# Patient Record
Sex: MALE | Race: WHITE | NOT HISPANIC OR LATINO | Employment: OTHER | URBAN - METROPOLITAN AREA
[De-identification: names, ages, dates, MRNs, and addresses within clinical notes are randomized per-mention and may not be internally consistent; named-entity substitution may affect disease eponyms.]

---

## 2017-02-21 ENCOUNTER — HOSPITAL ENCOUNTER (OUTPATIENT)
Dept: FAMILY MEDICINE CLINIC | Facility: CLINIC | Age: 82
Setting detail: SPECIMEN
Discharge: HOME | End: 2017-02-21

## 2017-02-21 LAB
INR PPP: 0.7 (ref 2–3)
PROTHROMBIN TIME: 10.3 SEC (ref 19.4–28.5)

## 2017-02-24 ENCOUNTER — HOSPITAL ENCOUNTER (OUTPATIENT)
Dept: FAMILY MEDICINE CLINIC | Facility: CLINIC | Age: 82
Setting detail: SPECIMEN
Discharge: HOME | End: 2017-02-24

## 2017-02-24 LAB
INR PPP: 1 (ref 2–3)
PROTHROMBIN TIME: 13.8 SEC (ref 19.4–28.5)

## 2017-03-20 ENCOUNTER — HOSPITAL ENCOUNTER (OUTPATIENT)
Dept: FAMILY MEDICINE CLINIC | Facility: CLINIC | Age: 82
Setting detail: SPECIMEN
Discharge: HOME | End: 2017-03-20

## 2017-03-20 LAB
INR PPP: 1.4 (ref 2–3)
PROTHROMBIN TIME: 15.5 SEC (ref 19.4–28.5)

## 2017-06-26 ENCOUNTER — HOSPITAL ENCOUNTER (OUTPATIENT)
Dept: FAMILY MEDICINE CLINIC | Facility: CLINIC | Age: 82
Setting detail: SPECIMEN
Discharge: HOME | End: 2017-06-26
Attending: FAMILY MEDICINE | Admitting: FAMILY MEDICINE

## 2017-06-26 LAB
INR PPP: 4.4 (ref 2–3)
PROTHROMBIN TIME: 50.1 SEC (ref 19.4–28.5)

## 2017-07-05 ENCOUNTER — HOSPITAL ENCOUNTER (EMERGENCY)
Dept: HOSPITAL 23 - CFTX | Age: 82
Discharge: HOME | End: 2017-07-05
Payer: MEDICARE

## 2017-07-05 DIAGNOSIS — S61.211A: ICD-10-CM

## 2017-07-05 DIAGNOSIS — Z23: ICD-10-CM
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
## 2017-07-05 DIAGNOSIS — S61.213A: ICD-10-CM

## 2017-07-05 DIAGNOSIS — S61.012A: Primary | ICD-10-CM

## 2017-07-05 LAB
HIV1+2 AB SPEC QL IA.RAPID: 34.1 SECONDS (ref 23.5–31.3)
INR: 2.2
PROTHROMBIN TIME (PATIENT): 23.8 SECONDS (ref 10–11.7)

## 2018-01-09 ENCOUNTER — HOSPITAL ENCOUNTER (OUTPATIENT)
Dept: FAMILY MEDICINE CLINIC | Facility: CLINIC | Age: 83
Setting detail: SPECIMEN
Discharge: HOME | End: 2018-01-09
Attending: FAMILY MEDICINE | Admitting: FAMILY MEDICINE

## 2018-01-09 LAB
INR PPP: 2.7 (ref 2–3)
PROTHROMBIN TIME: 28 SEC (ref 19.4–28.5)

## 2018-12-20 ENCOUNTER — HOSPITAL ENCOUNTER (OUTPATIENT)
Dept: FAMILY MEDICINE CLINIC | Facility: CLINIC | Age: 83
Setting detail: SPECIMEN
Discharge: HOME | End: 2018-12-20
Attending: FAMILY MEDICINE | Admitting: FAMILY MEDICINE

## 2018-12-20 LAB
ALBUMIN SERPL-MCNC: 2.8 G/DL (ref 3.5–4.8)
ALBUMIN/GLOB SERPL: 0.8 {RATIO} (ref 1–1.7)
ALP SERPL-CCNC: 60 IU/L (ref 32–91)
ALT SERPL-CCNC: 9 IU/L (ref 17–63)
ANION GAP SERPL CALC-SCNC: 13.7 MMOL/L (ref 10–20)
AST SERPL-CCNC: 14 IU/L (ref 15–41)
BASOPHILS # BLD AUTO: 0 10*3/UL (ref 0–0.2)
BASOPHILS NFR BLD AUTO: 1 % (ref 0–2)
BILIRUB SERPL-MCNC: 0.4 MG/DL (ref 0.3–1.2)
BUN SERPL-MCNC: 17 MG/DL (ref 8–20)
BUN/CREAT SERPL: 15.5 (ref 6.2–20.3)
CALCIUM SERPL-MCNC: 8.4 MG/DL (ref 8.9–10.3)
CHLORIDE SERPL-SCNC: 106 MMOL/L (ref 101–111)
CONV CO2: 25 MMOL/L (ref 22–32)
CONV TOTAL PROTEIN: 6.2 G/DL (ref 6.1–7.9)
CREAT UR-MCNC: 1.1 MG/DL (ref 0.7–1.2)
DIFFERENTIAL METHOD BLD: (no result)
EOSINOPHIL # BLD AUTO: 0.1 10*3/UL (ref 0–0.3)
EOSINOPHIL # BLD AUTO: 2 % (ref 0–3)
ERYTHROCYTE [DISTWIDTH] IN BLOOD BY AUTOMATED COUNT: 17.5 % (ref 11.5–14.5)
GLOBULIN UR ELPH-MCNC: 3.4 G/DL (ref 2.5–3.8)
GLUCOSE SERPL-MCNC: 96 MG/DL (ref 65–99)
HCT VFR BLD AUTO: 34.1 % (ref 40–54)
HGB BLD-MCNC: 10.9 G/DL (ref 14–18)
LYMPHOCYTES # BLD AUTO: 0.8 10*3/UL (ref 0.8–4.8)
LYMPHOCYTES NFR BLD AUTO: 13 % (ref 18–42)
MCH RBC QN AUTO: 28.1 PG (ref 26–32)
MCHC RBC AUTO-ENTMCNC: 32.1 G/DL (ref 32–36)
MCV RBC AUTO: 87.5 FL (ref 80–94)
MONOCYTES # BLD AUTO: 0.4 10*3/UL (ref 0.1–1.3)
MONOCYTES NFR BLD AUTO: 6 % (ref 2–11)
NEUTROPHILS # BLD AUTO: 4.8 10*3/UL (ref 2.3–8.6)
NEUTROPHILS NFR BLD AUTO: 78 % (ref 50–75)
NRBC BLD AUTO-RTO: 0 /100{WBCS}
NRBC/RBC NFR BLD MANUAL: 0 10*3/UL
PLATELET # BLD AUTO: 249 10*3/UL (ref 150–450)
PMV BLD AUTO: 9.1 FL (ref 7.4–10.4)
POTASSIUM SERPL-SCNC: 4.7 MMOL/L (ref 3.6–5.1)
RBC # BLD AUTO: 3.89 10*6/UL (ref 4.6–6)
SODIUM SERPL-SCNC: 140 MMOL/L (ref 136–144)
T3FREE SERPL-MCNC: 4.85 PG/ML (ref 2.39–6.79)
T4 FREE SERPL-MCNC: 0.93 NG/DL (ref 0.58–1.64)
TSH SERPL-ACNC: 1.98 UIU/ML (ref 0.34–5.6)
WBC # BLD AUTO: 6.1 10*3/UL (ref 4.5–11.5)

## 2019-07-11 RX ORDER — SERTRALINE HYDROCHLORIDE 100 MG/1
TABLET, FILM COATED ORAL
Qty: 30 TABLET | Refills: 1 | Status: SHIPPED | OUTPATIENT
Start: 2019-07-11 | End: 2019-09-09 | Stop reason: SDUPTHER

## 2019-07-15 ENCOUNTER — ANTICOAGULATION VISIT (OUTPATIENT)
Dept: FAMILY MEDICINE CLINIC | Facility: CLINIC | Age: 84
End: 2019-07-15

## 2019-07-15 DIAGNOSIS — Z79.01 LONG TERM CURRENT USE OF ANTICOAGULANT: Primary | ICD-10-CM

## 2019-07-15 LAB — INR PPP: 2.1 (ref 2–3)

## 2019-07-15 PROCEDURE — 85610 PROTHROMBIN TIME: CPT | Performed by: FAMILY MEDICINE

## 2019-07-15 PROCEDURE — 36416 COLLJ CAPILLARY BLOOD SPEC: CPT | Performed by: FAMILY MEDICINE

## 2019-07-15 PROCEDURE — 93793 ANTICOAG MGMT PT WARFARIN: CPT | Performed by: FAMILY MEDICINE

## 2019-07-30 RX ORDER — WARFARIN SODIUM 1 MG/1
TABLET ORAL
Qty: 30 TABLET | Refills: 2 | Status: SHIPPED | OUTPATIENT
Start: 2019-07-30 | End: 2019-10-15

## 2019-08-05 ENCOUNTER — ANTICOAGULATION VISIT (OUTPATIENT)
Dept: FAMILY MEDICINE CLINIC | Facility: CLINIC | Age: 84
End: 2019-08-05

## 2019-08-05 DIAGNOSIS — Z79.01 LONG TERM CURRENT USE OF ANTICOAGULANT: Primary | ICD-10-CM

## 2019-08-05 DIAGNOSIS — I25.83 CORONARY ARTERY DISEASE DUE TO LIPID RICH PLAQUE: ICD-10-CM

## 2019-08-05 DIAGNOSIS — I25.10 CORONARY ARTERY DISEASE DUE TO LIPID RICH PLAQUE: ICD-10-CM

## 2019-08-05 LAB — INR PPP: 3.8 (ref 2–3)

## 2019-08-05 PROCEDURE — 36416 COLLJ CAPILLARY BLOOD SPEC: CPT | Performed by: FAMILY MEDICINE

## 2019-08-05 PROCEDURE — 85610 PROTHROMBIN TIME: CPT | Performed by: FAMILY MEDICINE

## 2019-09-03 ENCOUNTER — ANTICOAGULATION VISIT (OUTPATIENT)
Dept: FAMILY MEDICINE CLINIC | Facility: CLINIC | Age: 84
End: 2019-09-03

## 2019-09-03 DIAGNOSIS — Z79.01 LONG TERM CURRENT USE OF ANTICOAGULANT: Primary | ICD-10-CM

## 2019-09-03 DIAGNOSIS — I25.83 CORONARY ARTERY DISEASE DUE TO LIPID RICH PLAQUE: ICD-10-CM

## 2019-09-03 DIAGNOSIS — I25.10 CORONARY ARTERY DISEASE DUE TO LIPID RICH PLAQUE: ICD-10-CM

## 2019-09-03 LAB — INR PPP: 2.7 (ref 2–3)

## 2019-09-03 PROCEDURE — 36416 COLLJ CAPILLARY BLOOD SPEC: CPT | Performed by: FAMILY MEDICINE

## 2019-09-03 PROCEDURE — 85610 PROTHROMBIN TIME: CPT | Performed by: FAMILY MEDICINE

## 2019-09-09 RX ORDER — SERTRALINE HYDROCHLORIDE 100 MG/1
TABLET, FILM COATED ORAL
Qty: 30 TABLET | Refills: 2 | Status: SHIPPED | OUTPATIENT
Start: 2019-09-09 | End: 2019-12-11 | Stop reason: SDUPTHER

## 2019-10-15 ENCOUNTER — OFFICE VISIT (OUTPATIENT)
Dept: FAMILY MEDICINE CLINIC | Facility: CLINIC | Age: 84
End: 2019-10-15

## 2019-10-15 VITALS
WEIGHT: 188 LBS | DIASTOLIC BLOOD PRESSURE: 83 MMHG | HEIGHT: 71 IN | OXYGEN SATURATION: 97 % | HEART RATE: 53 BPM | BODY MASS INDEX: 26.32 KG/M2 | SYSTOLIC BLOOD PRESSURE: 189 MMHG

## 2019-10-15 DIAGNOSIS — Z23 NEED FOR VACCINATION: Primary | ICD-10-CM

## 2019-10-15 DIAGNOSIS — I10 ESSENTIAL HYPERTENSION: ICD-10-CM

## 2019-10-15 DIAGNOSIS — C85.10 B-CELL LYMPHOMA, UNSPECIFIED B-CELL LYMPHOMA TYPE, UNSPECIFIED BODY REGION (HCC): ICD-10-CM

## 2019-10-15 PROBLEM — I21.3 ST ELEVATION (STEMI) MYOCARDIAL INFARCTION (HCC): Status: ACTIVE | Noted: 2019-10-15

## 2019-10-15 PROBLEM — E46 PROTEIN CALORIE MALNUTRITION (HCC): Status: ACTIVE | Noted: 2018-12-20

## 2019-10-15 LAB
ALBUMIN SERPL-MCNC: 3.9 G/DL (ref 3.5–5.2)
ALBUMIN/GLOB SERPL: 1.3 G/DL
ALP SERPL-CCNC: 72 U/L (ref 39–117)
ALT SERPL W P-5'-P-CCNC: 8 U/L (ref 1–41)
ANION GAP SERPL CALCULATED.3IONS-SCNC: 12.7 MMOL/L (ref 5–15)
AST SERPL-CCNC: 12 U/L (ref 1–40)
BASOPHILS # BLD AUTO: 0.04 10*3/MM3 (ref 0–0.2)
BASOPHILS NFR BLD AUTO: 0.8 % (ref 0–1.5)
BILIRUB SERPL-MCNC: 0.2 MG/DL (ref 0.2–1.2)
BUN BLD-MCNC: 24 MG/DL (ref 8–23)
BUN/CREAT SERPL: 19.4 (ref 7–25)
CALCIUM SPEC-SCNC: 8.4 MG/DL (ref 8.2–9.6)
CHLORIDE SERPL-SCNC: 106 MMOL/L (ref 98–107)
CHOLEST SERPL-MCNC: 127 MG/DL (ref 0–200)
CO2 SERPL-SCNC: 25.3 MMOL/L (ref 22–29)
CREAT BLD-MCNC: 1.24 MG/DL (ref 0.76–1.27)
DEPRECATED RDW RBC AUTO: 48.6 FL (ref 37–54)
EOSINOPHIL # BLD AUTO: 0.16 10*3/MM3 (ref 0–0.4)
EOSINOPHIL NFR BLD AUTO: 3.1 % (ref 0.3–6.2)
ERYTHROCYTE [DISTWIDTH] IN BLOOD BY AUTOMATED COUNT: 15.1 % (ref 12.3–15.4)
GFR SERPL CREATININE-BSD FRML MDRD: 55 ML/MIN/1.73
GLOBULIN UR ELPH-MCNC: 3.1 GM/DL
GLUCOSE BLD-MCNC: 91 MG/DL (ref 65–99)
HCT VFR BLD AUTO: 34.4 % (ref 37.5–51)
HDLC SERPL-MCNC: 49 MG/DL (ref 40–60)
HGB BLD-MCNC: 11.1 G/DL (ref 13–17.7)
IMM GRANULOCYTES # BLD AUTO: 0.01 10*3/MM3 (ref 0–0.05)
IMM GRANULOCYTES NFR BLD AUTO: 0.2 % (ref 0–0.5)
LDLC SERPL CALC-MCNC: 50 MG/DL (ref 0–100)
LDLC/HDLC SERPL: 1.03 {RATIO}
LYMPHOCYTES # BLD AUTO: 0.97 10*3/MM3 (ref 0.7–3.1)
LYMPHOCYTES NFR BLD AUTO: 18.6 % (ref 19.6–45.3)
MCH RBC QN AUTO: 28.1 PG (ref 26.6–33)
MCHC RBC AUTO-ENTMCNC: 32.3 G/DL (ref 31.5–35.7)
MCV RBC AUTO: 87.1 FL (ref 79–97)
MONOCYTES # BLD AUTO: 0.37 10*3/MM3 (ref 0.1–0.9)
MONOCYTES NFR BLD AUTO: 7.1 % (ref 5–12)
NEUTROPHILS # BLD AUTO: 3.66 10*3/MM3 (ref 1.7–7)
NEUTROPHILS NFR BLD AUTO: 70.2 % (ref 42.7–76)
NRBC BLD AUTO-RTO: 0 /100 WBC (ref 0–0.2)
PLATELET # BLD AUTO: 178 10*3/MM3 (ref 140–450)
PMV BLD AUTO: 11.1 FL (ref 6–12)
POTASSIUM BLD-SCNC: 4.8 MMOL/L (ref 3.5–5.2)
PROT SERPL-MCNC: 7 G/DL (ref 6–8.5)
RBC # BLD AUTO: 3.95 10*6/MM3 (ref 4.14–5.8)
SODIUM BLD-SCNC: 144 MMOL/L (ref 136–145)
TRIGL SERPL-MCNC: 138 MG/DL (ref 0–150)
VLDLC SERPL-MCNC: 27.6 MG/DL (ref 5–40)
WBC NRBC COR # BLD: 5.21 10*3/MM3 (ref 3.4–10.8)

## 2019-10-15 PROCEDURE — 90674 CCIIV4 VAC NO PRSV 0.5 ML IM: CPT | Performed by: FAMILY MEDICINE

## 2019-10-15 PROCEDURE — 80053 COMPREHEN METABOLIC PANEL: CPT | Performed by: FAMILY MEDICINE

## 2019-10-15 PROCEDURE — 36415 COLL VENOUS BLD VENIPUNCTURE: CPT | Performed by: FAMILY MEDICINE

## 2019-10-15 PROCEDURE — G0008 ADMIN INFLUENZA VIRUS VAC: HCPCS | Performed by: FAMILY MEDICINE

## 2019-10-15 PROCEDURE — 85025 COMPLETE CBC W/AUTO DIFF WBC: CPT | Performed by: FAMILY MEDICINE

## 2019-10-15 PROCEDURE — 80061 LIPID PANEL: CPT | Performed by: FAMILY MEDICINE

## 2019-10-15 PROCEDURE — 99213 OFFICE O/P EST LOW 20 MIN: CPT | Performed by: FAMILY MEDICINE

## 2019-10-15 RX ORDER — WARFARIN SODIUM 5 MG/1
TABLET ORAL
Start: 2019-10-15 | End: 2019-11-04 | Stop reason: SDUPTHER

## 2019-10-15 RX ORDER — ROSUVASTATIN CALCIUM 10 MG/1
TABLET, COATED ORAL
COMMUNITY
Start: 2018-01-21 | End: 2020-03-14

## 2019-10-15 RX ORDER — ASPIRIN 325 MG
81 TABLET ORAL DAILY
COMMUNITY
End: 2021-01-01

## 2019-10-15 RX ORDER — LISINOPRIL AND HYDROCHLOROTHIAZIDE 20; 12.5 MG/1; MG/1
1 TABLET ORAL DAILY
Qty: 30 TABLET | Refills: 3 | Status: SHIPPED | OUTPATIENT
Start: 2019-10-15 | End: 2021-01-01

## 2019-10-15 NOTE — PROGRESS NOTES
"Here for 6 mo f/u-cholesterol  INR-3.4  Takes 6 mg daily    Subjective   Niko Servin is a 89 y.o. male.     He is in for follow-up on his high cholesterol.  He is also had a history in the past of a B-cell lymphoma but that was treated and is in remission.  He is overdue for lab work.  He recently traveled to [place] with his wife for a family wedding and had no issues or events.  He denies any headaches or chest pains and states he feels well.  He has no explanation for today's elevated blood pressure.  He was on blood pressure medication at one time but it was discontinued after he became hypotensive, though that was during his treatment for lymphoma.           BP (!) 189/83 (BP Location: Right arm, Cuff Size: Adult)   Pulse 53   Ht 180.3 cm (71\")   Wt 85.3 kg (188 lb)   SpO2 97%   BMI 26.22 kg/m²       Chief Complaint   Patient presents with   • Hyperlipidemia           Current Outpatient Medications:   •  aspirin 325 MG tablet, Take 81 mg by mouth Daily., Disp: , Rfl:   •  rosuvastatin (CRESTOR) 10 MG tablet, ROSUVASTATIN CALCIUM 10 MG TABS, Disp: , Rfl:   •  sertraline (ZOLOFT) 100 MG tablet, TAKE ONE TABLET BY MOUTH DAILY, Disp: 30 tablet, Rfl: 2  •  lisinopril-hydrochlorothiazide (PRINZIDE,ZESTORETIC) 20-12.5 MG per tablet, Take 1 tablet by mouth Daily., Disp: 30 tablet, Rfl: 3  •  warfarin (COUMADIN) 5 MG tablet, Take 1 po daily in the afternoon, Disp: , Rfl:         The following portions of the patient's history were reviewed and updated as appropriate: allergies, current medications, past family history, past medical history, past social history, past surgical history and problem list.    Review of Systems   Constitutional: Negative for activity change, fatigue and fever.   HENT: Negative for congestion, sinus pressure, sinus pain, sore throat and trouble swallowing.    Eyes: Negative for visual disturbance.   Respiratory: Negative for chest tightness, shortness of breath and wheezing.  "   Cardiovascular: Negative for chest pain.   Gastrointestinal: Negative for abdominal distention, abdominal pain, constipation, diarrhea, nausea and vomiting.   Genitourinary: Negative for difficulty urinating and dysuria.   Musculoskeletal: Negative for back pain and neck pain.   Neurological: Negative for dizziness, weakness, light-headedness and numbness.   Hematological: Bruises/bleeds easily.   Psychiatric/Behavioral: Negative for agitation, hallucinations and suicidal ideas.       Objective   Physical Exam   Constitutional: He is oriented to person, place, and time. No distress.   HENT:   Nose: Nose normal.   Mouth/Throat: Oropharynx is clear and moist.   Neck: Normal range of motion. Neck supple. No JVD present. No thyromegaly present.   Cardiovascular: Normal rate.   Murmur heard.  Pulmonary/Chest: Effort normal and breath sounds normal.   Abdominal: Soft. Bowel sounds are normal. He exhibits no mass. There is no guarding.   Musculoskeletal: He exhibits no edema.   Lymphadenopathy:     He has no cervical adenopathy.   Neurological: He is alert and oriented to person, place, and time.   Skin: Skin is warm and dry.   Psychiatric: He has a normal mood and affect.   Nursing note and vitals reviewed.        Assessment/Plan   Problems Addressed this Visit        Cardiovascular and Mediastinum    HTN (hypertension)    Relevant Medications    lisinopril-hydrochlorothiazide (PRINZIDE,ZESTORETIC) 20-12.5 MG per tablet    Other Relevant Orders    Comprehensive metabolic panel    Lipid panel       Hematopoietic and Hemostatic    B-cell lymphoma (CMS/HCC)    Relevant Orders    CBC w AUTO Differential    CBC Auto Differential      Other Visit Diagnoses     Need for vaccination    -  Primary    Relevant Orders    Flucelvax Quad=>4Years (PFS) (Completed)        I will restart some treatment for his high blood pressure  I will see him back here in the office in 3 months to monitor that but he is to come back next month for a  recheck of his INR.  His INR was a little out of line at 3.4  I will reduce his warfarin dose to 5 mg daily  He will get some labs done for me today and I will see him back sooner if labs indicate a should

## 2019-11-04 RX ORDER — WARFARIN SODIUM 5 MG/1
TABLET ORAL
Qty: 60 TABLET | Refills: 3 | Status: SHIPPED | OUTPATIENT
Start: 2019-11-04 | End: 2021-01-01

## 2019-11-12 ENCOUNTER — ANTICOAGULATION VISIT (OUTPATIENT)
Dept: FAMILY MEDICINE CLINIC | Facility: CLINIC | Age: 84
End: 2019-11-12

## 2019-11-12 DIAGNOSIS — Z79.01 LONG TERM CURRENT USE OF ANTICOAGULANT: ICD-10-CM

## 2019-11-12 DIAGNOSIS — I25.10 CORONARY ARTERY DISEASE DUE TO LIPID RICH PLAQUE: Primary | ICD-10-CM

## 2019-11-12 DIAGNOSIS — I25.83 CORONARY ARTERY DISEASE DUE TO LIPID RICH PLAQUE: Primary | ICD-10-CM

## 2019-11-12 LAB — INR PPP: 2.8 (ref 2–3)

## 2019-11-12 PROCEDURE — 93793 ANTICOAG MGMT PT WARFARIN: CPT | Performed by: FAMILY MEDICINE

## 2019-11-12 PROCEDURE — 36416 COLLJ CAPILLARY BLOOD SPEC: CPT | Performed by: FAMILY MEDICINE

## 2019-11-12 PROCEDURE — 85610 PROTHROMBIN TIME: CPT | Performed by: FAMILY MEDICINE

## 2019-12-09 ENCOUNTER — ANTICOAGULATION VISIT (OUTPATIENT)
Dept: FAMILY MEDICINE CLINIC | Facility: CLINIC | Age: 84
End: 2019-12-09

## 2019-12-09 DIAGNOSIS — Z79.01 LONG TERM CURRENT USE OF ANTICOAGULANT: Primary | ICD-10-CM

## 2019-12-09 DIAGNOSIS — I25.10 CHRONIC CORONARY ARTERY DISEASE: ICD-10-CM

## 2019-12-09 LAB — INR PPP: 2.1 (ref 2–3)

## 2019-12-09 PROCEDURE — 93793 ANTICOAG MGMT PT WARFARIN: CPT | Performed by: FAMILY MEDICINE

## 2019-12-09 PROCEDURE — 85610 PROTHROMBIN TIME: CPT | Performed by: FAMILY MEDICINE

## 2019-12-09 PROCEDURE — 36416 COLLJ CAPILLARY BLOOD SPEC: CPT | Performed by: FAMILY MEDICINE

## 2019-12-12 RX ORDER — SERTRALINE HYDROCHLORIDE 100 MG/1
TABLET, FILM COATED ORAL
Qty: 30 TABLET | Refills: 1 | Status: SHIPPED | OUTPATIENT
Start: 2019-12-12 | End: 2020-02-15

## 2020-01-01 ENCOUNTER — ANTICOAGULATION VISIT (OUTPATIENT)
Dept: FAMILY MEDICINE CLINIC | Facility: CLINIC | Age: 85
End: 2020-01-01

## 2020-01-01 ENCOUNTER — FLU SHOT (OUTPATIENT)
Dept: FAMILY MEDICINE CLINIC | Facility: CLINIC | Age: 85
End: 2020-01-01

## 2020-01-01 DIAGNOSIS — Z95.2 HISTORY OF MITRAL VALVE REPLACEMENT: ICD-10-CM

## 2020-01-01 DIAGNOSIS — Z79.01 LONG TERM CURRENT USE OF ANTICOAGULANT: Primary | ICD-10-CM

## 2020-01-01 DIAGNOSIS — Z23 IMMUNIZATION DUE: Primary | ICD-10-CM

## 2020-01-01 LAB
INR PPP: 2.7 (ref 2.5–3.5)
INR PPP: 3.4 (ref 2.5–3.5)
INR PPP: 3.6 (ref 2.5–3.5)
INR PPP: 3.9 (ref 2.5–3.5)
INR PPP: 4.2 (ref 2.5–3.5)

## 2020-01-01 PROCEDURE — 36416 COLLJ CAPILLARY BLOOD SPEC: CPT | Performed by: FAMILY MEDICINE

## 2020-01-01 PROCEDURE — 90686 IIV4 VACC NO PRSV 0.5 ML IM: CPT | Performed by: FAMILY MEDICINE

## 2020-01-01 PROCEDURE — 85610 PROTHROMBIN TIME: CPT | Performed by: FAMILY MEDICINE

## 2020-01-01 PROCEDURE — 93793 ANTICOAG MGMT PT WARFARIN: CPT | Performed by: FAMILY MEDICINE

## 2020-01-01 PROCEDURE — G0008 ADMIN INFLUENZA VIRUS VAC: HCPCS | Performed by: FAMILY MEDICINE

## 2020-01-01 RX ORDER — ROSUVASTATIN CALCIUM 10 MG/1
TABLET, COATED ORAL
Qty: 30 TABLET | Refills: 2 | Status: SHIPPED | OUTPATIENT
Start: 2020-01-01 | End: 2021-01-01

## 2020-01-01 RX ORDER — WARFARIN SODIUM 7.5 MG/1
TABLET ORAL
Qty: 30 TABLET | Refills: 4 | Status: SHIPPED | OUTPATIENT
Start: 2020-01-01 | End: 2021-01-01

## 2020-01-01 RX ORDER — SERTRALINE HYDROCHLORIDE 100 MG/1
TABLET, FILM COATED ORAL
Qty: 30 TABLET | Refills: 2 | Status: SHIPPED | OUTPATIENT
Start: 2020-01-01 | End: 2021-01-01

## 2020-01-14 ENCOUNTER — OFFICE VISIT (OUTPATIENT)
Dept: FAMILY MEDICINE CLINIC | Facility: CLINIC | Age: 85
End: 2020-01-14

## 2020-01-14 VITALS
WEIGHT: 189.8 LBS | OXYGEN SATURATION: 98 % | SYSTOLIC BLOOD PRESSURE: 148 MMHG | HEART RATE: 63 BPM | HEIGHT: 69 IN | BODY MASS INDEX: 28.11 KG/M2 | TEMPERATURE: 97.6 F | DIASTOLIC BLOOD PRESSURE: 64 MMHG

## 2020-01-14 DIAGNOSIS — Z79.01 LONG TERM CURRENT USE OF ANTICOAGULANT: Primary | ICD-10-CM

## 2020-01-14 DIAGNOSIS — Z95.2 HISTORY OF MITRAL VALVE REPLACEMENT: ICD-10-CM

## 2020-01-14 LAB — INR PPP: 1.5 (ref 2–3)

## 2020-01-14 PROCEDURE — 99213 OFFICE O/P EST LOW 20 MIN: CPT | Performed by: FAMILY MEDICINE

## 2020-01-14 PROCEDURE — 36416 COLLJ CAPILLARY BLOOD SPEC: CPT | Performed by: FAMILY MEDICINE

## 2020-01-14 PROCEDURE — 85610 PROTHROMBIN TIME: CPT | Performed by: FAMILY MEDICINE

## 2020-01-14 NOTE — PROGRESS NOTES
"Subjective   Niko Servin is a 89 y.o. male.     He is in today for follow-up on his high blood pressure and to get his INR checked.  He takes warfarin following of valve replacement years ago.  He still has difficulty hearing and wears a hearing aid but his wife does not believe is working well.  He still has some difficulty walking at home and has a walker but will not use it.  His family is aware and they are comfortable with him just continuing to be his usual stubborn self.  He denies issues with bowel or bladder function.  He denies issues with sleep or mood.  He and family have been advised to not have him driving but he continues to ignore that, and so far has managed not to have any incidents or accidents.         /64 (BP Location: Right arm, Patient Position: Sitting, Cuff Size: Small Adult)   Pulse 63   Temp 97.6 °F (36.4 °C) (Oral)   Ht 175.3 cm (69\")   Wt 86.1 kg (189 lb 12.8 oz)   SpO2 98%   BMI 28.03 kg/m²       Chief Complaint   Patient presents with   • Hypertension     bp f/u and INR check            Current Outpatient Medications:   •  aspirin 325 MG tablet, Take 81 mg by mouth Daily., Disp: , Rfl:   •  lisinopril-hydrochlorothiazide (PRINZIDE,ZESTORETIC) 20-12.5 MG per tablet, Take 1 tablet by mouth Daily., Disp: 30 tablet, Rfl: 3  •  rosuvastatin (CRESTOR) 10 MG tablet, ROSUVASTATIN CALCIUM 10 MG TABS, Disp: , Rfl:   •  sertraline (ZOLOFT) 100 MG tablet, TAKE ONE TABLET BY MOUTH DAILY, Disp: 30 tablet, Rfl: 1  •  warfarin (COUMADIN) 5 MG tablet, TAKE TWO TABLETS BY MOUTH DAILY AT 5PM, Disp: 60 tablet, Rfl: 3        The following portions of the patient's history were reviewed and updated as appropriate: allergies, current medications, past family history, past medical history, past social history, past surgical history and problem list.    Review of Systems   Constitutional: Negative for activity change, fatigue and fever.   HENT: Negative for congestion, sinus pressure, sinus " pain, sore throat and trouble swallowing.    Eyes: Negative for visual disturbance.   Respiratory: Negative for chest tightness, shortness of breath and wheezing.    Cardiovascular: Negative for chest pain.   Gastrointestinal: Negative for abdominal distention, abdominal pain, constipation, diarrhea, nausea and vomiting.   Genitourinary: Negative for difficulty urinating and dysuria.   Musculoskeletal: Negative for back pain and neck pain.   Neurological: Negative for dizziness, weakness, light-headedness and numbness.   Hematological: Bruises/bleeds easily.   Psychiatric/Behavioral: Negative for agitation, hallucinations and suicidal ideas.       Objective   Physical Exam   Constitutional: He is oriented to person, place, and time. No distress.   HENT:   Mouth/Throat: No oropharyngeal exudate.   Wearing his hearing aid in left ear but it does not feel like it is working to me   Neck: Normal range of motion. Neck supple. No thyromegaly present.   Cardiovascular: Normal rate.   Murmur heard.  Pulmonary/Chest: Effort normal and breath sounds normal. He has no wheezes.   Abdominal: Soft. Bowel sounds are normal. There is no guarding.   Lymphadenopathy:     He has no cervical adenopathy.   Neurological: He is alert and oriented to person, place, and time. He displays normal reflexes.   Nursing note and vitals reviewed.        Assessment/Plan   Problems Addressed this Visit        Other    History of mitral valve replacement    Relevant Orders    POC INR (Completed)      Other Visit Diagnoses     Long term current use of anticoagulant    -  Primary    Relevant Orders    POC INR (Completed)        His blood pressure is improved so I will not change his medication  I have advised that he use the walker and stop driving again  I have advised that we upped his Coumadin to 6 mg and will recheck his INR in 2 weeks  I will see him back in 6 months to check his blood pressure again

## 2020-01-22 RX ORDER — WARFARIN SODIUM 1 MG/1
TABLET ORAL
Qty: 30 TABLET | Refills: 1 | Status: SHIPPED | OUTPATIENT
Start: 2020-01-22 | End: 2020-01-29 | Stop reason: DRUGHIGH

## 2020-01-28 ENCOUNTER — ANTICOAGULATION VISIT (OUTPATIENT)
Dept: FAMILY MEDICINE CLINIC | Facility: CLINIC | Age: 85
End: 2020-01-28

## 2020-01-28 DIAGNOSIS — I25.10 CHRONIC CORONARY ARTERY DISEASE: ICD-10-CM

## 2020-01-28 DIAGNOSIS — Z79.01 LONG TERM CURRENT USE OF ANTICOAGULANT: Primary | ICD-10-CM

## 2020-01-28 LAB — INR PPP: 1.7 (ref 2–3)

## 2020-01-28 PROCEDURE — 93793 ANTICOAG MGMT PT WARFARIN: CPT | Performed by: FAMILY MEDICINE

## 2020-01-28 PROCEDURE — 85610 PROTHROMBIN TIME: CPT | Performed by: FAMILY MEDICINE

## 2020-01-28 PROCEDURE — 36416 COLLJ CAPILLARY BLOOD SPEC: CPT | Performed by: FAMILY MEDICINE

## 2020-01-28 NOTE — PROGRESS NOTES
Patient is to increase his dose to 7.5mg daily. Prescription for new dose will be sent to pharmacy. Patient is to recheck INR in 2 weeks.

## 2020-01-29 ENCOUNTER — TELEPHONE (OUTPATIENT)
Dept: FAMILY MEDICINE CLINIC | Facility: CLINIC | Age: 85
End: 2020-01-29

## 2020-01-29 RX ORDER — WARFARIN SODIUM 7.5 MG/1
7.5 TABLET ORAL
Qty: 30 TABLET | Refills: 5 | Status: SHIPPED | OUTPATIENT
Start: 2020-01-29 | End: 2020-01-01

## 2020-02-11 ENCOUNTER — ANTICOAGULATION VISIT (OUTPATIENT)
Dept: FAMILY MEDICINE CLINIC | Facility: CLINIC | Age: 85
End: 2020-02-11

## 2020-02-11 DIAGNOSIS — Z79.01 LONG TERM CURRENT USE OF ANTICOAGULANT: Primary | ICD-10-CM

## 2020-02-11 DIAGNOSIS — I25.10 CHRONIC CORONARY ARTERY DISEASE: ICD-10-CM

## 2020-02-11 LAB — INR PPP: 2.8 (ref 2–3)

## 2020-02-11 PROCEDURE — 85610 PROTHROMBIN TIME: CPT | Performed by: FAMILY MEDICINE

## 2020-02-11 PROCEDURE — 36416 COLLJ CAPILLARY BLOOD SPEC: CPT | Performed by: FAMILY MEDICINE

## 2020-02-15 RX ORDER — SERTRALINE HYDROCHLORIDE 100 MG/1
TABLET, FILM COATED ORAL
Qty: 30 TABLET | Refills: 3 | Status: SHIPPED | OUTPATIENT
Start: 2020-02-15 | End: 2020-06-14

## 2020-03-10 ENCOUNTER — ANTICOAGULATION VISIT (OUTPATIENT)
Dept: FAMILY MEDICINE CLINIC | Facility: CLINIC | Age: 85
End: 2020-03-10

## 2020-03-10 DIAGNOSIS — Z95.2 HISTORY OF MITRAL VALVE REPLACEMENT: ICD-10-CM

## 2020-03-10 DIAGNOSIS — Z79.01 LONG TERM CURRENT USE OF ANTICOAGULANT: Primary | ICD-10-CM

## 2020-03-10 LAB — INR PPP: 3.5 (ref 2–3)

## 2020-03-10 PROCEDURE — 93793 ANTICOAG MGMT PT WARFARIN: CPT | Performed by: FAMILY MEDICINE

## 2020-03-10 PROCEDURE — 36416 COLLJ CAPILLARY BLOOD SPEC: CPT | Performed by: FAMILY MEDICINE

## 2020-03-10 PROCEDURE — 85610 PROTHROMBIN TIME: CPT | Performed by: FAMILY MEDICINE

## 2020-03-14 RX ORDER — ROSUVASTATIN CALCIUM 10 MG/1
TABLET, COATED ORAL
Qty: 30 TABLET | Refills: 2 | Status: SHIPPED | OUTPATIENT
Start: 2020-03-14 | End: 2020-06-22

## 2020-04-07 ENCOUNTER — ANTICOAGULATION VISIT (OUTPATIENT)
Dept: FAMILY MEDICINE CLINIC | Facility: CLINIC | Age: 85
End: 2020-04-07

## 2020-04-07 DIAGNOSIS — Z79.01 LONG TERM CURRENT USE OF ANTICOAGULANT: Primary | ICD-10-CM

## 2020-04-07 DIAGNOSIS — Z95.2 HISTORY OF MITRAL VALVE REPLACEMENT: ICD-10-CM

## 2020-04-07 LAB — INR PPP: 5.1 (ref 2.5–3.5)

## 2020-04-07 PROCEDURE — 85610 PROTHROMBIN TIME: CPT | Performed by: FAMILY MEDICINE

## 2020-04-07 PROCEDURE — 36416 COLLJ CAPILLARY BLOOD SPEC: CPT | Performed by: FAMILY MEDICINE

## 2020-04-21 ENCOUNTER — ANTICOAGULATION VISIT (OUTPATIENT)
Dept: FAMILY MEDICINE CLINIC | Facility: CLINIC | Age: 85
End: 2020-04-21

## 2020-04-21 DIAGNOSIS — Z79.01 LONG TERM CURRENT USE OF ANTICOAGULANT: Primary | ICD-10-CM

## 2020-04-21 DIAGNOSIS — I25.10 CHRONIC CORONARY ARTERY DISEASE: ICD-10-CM

## 2020-04-21 LAB — INR PPP: 1.6 (ref 2–3)

## 2020-04-21 PROCEDURE — 85610 PROTHROMBIN TIME: CPT | Performed by: FAMILY MEDICINE

## 2020-04-21 PROCEDURE — 36416 COLLJ CAPILLARY BLOOD SPEC: CPT | Performed by: FAMILY MEDICINE

## 2020-04-21 PROCEDURE — 93793 ANTICOAG MGMT PT WARFARIN: CPT | Performed by: FAMILY MEDICINE

## 2020-05-19 ENCOUNTER — ANTICOAGULATION VISIT (OUTPATIENT)
Dept: FAMILY MEDICINE CLINIC | Facility: CLINIC | Age: 85
End: 2020-05-19

## 2020-05-19 DIAGNOSIS — Z79.01 LONG TERM CURRENT USE OF ANTICOAGULANT: Primary | ICD-10-CM

## 2020-05-19 DIAGNOSIS — Z95.2 HISTORY OF MITRAL VALVE REPLACEMENT: ICD-10-CM

## 2020-05-19 LAB — INR PPP: 1.4 (ref 2.5–3.5)

## 2020-05-19 PROCEDURE — 93793 ANTICOAG MGMT PT WARFARIN: CPT | Performed by: NURSE PRACTITIONER

## 2020-05-19 PROCEDURE — 36416 COLLJ CAPILLARY BLOOD SPEC: CPT | Performed by: NURSE PRACTITIONER

## 2020-05-19 PROCEDURE — 85610 PROTHROMBIN TIME: CPT | Performed by: NURSE PRACTITIONER

## 2020-05-22 ENCOUNTER — ANTICOAGULATION VISIT (OUTPATIENT)
Dept: FAMILY MEDICINE CLINIC | Facility: CLINIC | Age: 85
End: 2020-05-22

## 2020-05-22 DIAGNOSIS — Z79.01 LONG TERM CURRENT USE OF ANTICOAGULANT: Primary | ICD-10-CM

## 2020-05-22 LAB — INR PPP: 1.7 (ref 2.5–3.5)

## 2020-05-22 PROCEDURE — 36416 COLLJ CAPILLARY BLOOD SPEC: CPT | Performed by: FAMILY MEDICINE

## 2020-05-22 PROCEDURE — 93793 ANTICOAG MGMT PT WARFARIN: CPT | Performed by: FAMILY MEDICINE

## 2020-05-22 PROCEDURE — 85610 PROTHROMBIN TIME: CPT | Performed by: FAMILY MEDICINE

## 2020-05-27 ENCOUNTER — ANTICOAGULATION VISIT (OUTPATIENT)
Dept: FAMILY MEDICINE CLINIC | Facility: CLINIC | Age: 85
End: 2020-05-27

## 2020-05-27 DIAGNOSIS — Z79.01 LONG TERM CURRENT USE OF ANTICOAGULANT: Primary | ICD-10-CM

## 2020-05-27 DIAGNOSIS — Z95.2 HISTORY OF MITRAL VALVE REPLACEMENT: ICD-10-CM

## 2020-05-27 LAB — INR PPP: 1.6 (ref 2.5–3.5)

## 2020-05-27 PROCEDURE — 85610 PROTHROMBIN TIME: CPT | Performed by: FAMILY MEDICINE

## 2020-05-27 PROCEDURE — 36416 COLLJ CAPILLARY BLOOD SPEC: CPT | Performed by: FAMILY MEDICINE

## 2020-05-27 PROCEDURE — 93793 ANTICOAG MGMT PT WARFARIN: CPT | Performed by: FAMILY MEDICINE

## 2020-05-27 NOTE — PROGRESS NOTES
Pt is to take 1.5 tablets on Monday,Wednesday,Friday and 1 tablet all other days. Recheck INR in 2 weeks.

## 2020-06-09 ENCOUNTER — ANTICOAGULATION VISIT (OUTPATIENT)
Dept: FAMILY MEDICINE CLINIC | Facility: CLINIC | Age: 85
End: 2020-06-09

## 2020-06-09 DIAGNOSIS — I25.10 CHRONIC CORONARY ARTERY DISEASE: ICD-10-CM

## 2020-06-09 DIAGNOSIS — Z79.01 LONG TERM CURRENT USE OF ANTICOAGULANT: Primary | ICD-10-CM

## 2020-06-09 LAB — INR PPP: 2.8 (ref 2.5–3.5)

## 2020-06-09 PROCEDURE — 85610 PROTHROMBIN TIME: CPT | Performed by: FAMILY MEDICINE

## 2020-06-09 NOTE — PROGRESS NOTES
Pt is to take 1.5 tablets on Monday,Wednesday,Friday and 1 tablet all other days. Recheck INR in 1 month.

## 2020-06-14 RX ORDER — SERTRALINE HYDROCHLORIDE 100 MG/1
TABLET, FILM COATED ORAL
Qty: 30 TABLET | Refills: 0 | Status: SHIPPED | OUTPATIENT
Start: 2020-06-14 | End: 2020-07-16

## 2020-06-22 RX ORDER — ROSUVASTATIN CALCIUM 10 MG/1
TABLET, COATED ORAL
Qty: 30 TABLET | Refills: 3 | Status: SHIPPED | OUTPATIENT
Start: 2020-06-22 | End: 2020-01-01

## 2020-07-09 ENCOUNTER — ANTICOAGULATION VISIT (OUTPATIENT)
Dept: FAMILY MEDICINE CLINIC | Facility: CLINIC | Age: 85
End: 2020-07-09

## 2020-07-09 DIAGNOSIS — Z95.2 HISTORY OF MITRAL VALVE REPLACEMENT: ICD-10-CM

## 2020-07-09 DIAGNOSIS — Z79.01 LONG TERM CURRENT USE OF ANTICOAGULANT: Primary | ICD-10-CM

## 2020-07-09 LAB — INR PPP: 5.1 (ref 1–3)

## 2020-07-09 PROCEDURE — 85610 PROTHROMBIN TIME: CPT | Performed by: FAMILY MEDICINE

## 2020-07-09 PROCEDURE — 36416 COLLJ CAPILLARY BLOOD SPEC: CPT | Performed by: FAMILY MEDICINE

## 2020-07-09 PROCEDURE — 93793 ANTICOAG MGMT PT WARFARIN: CPT | Performed by: FAMILY MEDICINE

## 2020-07-16 RX ORDER — SERTRALINE HYDROCHLORIDE 100 MG/1
TABLET, FILM COATED ORAL
Qty: 30 TABLET | Refills: 3 | Status: SHIPPED | OUTPATIENT
Start: 2020-07-16 | End: 2020-01-01

## 2020-07-20 ENCOUNTER — ANTICOAGULATION VISIT (OUTPATIENT)
Dept: FAMILY MEDICINE CLINIC | Facility: CLINIC | Age: 85
End: 2020-07-20

## 2020-07-20 DIAGNOSIS — Z95.2 HISTORY OF MITRAL VALVE REPLACEMENT: Primary | ICD-10-CM

## 2020-07-20 DIAGNOSIS — Z79.01 LONG TERM CURRENT USE OF ANTICOAGULANT: ICD-10-CM

## 2020-07-20 LAB — INR PPP: 2.7 (ref 2–3)

## 2020-07-20 PROCEDURE — 36416 COLLJ CAPILLARY BLOOD SPEC: CPT | Performed by: FAMILY MEDICINE

## 2020-07-20 PROCEDURE — 85610 PROTHROMBIN TIME: CPT | Performed by: FAMILY MEDICINE

## 2020-08-03 ENCOUNTER — ANTICOAGULATION VISIT (OUTPATIENT)
Dept: FAMILY MEDICINE CLINIC | Facility: CLINIC | Age: 85
End: 2020-08-03

## 2020-08-03 DIAGNOSIS — Z79.01 LONG TERM CURRENT USE OF ANTICOAGULANT: Primary | ICD-10-CM

## 2020-08-03 DIAGNOSIS — Z95.2 HISTORY OF MITRAL VALVE REPLACEMENT: ICD-10-CM

## 2020-08-03 LAB — INR PPP: 3.7 (ref 2.5–3.5)

## 2020-08-03 PROCEDURE — 93793 ANTICOAG MGMT PT WARFARIN: CPT | Performed by: FAMILY MEDICINE

## 2020-08-03 PROCEDURE — 36416 COLLJ CAPILLARY BLOOD SPEC: CPT | Performed by: FAMILY MEDICINE

## 2020-08-03 PROCEDURE — 85610 PROTHROMBIN TIME: CPT | Performed by: FAMILY MEDICINE

## 2021-01-01 ENCOUNTER — PATIENT OUTREACH (OUTPATIENT)
Dept: CASE MANAGEMENT | Facility: OTHER | Age: 86
End: 2021-01-01

## 2021-01-01 ENCOUNTER — HOSPITAL ENCOUNTER (INPATIENT)
Facility: HOSPITAL | Age: 86
LOS: 4 days | Discharge: HOSPICE | End: 2021-08-21
Attending: STUDENT IN AN ORGANIZED HEALTH CARE EDUCATION/TRAINING PROGRAM | Admitting: STUDENT IN AN ORGANIZED HEALTH CARE EDUCATION/TRAINING PROGRAM

## 2021-01-01 ENCOUNTER — APPOINTMENT (OUTPATIENT)
Dept: CT IMAGING | Facility: HOSPITAL | Age: 86
End: 2021-01-01

## 2021-01-01 ENCOUNTER — LAB (OUTPATIENT)
Dept: FAMILY MEDICINE CLINIC | Facility: CLINIC | Age: 86
End: 2021-01-01

## 2021-01-01 ENCOUNTER — OFFICE VISIT (OUTPATIENT)
Dept: FAMILY MEDICINE CLINIC | Facility: CLINIC | Age: 86
End: 2021-01-01

## 2021-01-01 ENCOUNTER — ANTICOAGULATION VISIT (OUTPATIENT)
Dept: FAMILY MEDICINE CLINIC | Facility: CLINIC | Age: 86
End: 2021-01-01

## 2021-01-01 ENCOUNTER — APPOINTMENT (OUTPATIENT)
Dept: CARDIOLOGY | Facility: HOSPITAL | Age: 86
End: 2021-01-01

## 2021-01-01 ENCOUNTER — APPOINTMENT (OUTPATIENT)
Dept: GENERAL RADIOLOGY | Facility: HOSPITAL | Age: 86
End: 2021-01-01

## 2021-01-01 ENCOUNTER — ANESTHESIA (OUTPATIENT)
Dept: PERIOP | Facility: HOSPITAL | Age: 86
End: 2021-01-01

## 2021-01-01 ENCOUNTER — TELEPHONE (OUTPATIENT)
Dept: FAMILY MEDICINE CLINIC | Facility: CLINIC | Age: 86
End: 2021-01-01

## 2021-01-01 ENCOUNTER — HOSPITAL ENCOUNTER (EMERGENCY)
Facility: HOSPITAL | Age: 86
Discharge: HOME | End: 2021-04-12
Admitting: EMERGENCY MEDICINE

## 2021-01-01 ENCOUNTER — TRANSCRIBE ORDERS (OUTPATIENT)
Dept: HOME HEALTH SERVICES | Facility: HOME HEALTHCARE | Age: 86
End: 2021-01-01

## 2021-01-01 ENCOUNTER — ANESTHESIA EVENT (OUTPATIENT)
Dept: PERIOP | Facility: HOSPITAL | Age: 86
End: 2021-01-01

## 2021-01-01 ENCOUNTER — EPISODE CHANGES (OUTPATIENT)
Dept: CASE MANAGEMENT | Facility: OTHER | Age: 86
End: 2021-01-01

## 2021-01-01 ENCOUNTER — HOSPITAL ENCOUNTER (INPATIENT)
Facility: HOSPITAL | Age: 86
LOS: 6 days | End: 2021-08-17
Attending: EMERGENCY MEDICINE | Admitting: STUDENT IN AN ORGANIZED HEALTH CARE EDUCATION/TRAINING PROGRAM

## 2021-01-01 VITALS
HEART RATE: 80 BPM | RESPIRATION RATE: 22 BRPM | WEIGHT: 186.51 LBS | DIASTOLIC BLOOD PRESSURE: 61 MMHG | HEIGHT: 71 IN | SYSTOLIC BLOOD PRESSURE: 131 MMHG | TEMPERATURE: 98.3 F | BODY MASS INDEX: 26.11 KG/M2 | OXYGEN SATURATION: 100 %

## 2021-01-01 VITALS
HEART RATE: 67 BPM | RESPIRATION RATE: 17 BRPM | SYSTOLIC BLOOD PRESSURE: 147 MMHG | TEMPERATURE: 97.8 F | BODY MASS INDEX: 26.26 KG/M2 | WEIGHT: 188.27 LBS | DIASTOLIC BLOOD PRESSURE: 67 MMHG | OXYGEN SATURATION: 100 %

## 2021-01-01 VITALS
SYSTOLIC BLOOD PRESSURE: 142 MMHG | RESPIRATION RATE: 19 BRPM | HEART RATE: 111 BPM | TEMPERATURE: 97.5 F | HEIGHT: 68 IN | DIASTOLIC BLOOD PRESSURE: 92 MMHG | BODY MASS INDEX: 25.76 KG/M2 | WEIGHT: 170 LBS | OXYGEN SATURATION: 96 %

## 2021-01-01 VITALS
SYSTOLIC BLOOD PRESSURE: 175 MMHG | HEART RATE: 56 BPM | BODY MASS INDEX: 28.13 KG/M2 | DIASTOLIC BLOOD PRESSURE: 69 MMHG | TEMPERATURE: 98.4 F | OXYGEN SATURATION: 95 % | WEIGHT: 185 LBS

## 2021-01-01 DIAGNOSIS — Z95.2 HISTORY OF MITRAL VALVE REPLACEMENT: Primary | ICD-10-CM

## 2021-01-01 DIAGNOSIS — I96 TOE GANGRENE (HCC): ICD-10-CM

## 2021-01-01 DIAGNOSIS — Z95.2 HISTORY OF MITRAL VALVE REPLACEMENT: ICD-10-CM

## 2021-01-01 DIAGNOSIS — Z95.2 MECHANICAL HEART VALVE PRESENT: ICD-10-CM

## 2021-01-01 DIAGNOSIS — Z79.01 LONG TERM CURRENT USE OF ANTICOAGULANT THERAPY: ICD-10-CM

## 2021-01-01 DIAGNOSIS — I73.9 PERIPHERAL VASCULAR DISEASE OF LOWER EXTREMITY WITH ULCERATION (HCC): ICD-10-CM

## 2021-01-01 DIAGNOSIS — I10 ESSENTIAL HYPERTENSION: Primary | ICD-10-CM

## 2021-01-01 DIAGNOSIS — R41.0 DISORIENTATION: ICD-10-CM

## 2021-01-01 DIAGNOSIS — Z79.01 LONG TERM CURRENT USE OF ANTICOAGULANT THERAPY: Primary | ICD-10-CM

## 2021-01-01 DIAGNOSIS — L97.909 PERIPHERAL VASCULAR DISEASE OF LOWER EXTREMITY WITH ULCERATION (HCC): ICD-10-CM

## 2021-01-01 DIAGNOSIS — H26.9 CATARACT OF BOTH EYES, UNSPECIFIED CATARACT TYPE: ICD-10-CM

## 2021-01-01 DIAGNOSIS — L03.116 CELLULITIS OF LEFT LOWER EXTREMITY: Primary | ICD-10-CM

## 2021-01-01 DIAGNOSIS — R41.0 DISORIENTATION: Primary | ICD-10-CM

## 2021-01-01 DIAGNOSIS — I25.10 CHRONIC CORONARY ARTERY DISEASE: Primary | ICD-10-CM

## 2021-01-01 DIAGNOSIS — Z79.01 LONG TERM CURRENT USE OF ANTICOAGULANT: ICD-10-CM

## 2021-01-01 DIAGNOSIS — M25.572 ACUTE LEFT ANKLE PAIN: Primary | ICD-10-CM

## 2021-01-01 DIAGNOSIS — R79.1 SUPRATHERAPEUTIC INR: ICD-10-CM

## 2021-01-01 LAB
ABO GROUP BLD: NORMAL
ABO GROUP BLD: NORMAL
ALBUMIN SERPL-MCNC: 2.2 G/DL (ref 3.5–5.2)
ALBUMIN SERPL-MCNC: 2.6 G/DL (ref 3.5–5.2)
ALBUMIN SERPL-MCNC: 2.7 G/DL (ref 3.5–5.2)
ALBUMIN SERPL-MCNC: 2.7 G/DL (ref 3.5–5.2)
ALBUMIN SERPL-MCNC: 3.3 G/DL (ref 3.5–5.2)
ALBUMIN SERPL-MCNC: 3.8 G/DL (ref 3.5–5.2)
ALBUMIN/GLOB SERPL: 0.6 G/DL
ALBUMIN/GLOB SERPL: 0.7 G/DL
ALBUMIN/GLOB SERPL: 0.7 G/DL
ALBUMIN/GLOB SERPL: 0.8 G/DL
ALBUMIN/GLOB SERPL: 1.1 G/DL
ALBUMIN/GLOB SERPL: 1.3 G/DL
ALP SERPL-CCNC: 57 U/L (ref 39–117)
ALP SERPL-CCNC: 64 U/L (ref 39–117)
ALP SERPL-CCNC: 72 U/L (ref 39–117)
ALP SERPL-CCNC: 74 U/L (ref 39–117)
ALP SERPL-CCNC: 77 U/L (ref 39–117)
ALP SERPL-CCNC: 80 U/L (ref 39–117)
ALT SERPL W P-5'-P-CCNC: 10 U/L (ref 1–41)
ALT SERPL W P-5'-P-CCNC: 7 U/L (ref 1–41)
ALT SERPL W P-5'-P-CCNC: 8 U/L (ref 1–41)
ALT SERPL W P-5'-P-CCNC: 9 U/L (ref 1–41)
AMMONIA BLD-SCNC: 10 UMOL/L (ref 16–60)
ANION GAP SERPL CALCULATED.3IONS-SCNC: 10 MMOL/L (ref 5–15)
ANION GAP SERPL CALCULATED.3IONS-SCNC: 10 MMOL/L (ref 5–15)
ANION GAP SERPL CALCULATED.3IONS-SCNC: 11 MMOL/L (ref 5–15)
ANION GAP SERPL CALCULATED.3IONS-SCNC: 8.4 MMOL/L (ref 5–15)
ANION GAP SERPL CALCULATED.3IONS-SCNC: 9 MMOL/L (ref 5–15)
APTT PPP: 39.9 SECONDS (ref 61–76.5)
APTT PPP: 49.5 SECONDS (ref 61–76.5)
APTT PPP: 51 SECONDS (ref 61–76.5)
APTT PPP: 53.8 SECONDS (ref 61–76.5)
APTT PPP: 54.1 SECONDS (ref 61–76.5)
APTT PPP: 55.1 SECONDS (ref 61–76.5)
APTT PPP: 56.2 SECONDS (ref 61–76.5)
APTT PPP: 68.3 SECONDS (ref 61–76.5)
APTT PPP: 73.2 SECONDS (ref 61–76.5)
APTT PPP: 86.3 SECONDS (ref 61–76.5)
ARTERIAL PATENCY WRIST A: POSITIVE
ARTERIAL PATENCY WRIST A: POSITIVE
AST SERPL-CCNC: 10 U/L (ref 1–40)
AST SERPL-CCNC: 14 U/L (ref 1–40)
AST SERPL-CCNC: 15 U/L (ref 1–40)
AST SERPL-CCNC: 16 U/L (ref 1–40)
AST SERPL-CCNC: 17 U/L (ref 1–40)
AST SERPL-CCNC: 22 U/L (ref 1–40)
ATMOSPHERIC PRESS: ABNORMAL MM[HG]
ATMOSPHERIC PRESS: ABNORMAL MM[HG]
B PARAPERT DNA SPEC QL NAA+PROBE: NOT DETECTED
B PERT DNA SPEC QL NAA+PROBE: NOT DETECTED
BACTERIA BLD CULT: ABNORMAL
BACTERIA SPEC AEROBE CULT: ABNORMAL
BACTERIA SPEC AEROBE CULT: NORMAL
BACTERIA SPEC ANAEROBE CULT: NORMAL
BACTERIA UR QL AUTO: ABNORMAL /HPF
BACTERIA UR QL AUTO: NORMAL /HPF
BASE EXCESS BLDA CALC-SCNC: 0.2 MMOL/L (ref 0–3)
BASE EXCESS BLDA CALC-SCNC: 3.5 MMOL/L (ref 0–3)
BASOPHILS # BLD AUTO: 0 10*3/MM3 (ref 0–0.2)
BASOPHILS # BLD AUTO: 0.04 10*3/MM3 (ref 0–0.2)
BASOPHILS # BLD AUTO: 0.04 10*3/MM3 (ref 0–0.2)
BASOPHILS # BLD AUTO: 0.1 10*3/MM3 (ref 0–0.2)
BASOPHILS NFR BLD AUTO: 0.2 % (ref 0–1.5)
BASOPHILS NFR BLD AUTO: 0.2 % (ref 0–1.5)
BASOPHILS NFR BLD AUTO: 0.3 % (ref 0–1.5)
BASOPHILS NFR BLD AUTO: 0.3 % (ref 0–1.5)
BASOPHILS NFR BLD AUTO: 0.4 % (ref 0–1.5)
BASOPHILS NFR BLD AUTO: 0.4 % (ref 0–1.5)
BASOPHILS NFR BLD AUTO: 0.5 % (ref 0–1.5)
BASOPHILS NFR BLD AUTO: 0.6 % (ref 0–1.5)
BASOPHILS NFR BLD AUTO: 0.7 % (ref 0–1.5)
BASOPHILS NFR BLD AUTO: 0.9 % (ref 0–1.5)
BDY SITE: ABNORMAL
BDY SITE: ABNORMAL
BH BB BLOOD EXPIRATION DATE: NORMAL
BH BB BLOOD TYPE BARCODE: 600
BH BB BLOOD TYPE BARCODE: 6200
BH BB BLOOD TYPE BARCODE: 6200
BH BB DISPENSE STATUS: NORMAL
BH BB PRODUCT CODE: NORMAL
BH BB UNIT NUMBER: NORMAL
BH CV ECHO MEAS - AO MAX PG (FULL): 37.5 MMHG
BH CV ECHO MEAS - AO MAX PG: 40.3 MMHG
BH CV ECHO MEAS - AO MEAN PG (FULL): 18.2 MMHG
BH CV ECHO MEAS - AO MEAN PG: 20 MMHG
BH CV ECHO MEAS - AO ROOT AREA (BSA CORRECTED): 1.5
BH CV ECHO MEAS - AO ROOT AREA: 7.4 CM^2
BH CV ECHO MEAS - AO ROOT DIAM: 3.1 CM
BH CV ECHO MEAS - AO V2 MAX: 317 CM/SEC
BH CV ECHO MEAS - AO V2 MEAN: 212.6 CM/SEC
BH CV ECHO MEAS - AO V2 VTI: 63.1 CM
BH CV ECHO MEAS - AORTIC HR: 76.2 BPM
BH CV ECHO MEAS - AORTIC R-R: 0.79 SEC
BH CV ECHO MEAS - AVA(I,A): 1.4 CM^2
BH CV ECHO MEAS - AVA(I,D): 1.4 CM^2
BH CV ECHO MEAS - AVA(V,A): 1.1 CM^2
BH CV ECHO MEAS - AVA(V,D): 1.1 CM^2
BH CV ECHO MEAS - BSA(HAYCOCK): 2 M^2
BH CV ECHO MEAS - BSA: 2 M^2
BH CV ECHO MEAS - BZI_BMI: 24.5 KILOGRAMS/M^2
BH CV ECHO MEAS - BZI_METRIC_HEIGHT: 182.9 CM
BH CV ECHO MEAS - BZI_METRIC_WEIGHT: 82.1 KG
BH CV ECHO MEAS - CI(AO): 17.3 L/MIN/M^2
BH CV ECHO MEAS - CI(LVOT): 3.4 L/MIN/M^2
BH CV ECHO MEAS - CO(AO): 35.4 L/MIN
BH CV ECHO MEAS - CO(LVOT): 6.9 L/MIN
BH CV ECHO MEAS - EDV(CUBED): 112.3 ML
BH CV ECHO MEAS - EDV(MOD-SP2): 100.8 ML
BH CV ECHO MEAS - EDV(MOD-SP4): 86.8 ML
BH CV ECHO MEAS - EDV(TEICH): 108.8 ML
BH CV ECHO MEAS - EF(CUBED): 59.7 %
BH CV ECHO MEAS - EF(MOD-BP): 57 %
BH CV ECHO MEAS - EF(MOD-SP2): 51.6 %
BH CV ECHO MEAS - EF(MOD-SP4): 61.8 %
BH CV ECHO MEAS - EF(TEICH): 51.2 %
BH CV ECHO MEAS - ESV(CUBED): 45.3 ML
BH CV ECHO MEAS - ESV(MOD-SP2): 48.8 ML
BH CV ECHO MEAS - ESV(MOD-SP4): 33.2 ML
BH CV ECHO MEAS - ESV(TEICH): 53.1 ML
BH CV ECHO MEAS - FS: 26.1 %
BH CV ECHO MEAS - IVS/LVPW: 0.94
BH CV ECHO MEAS - IVSD: 1.1 CM
BH CV ECHO MEAS - LA DIMENSION(2D): 4.5 CM
BH CV ECHO MEAS - LV DIASTOLIC VOL/BSA (35-75): 42.5 ML/M^2
BH CV ECHO MEAS - LV MASS(C)D: 196.7 GRAMS
BH CV ECHO MEAS - LV MASS(C)DI: 96.4 GRAMS/M^2
BH CV ECHO MEAS - LV MAX PG: 2.9 MMHG
BH CV ECHO MEAS - LV MEAN PG: 1.8 MMHG
BH CV ECHO MEAS - LV SYSTOLIC VOL/BSA (12-30): 16.3 ML/M^2
BH CV ECHO MEAS - LV V1 MAX: 84.5 CM/SEC
BH CV ECHO MEAS - LV V1 MEAN: 63.6 CM/SEC
BH CV ECHO MEAS - LV V1 VTI: 21.1 CM
BH CV ECHO MEAS - LVIDD: 4.8 CM
BH CV ECHO MEAS - LVIDS: 3.6 CM
BH CV ECHO MEAS - LVOT AREA: 4.3 CM^2
BH CV ECHO MEAS - LVOT DIAM: 2.3 CM
BH CV ECHO MEAS - LVPWD: 1.1 CM
BH CV ECHO MEAS - MR MAX PG: 61.7 MMHG
BH CV ECHO MEAS - MR MAX VEL: 392.5 CM/SEC
BH CV ECHO MEAS - MV A MAX VEL: 121.6 CM/SEC
BH CV ECHO MEAS - MV DEC SLOPE: 363.9 CM/SEC^2
BH CV ECHO MEAS - MV DEC TIME: 0.24 SEC
BH CV ECHO MEAS - MV E MAX VEL: 87.9 CM/SEC
BH CV ECHO MEAS - MV E/A: 0.72
BH CV ECHO MEAS - MV MAX PG: 7.4 MMHG
BH CV ECHO MEAS - MV MEAN PG: 3.3 MMHG
BH CV ECHO MEAS - MV V2 MAX: 135.6 CM/SEC
BH CV ECHO MEAS - MV V2 MEAN: 85.8 CM/SEC
BH CV ECHO MEAS - MV V2 VTI: 50.4 CM
BH CV ECHO MEAS - MVA(VTI): 1.8 CM^2
BH CV ECHO MEAS - PA MAX PG (FULL): 5.2 MMHG
BH CV ECHO MEAS - PA MAX PG: 7.6 MMHG
BH CV ECHO MEAS - PA MEAN PG (FULL): 2.4 MMHG
BH CV ECHO MEAS - PA MEAN PG: 3.6 MMHG
BH CV ECHO MEAS - PA V2 MAX: 137.8 CM/SEC
BH CV ECHO MEAS - PA V2 MEAN: 89.7 CM/SEC
BH CV ECHO MEAS - PA V2 VTI: 26.6 CM
BH CV ECHO MEAS - PULM A REVS DUR: 0.13 SEC
BH CV ECHO MEAS - PULM A REVS VEL: 25.4 CM/SEC
BH CV ECHO MEAS - PULM DIAS VEL: 49.6 CM/SEC
BH CV ECHO MEAS - PULM S/D: 1.1
BH CV ECHO MEAS - PULM SYS VEL: 56.3 CM/SEC
BH CV ECHO MEAS - RV MAX PG: 2.4 MMHG
BH CV ECHO MEAS - RV MEAN PG: 1.2 MMHG
BH CV ECHO MEAS - RV V1 MAX: 77.2 CM/SEC
BH CV ECHO MEAS - RV V1 MEAN: 51.5 CM/SEC
BH CV ECHO MEAS - RV V1 VTI: 14.1 CM
BH CV ECHO MEAS - SI(AO): 227.8 ML/M^2
BH CV ECHO MEAS - SI(CUBED): 32.8 ML/M^2
BH CV ECHO MEAS - SI(LVOT): 44.2 ML/M^2
BH CV ECHO MEAS - SI(MOD-SP2): 25.5 ML/M^2
BH CV ECHO MEAS - SI(MOD-SP4): 26.3 ML/M^2
BH CV ECHO MEAS - SI(TEICH): 27.3 ML/M^2
BH CV ECHO MEAS - SV(AO): 465.1 ML
BH CV ECHO MEAS - SV(CUBED): 67.1 ML
BH CV ECHO MEAS - SV(LVOT): 90.3 ML
BH CV ECHO MEAS - SV(MOD-SP2): 52 ML
BH CV ECHO MEAS - SV(MOD-SP4): 53.6 ML
BH CV ECHO MEAS - SV(TEICH): 55.7 ML
BH CV ECHO MEAS - TR MAX VEL: 279.5 CM/SEC
BH CV LOWER ARTERIAL LEFT ABI RATIO: 1.4
BH CV LOWER ARTERIAL LEFT DORSALIS PEDIS SYS MAX: 172 MMHG
BH CV LOWER ARTERIAL LEFT GREAT TOE SYS MAX: 0 MMHG
BH CV LOWER ARTERIAL LEFT POST TIBIAL SYS MAX: 212 MMHG
BH CV LOWER ARTERIAL LEFT TBI RATIO: 0
BH CV LOWER ARTERIAL RIGHT DORSALIS PEDIS SYS MAX: 254 MMHG
BH CV LOWER ARTERIAL RIGHT GREAT TOE SYS MAX: 173 MMHG
BH CV LOWER ARTERIAL RIGHT POST TIBIAL SYS MAX: 254 MMHG
BH CV LOWER ARTERIAL RIGHT TBI RATIO: 1.15
BH CV LOWER VASCULAR LEFT COMMON FEMORAL AUGMENT: NORMAL
BH CV LOWER VASCULAR LEFT COMMON FEMORAL COMPETENT: NORMAL
BH CV LOWER VASCULAR LEFT COMMON FEMORAL COMPRESS: NORMAL
BH CV LOWER VASCULAR LEFT COMMON FEMORAL PHASIC: NORMAL
BH CV LOWER VASCULAR LEFT COMMON FEMORAL SPONT: NORMAL
BH CV LOWER VASCULAR LEFT DISTAL FEMORAL COMPRESS: NORMAL
BH CV LOWER VASCULAR LEFT GASTRONEMIUS COMPRESS: NORMAL
BH CV LOWER VASCULAR LEFT GREATER SAPH AK COMPRESS: NORMAL
BH CV LOWER VASCULAR LEFT GREATER SAPH BK COMPRESS: NORMAL
BH CV LOWER VASCULAR LEFT LESSER SAPH COMPRESS: NORMAL
BH CV LOWER VASCULAR LEFT MID FEMORAL AUGMENT: NORMAL
BH CV LOWER VASCULAR LEFT MID FEMORAL COMPETENT: NORMAL
BH CV LOWER VASCULAR LEFT MID FEMORAL COMPRESS: NORMAL
BH CV LOWER VASCULAR LEFT MID FEMORAL PHASIC: NORMAL
BH CV LOWER VASCULAR LEFT MID FEMORAL SPONT: NORMAL
BH CV LOWER VASCULAR LEFT PERONEAL COMPRESS: NORMAL
BH CV LOWER VASCULAR LEFT POPLITEAL AUGMENT: NORMAL
BH CV LOWER VASCULAR LEFT POPLITEAL COMPETENT: NORMAL
BH CV LOWER VASCULAR LEFT POPLITEAL COMPRESS: NORMAL
BH CV LOWER VASCULAR LEFT POPLITEAL PHASIC: NORMAL
BH CV LOWER VASCULAR LEFT POPLITEAL SPONT: NORMAL
BH CV LOWER VASCULAR LEFT POSTERIOR TIBIAL COMPRESS: NORMAL
BH CV LOWER VASCULAR LEFT PROXIMAL FEMORAL COMPRESS: NORMAL
BH CV LOWER VASCULAR LEFT SAPHENOFEMORAL JUNCTION COMPRESS: NORMAL
BH CV LOWER VASCULAR RIGHT COMMON FEMORAL AUGMENT: NORMAL
BH CV LOWER VASCULAR RIGHT COMMON FEMORAL COMPETENT: NORMAL
BH CV LOWER VASCULAR RIGHT COMMON FEMORAL COMPRESS: NORMAL
BH CV LOWER VASCULAR RIGHT COMMON FEMORAL PHASIC: NORMAL
BH CV LOWER VASCULAR RIGHT COMMON FEMORAL SPONT: NORMAL
BH CV XLRA MEAS - DIST GSV THIGH DIST LEFT: 0.56 CM
BH CV XLRA MEAS - GSV ANKLE DIST LEFT: 0.36 CM
BH CV XLRA MEAS - GSV ANKLE DIST RIGHT: 0.23 CM
BH CV XLRA MEAS - MID GSV CALF LEFT: 0.46 CM
BH CV XLRA MEAS - MID GSV CALF RIGHT: 0.23 CM
BH CV XLRA MEAS - MID GSV THIGH  LEFT: 0.4 CM
BH CV XLRA MEAS - PROX GSV CALF DIST LEFT: 0.28 CM
BH CV XLRA MEAS - PROX GSV CALF DIST RIGHT: 0.35 CM
BH CV XLRA MEAS - PROX GSV THIGH  LEFT: 0.63 CM
BILIRUB SERPL-MCNC: 0.2 MG/DL (ref 0–1.2)
BILIRUB SERPL-MCNC: 0.3 MG/DL (ref 0–1.2)
BILIRUB SERPL-MCNC: 0.4 MG/DL (ref 0–1.2)
BILIRUB UR QL STRIP: NEGATIVE
BILIRUB UR QL STRIP: NEGATIVE
BLD GP AB SCN SERPL QL: NEGATIVE
BLD GP AB SCN SERPL QL: NEGATIVE
BUN SERPL-MCNC: 18 MG/DL (ref 8–23)
BUN SERPL-MCNC: 19 MG/DL (ref 8–23)
BUN SERPL-MCNC: 20 MG/DL (ref 8–23)
BUN SERPL-MCNC: 20 MG/DL (ref 8–23)
BUN SERPL-MCNC: 21 MG/DL (ref 8–23)
BUN SERPL-MCNC: 25 MG/DL (ref 8–23)
BUN SERPL-MCNC: 26 MG/DL (ref 8–23)
BUN SERPL-MCNC: 28 MG/DL (ref 8–23)
BUN SERPL-MCNC: 30 MG/DL (ref 8–23)
BUN SERPL-MCNC: 30 MG/DL (ref 8–23)
BUN SERPL-MCNC: 32 MG/DL (ref 8–23)
BUN SERPL-MCNC: 35 MG/DL (ref 8–23)
BUN SERPL-MCNC: 36 MG/DL (ref 8–23)
BUN/CREAT SERPL: 18.5 (ref 7–25)
BUN/CREAT SERPL: 19 (ref 7–25)
BUN/CREAT SERPL: 19.1 (ref 7–25)
BUN/CREAT SERPL: 19.1 (ref 7–25)
BUN/CREAT SERPL: 19.6 (ref 7–25)
BUN/CREAT SERPL: 22 (ref 7–25)
BUN/CREAT SERPL: 22.3 (ref 7–25)
BUN/CREAT SERPL: 23.4 (ref 7–25)
BUN/CREAT SERPL: 23.5 (ref 7–25)
BUN/CREAT SERPL: 23.8 (ref 7–25)
BUN/CREAT SERPL: 25.2 (ref 7–25)
BUN/CREAT SERPL: 28 (ref 7–25)
BUN/CREAT SERPL: 30 (ref 7–25)
C PNEUM DNA NPH QL NAA+NON-PROBE: NOT DETECTED
CALCIUM SPEC-SCNC: 7.7 MG/DL (ref 8.2–9.6)
CALCIUM SPEC-SCNC: 7.9 MG/DL (ref 8.2–9.6)
CALCIUM SPEC-SCNC: 7.9 MG/DL (ref 8.2–9.6)
CALCIUM SPEC-SCNC: 8.1 MG/DL (ref 8.2–9.6)
CALCIUM SPEC-SCNC: 8.2 MG/DL (ref 8.2–9.6)
CALCIUM SPEC-SCNC: 8.2 MG/DL (ref 8.2–9.6)
CALCIUM SPEC-SCNC: 8.3 MG/DL (ref 8.2–9.6)
CALCIUM SPEC-SCNC: 8.4 MG/DL (ref 8.2–9.6)
CALCIUM SPEC-SCNC: 8.5 MG/DL (ref 8.2–9.6)
CALCIUM SPEC-SCNC: 8.5 MG/DL (ref 8.2–9.6)
CALCIUM SPEC-SCNC: 8.7 MG/DL (ref 8.2–9.6)
CHLORIDE SERPL-SCNC: 103 MMOL/L (ref 98–107)
CHLORIDE SERPL-SCNC: 103 MMOL/L (ref 98–107)
CHLORIDE SERPL-SCNC: 104 MMOL/L (ref 98–107)
CHLORIDE SERPL-SCNC: 105 MMOL/L (ref 98–107)
CHLORIDE SERPL-SCNC: 105 MMOL/L (ref 98–107)
CHLORIDE SERPL-SCNC: 106 MMOL/L (ref 98–107)
CHLORIDE SERPL-SCNC: 107 MMOL/L (ref 98–107)
CHLORIDE SERPL-SCNC: 108 MMOL/L (ref 98–107)
CHLORIDE SERPL-SCNC: 108 MMOL/L (ref 98–107)
CHOLEST SERPL-MCNC: 132 MG/DL (ref 0–200)
CLARITY UR: ABNORMAL
CLARITY UR: CLEAR
CO2 BLDA-SCNC: 26.6 MMOL/L (ref 22–29)
CO2 BLDA-SCNC: 32.5 MMOL/L (ref 22–29)
CO2 SERPL-SCNC: 26 MMOL/L (ref 22–29)
CO2 SERPL-SCNC: 27.6 MMOL/L (ref 22–29)
CO2 SERPL-SCNC: 28 MMOL/L (ref 22–29)
CO2 SERPL-SCNC: 28 MMOL/L (ref 22–29)
CO2 SERPL-SCNC: 29 MMOL/L (ref 22–29)
CO2 SERPL-SCNC: 31 MMOL/L (ref 22–29)
COLOR UR: ABNORMAL
COLOR UR: ABNORMAL
CREAT SERPL-MCNC: 0.94 MG/DL (ref 0.76–1.27)
CREAT SERPL-MCNC: 0.97 MG/DL (ref 0.76–1.27)
CREAT SERPL-MCNC: 1.05 MG/DL (ref 0.76–1.27)
CREAT SERPL-MCNC: 1.08 MG/DL (ref 0.76–1.27)
CREAT SERPL-MCNC: 1.1 MG/DL (ref 0.76–1.27)
CREAT SERPL-MCNC: 1.12 MG/DL (ref 0.76–1.27)
CREAT SERPL-MCNC: 1.18 MG/DL (ref 0.76–1.27)
CREAT SERPL-MCNC: 1.19 MG/DL (ref 0.76–1.27)
CREAT SERPL-MCNC: 1.2 MG/DL (ref 0.76–1.27)
CREAT SERPL-MCNC: 1.25 MG/DL (ref 0.76–1.27)
CREAT SERPL-MCNC: 1.26 MG/DL (ref 0.76–1.27)
CREAT SERPL-MCNC: 1.27 MG/DL (ref 0.76–1.27)
CREAT SERPL-MCNC: 1.28 MG/DL (ref 0.76–1.27)
CROSSMATCH INTERPRETATION: NORMAL
CRP SERPL-MCNC: 28.87 MG/DL (ref 0–0.5)
D-LACTATE SERPL-SCNC: 0.8 MMOL/L (ref 0.5–2)
D-LACTATE SERPL-SCNC: 1 MMOL/L (ref 0.5–2)
D-LACTATE SERPL-SCNC: 1.1 MMOL/L (ref 0.5–2)
D-LACTATE SERPL-SCNC: 1.4 MMOL/L (ref 0.5–2)
DEPRECATED RDW RBC AUTO: 48.1 FL (ref 37–54)
DEPRECATED RDW RBC AUTO: 48.7 FL (ref 37–54)
DEPRECATED RDW RBC AUTO: 50.3 FL (ref 37–54)
DEPRECATED RDW RBC AUTO: 50.8 FL (ref 37–54)
DEPRECATED RDW RBC AUTO: 51.2 FL (ref 37–54)
DEPRECATED RDW RBC AUTO: 51.6 FL (ref 37–54)
DEPRECATED RDW RBC AUTO: 52.1 FL (ref 37–54)
DEPRECATED RDW RBC AUTO: 52.5 FL (ref 37–54)
DEPRECATED RDW RBC AUTO: 52.9 FL (ref 37–54)
DEPRECATED RDW RBC AUTO: 53.8 FL (ref 37–54)
EOSINOPHIL # BLD AUTO: 0.1 10*3/MM3 (ref 0–0.4)
EOSINOPHIL # BLD AUTO: 0.16 10*3/MM3 (ref 0–0.4)
EOSINOPHIL # BLD AUTO: 0.16 10*3/MM3 (ref 0–0.4)
EOSINOPHIL # BLD AUTO: 0.2 10*3/MM3 (ref 0–0.4)
EOSINOPHIL NFR BLD AUTO: 0.6 % (ref 0.3–6.2)
EOSINOPHIL NFR BLD AUTO: 0.8 % (ref 0.3–6.2)
EOSINOPHIL NFR BLD AUTO: 0.9 % (ref 0.3–6.2)
EOSINOPHIL NFR BLD AUTO: 1 % (ref 0.3–6.2)
EOSINOPHIL NFR BLD AUTO: 1.2 % (ref 0.3–6.2)
EOSINOPHIL NFR BLD AUTO: 1.5 % (ref 0.3–6.2)
EOSINOPHIL NFR BLD AUTO: 1.9 % (ref 0.3–6.2)
EOSINOPHIL NFR BLD AUTO: 2 % (ref 0.3–6.2)
EOSINOPHIL NFR BLD AUTO: 2.3 % (ref 0.3–6.2)
EOSINOPHIL NFR BLD AUTO: 2.5 % (ref 0.3–6.2)
EOSINOPHIL NFR BLD AUTO: 2.8 % (ref 0.3–6.2)
EOSINOPHIL NFR BLD AUTO: 2.8 % (ref 0.3–6.2)
ERYTHROCYTE [DISTWIDTH] IN BLOOD BY AUTOMATED COUNT: 15.2 % (ref 12.3–15.4)
ERYTHROCYTE [DISTWIDTH] IN BLOOD BY AUTOMATED COUNT: 15.4 % (ref 12.3–15.4)
ERYTHROCYTE [DISTWIDTH] IN BLOOD BY AUTOMATED COUNT: 17.1 % (ref 12.3–15.4)
ERYTHROCYTE [DISTWIDTH] IN BLOOD BY AUTOMATED COUNT: 17.4 % (ref 12.3–15.4)
ERYTHROCYTE [DISTWIDTH] IN BLOOD BY AUTOMATED COUNT: 17.5 % (ref 12.3–15.4)
ERYTHROCYTE [DISTWIDTH] IN BLOOD BY AUTOMATED COUNT: 17.6 % (ref 12.3–15.4)
ERYTHROCYTE [DISTWIDTH] IN BLOOD BY AUTOMATED COUNT: 17.6 % (ref 12.3–15.4)
ERYTHROCYTE [DISTWIDTH] IN BLOOD BY AUTOMATED COUNT: 17.8 % (ref 12.3–15.4)
ERYTHROCYTE [DISTWIDTH] IN BLOOD BY AUTOMATED COUNT: 17.9 % (ref 12.3–15.4)
ERYTHROCYTE [DISTWIDTH] IN BLOOD BY AUTOMATED COUNT: 18.1 % (ref 12.3–15.4)
ERYTHROCYTE [DISTWIDTH] IN BLOOD BY AUTOMATED COUNT: 18.1 % (ref 12.3–15.4)
ERYTHROCYTE [SEDIMENTATION RATE] IN BLOOD: 42 MM/HR (ref 0–20)
FERRITIN SERPL-MCNC: 554.4 NG/ML (ref 30–400)
FLUAV SUBTYP SPEC NAA+PROBE: NOT DETECTED
FLUBV RNA ISLT QL NAA+PROBE: NOT DETECTED
GFR SERPL CREATININE-BSD FRML MDRD: 53 ML/MIN/1.73
GFR SERPL CREATININE-BSD FRML MDRD: 53 ML/MIN/1.73
GFR SERPL CREATININE-BSD FRML MDRD: 54 ML/MIN/1.73
GFR SERPL CREATININE-BSD FRML MDRD: 54 ML/MIN/1.73
GFR SERPL CREATININE-BSD FRML MDRD: 57 ML/MIN/1.73
GFR SERPL CREATININE-BSD FRML MDRD: 57 ML/MIN/1.73
GFR SERPL CREATININE-BSD FRML MDRD: 58 ML/MIN/1.73
GFR SERPL CREATININE-BSD FRML MDRD: 61 ML/MIN/1.73
GFR SERPL CREATININE-BSD FRML MDRD: 63 ML/MIN/1.73
GFR SERPL CREATININE-BSD FRML MDRD: 64 ML/MIN/1.73
GFR SERPL CREATININE-BSD FRML MDRD: 66 ML/MIN/1.73
GFR SERPL CREATININE-BSD FRML MDRD: 72 ML/MIN/1.73
GFR SERPL CREATININE-BSD FRML MDRD: 75 ML/MIN/1.73
GLOBULIN UR ELPH-MCNC: 2.9 GM/DL
GLOBULIN UR ELPH-MCNC: 3 GM/DL
GLOBULIN UR ELPH-MCNC: 3.2 GM/DL
GLOBULIN UR ELPH-MCNC: 3.4 GM/DL
GLOBULIN UR ELPH-MCNC: 3.6 GM/DL
GLOBULIN UR ELPH-MCNC: 3.7 GM/DL
GLUCOSE BLDC GLUCOMTR-MCNC: 117 MG/DL (ref 70–105)
GLUCOSE BLDC GLUCOMTR-MCNC: 120 MG/DL (ref 70–105)
GLUCOSE BLDC GLUCOMTR-MCNC: 125 MG/DL (ref 70–105)
GLUCOSE SERPL-MCNC: 103 MG/DL (ref 65–99)
GLUCOSE SERPL-MCNC: 106 MG/DL (ref 65–99)
GLUCOSE SERPL-MCNC: 113 MG/DL (ref 65–99)
GLUCOSE SERPL-MCNC: 115 MG/DL (ref 65–99)
GLUCOSE SERPL-MCNC: 123 MG/DL (ref 65–99)
GLUCOSE SERPL-MCNC: 76 MG/DL (ref 65–99)
GLUCOSE SERPL-MCNC: 79 MG/DL (ref 65–99)
GLUCOSE SERPL-MCNC: 82 MG/DL (ref 65–99)
GLUCOSE SERPL-MCNC: 88 MG/DL (ref 65–99)
GLUCOSE SERPL-MCNC: 89 MG/DL (ref 65–99)
GLUCOSE SERPL-MCNC: 95 MG/DL (ref 65–99)
GLUCOSE SERPL-MCNC: 98 MG/DL (ref 65–99)
GLUCOSE SERPL-MCNC: 98 MG/DL (ref 65–99)
GLUCOSE UR STRIP-MCNC: NEGATIVE MG/DL
GLUCOSE UR STRIP-MCNC: NEGATIVE MG/DL
GRAM STN SPEC: ABNORMAL
HADV DNA SPEC NAA+PROBE: NOT DETECTED
HBA1C MFR BLD: 6 % (ref 3.5–5.6)
HCO3 BLDA-SCNC: 25.3 MMOL/L (ref 21–28)
HCO3 BLDA-SCNC: 30.6 MMOL/L (ref 21–28)
HCOV 229E RNA SPEC QL NAA+PROBE: NOT DETECTED
HCOV HKU1 RNA SPEC QL NAA+PROBE: NOT DETECTED
HCOV NL63 RNA SPEC QL NAA+PROBE: NOT DETECTED
HCOV OC43 RNA SPEC QL NAA+PROBE: NOT DETECTED
HCT VFR BLD AUTO: 22.1 % (ref 37.5–51)
HCT VFR BLD AUTO: 24.4 % (ref 37.5–51)
HCT VFR BLD AUTO: 24.5 % (ref 37.5–51)
HCT VFR BLD AUTO: 25 % (ref 37.5–51)
HCT VFR BLD AUTO: 25.1 % (ref 37.5–51)
HCT VFR BLD AUTO: 25.2 % (ref 37.5–51)
HCT VFR BLD AUTO: 25.5 % (ref 37.5–51)
HCT VFR BLD AUTO: 26.3 % (ref 37.5–51)
HCT VFR BLD AUTO: 26.3 % (ref 37.5–51)
HCT VFR BLD AUTO: 27 % (ref 37.5–51)
HCT VFR BLD AUTO: 27.4 % (ref 37.5–51)
HCT VFR BLD AUTO: 27.6 % (ref 37.5–51)
HCT VFR BLD AUTO: 29.5 % (ref 37.5–51)
HCT VFR BLD AUTO: 32.2 % (ref 37.5–51)
HCT VFR BLD AUTO: 33.2 % (ref 37.5–51)
HDLC SERPL-MCNC: 54 MG/DL (ref 40–60)
HEMODILUTION: NO
HEMODILUTION: NO
HGB BLD-MCNC: 10.4 G/DL (ref 13–17.7)
HGB BLD-MCNC: 10.4 G/DL (ref 13–17.7)
HGB BLD-MCNC: 7.2 G/DL (ref 13–17.7)
HGB BLD-MCNC: 7.9 G/DL (ref 13–17.7)
HGB BLD-MCNC: 7.9 G/DL (ref 13–17.7)
HGB BLD-MCNC: 8 G/DL (ref 13–17.7)
HGB BLD-MCNC: 8 G/DL (ref 13–17.7)
HGB BLD-MCNC: 8.2 G/DL (ref 13–17.7)
HGB BLD-MCNC: 8.3 G/DL (ref 13–17.7)
HGB BLD-MCNC: 8.6 G/DL (ref 13–17.7)
HGB BLD-MCNC: 8.7 G/DL (ref 13–17.7)
HGB BLD-MCNC: 9.4 G/DL (ref 13–17.7)
HGB UR QL STRIP.AUTO: ABNORMAL
HGB UR QL STRIP.AUTO: NEGATIVE
HMPV RNA NPH QL NAA+NON-PROBE: NOT DETECTED
HOLD SPECIMEN: NORMAL
HPIV1 RNA SPEC QL NAA+PROBE: NOT DETECTED
HPIV2 RNA SPEC QL NAA+PROBE: NOT DETECTED
HPIV3 RNA NPH QL NAA+PROBE: NOT DETECTED
HPIV4 P GENE NPH QL NAA+PROBE: NOT DETECTED
HYALINE CASTS UR QL AUTO: ABNORMAL /LPF
HYALINE CASTS UR QL AUTO: NORMAL /LPF
IMM GRANULOCYTES # BLD AUTO: 0.01 10*3/MM3 (ref 0–0.05)
IMM GRANULOCYTES # BLD AUTO: 0.01 10*3/MM3 (ref 0–0.05)
IMM GRANULOCYTES NFR BLD AUTO: 0.2 % (ref 0–0.5)
IMM GRANULOCYTES NFR BLD AUTO: 0.2 % (ref 0–0.5)
INHALED O2 CONCENTRATION: 32 %
INHALED O2 CONCENTRATION: 33 %
INR PPP: 1.43 (ref 2–3)
INR PPP: 1.66 (ref 0.93–1.1)
INR PPP: 1.66 (ref 2–3)
INR PPP: 1.66 (ref 2–3)
INR PPP: 1.87 (ref 2–3)
INR PPP: 1.9 (ref 2–3)
INR PPP: 2.4 (ref 2–3)
INR PPP: 2.6 (ref 2.5–3.5)
INR PPP: 2.79 (ref 2–3)
INR PPP: 2.87 (ref 2–3)
INR PPP: 2.91 (ref 2–3)
INR PPP: 3.1 (ref 1–3)
INR PPP: 3.17 (ref 2–3)
INR PPP: 3.2 (ref 2.5–3.5)
INR PPP: 3.4 (ref 2.5–3.5)
INR PPP: 3.4 (ref 2.5–3.5)
INR PPP: 3.6 (ref 2.5–3.5)
INR PPP: 3.9 (ref 2.5–3.5)
INR PPP: 3.92 (ref 2–3)
INR PPP: 4.24 (ref 2–3)
INR PPP: 4.3 (ref 2.5–3.5)
INR PPP: >=8 (ref 2–3)
IRON 24H UR-MRATE: 15 MCG/DL (ref 59–158)
IRON 24H UR-MRATE: 25 MCG/DL (ref 59–158)
IRON SATN MFR SERPL: 19 % (ref 20–50)
KETONES UR QL STRIP: NEGATIVE
KETONES UR QL STRIP: NEGATIVE
LAB AP CASE REPORT: NORMAL
LDLC SERPL CALC-MCNC: 58 MG/DL (ref 0–100)
LDLC/HDLC SERPL: 1.04 {RATIO}
LEUKOCYTE ESTERASE UR QL STRIP.AUTO: ABNORMAL
LEUKOCYTE ESTERASE UR QL STRIP.AUTO: NEGATIVE
LYMPHOCYTES # BLD AUTO: 0.3 10*3/MM3 (ref 0.7–3.1)
LYMPHOCYTES # BLD AUTO: 0.4 10*3/MM3 (ref 0.7–3.1)
LYMPHOCYTES # BLD AUTO: 0.72 10*3/MM3 (ref 0.7–3.1)
LYMPHOCYTES # BLD AUTO: 0.8 10*3/MM3 (ref 0.7–3.1)
LYMPHOCYTES # BLD AUTO: 0.93 10*3/MM3 (ref 0.7–3.1)
LYMPHOCYTES NFR BLD AUTO: 12.6 % (ref 19.6–45.3)
LYMPHOCYTES NFR BLD AUTO: 16.3 % (ref 19.6–45.3)
LYMPHOCYTES NFR BLD AUTO: 2.7 % (ref 19.6–45.3)
LYMPHOCYTES NFR BLD AUTO: 2.9 % (ref 19.6–45.3)
LYMPHOCYTES NFR BLD AUTO: 3 % (ref 19.6–45.3)
LYMPHOCYTES NFR BLD AUTO: 3.3 % (ref 19.6–45.3)
LYMPHOCYTES NFR BLD AUTO: 3.8 % (ref 19.6–45.3)
LYMPHOCYTES NFR BLD AUTO: 4.2 % (ref 19.6–45.3)
LYMPHOCYTES NFR BLD AUTO: 4.2 % (ref 19.6–45.3)
LYMPHOCYTES NFR BLD AUTO: 4.4 % (ref 19.6–45.3)
LYMPHOCYTES NFR BLD AUTO: 4.5 % (ref 19.6–45.3)
LYMPHOCYTES NFR BLD AUTO: 6.5 % (ref 19.6–45.3)
M PNEUMO IGG SER IA-ACNC: NOT DETECTED
MAGNESIUM SERPL-MCNC: 1.5 MG/DL (ref 1.7–2.3)
MAGNESIUM SERPL-MCNC: 1.6 MG/DL (ref 1.7–2.3)
MAGNESIUM SERPL-MCNC: 1.6 MG/DL (ref 1.7–2.3)
MAGNESIUM SERPL-MCNC: 1.8 MG/DL (ref 1.7–2.3)
MAGNESIUM SERPL-MCNC: 1.9 MG/DL (ref 1.7–2.3)
MAGNESIUM SERPL-MCNC: 2.1 MG/DL (ref 1.7–2.3)
MAGNESIUM SERPL-MCNC: 2.4 MG/DL (ref 1.7–2.3)
MAXIMAL PREDICTED HEART RATE: 128 BPM
MCH RBC QN AUTO: 26.1 PG (ref 26.6–33)
MCH RBC QN AUTO: 26.1 PG (ref 26.6–33)
MCH RBC QN AUTO: 26.2 PG (ref 26.6–33)
MCH RBC QN AUTO: 26.4 PG (ref 26.6–33)
MCH RBC QN AUTO: 26.6 PG (ref 26.6–33)
MCH RBC QN AUTO: 26.8 PG (ref 26.6–33)
MCH RBC QN AUTO: 26.9 PG (ref 26.6–33)
MCH RBC QN AUTO: 27 PG (ref 26.6–33)
MCH RBC QN AUTO: 27 PG (ref 26.6–33)
MCH RBC QN AUTO: 27.1 PG (ref 26.6–33)
MCH RBC QN AUTO: 27.2 PG (ref 26.6–33)
MCH RBC QN AUTO: 27.5 PG (ref 26.6–33)
MCH RBC QN AUTO: 27.5 PG (ref 26.6–33)
MCH RBC QN AUTO: 27.7 PG (ref 26.6–33)
MCH RBC QN AUTO: 28.3 PG (ref 26.6–33)
MCHC RBC AUTO-ENTMCNC: 31.3 G/DL (ref 31.5–35.7)
MCHC RBC AUTO-ENTMCNC: 31.5 G/DL (ref 31.5–35.7)
MCHC RBC AUTO-ENTMCNC: 31.7 G/DL (ref 31.5–35.7)
MCHC RBC AUTO-ENTMCNC: 31.7 G/DL (ref 31.5–35.7)
MCHC RBC AUTO-ENTMCNC: 31.9 G/DL (ref 31.5–35.7)
MCHC RBC AUTO-ENTMCNC: 32 G/DL (ref 31.5–35.7)
MCHC RBC AUTO-ENTMCNC: 32.3 G/DL (ref 31.5–35.7)
MCHC RBC AUTO-ENTMCNC: 32.4 G/DL (ref 31.5–35.7)
MCHC RBC AUTO-ENTMCNC: 32.5 G/DL (ref 31.5–35.7)
MCHC RBC AUTO-ENTMCNC: 32.7 G/DL (ref 31.5–35.7)
MCHC RBC AUTO-ENTMCNC: 32.7 G/DL (ref 31.5–35.7)
MCHC RBC AUTO-ENTMCNC: 32.9 G/DL (ref 31.5–35.7)
MCHC RBC AUTO-ENTMCNC: 33 G/DL (ref 31.5–35.7)
MCV RBC AUTO: 82.1 FL (ref 79–97)
MCV RBC AUTO: 82.2 FL (ref 79–97)
MCV RBC AUTO: 82.2 FL (ref 79–97)
MCV RBC AUTO: 82.7 FL (ref 79–97)
MCV RBC AUTO: 83 FL (ref 79–97)
MCV RBC AUTO: 83.1 FL (ref 79–97)
MCV RBC AUTO: 83.2 FL (ref 79–97)
MCV RBC AUTO: 83.2 FL (ref 79–97)
MCV RBC AUTO: 83.3 FL (ref 79–97)
MCV RBC AUTO: 83.4 FL (ref 79–97)
MCV RBC AUTO: 83.6 FL (ref 79–97)
MCV RBC AUTO: 83.9 FL (ref 79–97)
MCV RBC AUTO: 84.2 FL (ref 79–97)
MCV RBC AUTO: 86.7 FL (ref 79–97)
MCV RBC AUTO: 87.5 FL (ref 79–97)
MODALITY: ABNORMAL
MODALITY: ABNORMAL
MONOCYTES # BLD AUTO: 0.4 10*3/MM3 (ref 0.1–0.9)
MONOCYTES # BLD AUTO: 0.45 10*3/MM3 (ref 0.1–0.9)
MONOCYTES # BLD AUTO: 0.5 10*3/MM3 (ref 0.1–0.9)
MONOCYTES # BLD AUTO: 0.6 10*3/MM3 (ref 0.1–0.9)
MONOCYTES NFR BLD AUTO: 4.7 % (ref 5–12)
MONOCYTES NFR BLD AUTO: 4.8 % (ref 5–12)
MONOCYTES NFR BLD AUTO: 5.1 % (ref 5–12)
MONOCYTES NFR BLD AUTO: 5.3 % (ref 5–12)
MONOCYTES NFR BLD AUTO: 5.4 % (ref 5–12)
MONOCYTES NFR BLD AUTO: 5.5 % (ref 5–12)
MONOCYTES NFR BLD AUTO: 5.8 % (ref 5–12)
MONOCYTES NFR BLD AUTO: 5.9 % (ref 5–12)
MONOCYTES NFR BLD AUTO: 7 % (ref 5–12)
MONOCYTES NFR BLD AUTO: 7.9 % (ref 5–12)
NEUTROPHILS NFR BLD AUTO: 10.5 10*3/MM3 (ref 1.7–7)
NEUTROPHILS NFR BLD AUTO: 4.13 10*3/MM3 (ref 1.7–7)
NEUTROPHILS NFR BLD AUTO: 4.37 10*3/MM3 (ref 1.7–7)
NEUTROPHILS NFR BLD AUTO: 7.3 10*3/MM3 (ref 1.7–7)
NEUTROPHILS NFR BLD AUTO: 7.5 10*3/MM3 (ref 1.7–7)
NEUTROPHILS NFR BLD AUTO: 7.6 10*3/MM3 (ref 1.7–7)
NEUTROPHILS NFR BLD AUTO: 72.1 % (ref 42.7–76)
NEUTROPHILS NFR BLD AUTO: 76.7 % (ref 42.7–76)
NEUTROPHILS NFR BLD AUTO: 8 10*3/MM3 (ref 1.7–7)
NEUTROPHILS NFR BLD AUTO: 8 10*3/MM3 (ref 1.7–7)
NEUTROPHILS NFR BLD AUTO: 8.2 10*3/MM3 (ref 1.7–7)
NEUTROPHILS NFR BLD AUTO: 86.8 % (ref 42.7–76)
NEUTROPHILS NFR BLD AUTO: 87.3 % (ref 42.7–76)
NEUTROPHILS NFR BLD AUTO: 87.8 % (ref 42.7–76)
NEUTROPHILS NFR BLD AUTO: 89.1 % (ref 42.7–76)
NEUTROPHILS NFR BLD AUTO: 89.1 % (ref 42.7–76)
NEUTROPHILS NFR BLD AUTO: 89.2 % (ref 42.7–76)
NEUTROPHILS NFR BLD AUTO: 89.6 % (ref 42.7–76)
NEUTROPHILS NFR BLD AUTO: 89.8 % (ref 42.7–76)
NEUTROPHILS NFR BLD AUTO: 89.8 % (ref 42.7–76)
NEUTROPHILS NFR BLD AUTO: 9 10*3/MM3 (ref 1.7–7)
NEUTROPHILS NFR BLD AUTO: 9.2 10*3/MM3 (ref 1.7–7)
NEUTROPHILS NFR BLD AUTO: 9.8 10*3/MM3 (ref 1.7–7)
NEUTROPHILS NFR BLD AUTO: 90.1 % (ref 42.7–76)
NITRITE UR QL STRIP: NEGATIVE
NITRITE UR QL STRIP: NEGATIVE
NRBC BLD AUTO-RTO: 0 /100 WBC (ref 0–0.2)
NT-PROBNP SERPL-MCNC: 5874 PG/ML (ref 0–1800)
PATH REPORT.FINAL DX SPEC: NORMAL
PATH REPORT.GROSS SPEC: NORMAL
PCO2 BLDA: 42.5 MM HG (ref 35–48)
PCO2 BLDA: 60.8 MM HG (ref 35–48)
PH BLDA: 7.31 PH UNITS (ref 7.35–7.45)
PH BLDA: 7.38 PH UNITS (ref 7.35–7.45)
PH UR STRIP.AUTO: 5.5 [PH] (ref 5–8)
PH UR STRIP.AUTO: 6 [PH] (ref 5–8)
PHOSPHATE SERPL-MCNC: 2.3 MG/DL (ref 2.5–4.5)
PHOSPHATE SERPL-MCNC: 2.3 MG/DL (ref 2.5–4.5)
PHOSPHATE SERPL-MCNC: 3.5 MG/DL (ref 2.5–4.5)
PLATELET # BLD AUTO: 166 10*3/MM3 (ref 140–450)
PLATELET # BLD AUTO: 217 10*3/MM3 (ref 140–450)
PLATELET # BLD AUTO: 243 10*3/MM3 (ref 140–450)
PLATELET # BLD AUTO: 245 10*3/MM3 (ref 140–450)
PLATELET # BLD AUTO: 254 10*3/MM3 (ref 140–450)
PLATELET # BLD AUTO: 261 10*3/MM3 (ref 140–450)
PLATELET # BLD AUTO: 265 10*3/MM3 (ref 140–450)
PLATELET # BLD AUTO: 267 10*3/MM3 (ref 140–450)
PLATELET # BLD AUTO: 277 10*3/MM3 (ref 140–450)
PLATELET # BLD AUTO: 278 10*3/MM3 (ref 140–450)
PLATELET # BLD AUTO: 290 10*3/MM3 (ref 140–450)
PLATELET # BLD AUTO: 292 10*3/MM3 (ref 140–450)
PLATELET # BLD AUTO: 302 10*3/MM3 (ref 140–450)
PLATELET # BLD AUTO: 304 10*3/MM3 (ref 140–450)
PLATELET # BLD AUTO: 308 10*3/MM3 (ref 140–450)
PMV BLD AUTO: 10.8 FL (ref 6–12)
PMV BLD AUTO: 11.9 FL (ref 6–12)
PMV BLD AUTO: 7.7 FL (ref 6–12)
PMV BLD AUTO: 7.8 FL (ref 6–12)
PMV BLD AUTO: 7.9 FL (ref 6–12)
PMV BLD AUTO: 8 FL (ref 6–12)
PMV BLD AUTO: 8 FL (ref 6–12)
PMV BLD AUTO: 8.1 FL (ref 6–12)
PMV BLD AUTO: 8.3 FL (ref 6–12)
PMV BLD AUTO: 8.5 FL (ref 6–12)
PO2 BLDA: 80.4 MM HG (ref 83–108)
PO2 BLDA: 81.1 MM HG (ref 83–108)
POTASSIUM SERPL-SCNC: 3.1 MMOL/L (ref 3.5–5.2)
POTASSIUM SERPL-SCNC: 3.4 MMOL/L (ref 3.5–5.2)
POTASSIUM SERPL-SCNC: 3.6 MMOL/L (ref 3.5–5.2)
POTASSIUM SERPL-SCNC: 3.8 MMOL/L (ref 3.5–5.2)
POTASSIUM SERPL-SCNC: 3.9 MMOL/L (ref 3.5–5.2)
POTASSIUM SERPL-SCNC: 3.9 MMOL/L (ref 3.5–5.2)
POTASSIUM SERPL-SCNC: 4 MMOL/L (ref 3.5–5.2)
POTASSIUM SERPL-SCNC: 4 MMOL/L (ref 3.5–5.2)
POTASSIUM SERPL-SCNC: 4.1 MMOL/L (ref 3.5–5.2)
POTASSIUM SERPL-SCNC: 4.8 MMOL/L (ref 3.5–5.2)
POTASSIUM SERPL-SCNC: 5.1 MMOL/L (ref 3.5–5.2)
PROT SERPL-MCNC: 5.6 G/DL (ref 6–8.5)
PROT SERPL-MCNC: 5.9 G/DL (ref 6–8.5)
PROT SERPL-MCNC: 6.2 G/DL (ref 6–8.5)
PROT SERPL-MCNC: 6.2 G/DL (ref 6–8.5)
PROT SERPL-MCNC: 6.4 G/DL (ref 6–8.5)
PROT SERPL-MCNC: 6.8 G/DL (ref 6–8.5)
PROT UR QL STRIP: ABNORMAL
PROT UR QL STRIP: ABNORMAL
PROTHROMBIN TIME: 15.5 SECONDS (ref 19.4–28.5)
PROTHROMBIN TIME: 17.7 SECONDS (ref 19.4–28.5)
PROTHROMBIN TIME: 17.7 SECONDS (ref 19.4–28.5)
PROTHROMBIN TIME: 17.7 SECONDS (ref 9.6–11.7)
PROTHROMBIN TIME: 19.8 SECONDS (ref 19.4–28.5)
PROTHROMBIN TIME: 20.1 SECONDS (ref 19.4–28.5)
PROTHROMBIN TIME: 25 SECONDS (ref 19.4–28.5)
PROTHROMBIN TIME: 28.7 SECONDS (ref 19.4–28.5)
PROTHROMBIN TIME: 29.8 SECONDS (ref 19.4–28.5)
PROTHROMBIN TIME: 30 SECONDS (ref 19.4–28.5)
PROTHROMBIN TIME: 32.3 SECONDS (ref 19.4–28.5)
PROTHROMBIN TIME: 39.3 SECONDS (ref 19.4–28.5)
PROTHROMBIN TIME: 42.3 SECONDS (ref 19.4–28.5)
PROTHROMBIN TIME: 90 SECONDS (ref 19.4–28.5)
PROTHROMBIN TIME: >90 SECONDS (ref 19.4–28.5)
PROTHROMBIN TIME: >90 SECONDS (ref 19.4–28.5)
QT INTERVAL: 383 MS
RBC # BLD AUTO: 2.65 10*6/MM3 (ref 4.14–5.8)
RBC # BLD AUTO: 2.9 10*6/MM3 (ref 4.14–5.8)
RBC # BLD AUTO: 2.93 10*6/MM3 (ref 4.14–5.8)
RBC # BLD AUTO: 3.01 10*6/MM3 (ref 4.14–5.8)
RBC # BLD AUTO: 3.01 10*6/MM3 (ref 4.14–5.8)
RBC # BLD AUTO: 3.04 10*6/MM3 (ref 4.14–5.8)
RBC # BLD AUTO: 3.06 10*6/MM3 (ref 4.14–5.8)
RBC # BLD AUTO: 3.14 10*6/MM3 (ref 4.14–5.8)
RBC # BLD AUTO: 3.2 10*6/MM3 (ref 4.14–5.8)
RBC # BLD AUTO: 3.26 10*6/MM3 (ref 4.14–5.8)
RBC # BLD AUTO: 3.33 10*6/MM3 (ref 4.14–5.8)
RBC # BLD AUTO: 3.34 10*6/MM3 (ref 4.14–5.8)
RBC # BLD AUTO: 3.56 10*6/MM3 (ref 4.14–5.8)
RBC # BLD AUTO: 3.68 10*6/MM3 (ref 4.14–5.8)
RBC # BLD AUTO: 3.83 10*6/MM3 (ref 4.14–5.8)
RBC # UR: ABNORMAL /HPF
RBC # UR: NORMAL /HPF
REF LAB TEST METHOD: ABNORMAL
REF LAB TEST METHOD: NORMAL
RH BLD: POSITIVE
RH BLD: POSITIVE
RHINOVIRUS RNA SPEC NAA+PROBE: NOT DETECTED
RSV RNA NPH QL NAA+NON-PROBE: NOT DETECTED
SAO2 % BLDCOA: 94.2 % (ref 94–98)
SAO2 % BLDCOA: 95.7 % (ref 94–98)
SARS-COV-2 ORF1AB RESP QL NAA+PROBE: NOT DETECTED
SARS-COV-2 RNA PNL SPEC NAA+PROBE: NOT DETECTED
SODIUM SERPL-SCNC: 141 MMOL/L (ref 136–145)
SODIUM SERPL-SCNC: 141 MMOL/L (ref 136–145)
SODIUM SERPL-SCNC: 142 MMOL/L (ref 136–145)
SODIUM SERPL-SCNC: 143 MMOL/L (ref 136–145)
SODIUM SERPL-SCNC: 144 MMOL/L (ref 136–145)
SODIUM SERPL-SCNC: 144 MMOL/L (ref 136–145)
SODIUM SERPL-SCNC: 146 MMOL/L (ref 136–145)
SODIUM SERPL-SCNC: 146 MMOL/L (ref 136–145)
SODIUM SERPL-SCNC: 148 MMOL/L (ref 136–145)
SP GR UR STRIP: 1.02 (ref 1–1.03)
SP GR UR STRIP: 1.02 (ref 1–1.03)
SQUAMOUS #/AREA URNS HPF: ABNORMAL /HPF
SQUAMOUS #/AREA URNS HPF: NORMAL /HPF
STRESS TARGET HR: 109 BPM
T&S EXPIRATION DATE: NORMAL
T&S EXPIRATION DATE: NORMAL
TIBC SERPL-MCNC: 133 MCG/DL (ref 298–536)
TRANSFERRIN SERPL-MCNC: 89 MG/DL (ref 200–360)
TRIGL SERPL-MCNC: 110 MG/DL (ref 0–150)
TROPONIN T SERPL-MCNC: 0.01 NG/ML (ref 0–0.03)
TSH SERPL DL<=0.05 MIU/L-ACNC: 1.79 UIU/ML (ref 0.27–4.2)
TSH SERPL DL<=0.05 MIU/L-ACNC: 2.83 UIU/ML (ref 0.27–4.2)
UNIT  ABO: NORMAL
UNIT  RH: NORMAL
UPPER ARTERIAL LEFT ARM BRACHIAL SYS MAX: 142 MMHG
UPPER ARTERIAL RIGHT ARM BRACHIAL SYS MAX: 151 MMHG
UROBILINOGEN UR QL STRIP: ABNORMAL
UROBILINOGEN UR QL STRIP: ABNORMAL
VANCOMYCIN SERPL-MCNC: 12.1 MCG/ML (ref 5–40)
VANCOMYCIN TROUGH SERPL-MCNC: 16.7 MCG/ML (ref 5–20)
VLDLC SERPL-MCNC: 20 MG/DL (ref 5–40)
WBC # BLD AUTO: 10 10*3/MM3 (ref 3.4–10.8)
WBC # BLD AUTO: 10.3 10*3/MM3 (ref 3.4–10.8)
WBC # BLD AUTO: 10.9 10*3/MM3 (ref 3.4–10.8)
WBC # BLD AUTO: 12 10*3/MM3 (ref 3.4–10.8)
WBC # BLD AUTO: 5.7 10*3/MM3 (ref 3.4–10.8)
WBC # BLD AUTO: 5.72 10*3/MM3 (ref 3.4–10.8)
WBC # BLD AUTO: 7.8 10*3/MM3 (ref 3.4–10.8)
WBC # BLD AUTO: 8.1 10*3/MM3 (ref 3.4–10.8)
WBC # BLD AUTO: 8.4 10*3/MM3 (ref 3.4–10.8)
WBC # BLD AUTO: 8.7 10*3/MM3 (ref 3.4–10.8)
WBC # BLD AUTO: 8.8 10*3/MM3 (ref 3.4–10.8)
WBC # BLD AUTO: 9 10*3/MM3 (ref 3.4–10.8)
WBC # BLD AUTO: 9.1 10*3/MM3 (ref 3.4–10.8)
WBC UR QL AUTO: ABNORMAL /HPF
WBC UR QL AUTO: NORMAL /HPF
WHOLE BLOOD HOLD SPECIMEN: NORMAL

## 2021-01-01 PROCEDURE — 25010000002 HALOPERIDOL LACTATE PER 5 MG: Performed by: PODIATRIST

## 2021-01-01 PROCEDURE — 85610 PROTHROMBIN TIME: CPT | Performed by: FAMILY MEDICINE

## 2021-01-01 PROCEDURE — 25010000002 LORAZEPAM PER 2 MG: Performed by: STUDENT IN AN ORGANIZED HEALTH CARE EDUCATION/TRAINING PROGRAM

## 2021-01-01 PROCEDURE — 75635 CT ANGIO ABDOMINAL ARTERIES: CPT

## 2021-01-01 PROCEDURE — 25010000002 HEPARIN (PORCINE) 25000-0.45 UT/250ML-% SOLUTION: Performed by: INTERNAL MEDICINE

## 2021-01-01 PROCEDURE — 80053 COMPREHEN METABOLIC PANEL: CPT | Performed by: INTERNAL MEDICINE

## 2021-01-01 PROCEDURE — 85610 PROTHROMBIN TIME: CPT | Performed by: INTERNAL MEDICINE

## 2021-01-01 PROCEDURE — 99497 ADVNCD CARE PLAN 30 MIN: CPT | Performed by: NURSE PRACTITIONER

## 2021-01-01 PROCEDURE — 86850 RBC ANTIBODY SCREEN: CPT | Performed by: EMERGENCY MEDICINE

## 2021-01-01 PROCEDURE — 94799 UNLISTED PULMONARY SVC/PX: CPT

## 2021-01-01 PROCEDURE — 83540 ASSAY OF IRON: CPT | Performed by: INTERNAL MEDICINE

## 2021-01-01 PROCEDURE — 99231 SBSQ HOSP IP/OBS SF/LOW 25: CPT | Performed by: PODIATRIST

## 2021-01-01 PROCEDURE — 25010000002 VANCOMYCIN 1 G RECONSTITUTED SOLUTION 1 EACH VIAL: Performed by: PODIATRIST

## 2021-01-01 PROCEDURE — 73630 X-RAY EXAM OF FOOT: CPT

## 2021-01-01 PROCEDURE — 25010000003 AMPICILLIN-SULBACTAM PER 1.5 G: Performed by: PODIATRIST

## 2021-01-01 PROCEDURE — 25010000003 AMPICILLIN-SULBACTAM PER 1.5 G: Performed by: NURSE PRACTITIONER

## 2021-01-01 PROCEDURE — 36430 TRANSFUSION BLD/BLD COMPNT: CPT

## 2021-01-01 PROCEDURE — 93922 UPR/L XTREMITY ART 2 LEVELS: CPT

## 2021-01-01 PROCEDURE — 25010000002 PROPOFOL 10 MG/ML EMULSION: Performed by: ANESTHESIOLOGY

## 2021-01-01 PROCEDURE — 99220 PR INITIAL OBSERVATION CARE/DAY 70 MINUTES: CPT | Performed by: INTERNAL MEDICINE

## 2021-01-01 PROCEDURE — P9017 PLASMA 1 DONOR FRZ W/IN 8 HR: HCPCS

## 2021-01-01 PROCEDURE — 90471 IMMUNIZATION ADMIN: CPT | Performed by: EMERGENCY MEDICINE

## 2021-01-01 PROCEDURE — 80053 COMPREHEN METABOLIC PANEL: CPT | Performed by: EMERGENCY MEDICINE

## 2021-01-01 PROCEDURE — 85610 PROTHROMBIN TIME: CPT | Performed by: EMERGENCY MEDICINE

## 2021-01-01 PROCEDURE — 25010000002 MORPHINE PER 10 MG: Performed by: INTERNAL MEDICINE

## 2021-01-01 PROCEDURE — 25010000002 FUROSEMIDE PER 20 MG: Performed by: INTERNAL MEDICINE

## 2021-01-01 PROCEDURE — 85025 COMPLETE CBC W/AUTO DIFF WBC: CPT | Performed by: INTERNAL MEDICINE

## 2021-01-01 PROCEDURE — 87077 CULTURE AEROBIC IDENTIFY: CPT | Performed by: EMERGENCY MEDICINE

## 2021-01-01 PROCEDURE — 86927 PLASMA FRESH FROZEN: CPT

## 2021-01-01 PROCEDURE — 93306 TTE W/DOPPLER COMPLETE: CPT

## 2021-01-01 PROCEDURE — 85610 PROTHROMBIN TIME: CPT | Performed by: NURSE PRACTITIONER

## 2021-01-01 PROCEDURE — 84100 ASSAY OF PHOSPHORUS: CPT | Performed by: INTERNAL MEDICINE

## 2021-01-01 PROCEDURE — 85730 THROMBOPLASTIN TIME PARTIAL: CPT | Performed by: INTERNAL MEDICINE

## 2021-01-01 PROCEDURE — 82803 BLOOD GASES ANY COMBINATION: CPT

## 2021-01-01 PROCEDURE — 99233 SBSQ HOSP IP/OBS HIGH 50: CPT | Performed by: INTERNAL MEDICINE

## 2021-01-01 PROCEDURE — 36415 COLL VENOUS BLD VENIPUNCTURE: CPT | Performed by: INTERNAL MEDICINE

## 2021-01-01 PROCEDURE — 86140 C-REACTIVE PROTEIN: CPT | Performed by: EMERGENCY MEDICINE

## 2021-01-01 PROCEDURE — 25010000002 MORPHINE PER 10 MG: Performed by: PODIATRIST

## 2021-01-01 PROCEDURE — 87077 CULTURE AEROBIC IDENTIFY: CPT | Performed by: PODIATRIST

## 2021-01-01 PROCEDURE — 83605 ASSAY OF LACTIC ACID: CPT

## 2021-01-01 PROCEDURE — 88311 DECALCIFY TISSUE: CPT | Performed by: PODIATRIST

## 2021-01-01 PROCEDURE — 80061 LIPID PANEL: CPT | Performed by: FAMILY MEDICINE

## 2021-01-01 PROCEDURE — 87075 CULTR BACTERIA EXCEPT BLOOD: CPT | Performed by: PODIATRIST

## 2021-01-01 PROCEDURE — 97162 PT EVAL MOD COMPLEX 30 MIN: CPT

## 2021-01-01 PROCEDURE — 85025 COMPLETE CBC W/AUTO DIFF WBC: CPT | Performed by: EMERGENCY MEDICINE

## 2021-01-01 PROCEDURE — 36416 COLLJ CAPILLARY BLOOD SPEC: CPT | Performed by: FAMILY MEDICINE

## 2021-01-01 PROCEDURE — 82140 ASSAY OF AMMONIA: CPT | Performed by: STUDENT IN AN ORGANIZED HEALTH CARE EDUCATION/TRAINING PROGRAM

## 2021-01-01 PROCEDURE — 85610 PROTHROMBIN TIME: CPT | Performed by: PODIATRIST

## 2021-01-01 PROCEDURE — 25010000002 CALCIUM GLUCONATE-NACL 1-0.675 GM/50ML-% SOLUTION: Performed by: INTERNAL MEDICINE

## 2021-01-01 PROCEDURE — 80202 ASSAY OF VANCOMYCIN: CPT | Performed by: INTERNAL MEDICINE

## 2021-01-01 PROCEDURE — 99232 SBSQ HOSP IP/OBS MODERATE 35: CPT | Performed by: INTERNAL MEDICINE

## 2021-01-01 PROCEDURE — 80048 BASIC METABOLIC PNL TOTAL CA: CPT | Performed by: PODIATRIST

## 2021-01-01 PROCEDURE — 25010000002 MORPHINE PER 10 MG: Performed by: STUDENT IN AN ORGANIZED HEALTH CARE EDUCATION/TRAINING PROGRAM

## 2021-01-01 PROCEDURE — 36415 COLL VENOUS BLD VENIPUNCTURE: CPT | Performed by: FAMILY MEDICINE

## 2021-01-01 PROCEDURE — 25010000002 MEROPENEM PER 100 MG: Performed by: INTERNAL MEDICINE

## 2021-01-01 PROCEDURE — 25010000002 VANCOMYCIN 1 G RECONSTITUTED SOLUTION 1 EACH VIAL: Performed by: NURSE PRACTITIONER

## 2021-01-01 PROCEDURE — 85025 COMPLETE CBC W/AUTO DIFF WBC: CPT | Performed by: FAMILY MEDICINE

## 2021-01-01 PROCEDURE — 80202 ASSAY OF VANCOMYCIN: CPT | Performed by: NURSE PRACTITIONER

## 2021-01-01 PROCEDURE — 87186 SC STD MICRODIL/AGAR DIL: CPT | Performed by: PODIATRIST

## 2021-01-01 PROCEDURE — 85610 PROTHROMBIN TIME: CPT | Performed by: STUDENT IN AN ORGANIZED HEALTH CARE EDUCATION/TRAINING PROGRAM

## 2021-01-01 PROCEDURE — 81001 URINALYSIS AUTO W/SCOPE: CPT | Performed by: EMERGENCY MEDICINE

## 2021-01-01 PROCEDURE — 85025 COMPLETE CBC W/AUTO DIFF WBC: CPT | Performed by: NURSE PRACTITIONER

## 2021-01-01 PROCEDURE — 90715 TDAP VACCINE 7 YRS/> IM: CPT | Performed by: EMERGENCY MEDICINE

## 2021-01-01 PROCEDURE — 71045 X-RAY EXAM CHEST 1 VIEW: CPT

## 2021-01-01 PROCEDURE — 70450 CT HEAD/BRAIN W/O DYE: CPT

## 2021-01-01 PROCEDURE — 25010000002 ALBUMIN HUMAN 25% PER 50 ML: Performed by: INTERNAL MEDICINE

## 2021-01-01 PROCEDURE — P9047 ALBUMIN (HUMAN), 25%, 50ML: HCPCS | Performed by: INTERNAL MEDICINE

## 2021-01-01 PROCEDURE — 93005 ELECTROCARDIOGRAM TRACING: CPT | Performed by: EMERGENCY MEDICINE

## 2021-01-01 PROCEDURE — 83735 ASSAY OF MAGNESIUM: CPT | Performed by: NURSE PRACTITIONER

## 2021-01-01 PROCEDURE — 83605 ASSAY OF LACTIC ACID: CPT | Performed by: INTERNAL MEDICINE

## 2021-01-01 PROCEDURE — 87150 DNA/RNA AMPLIFIED PROBE: CPT | Performed by: EMERGENCY MEDICINE

## 2021-01-01 PROCEDURE — 94640 AIRWAY INHALATION TREATMENT: CPT

## 2021-01-01 PROCEDURE — 99232 SBSQ HOSP IP/OBS MODERATE 35: CPT | Performed by: STUDENT IN AN ORGANIZED HEALTH CARE EDUCATION/TRAINING PROGRAM

## 2021-01-01 PROCEDURE — 36600 WITHDRAWAL OF ARTERIAL BLOOD: CPT

## 2021-01-01 PROCEDURE — 80048 BASIC METABOLIC PNL TOTAL CA: CPT | Performed by: STUDENT IN AN ORGANIZED HEALTH CARE EDUCATION/TRAINING PROGRAM

## 2021-01-01 PROCEDURE — 86901 BLOOD TYPING SEROLOGIC RH(D): CPT | Performed by: EMERGENCY MEDICINE

## 2021-01-01 PROCEDURE — 99284 EMERGENCY DEPT VISIT MOD MDM: CPT

## 2021-01-01 PROCEDURE — 97530 THERAPEUTIC ACTIVITIES: CPT

## 2021-01-01 PROCEDURE — 83735 ASSAY OF MAGNESIUM: CPT | Performed by: PODIATRIST

## 2021-01-01 PROCEDURE — 99232 SBSQ HOSP IP/OBS MODERATE 35: CPT | Performed by: PODIATRIST

## 2021-01-01 PROCEDURE — 85730 THROMBOPLASTIN TIME PARTIAL: CPT | Performed by: STUDENT IN AN ORGANIZED HEALTH CARE EDUCATION/TRAINING PROGRAM

## 2021-01-01 PROCEDURE — 99233 SBSQ HOSP IP/OBS HIGH 50: CPT | Performed by: STUDENT IN AN ORGANIZED HEALTH CARE EDUCATION/TRAINING PROGRAM

## 2021-01-01 PROCEDURE — 87635 SARS-COV-2 COVID-19 AMP PRB: CPT | Performed by: INTERNAL MEDICINE

## 2021-01-01 PROCEDURE — 86850 RBC ANTIBODY SCREEN: CPT | Performed by: INTERNAL MEDICINE

## 2021-01-01 PROCEDURE — 25010000002 VANCOMYCIN PER 500 MG: Performed by: EMERGENCY MEDICINE

## 2021-01-01 PROCEDURE — 80048 BASIC METABOLIC PNL TOTAL CA: CPT | Performed by: INTERNAL MEDICINE

## 2021-01-01 PROCEDURE — 85027 COMPLETE CBC AUTOMATED: CPT | Performed by: STUDENT IN AN ORGANIZED HEALTH CARE EDUCATION/TRAINING PROGRAM

## 2021-01-01 PROCEDURE — 80048 BASIC METABOLIC PNL TOTAL CA: CPT | Performed by: NURSE PRACTITIONER

## 2021-01-01 PROCEDURE — 25010000002 NA FERRIC GLUC CPLX PER 12.5 MG: Performed by: INTERNAL MEDICINE

## 2021-01-01 PROCEDURE — U0004 COV-19 TEST NON-CDC HGH THRU: HCPCS | Performed by: EMERGENCY MEDICINE

## 2021-01-01 PROCEDURE — 25010000002 MAGNESIUM SULFATE IN D5W 1G/100ML (PREMIX) 1-5 GM/100ML-% SOLUTION: Performed by: INTERNAL MEDICINE

## 2021-01-01 PROCEDURE — 80053 COMPREHEN METABOLIC PANEL: CPT | Performed by: FAMILY MEDICINE

## 2021-01-01 PROCEDURE — 85652 RBC SED RATE AUTOMATED: CPT | Performed by: EMERGENCY MEDICINE

## 2021-01-01 PROCEDURE — 28820 AMPUTATION OF TOE: CPT | Performed by: PODIATRIST

## 2021-01-01 PROCEDURE — 86901 BLOOD TYPING SEROLOGIC RH(D): CPT | Performed by: INTERNAL MEDICINE

## 2021-01-01 PROCEDURE — 25010000002 HALOPERIDOL LACTATE PER 5 MG: Performed by: INTERNAL MEDICINE

## 2021-01-01 PROCEDURE — 86900 BLOOD TYPING SEROLOGIC ABO: CPT | Performed by: INTERNAL MEDICINE

## 2021-01-01 PROCEDURE — 99222 1ST HOSP IP/OBS MODERATE 55: CPT | Performed by: NURSE PRACTITIONER

## 2021-01-01 PROCEDURE — 99283 EMERGENCY DEPT VISIT LOW MDM: CPT

## 2021-01-01 PROCEDURE — 88305 TISSUE EXAM BY PATHOLOGIST: CPT | Performed by: PODIATRIST

## 2021-01-01 PROCEDURE — 83036 HEMOGLOBIN GLYCOSYLATED A1C: CPT | Performed by: INTERNAL MEDICINE

## 2021-01-01 PROCEDURE — 25010000002 CEFTRIAXONE PER 250 MG: Performed by: NURSE PRACTITIONER

## 2021-01-01 PROCEDURE — 99214 OFFICE O/P EST MOD 30 MIN: CPT | Performed by: FAMILY MEDICINE

## 2021-01-01 PROCEDURE — 87040 BLOOD CULTURE FOR BACTERIA: CPT | Performed by: EMERGENCY MEDICINE

## 2021-01-01 PROCEDURE — 84466 ASSAY OF TRANSFERRIN: CPT | Performed by: STUDENT IN AN ORGANIZED HEALTH CARE EDUCATION/TRAINING PROGRAM

## 2021-01-01 PROCEDURE — 97166 OT EVAL MOD COMPLEX 45 MIN: CPT

## 2021-01-01 PROCEDURE — 84443 ASSAY THYROID STIM HORMONE: CPT | Performed by: INTERNAL MEDICINE

## 2021-01-01 PROCEDURE — 25010000002 TDAP 5-2.5-18.5 LF-MCG/0.5 SUSPENSION: Performed by: EMERGENCY MEDICINE

## 2021-01-01 PROCEDURE — 25010000002 PIPERACILLIN SOD-TAZOBACTAM PER 1 G: Performed by: EMERGENCY MEDICINE

## 2021-01-01 PROCEDURE — 81001 URINALYSIS AUTO W/SCOPE: CPT

## 2021-01-01 PROCEDURE — G0378 HOSPITAL OBSERVATION PER HR: HCPCS

## 2021-01-01 PROCEDURE — 86900 BLOOD TYPING SEROLOGIC ABO: CPT | Performed by: EMERGENCY MEDICINE

## 2021-01-01 PROCEDURE — 85025 COMPLETE CBC W/AUTO DIFF WBC: CPT | Performed by: PODIATRIST

## 2021-01-01 PROCEDURE — 82962 GLUCOSE BLOOD TEST: CPT

## 2021-01-01 PROCEDURE — 25010000002 VANCOMYCIN 10 G RECONSTITUTED SOLUTION: Performed by: EMERGENCY MEDICINE

## 2021-01-01 PROCEDURE — 94660 CPAP INITIATION&MGMT: CPT

## 2021-01-01 PROCEDURE — 99238 HOSP IP/OBS DSCHRG MGMT 30/<: CPT | Performed by: STUDENT IN AN ORGANIZED HEALTH CARE EDUCATION/TRAINING PROGRAM

## 2021-01-01 PROCEDURE — 0Y6Q0Z0 DETACHMENT AT LEFT 1ST TOE, COMPLETE, OPEN APPROACH: ICD-10-PCS | Performed by: PODIATRIST

## 2021-01-01 PROCEDURE — 99221 1ST HOSP IP/OBS SF/LOW 40: CPT | Performed by: PODIATRIST

## 2021-01-01 PROCEDURE — 93306 TTE W/DOPPLER COMPLETE: CPT | Performed by: INTERNAL MEDICINE

## 2021-01-01 PROCEDURE — 87633 RESP VIRUS 12-25 TARGETS: CPT | Performed by: INTERNAL MEDICINE

## 2021-01-01 PROCEDURE — 5A09357 ASSISTANCE WITH RESPIRATORY VENTILATION, LESS THAN 24 CONSECUTIVE HOURS, CONTINUOUS POSITIVE AIRWAY PRESSURE: ICD-10-PCS | Performed by: INTERNAL MEDICINE

## 2021-01-01 PROCEDURE — 97164 PT RE-EVAL EST PLAN CARE: CPT

## 2021-01-01 PROCEDURE — 99204 OFFICE O/P NEW MOD 45 MIN: CPT | Performed by: INTERNAL MEDICINE

## 2021-01-01 PROCEDURE — 84484 ASSAY OF TROPONIN QUANT: CPT | Performed by: EMERGENCY MEDICINE

## 2021-01-01 PROCEDURE — 83880 ASSAY OF NATRIURETIC PEPTIDE: CPT | Performed by: INTERNAL MEDICINE

## 2021-01-01 PROCEDURE — 93971 EXTREMITY STUDY: CPT

## 2021-01-01 PROCEDURE — 85027 COMPLETE CBC AUTOMATED: CPT | Performed by: INTERNAL MEDICINE

## 2021-01-01 PROCEDURE — 71046 X-RAY EXAM CHEST 2 VIEWS: CPT

## 2021-01-01 PROCEDURE — 63710000001 DIPHENHYDRAMINE PER 50 MG: Performed by: INTERNAL MEDICINE

## 2021-01-01 PROCEDURE — 87205 SMEAR GRAM STAIN: CPT | Performed by: PODIATRIST

## 2021-01-01 PROCEDURE — 73610 X-RAY EXAM OF ANKLE: CPT

## 2021-01-01 PROCEDURE — 36415 COLL VENOUS BLD VENIPUNCTURE: CPT | Performed by: EMERGENCY MEDICINE

## 2021-01-01 PROCEDURE — 84443 ASSAY THYROID STIM HORMONE: CPT | Performed by: STUDENT IN AN ORGANIZED HEALTH CARE EDUCATION/TRAINING PROGRAM

## 2021-01-01 PROCEDURE — P9016 RBC LEUKOCYTES REDUCED: HCPCS

## 2021-01-01 PROCEDURE — 83540 ASSAY OF IRON: CPT | Performed by: STUDENT IN AN ORGANIZED HEALTH CARE EDUCATION/TRAINING PROGRAM

## 2021-01-01 PROCEDURE — 86900 BLOOD TYPING SEROLOGIC ABO: CPT

## 2021-01-01 PROCEDURE — 71250 CT THORAX DX C-: CPT

## 2021-01-01 PROCEDURE — 87070 CULTURE OTHR SPECIMN AEROBIC: CPT | Performed by: PODIATRIST

## 2021-01-01 PROCEDURE — 25010000003 PHYTONADIONE 10 MG/ML SOLUTION 1 ML AMPULE: Performed by: NURSE PRACTITIONER

## 2021-01-01 PROCEDURE — 83605 ASSAY OF LACTIC ACID: CPT | Performed by: NURSE PRACTITIONER

## 2021-01-01 PROCEDURE — 36415 COLL VENOUS BLD VENIPUNCTURE: CPT

## 2021-01-01 PROCEDURE — 25010000003 LIDOCAINE 1 % SOLUTION: Performed by: PODIATRIST

## 2021-01-01 PROCEDURE — 25010000003 MAGNESIUM SULFATE 4 GM/100ML SOLUTION: Performed by: PODIATRIST

## 2021-01-01 PROCEDURE — 25010000002 DIPHENHYDRAMINE PER 50 MG: Performed by: NURSE PRACTITIONER

## 2021-01-01 PROCEDURE — 86923 COMPATIBILITY TEST ELECTRIC: CPT

## 2021-01-01 PROCEDURE — 87147 CULTURE TYPE IMMUNOLOGIC: CPT | Performed by: PODIATRIST

## 2021-01-01 PROCEDURE — 82728 ASSAY OF FERRITIN: CPT | Performed by: STUDENT IN AN ORGANIZED HEALTH CARE EDUCATION/TRAINING PROGRAM

## 2021-01-01 PROCEDURE — 87186 SC STD MICRODIL/AGAR DIL: CPT | Performed by: EMERGENCY MEDICINE

## 2021-01-01 PROCEDURE — 36415 COLL VENOUS BLD VENIPUNCTURE: CPT | Performed by: PODIATRIST

## 2021-01-01 PROCEDURE — 93970 EXTREMITY STUDY: CPT

## 2021-01-01 PROCEDURE — 87176 TISSUE HOMOGENIZATION CULTR: CPT | Performed by: PODIATRIST

## 2021-01-01 PROCEDURE — 0 IOPAMIDOL PER 1 ML: Performed by: INTERNAL MEDICINE

## 2021-01-01 PROCEDURE — 86901 BLOOD TYPING SEROLOGIC RH(D): CPT

## 2021-01-01 RX ORDER — IPRATROPIUM BROMIDE AND ALBUTEROL SULFATE 2.5; .5 MG/3ML; MG/3ML
3 SOLUTION RESPIRATORY (INHALATION) EVERY 4 HOURS PRN
Status: DISCONTINUED | OUTPATIENT
Start: 2021-01-01 | End: 2021-01-01 | Stop reason: HOSPADM

## 2021-01-01 RX ORDER — VANCOMYCIN 1.75 GRAM/500 ML IN 0.9 % SODIUM CHLORIDE INTRAVENOUS
20 ONCE
Status: COMPLETED | OUTPATIENT
Start: 2021-01-01 | End: 2021-01-01

## 2021-01-01 RX ORDER — IPRATROPIUM BROMIDE AND ALBUTEROL SULFATE 2.5; .5 MG/3ML; MG/3ML
3 SOLUTION RESPIRATORY (INHALATION) ONCE AS NEEDED
Status: DISCONTINUED | OUTPATIENT
Start: 2021-01-01 | End: 2021-01-01 | Stop reason: HOSPADM

## 2021-01-01 RX ORDER — HALOPERIDOL 5 MG/ML
1 INJECTION INTRAMUSCULAR EVERY 6 HOURS PRN
Status: DISCONTINUED | OUTPATIENT
Start: 2021-01-01 | End: 2021-01-01 | Stop reason: HOSPADM

## 2021-01-01 RX ORDER — MORPHINE SULFATE 4 MG/ML
2 INJECTION, SOLUTION INTRAMUSCULAR; INTRAVENOUS EVERY 4 HOURS PRN
Status: COMPLETED | OUTPATIENT
Start: 2021-01-01 | End: 2021-01-01

## 2021-01-01 RX ORDER — ROSUVASTATIN CALCIUM 10 MG/1
TABLET, COATED ORAL
Qty: 30 TABLET | Refills: 1 | Status: SHIPPED | OUTPATIENT
Start: 2021-01-01 | End: 2021-01-01

## 2021-01-01 RX ORDER — ACETAMINOPHEN 160 MG/5ML
650 SOLUTION ORAL EVERY 4 HOURS PRN
Status: DISCONTINUED | OUTPATIENT
Start: 2021-01-01 | End: 2021-01-01 | Stop reason: HOSPADM

## 2021-01-01 RX ORDER — WARFARIN SODIUM 7.5 MG/1
7.5 TABLET ORAL DAILY
Status: CANCELLED | OUTPATIENT
Start: 2021-01-01

## 2021-01-01 RX ORDER — ACETAMINOPHEN 650 MG/1
650 SUPPOSITORY RECTAL ONCE AS NEEDED
Status: DISCONTINUED | OUTPATIENT
Start: 2021-01-01 | End: 2021-01-01 | Stop reason: HOSPADM

## 2021-01-01 RX ORDER — CALCIUM GLUCONATE 20 MG/ML
1 INJECTION, SOLUTION INTRAVENOUS ONCE
Status: COMPLETED | OUTPATIENT
Start: 2021-01-01 | End: 2021-01-01

## 2021-01-01 RX ORDER — POTASSIUM CHLORIDE 20 MEQ/1
40 TABLET, EXTENDED RELEASE ORAL ONCE
Status: DISCONTINUED | OUTPATIENT
Start: 2021-01-01 | End: 2021-01-01

## 2021-01-01 RX ORDER — SERTRALINE HYDROCHLORIDE 100 MG/1
100 TABLET, FILM COATED ORAL DAILY
Status: DISCONTINUED | OUTPATIENT
Start: 2021-01-01 | End: 2021-01-01 | Stop reason: HOSPADM

## 2021-01-01 RX ORDER — MAGNESIUM SULFATE 1 G/100ML
1 INJECTION INTRAVENOUS ONCE
Status: COMPLETED | OUTPATIENT
Start: 2021-01-01 | End: 2021-01-01

## 2021-01-01 RX ORDER — FUROSEMIDE 10 MG/ML
20 INJECTION INTRAMUSCULAR; INTRAVENOUS ONCE
Status: COMPLETED | OUTPATIENT
Start: 2021-01-01 | End: 2021-01-01

## 2021-01-01 RX ORDER — ROSUVASTATIN CALCIUM 10 MG/1
10 TABLET, COATED ORAL NIGHTLY
Status: DISCONTINUED | OUTPATIENT
Start: 2021-01-01 | End: 2021-01-01 | Stop reason: HOSPADM

## 2021-01-01 RX ORDER — SODIUM CHLORIDE 450 MG/100ML
75 INJECTION, SOLUTION INTRAVENOUS CONTINUOUS
Status: DISCONTINUED | OUTPATIENT
Start: 2021-01-01 | End: 2021-01-01

## 2021-01-01 RX ORDER — DIPHENHYDRAMINE HYDROCHLORIDE 50 MG/ML
12.5 INJECTION INTRAMUSCULAR; INTRAVENOUS ONCE
Status: COMPLETED | OUTPATIENT
Start: 2021-01-01 | End: 2021-01-01

## 2021-01-01 RX ORDER — ROSUVASTATIN CALCIUM 10 MG/1
TABLET, COATED ORAL
Qty: 30 TABLET | Refills: 0 | Status: SHIPPED | OUTPATIENT
Start: 2021-01-01 | End: 2021-01-01

## 2021-01-01 RX ORDER — CLINDAMYCIN PHOSPHATE 900 MG/50ML
900 INJECTION, SOLUTION INTRAVENOUS EVERY 8 HOURS
Status: DISCONTINUED | OUTPATIENT
Start: 2021-01-01 | End: 2021-01-01

## 2021-01-01 RX ORDER — ACETAMINOPHEN 650 MG/1
650 SUPPOSITORY RECTAL EVERY 4 HOURS PRN
Status: CANCELLED | OUTPATIENT
Start: 2021-01-01

## 2021-01-01 RX ORDER — WARFARIN SODIUM 7.5 MG/1
7.5 TABLET ORAL DAILY
Status: DISCONTINUED | OUTPATIENT
Start: 2021-01-01 | End: 2021-01-01

## 2021-01-01 RX ORDER — MAGNESIUM SULFATE 1 G/100ML
1 INJECTION INTRAVENOUS AS NEEDED
Status: DISCONTINUED | OUTPATIENT
Start: 2021-01-01 | End: 2021-01-01 | Stop reason: SDUPTHER

## 2021-01-01 RX ORDER — ASPIRIN 81 MG/1
81 TABLET, CHEWABLE ORAL DAILY
Status: ON HOLD | COMMUNITY
End: 2021-01-01

## 2021-01-01 RX ORDER — POTASSIUM CHLORIDE 20 MEQ/1
40 TABLET, EXTENDED RELEASE ORAL AS NEEDED
Status: DISCONTINUED | OUTPATIENT
Start: 2021-01-01 | End: 2021-01-01 | Stop reason: HOSPADM

## 2021-01-01 RX ORDER — ALBUMIN (HUMAN) 12.5 G/50ML
25 SOLUTION INTRAVENOUS EVERY 8 HOURS
Status: COMPLETED | OUTPATIENT
Start: 2021-01-01 | End: 2021-01-01

## 2021-01-01 RX ORDER — HEPARIN SODIUM 10000 [USP'U]/100ML
12 INJECTION, SOLUTION INTRAVENOUS
Status: DISCONTINUED | OUTPATIENT
Start: 2021-01-01 | End: 2021-01-01 | Stop reason: HOSPADM

## 2021-01-01 RX ORDER — ONDANSETRON 2 MG/ML
4 INJECTION INTRAMUSCULAR; INTRAVENOUS EVERY 6 HOURS PRN
Status: DISCONTINUED | OUTPATIENT
Start: 2021-01-01 | End: 2021-01-01 | Stop reason: HOSPADM

## 2021-01-01 RX ORDER — ACETAMINOPHEN 160 MG/5ML
650 SOLUTION ORAL ONCE
Status: COMPLETED | OUTPATIENT
Start: 2021-01-01 | End: 2021-01-01

## 2021-01-01 RX ORDER — ALUMINA, MAGNESIA, AND SIMETHICONE 2400; 2400; 240 MG/30ML; MG/30ML; MG/30ML
15 SUSPENSION ORAL EVERY 6 HOURS PRN
Status: CANCELLED | OUTPATIENT
Start: 2021-01-01

## 2021-01-01 RX ORDER — CHOLECALCIFEROL (VITAMIN D3) 125 MCG
5 CAPSULE ORAL NIGHTLY PRN
Status: DISCONTINUED | OUTPATIENT
Start: 2021-01-01 | End: 2021-01-01 | Stop reason: HOSPADM

## 2021-01-01 RX ORDER — ROSUVASTATIN CALCIUM 10 MG/1
TABLET, COATED ORAL
Qty: 30 TABLET | Refills: 5 | Status: ON HOLD | OUTPATIENT
Start: 2021-01-01 | End: 2021-01-01

## 2021-01-01 RX ORDER — QUETIAPINE FUMARATE 25 MG/1
25 TABLET, FILM COATED ORAL EVERY 12 HOURS SCHEDULED
Status: CANCELLED | OUTPATIENT
Start: 2021-01-01

## 2021-01-01 RX ORDER — ACETAMINOPHEN 650 MG/1
650 SUPPOSITORY RECTAL EVERY 4 HOURS PRN
Status: DISCONTINUED | OUTPATIENT
Start: 2021-01-01 | End: 2021-01-01 | Stop reason: HOSPADM

## 2021-01-01 RX ORDER — ACETAMINOPHEN 650 MG/1
650 SUPPOSITORY RECTAL ONCE
Status: COMPLETED | OUTPATIENT
Start: 2021-01-01 | End: 2021-01-01

## 2021-01-01 RX ORDER — TAMSULOSIN HYDROCHLORIDE 0.4 MG/1
0.4 CAPSULE ORAL DAILY
Status: DISCONTINUED | OUTPATIENT
Start: 2021-01-01 | End: 2021-01-01 | Stop reason: HOSPADM

## 2021-01-01 RX ORDER — DIPHENHYDRAMINE HCL 25 MG
25 CAPSULE ORAL ONCE
Status: COMPLETED | OUTPATIENT
Start: 2021-01-01 | End: 2021-01-01

## 2021-01-01 RX ORDER — ACETAMINOPHEN 325 MG/1
650 TABLET ORAL ONCE
Status: COMPLETED | OUTPATIENT
Start: 2021-01-01 | End: 2021-01-01

## 2021-01-01 RX ORDER — DIPHENHYDRAMINE HYDROCHLORIDE 50 MG/ML
12.5 INJECTION INTRAMUSCULAR; INTRAVENOUS EVERY 6 HOURS PRN
Status: DISPENSED | OUTPATIENT
Start: 2021-01-01 | End: 2021-01-01

## 2021-01-01 RX ORDER — SODIUM CHLORIDE, SODIUM LACTATE, POTASSIUM CHLORIDE, CALCIUM CHLORIDE 600; 310; 30; 20 MG/100ML; MG/100ML; MG/100ML; MG/100ML
INJECTION, SOLUTION INTRAVENOUS CONTINUOUS PRN
Status: DISCONTINUED | OUTPATIENT
Start: 2021-01-01 | End: 2021-01-01 | Stop reason: SURG

## 2021-01-01 RX ORDER — QUETIAPINE FUMARATE 25 MG/1
25 TABLET, FILM COATED ORAL DAILY PRN
Status: DISCONTINUED | OUTPATIENT
Start: 2021-01-01 | End: 2021-01-01 | Stop reason: HOSPADM

## 2021-01-01 RX ORDER — MAGNESIUM SULFATE HEPTAHYDRATE 40 MG/ML
2 INJECTION, SOLUTION INTRAVENOUS AS NEEDED
Status: DISCONTINUED | OUTPATIENT
Start: 2021-01-01 | End: 2021-01-01 | Stop reason: HOSPADM

## 2021-01-01 RX ORDER — ACETAMINOPHEN 325 MG/1
650 TABLET ORAL ONCE AS NEEDED
Status: DISCONTINUED | OUTPATIENT
Start: 2021-01-01 | End: 2021-01-01 | Stop reason: HOSPADM

## 2021-01-01 RX ORDER — ALBUTEROL SULFATE 2.5 MG/3ML
2.5 SOLUTION RESPIRATORY (INHALATION) ONCE
Status: COMPLETED | OUTPATIENT
Start: 2021-01-01 | End: 2021-01-01

## 2021-01-01 RX ORDER — ACETAMINOPHEN 325 MG/1
650 TABLET ORAL EVERY 4 HOURS PRN
Status: DISCONTINUED | OUTPATIENT
Start: 2021-01-01 | End: 2021-01-01 | Stop reason: HOSPADM

## 2021-01-01 RX ORDER — QUETIAPINE FUMARATE 25 MG/1
25 TABLET, FILM COATED ORAL EVERY 12 HOURS SCHEDULED
Status: DISCONTINUED | OUTPATIENT
Start: 2021-01-01 | End: 2021-01-01 | Stop reason: HOSPADM

## 2021-01-01 RX ORDER — SODIUM CHLORIDE 0.9 % (FLUSH) 0.9 %
10 SYRINGE (ML) INJECTION AS NEEDED
Status: DISCONTINUED | OUTPATIENT
Start: 2021-01-01 | End: 2021-01-01 | Stop reason: HOSPADM

## 2021-01-01 RX ORDER — PHYTONADIONE 10 MG/ML
5 INJECTION, EMULSION INTRAMUSCULAR; INTRAVENOUS; SUBCUTANEOUS ONCE
Status: DISCONTINUED | OUTPATIENT
Start: 2021-01-01 | End: 2021-01-01

## 2021-01-01 RX ORDER — IPRATROPIUM BROMIDE AND ALBUTEROL SULFATE 2.5; .5 MG/3ML; MG/3ML
3 SOLUTION RESPIRATORY (INHALATION)
Status: DISCONTINUED | OUTPATIENT
Start: 2021-01-01 | End: 2021-01-01 | Stop reason: HOSPADM

## 2021-01-01 RX ORDER — SODIUM HYPOCHLORITE 1.25 MG/ML
SOLUTION TOPICAL 2 TIMES DAILY
Status: DISCONTINUED | OUTPATIENT
Start: 2021-01-01 | End: 2021-01-01 | Stop reason: HOSPADM

## 2021-01-01 RX ORDER — ONDANSETRON 2 MG/ML
4 INJECTION INTRAMUSCULAR; INTRAVENOUS ONCE AS NEEDED
Status: DISCONTINUED | OUTPATIENT
Start: 2021-01-01 | End: 2021-01-01 | Stop reason: HOSPADM

## 2021-01-01 RX ORDER — SERTRALINE HYDROCHLORIDE 100 MG/1
TABLET, FILM COATED ORAL
Qty: 30 TABLET | Refills: 3 | Status: SHIPPED | OUTPATIENT
Start: 2021-01-01 | End: 2021-01-01

## 2021-01-01 RX ORDER — NITROGLYCERIN 0.4 MG/1
0.4 TABLET SUBLINGUAL
Status: DISCONTINUED | OUTPATIENT
Start: 2021-01-01 | End: 2021-01-01 | Stop reason: HOSPADM

## 2021-01-01 RX ORDER — WARFARIN SODIUM 7.5 MG/1
TABLET ORAL
Qty: 30 TABLET | Refills: 2 | Status: SHIPPED | OUTPATIENT
Start: 2021-01-01 | End: 2021-01-01 | Stop reason: HOSPADM

## 2021-01-01 RX ORDER — WARFARIN SODIUM 1 MG/1
1 TABLET ORAL
Status: DISCONTINUED | OUTPATIENT
Start: 2021-01-01 | End: 2021-01-01

## 2021-01-01 RX ORDER — SERTRALINE HYDROCHLORIDE 100 MG/1
100 TABLET, FILM COATED ORAL DAILY PRN
Status: DISCONTINUED | OUTPATIENT
Start: 2021-01-01 | End: 2021-01-01 | Stop reason: HOSPADM

## 2021-01-01 RX ORDER — PROPOFOL 10 MG/ML
VIAL (ML) INTRAVENOUS AS NEEDED
Status: DISCONTINUED | OUTPATIENT
Start: 2021-01-01 | End: 2021-01-01 | Stop reason: SURG

## 2021-01-01 RX ORDER — ACETAMINOPHEN 325 MG/1
650 TABLET ORAL EVERY 4 HOURS PRN
Status: CANCELLED | OUTPATIENT
Start: 2021-01-01

## 2021-01-01 RX ORDER — QUETIAPINE FUMARATE 25 MG/1
25 TABLET, FILM COATED ORAL EVERY 12 HOURS SCHEDULED
Status: DISCONTINUED | OUTPATIENT
Start: 2021-01-01 | End: 2021-01-01

## 2021-01-01 RX ORDER — HALOPERIDOL 5 MG/ML
1 INJECTION INTRAMUSCULAR ONCE
Status: COMPLETED | OUTPATIENT
Start: 2021-01-01 | End: 2021-01-01

## 2021-01-01 RX ORDER — LORAZEPAM 2 MG/ML
1 INJECTION INTRAMUSCULAR EVERY 4 HOURS PRN
Status: DISCONTINUED | OUTPATIENT
Start: 2021-01-01 | End: 2021-01-01 | Stop reason: HOSPADM

## 2021-01-01 RX ORDER — FUROSEMIDE 10 MG/ML
20 INJECTION INTRAMUSCULAR; INTRAVENOUS EVERY 12 HOURS
Status: DISCONTINUED | OUTPATIENT
Start: 2021-01-01 | End: 2021-01-01

## 2021-01-01 RX ORDER — WARFARIN SODIUM 7.5 MG/1
TABLET ORAL
Qty: 30 TABLET | Refills: 3 | Status: SHIPPED | OUTPATIENT
Start: 2021-01-01 | End: 2021-01-01

## 2021-01-01 RX ORDER — PHYTONADIONE 2 MG/ML
5 INJECTION, EMULSION INTRAMUSCULAR; INTRAVENOUS; SUBCUTANEOUS ONCE
Status: DISCONTINUED | OUTPATIENT
Start: 2021-01-01 | End: 2021-01-01

## 2021-01-01 RX ORDER — BUDESONIDE 0.5 MG/2ML
0.5 INHALANT ORAL
Status: DISCONTINUED | OUTPATIENT
Start: 2021-01-01 | End: 2021-01-01 | Stop reason: HOSPADM

## 2021-01-01 RX ORDER — ALUMINA, MAGNESIA, AND SIMETHICONE 2400; 2400; 240 MG/30ML; MG/30ML; MG/30ML
15 SUSPENSION ORAL EVERY 6 HOURS PRN
Status: DISCONTINUED | OUTPATIENT
Start: 2021-01-01 | End: 2021-01-01 | Stop reason: HOSPADM

## 2021-01-01 RX ORDER — MORPHINE SULFATE 4 MG/ML
2 INJECTION, SOLUTION INTRAMUSCULAR; INTRAVENOUS EVERY 4 HOURS PRN
Status: CANCELLED | OUTPATIENT
Start: 2021-01-01

## 2021-01-01 RX ORDER — RISPERIDONE 1 MG/1
1 TABLET ORAL
Qty: 30 TABLET | Refills: 1 | Status: SHIPPED | OUTPATIENT
Start: 2021-01-01 | End: 2021-01-01

## 2021-01-01 RX ORDER — BUDESONIDE 0.5 MG/2ML
0.5 INHALANT ORAL
Status: DISCONTINUED | OUTPATIENT
Start: 2021-01-01 | End: 2021-01-01

## 2021-01-01 RX ORDER — FENTANYL/ROPIVACAINE/NS/PF 2-625MCG/1
15 PLASTIC BAG, INJECTION (ML) EPIDURAL AS NEEDED
Status: DISCONTINUED | OUTPATIENT
Start: 2021-01-01 | End: 2021-01-01 | Stop reason: HOSPADM

## 2021-01-01 RX ORDER — SERTRALINE HYDROCHLORIDE 100 MG/1
TABLET, FILM COATED ORAL
Qty: 30 TABLET | Refills: 2 | Status: SHIPPED | OUTPATIENT
Start: 2021-01-01 | End: 2021-01-01 | Stop reason: HOSPADM

## 2021-01-01 RX ORDER — ACETAMINOPHEN 160 MG/5ML
650 SOLUTION ORAL EVERY 4 HOURS PRN
Status: CANCELLED | OUTPATIENT
Start: 2021-01-01

## 2021-01-01 RX ORDER — GUAIFENESIN AND CODEINE PHOSPHATE 100; 10 MG/5ML; MG/5ML
5 SOLUTION ORAL EVERY 8 HOURS PRN
Status: DISCONTINUED | OUTPATIENT
Start: 2021-01-01 | End: 2021-01-01 | Stop reason: HOSPADM

## 2021-01-01 RX ORDER — HALOPERIDOL 5 MG/ML
1 INJECTION INTRAMUSCULAR EVERY 6 HOURS PRN
Status: CANCELLED | OUTPATIENT
Start: 2021-01-01

## 2021-01-01 RX ORDER — MAGNESIUM SULFATE HEPTAHYDRATE 40 MG/ML
4 INJECTION, SOLUTION INTRAVENOUS AS NEEDED
Status: DISCONTINUED | OUTPATIENT
Start: 2021-01-01 | End: 2021-01-01 | Stop reason: HOSPADM

## 2021-01-01 RX ORDER — ONDANSETRON 4 MG/1
4 TABLET, FILM COATED ORAL EVERY 6 HOURS PRN
Status: DISCONTINUED | OUTPATIENT
Start: 2021-01-01 | End: 2021-01-01 | Stop reason: HOSPADM

## 2021-01-01 RX ORDER — ACETAMINOPHEN 650 MG
TABLET, EXTENDED RELEASE ORAL AS NEEDED
Status: DISCONTINUED | OUTPATIENT
Start: 2021-01-01 | End: 2021-01-01 | Stop reason: HOSPADM

## 2021-01-01 RX ORDER — SERTRALINE HYDROCHLORIDE 100 MG/1
100 TABLET, FILM COATED ORAL DAILY
Status: DISCONTINUED | OUTPATIENT
Start: 2021-01-01 | End: 2021-01-01

## 2021-01-01 RX ORDER — LIDOCAINE HYDROCHLORIDE 10 MG/ML
INJECTION, SOLUTION INFILTRATION; PERINEURAL AS NEEDED
Status: DISCONTINUED | OUTPATIENT
Start: 2021-01-01 | End: 2021-01-01 | Stop reason: HOSPADM

## 2021-01-01 RX ORDER — SODIUM CHLORIDE 9 MG/ML
60 INJECTION, SOLUTION INTRAVENOUS CONTINUOUS
Status: DISCONTINUED | OUTPATIENT
Start: 2021-01-01 | End: 2021-01-01 | Stop reason: HOSPADM

## 2021-01-01 RX ORDER — MAGNESIUM SULFATE HEPTAHYDRATE 40 MG/ML
2 INJECTION, SOLUTION INTRAVENOUS AS NEEDED
Status: DISCONTINUED | OUTPATIENT
Start: 2021-01-01 | End: 2021-01-01 | Stop reason: SDUPTHER

## 2021-01-01 RX ORDER — POTASSIUM CHLORIDE 7.45 MG/ML
10 INJECTION INTRAVENOUS
Status: DISCONTINUED | OUTPATIENT
Start: 2021-01-01 | End: 2021-01-01 | Stop reason: HOSPADM

## 2021-01-01 RX ORDER — MORPHINE SULFATE 4 MG/ML
2 INJECTION, SOLUTION INTRAMUSCULAR; INTRAVENOUS EVERY 4 HOURS PRN
Status: DISCONTINUED | OUTPATIENT
Start: 2021-01-01 | End: 2021-01-01 | Stop reason: HOSPADM

## 2021-01-01 RX ORDER — SERTRALINE HYDROCHLORIDE 100 MG/1
100 TABLET, FILM COATED ORAL DAILY
Status: CANCELLED | OUTPATIENT
Start: 2021-01-01

## 2021-01-01 RX ORDER — SODIUM CHLORIDE 0.9 % (FLUSH) 0.9 %
10 SYRINGE (ML) INJECTION EVERY 12 HOURS SCHEDULED
Status: DISCONTINUED | OUTPATIENT
Start: 2021-01-01 | End: 2021-01-01 | Stop reason: HOSPADM

## 2021-01-01 RX ADMIN — SODIUM CHLORIDE 3 G: 900 INJECTION INTRAVENOUS at 21:53

## 2021-01-01 RX ADMIN — MORPHINE SULFATE 2 MG: 4 INJECTION INTRAVENOUS at 00:37

## 2021-01-01 RX ADMIN — IPRATROPIUM BROMIDE AND ALBUTEROL SULFATE 3 ML: 2.5; .5 SOLUTION RESPIRATORY (INHALATION) at 07:05

## 2021-01-01 RX ADMIN — DIPHENHYDRAMINE HYDROCHLORIDE 25 MG: 25 CAPSULE ORAL at 09:35

## 2021-01-01 RX ADMIN — HALOPERIDOL LACTATE 1 MG: 5 INJECTION, SOLUTION INTRAMUSCULAR at 12:12

## 2021-01-01 RX ADMIN — ROSUVASTATIN CALCIUM 10 MG: 10 TABLET, FILM COATED ORAL at 21:12

## 2021-01-01 RX ADMIN — SERTRALINE HYDROCHLORIDE 100 MG: 100 TABLET ORAL at 10:24

## 2021-01-01 RX ADMIN — Medication 10 ML: at 20:20

## 2021-01-01 RX ADMIN — TAMSULOSIN HYDROCHLORIDE 0.4 MG: 0.4 CAPSULE ORAL at 10:53

## 2021-01-01 RX ADMIN — Medication 10 ML: at 22:18

## 2021-01-01 RX ADMIN — QUETIAPINE FUMARATE 25 MG: 25 TABLET ORAL at 21:37

## 2021-01-01 RX ADMIN — FUROSEMIDE 20 MG: 10 INJECTION, SOLUTION INTRAMUSCULAR; INTRAVENOUS at 17:48

## 2021-01-01 RX ADMIN — IPRATROPIUM BROMIDE AND ALBUTEROL SULFATE 3 ML: 2.5; .5 SOLUTION RESPIRATORY (INHALATION) at 12:01

## 2021-01-01 RX ADMIN — GUAIFENESIN AND CODEINE PHOSPHATE 5 ML: 10; 100 LIQUID ORAL at 02:22

## 2021-01-01 RX ADMIN — HALOPERIDOL LACTATE 1 MG: 5 INJECTION, SOLUTION INTRAMUSCULAR at 00:37

## 2021-01-01 RX ADMIN — SODIUM HYPOCHLORITE 1 ML: 1.25 SOLUTION TOPICAL at 09:22

## 2021-01-01 RX ADMIN — SODIUM CHLORIDE 60 ML/HR: 9 INJECTION, SOLUTION INTRAVENOUS at 23:57

## 2021-01-01 RX ADMIN — CLINDAMYCIN PHOSPHATE 900 MG: 900 INJECTION, SOLUTION INTRAVENOUS at 10:00

## 2021-01-01 RX ADMIN — IPRATROPIUM BROMIDE AND ALBUTEROL SULFATE 3 ML: 2.5; .5 SOLUTION RESPIRATORY (INHALATION) at 20:59

## 2021-01-01 RX ADMIN — AMPICILLIN SODIUM AND SULBACTAM SODIUM 3 G: 2; 1 INJECTION, POWDER, FOR SOLUTION INTRAMUSCULAR; INTRAVENOUS at 22:16

## 2021-01-01 RX ADMIN — TAMSULOSIN HYDROCHLORIDE 0.4 MG: 0.4 CAPSULE ORAL at 09:35

## 2021-01-01 RX ADMIN — PHYTONADIONE 5 MG: 10 INJECTION, EMULSION INTRAMUSCULAR; INTRAVENOUS; SUBCUTANEOUS at 13:52

## 2021-01-01 RX ADMIN — SERTRALINE HYDROCHLORIDE 100 MG: 100 TABLET ORAL at 09:19

## 2021-01-01 RX ADMIN — AMPICILLIN SODIUM AND SULBACTAM SODIUM 3 G: 2; 1 INJECTION, POWDER, FOR SOLUTION INTRAMUSCULAR; INTRAVENOUS at 05:57

## 2021-01-01 RX ADMIN — IPRATROPIUM BROMIDE AND ALBUTEROL SULFATE 3 ML: 2.5; .5 SOLUTION RESPIRATORY (INHALATION) at 20:05

## 2021-01-01 RX ADMIN — IPRATROPIUM BROMIDE AND ALBUTEROL SULFATE 3 ML: 2.5; .5 SOLUTION RESPIRATORY (INHALATION) at 23:51

## 2021-01-01 RX ADMIN — VANCOMYCIN HYDROCHLORIDE 1000 MG: 1 INJECTION, POWDER, LYOPHILIZED, FOR SOLUTION INTRAVENOUS at 03:39

## 2021-01-01 RX ADMIN — Medication 10 ML: at 09:14

## 2021-01-01 RX ADMIN — VANCOMYCIN HYDROCHLORIDE 1750 MG: 10 INJECTION, POWDER, LYOPHILIZED, FOR SOLUTION INTRAVENOUS at 03:23

## 2021-01-01 RX ADMIN — DIPHENHYDRAMINE HYDROCHLORIDE 12.5 MG: 50 INJECTION INTRAMUSCULAR; INTRAVENOUS at 22:57

## 2021-01-01 RX ADMIN — IPRATROPIUM BROMIDE AND ALBUTEROL SULFATE 3 ML: 2.5; .5 SOLUTION RESPIRATORY (INHALATION) at 20:17

## 2021-01-01 RX ADMIN — HEPARIN SODIUM 12 UNITS/KG/HR: 10000 INJECTION, SOLUTION INTRAVENOUS at 03:07

## 2021-01-01 RX ADMIN — Medication 1200 MG: at 09:14

## 2021-01-01 RX ADMIN — VANCOMYCIN HYDROCHLORIDE 1000 MG: 1 INJECTION, POWDER, LYOPHILIZED, FOR SOLUTION INTRAVENOUS at 04:04

## 2021-01-01 RX ADMIN — Medication 10 ML: at 08:51

## 2021-01-01 RX ADMIN — MORPHINE SULFATE 2 MG: 4 INJECTION INTRAVENOUS at 19:03

## 2021-01-01 RX ADMIN — HEPARIN SODIUM 14 UNITS/KG/HR: 10000 INJECTION, SOLUTION INTRAVENOUS at 07:54

## 2021-01-01 RX ADMIN — BUDESONIDE 0.5 MG: 0.5 SUSPENSION RESPIRATORY (INHALATION) at 19:06

## 2021-01-01 RX ADMIN — IPRATROPIUM BROMIDE AND ALBUTEROL SULFATE 3 ML: 2.5; .5 SOLUTION RESPIRATORY (INHALATION) at 07:39

## 2021-01-01 RX ADMIN — MEROPENEM 1 G: 1 INJECTION INTRAVENOUS at 08:51

## 2021-01-01 RX ADMIN — SODIUM HYPOCHLORITE: 1.25 SOLUTION TOPICAL at 10:16

## 2021-01-01 RX ADMIN — GUAIFENESIN AND CODEINE PHOSPHATE 5 ML: 10; 100 LIQUID ORAL at 15:00

## 2021-01-01 RX ADMIN — AMPICILLIN SODIUM AND SULBACTAM SODIUM 3 G: 2; 1 INJECTION, POWDER, FOR SOLUTION INTRAMUSCULAR; INTRAVENOUS at 15:23

## 2021-01-01 RX ADMIN — CLINDAMYCIN PHOSPHATE 900 MG: 900 INJECTION, SOLUTION INTRAVENOUS at 11:50

## 2021-01-01 RX ADMIN — AMPICILLIN SODIUM AND SULBACTAM SODIUM 3 G: 2; 1 INJECTION, POWDER, FOR SOLUTION INTRAMUSCULAR; INTRAVENOUS at 09:14

## 2021-01-01 RX ADMIN — LORAZEPAM 1 MG: 2 INJECTION INTRAMUSCULAR; INTRAVENOUS at 17:37

## 2021-01-01 RX ADMIN — ACETAMINOPHEN 650 MG: 325 TABLET, FILM COATED ORAL at 20:20

## 2021-01-01 RX ADMIN — MORPHINE SULFATE 2 MG: 4 INJECTION INTRAVENOUS at 10:34

## 2021-01-01 RX ADMIN — PIPERACILLIN AND TAZOBACTAM 3.38 G: 3; .375 INJECTION, POWDER, LYOPHILIZED, FOR SOLUTION INTRAVENOUS at 00:38

## 2021-01-01 RX ADMIN — ROSUVASTATIN CALCIUM 10 MG: 10 TABLET, FILM COATED ORAL at 20:20

## 2021-01-01 RX ADMIN — MORPHINE SULFATE 2 MG: 4 INJECTION INTRAVENOUS at 08:18

## 2021-01-01 RX ADMIN — FUROSEMIDE 20 MG: 10 INJECTION, SOLUTION INTRAMUSCULAR; INTRAVENOUS at 15:11

## 2021-01-01 RX ADMIN — SODIUM HYPOCHLORITE 1 ML: 1.25 SOLUTION TOPICAL at 22:19

## 2021-01-01 RX ADMIN — TETANUS TOXOID, REDUCED DIPHTHERIA TOXOID AND ACELLULAR PERTUSSIS VACCINE, ADSORBED 0.5 ML: 5; 2.5; 8; 8; 2.5 SUSPENSION INTRAMUSCULAR at 00:33

## 2021-01-01 RX ADMIN — ROSUVASTATIN CALCIUM 10 MG: 10 TABLET, FILM COATED ORAL at 21:08

## 2021-01-01 RX ADMIN — PROPOFOL 50 MG: 10 INJECTION, EMULSION INTRAVENOUS at 16:53

## 2021-01-01 RX ADMIN — BUDESONIDE 0.5 MG: 0.5 SUSPENSION RESPIRATORY (INHALATION) at 07:39

## 2021-01-01 RX ADMIN — GUAIFENESIN AND CODEINE PHOSPHATE 5 ML: 10; 100 LIQUID ORAL at 17:43

## 2021-01-01 RX ADMIN — SERTRALINE HYDROCHLORIDE 100 MG: 100 TABLET ORAL at 09:53

## 2021-01-01 RX ADMIN — SERTRALINE HYDROCHLORIDE 100 MG: 100 TABLET ORAL at 08:19

## 2021-01-01 RX ADMIN — PROPOFOL 50 MG: 10 INJECTION, EMULSION INTRAVENOUS at 16:56

## 2021-01-01 RX ADMIN — SODIUM HYPOCHLORITE: 1.25 SOLUTION TOPICAL at 20:30

## 2021-01-01 RX ADMIN — QUETIAPINE FUMARATE 25 MG: 25 TABLET ORAL at 23:22

## 2021-01-01 RX ADMIN — BUDESONIDE 0.5 MG: 0.5 SUSPENSION RESPIRATORY (INHALATION) at 20:01

## 2021-01-01 RX ADMIN — Medication 10 ML: at 09:53

## 2021-01-01 RX ADMIN — TAMSULOSIN HYDROCHLORIDE 0.4 MG: 0.4 CAPSULE ORAL at 09:19

## 2021-01-01 RX ADMIN — FUROSEMIDE 20 MG: 10 INJECTION, SOLUTION INTRAMUSCULAR; INTRAVENOUS at 03:39

## 2021-01-01 RX ADMIN — Medication 10 ML: at 22:03

## 2021-01-01 RX ADMIN — CLINDAMYCIN PHOSPHATE 900 MG: 900 INJECTION, SOLUTION INTRAVENOUS at 19:00

## 2021-01-01 RX ADMIN — SODIUM HYPOCHLORITE: 1.25 SOLUTION TOPICAL at 19:53

## 2021-01-01 RX ADMIN — AMPICILLIN SODIUM AND SULBACTAM SODIUM 3 G: 2; 1 INJECTION, POWDER, FOR SOLUTION INTRAMUSCULAR; INTRAVENOUS at 22:05

## 2021-01-01 RX ADMIN — MORPHINE SULFATE 2 MG: 4 INJECTION INTRAVENOUS at 23:10

## 2021-01-01 RX ADMIN — TAMSULOSIN HYDROCHLORIDE 0.4 MG: 0.4 CAPSULE ORAL at 09:14

## 2021-01-01 RX ADMIN — TAMSULOSIN HYDROCHLORIDE 0.4 MG: 0.4 CAPSULE ORAL at 10:02

## 2021-01-01 RX ADMIN — AMPICILLIN SODIUM AND SULBACTAM SODIUM 3 G: 2; 1 INJECTION, POWDER, FOR SOLUTION INTRAMUSCULAR; INTRAVENOUS at 17:40

## 2021-01-01 RX ADMIN — AMPICILLIN SODIUM AND SULBACTAM SODIUM 3 G: 2; 1 INJECTION, POWDER, FOR SOLUTION INTRAMUSCULAR; INTRAVENOUS at 08:19

## 2021-01-01 RX ADMIN — AMPICILLIN SODIUM AND SULBACTAM SODIUM 3 G: 2; 1 INJECTION, POWDER, FOR SOLUTION INTRAMUSCULAR; INTRAVENOUS at 17:02

## 2021-01-01 RX ADMIN — IPRATROPIUM BROMIDE AND ALBUTEROL SULFATE 3 ML: 2.5; .5 SOLUTION RESPIRATORY (INHALATION) at 02:15

## 2021-01-01 RX ADMIN — Medication 10 ML: at 09:38

## 2021-01-01 RX ADMIN — IPRATROPIUM BROMIDE AND ALBUTEROL SULFATE 3 ML: 2.5; .5 SOLUTION RESPIRATORY (INHALATION) at 00:57

## 2021-01-01 RX ADMIN — ALBUMIN HUMAN 25 G: 0.25 SOLUTION INTRAVENOUS at 17:00

## 2021-01-01 RX ADMIN — ACETAMINOPHEN 650 MG: 325 TABLET, FILM COATED ORAL at 09:35

## 2021-01-01 RX ADMIN — IPRATROPIUM BROMIDE AND ALBUTEROL SULFATE 3 ML: 2.5; .5 SOLUTION RESPIRATORY (INHALATION) at 06:51

## 2021-01-01 RX ADMIN — SODIUM HYPOCHLORITE: 1.25 SOLUTION TOPICAL at 13:25

## 2021-01-01 RX ADMIN — VANCOMYCIN HYDROCHLORIDE 1000 MG: 1 INJECTION, POWDER, LYOPHILIZED, FOR SOLUTION INTRAVENOUS at 03:29

## 2021-01-01 RX ADMIN — GLYCOPYRROLATE 0.4 MG: 0.2 INJECTION, SOLUTION INTRAMUSCULAR; INTRAVITREAL at 21:37

## 2021-01-01 RX ADMIN — ROSUVASTATIN CALCIUM 10 MG: 10 TABLET, FILM COATED ORAL at 20:29

## 2021-01-01 RX ADMIN — Medication 10 ML: at 03:59

## 2021-01-01 RX ADMIN — Medication 10 ML: at 21:08

## 2021-01-01 RX ADMIN — SODIUM HYPOCHLORITE: 1.25 SOLUTION TOPICAL at 10:20

## 2021-01-01 RX ADMIN — AMPICILLIN SODIUM AND SULBACTAM SODIUM 3 G: 2; 1 INJECTION, POWDER, FOR SOLUTION INTRAMUSCULAR; INTRAVENOUS at 09:19

## 2021-01-01 RX ADMIN — ACETAMINOPHEN 650 MG: 325 TABLET, FILM COATED ORAL at 11:26

## 2021-01-01 RX ADMIN — IPRATROPIUM BROMIDE AND ALBUTEROL SULFATE 3 ML: 2.5; .5 SOLUTION RESPIRATORY (INHALATION) at 20:01

## 2021-01-01 RX ADMIN — PROPOFOL 50 MG: 10 INJECTION, EMULSION INTRAVENOUS at 16:58

## 2021-01-01 RX ADMIN — Medication 10 ML: at 20:29

## 2021-01-01 RX ADMIN — HALOPERIDOL LACTATE 1 MG: 5 INJECTION, SOLUTION INTRAMUSCULAR at 11:28

## 2021-01-01 RX ADMIN — IPRATROPIUM BROMIDE AND ALBUTEROL SULFATE 3 ML: 2.5; .5 SOLUTION RESPIRATORY (INHALATION) at 00:11

## 2021-01-01 RX ADMIN — SODIUM CHLORIDE 75 ML/HR: 4.5 INJECTION, SOLUTION INTRAVENOUS at 10:30

## 2021-01-01 RX ADMIN — LORAZEPAM 1 MG: 2 INJECTION INTRAMUSCULAR; INTRAVENOUS at 16:29

## 2021-01-01 RX ADMIN — CALCIUM GLUCONATE 1 G: 20 INJECTION, SOLUTION INTRAVENOUS at 14:24

## 2021-01-01 RX ADMIN — Medication 1200 MG: at 09:21

## 2021-01-01 RX ADMIN — MORPHINE SULFATE 1 MG: 4 INJECTION INTRAVENOUS at 13:38

## 2021-01-01 RX ADMIN — MORPHINE SULFATE 2 MG: 4 INJECTION INTRAVENOUS at 03:12

## 2021-01-01 RX ADMIN — SERTRALINE HYDROCHLORIDE 100 MG: 100 TABLET ORAL at 09:35

## 2021-01-01 RX ADMIN — GLYCOPYRROLATE 0.4 MG: 0.2 INJECTION, SOLUTION INTRAMUSCULAR; INTRAVITREAL at 10:04

## 2021-01-01 RX ADMIN — AMPICILLIN SODIUM AND SULBACTAM SODIUM 3 G: 2; 1 INJECTION, POWDER, FOR SOLUTION INTRAMUSCULAR; INTRAVENOUS at 21:05

## 2021-01-01 RX ADMIN — IPRATROPIUM BROMIDE AND ALBUTEROL SULFATE 3 ML: 2.5; .5 SOLUTION RESPIRATORY (INHALATION) at 19:52

## 2021-01-01 RX ADMIN — SERTRALINE HYDROCHLORIDE 100 MG: 100 TABLET ORAL at 09:21

## 2021-01-01 RX ADMIN — IPRATROPIUM BROMIDE AND ALBUTEROL SULFATE 3 ML: 2.5; .5 SOLUTION RESPIRATORY (INHALATION) at 00:00

## 2021-01-01 RX ADMIN — CLINDAMYCIN PHOSPHATE 900 MG: 900 INJECTION, SOLUTION INTRAVENOUS at 02:24

## 2021-01-01 RX ADMIN — FUROSEMIDE 20 MG: 10 INJECTION, SOLUTION INTRAMUSCULAR; INTRAVENOUS at 03:29

## 2021-01-01 RX ADMIN — IPRATROPIUM BROMIDE AND ALBUTEROL SULFATE 3 ML: 2.5; .5 SOLUTION RESPIRATORY (INHALATION) at 18:30

## 2021-01-01 RX ADMIN — IPRATROPIUM BROMIDE AND ALBUTEROL SULFATE 3 ML: 2.5; .5 SOLUTION RESPIRATORY (INHALATION) at 13:40

## 2021-01-01 RX ADMIN — SERTRALINE HYDROCHLORIDE 100 MG: 100 TABLET ORAL at 09:14

## 2021-01-01 RX ADMIN — CLINDAMYCIN PHOSPHATE 900 MG: 900 INJECTION, SOLUTION INTRAVENOUS at 17:02

## 2021-01-01 RX ADMIN — IPRATROPIUM BROMIDE AND ALBUTEROL SULFATE 3 ML: 2.5; .5 SOLUTION RESPIRATORY (INHALATION) at 07:30

## 2021-01-01 RX ADMIN — AMPICILLIN SODIUM AND SULBACTAM SODIUM 3 G: 2; 1 INJECTION, POWDER, FOR SOLUTION INTRAMUSCULAR; INTRAVENOUS at 02:00

## 2021-01-01 RX ADMIN — AMPICILLIN SODIUM AND SULBACTAM SODIUM 3 G: 2; 1 INJECTION, POWDER, FOR SOLUTION INTRAMUSCULAR; INTRAVENOUS at 16:27

## 2021-01-01 RX ADMIN — FUROSEMIDE 20 MG: 20 INJECTION, SOLUTION INTRAMUSCULAR; INTRAVENOUS at 14:24

## 2021-01-01 RX ADMIN — VANCOMYCIN HYDROCHLORIDE 1000 MG: 1 INJECTION, POWDER, LYOPHILIZED, FOR SOLUTION INTRAVENOUS at 04:50

## 2021-01-01 RX ADMIN — AMPICILLIN SODIUM AND SULBACTAM SODIUM 3 G: 2; 1 INJECTION, POWDER, FOR SOLUTION INTRAMUSCULAR; INTRAVENOUS at 16:58

## 2021-01-01 RX ADMIN — Medication 1200 MG: at 20:29

## 2021-01-01 RX ADMIN — AMPICILLIN SODIUM AND SULBACTAM SODIUM 3 G: 2; 1 INJECTION, POWDER, FOR SOLUTION INTRAMUSCULAR; INTRAVENOUS at 03:22

## 2021-01-01 RX ADMIN — FUROSEMIDE 20 MG: 10 INJECTION, SOLUTION INTRAMUSCULAR; INTRAVENOUS at 17:53

## 2021-01-01 RX ADMIN — SERTRALINE HYDROCHLORIDE 100 MG: 100 TABLET ORAL at 08:50

## 2021-01-01 RX ADMIN — SODIUM CHLORIDE 125 MG: 9 INJECTION, SOLUTION INTRAVENOUS at 12:05

## 2021-01-01 RX ADMIN — Medication 10 ML: at 09:21

## 2021-01-01 RX ADMIN — VANCOMYCIN HYDROCHLORIDE 1000 MG: 1 INJECTION, POWDER, LYOPHILIZED, FOR SOLUTION INTRAVENOUS at 04:08

## 2021-01-01 RX ADMIN — CLINDAMYCIN PHOSPHATE 900 MG: 900 INJECTION, SOLUTION INTRAVENOUS at 02:49

## 2021-01-01 RX ADMIN — IPRATROPIUM BROMIDE AND ALBUTEROL SULFATE 3 ML: 2.5; .5 SOLUTION RESPIRATORY (INHALATION) at 07:04

## 2021-01-01 RX ADMIN — MORPHINE SULFATE 2 MG: 4 INJECTION INTRAVENOUS at 17:46

## 2021-01-01 RX ADMIN — IPRATROPIUM BROMIDE AND ALBUTEROL SULFATE 3 ML: 2.5; .5 SOLUTION RESPIRATORY (INHALATION) at 11:41

## 2021-01-01 RX ADMIN — AMPICILLIN SODIUM AND SULBACTAM SODIUM 3 G: 2; 1 INJECTION, POWDER, FOR SOLUTION INTRAMUSCULAR; INTRAVENOUS at 21:15

## 2021-01-01 RX ADMIN — IPRATROPIUM BROMIDE AND ALBUTEROL SULFATE 3 ML: 2.5; .5 SOLUTION RESPIRATORY (INHALATION) at 11:51

## 2021-01-01 RX ADMIN — ROSUVASTATIN CALCIUM 10 MG: 10 TABLET, FILM COATED ORAL at 22:02

## 2021-01-01 RX ADMIN — AMPICILLIN SODIUM AND SULBACTAM SODIUM 3 G: 2; 1 INJECTION, POWDER, FOR SOLUTION INTRAMUSCULAR; INTRAVENOUS at 15:54

## 2021-01-01 RX ADMIN — MAGNESIUM SULFATE IN DEXTROSE 1 G: 10 INJECTION, SOLUTION INTRAVENOUS at 09:14

## 2021-01-01 RX ADMIN — MORPHINE SULFATE 2 MG: 4 INJECTION INTRAVENOUS at 17:37

## 2021-01-01 RX ADMIN — MAGNESIUM SULFATE HEPTAHYDRATE 4 G: 4 INJECTION, SOLUTION INTRAVENOUS at 08:18

## 2021-01-01 RX ADMIN — ALBUMIN HUMAN 25 G: 0.25 SOLUTION INTRAVENOUS at 19:51

## 2021-01-01 RX ADMIN — MORPHINE SULFATE 2 MG: 4 INJECTION INTRAVENOUS at 12:23

## 2021-01-01 RX ADMIN — IPRATROPIUM BROMIDE AND ALBUTEROL SULFATE 3 ML: 2.5; .5 SOLUTION RESPIRATORY (INHALATION) at 12:04

## 2021-01-01 RX ADMIN — POTASSIUM PHOSPHATE, MONOBASIC 1000 MG: 500 TABLET, SOLUBLE ORAL at 09:21

## 2021-01-01 RX ADMIN — AMPICILLIN SODIUM AND SULBACTAM SODIUM 3 G: 2; 1 INJECTION, POWDER, FOR SOLUTION INTRAMUSCULAR; INTRAVENOUS at 09:53

## 2021-01-01 RX ADMIN — ACETAMINOPHEN 650 MG: 325 TABLET, FILM COATED ORAL at 17:11

## 2021-01-01 RX ADMIN — MORPHINE SULFATE 2 MG: 4 INJECTION INTRAVENOUS at 16:57

## 2021-01-01 RX ADMIN — CLINDAMYCIN PHOSPHATE 900 MG: 900 INJECTION, SOLUTION INTRAVENOUS at 08:50

## 2021-01-01 RX ADMIN — ACETAMINOPHEN ORAL SOLUTION 650 MG: 650 SOLUTION ORAL at 22:57

## 2021-01-01 RX ADMIN — Medication 10 ML: at 03:23

## 2021-01-01 RX ADMIN — HALOPERIDOL LACTATE 1 MG: 5 INJECTION, SOLUTION INTRAMUSCULAR at 00:27

## 2021-01-01 RX ADMIN — ALBUTEROL SULFATE 2.5 MG: 2.5 SOLUTION RESPIRATORY (INHALATION) at 00:02

## 2021-01-01 RX ADMIN — SODIUM CHLORIDE 75 ML/HR: 4.5 INJECTION, SOLUTION INTRAVENOUS at 17:54

## 2021-01-01 RX ADMIN — IOPAMIDOL 100 ML: 755 INJECTION, SOLUTION INTRAVENOUS at 10:17

## 2021-01-01 RX ADMIN — Medication 10 ML: at 09:52

## 2021-01-01 RX ADMIN — HEPARIN SODIUM 11 UNITS/KG/HR: 10000 INJECTION, SOLUTION INTRAVENOUS at 03:53

## 2021-01-01 RX ADMIN — Medication 10 ML: at 04:09

## 2021-01-01 RX ADMIN — AMPICILLIN SODIUM AND SULBACTAM SODIUM 3 G: 2; 1 INJECTION, POWDER, FOR SOLUTION INTRAMUSCULAR; INTRAVENOUS at 10:23

## 2021-01-01 RX ADMIN — SODIUM CHLORIDE 3 G: 900 INJECTION INTRAVENOUS at 04:31

## 2021-01-01 RX ADMIN — AMPICILLIN SODIUM AND SULBACTAM SODIUM 3 G: 2; 1 INJECTION, POWDER, FOR SOLUTION INTRAMUSCULAR; INTRAVENOUS at 09:22

## 2021-01-01 RX ADMIN — SODIUM CHLORIDE 125 MG: 9 INJECTION, SOLUTION INTRAVENOUS at 09:21

## 2021-01-01 RX ADMIN — CEFTRIAXONE SODIUM 2 G: 2 INJECTION, POWDER, FOR SOLUTION INTRAMUSCULAR; INTRAVENOUS at 05:46

## 2021-01-01 RX ADMIN — IPRATROPIUM BROMIDE AND ALBUTEROL SULFATE 3 ML: 2.5; .5 SOLUTION RESPIRATORY (INHALATION) at 11:13

## 2021-01-01 RX ADMIN — Medication 10 ML: at 19:51

## 2021-01-01 RX ADMIN — GLYCOPYRROLATE 0.4 MG: 0.2 INJECTION, SOLUTION INTRAMUSCULAR; INTRAVITREAL at 09:36

## 2021-01-01 RX ADMIN — IPRATROPIUM BROMIDE AND ALBUTEROL SULFATE 3 ML: 2.5; .5 SOLUTION RESPIRATORY (INHALATION) at 23:45

## 2021-01-01 RX ADMIN — MORPHINE SULFATE 2 MG: 4 INJECTION INTRAVENOUS at 19:58

## 2021-01-01 RX ADMIN — IPRATROPIUM BROMIDE AND ALBUTEROL SULFATE 3 ML: 2.5; .5 SOLUTION RESPIRATORY (INHALATION) at 11:44

## 2021-01-01 RX ADMIN — HEPARIN SODIUM 12 UNITS/KG/HR: 10000 INJECTION, SOLUTION INTRAVENOUS at 11:40

## 2021-01-01 RX ADMIN — AMPICILLIN SODIUM AND SULBACTAM SODIUM 3 G: 2; 1 INJECTION, POWDER, FOR SOLUTION INTRAMUSCULAR; INTRAVENOUS at 03:52

## 2021-01-01 RX ADMIN — IPRATROPIUM BROMIDE AND ALBUTEROL SULFATE 3 ML: 2.5; .5 SOLUTION RESPIRATORY (INHALATION) at 06:38

## 2021-01-01 RX ADMIN — AMPICILLIN SODIUM AND SULBACTAM SODIUM 3 G: 2; 1 INJECTION, POWDER, FOR SOLUTION INTRAMUSCULAR; INTRAVENOUS at 09:37

## 2021-01-01 RX ADMIN — Medication 10 ML: at 08:18

## 2021-01-01 RX ADMIN — ALBUMIN HUMAN 25 G: 0.25 SOLUTION INTRAVENOUS at 05:12

## 2021-01-01 RX ADMIN — AMPICILLIN SODIUM AND SULBACTAM SODIUM 3 G: 2; 1 INJECTION, POWDER, FOR SOLUTION INTRAMUSCULAR; INTRAVENOUS at 15:53

## 2021-01-01 RX ADMIN — Medication 10 ML: at 09:23

## 2021-01-01 RX ADMIN — Medication 600 MG: at 08:19

## 2021-01-01 RX ADMIN — ROSUVASTATIN CALCIUM 10 MG: 10 TABLET, FILM COATED ORAL at 19:51

## 2021-01-01 RX ADMIN — MORPHINE SULFATE 2 MG: 4 INJECTION INTRAVENOUS at 01:54

## 2021-01-01 RX ADMIN — ROSUVASTATIN CALCIUM 10 MG: 10 TABLET, FILM COATED ORAL at 22:18

## 2021-01-01 RX ADMIN — SODIUM HYPOCHLORITE: 1.25 SOLUTION TOPICAL at 16:58

## 2021-01-01 RX ADMIN — AMPICILLIN SODIUM AND SULBACTAM SODIUM 3 G: 2; 1 INJECTION, POWDER, FOR SOLUTION INTRAMUSCULAR; INTRAVENOUS at 23:18

## 2021-01-01 RX ADMIN — Medication 10 ML: at 21:14

## 2021-01-01 RX ADMIN — SODIUM CHLORIDE, SODIUM LACTATE, POTASSIUM CHLORIDE, AND CALCIUM CHLORIDE: .6; .31; .03; .02 INJECTION, SOLUTION INTRAVENOUS at 16:50

## 2021-01-01 RX ADMIN — AMPICILLIN SODIUM AND SULBACTAM SODIUM 3 G: 2; 1 INJECTION, POWDER, FOR SOLUTION INTRAMUSCULAR; INTRAVENOUS at 01:36

## 2021-01-01 RX ADMIN — AMPICILLIN SODIUM AND SULBACTAM SODIUM 3 G: 2; 1 INJECTION, POWDER, FOR SOLUTION INTRAMUSCULAR; INTRAVENOUS at 03:12

## 2021-01-01 RX ADMIN — QUETIAPINE FUMARATE 25 MG: 25 TABLET ORAL at 19:10

## 2021-01-01 RX ADMIN — HEPARIN SODIUM 15 UNITS/KG/HR: 10000 INJECTION, SOLUTION INTRAVENOUS at 04:58

## 2021-01-01 RX ADMIN — AMPICILLIN SODIUM AND SULBACTAM SODIUM 3 G: 2; 1 INJECTION, POWDER, FOR SOLUTION INTRAMUSCULAR; INTRAVENOUS at 03:19

## 2021-01-01 RX ADMIN — IPRATROPIUM BROMIDE AND ALBUTEROL SULFATE 3 ML: 2.5; .5 SOLUTION RESPIRATORY (INHALATION) at 06:45

## 2021-01-01 RX ADMIN — TAMSULOSIN HYDROCHLORIDE 0.4 MG: 0.4 CAPSULE ORAL at 09:21

## 2021-01-01 RX ADMIN — QUETIAPINE FUMARATE 25 MG: 25 TABLET ORAL at 10:19

## 2021-03-02 NOTE — TELEPHONE ENCOUNTER
Called patient to schedule an appointment for med check/ refill, so I spoke with patient wife schedule for April 27,2021 at 1:00pm

## 2021-03-22 NOTE — TELEPHONE ENCOUNTER
Caller: LENIN DEV    Relationship: Emergency Contact    Best call back number: [phone]     Caller requesting test results: YES    What test was performed: BLOOD WORK    When was the test performed: 03/19/21    Where was the test performed: IN OFFICE

## 2021-04-12 NOTE — DISCHARGE INSTRUCTIONS
Take tylenol as needed for pain  Wear Ace bandage as needed for comfort  Rest, ice, elevate left leg for pain and/or swelling.  Apply ice in 20-minute increments every few hours while awake    Follow-up with primary care for further management evaluation  Follow-up with orthopedic specialist for further evaluation    Return to the ER for new or worsening symptoms

## 2021-04-12 NOTE — ED PROVIDER NOTES
Subjective   Chief Complaint: Foot pain    Patient is a 89-year-old  male history of CAD, depression, MI presents the ER chief complaint of left ankle pain for few days.  Patient denies falling, however wife at bedside states that patient fell in the yard the other day and she had to help him up.  Patient reports pain medial malleolus left ankle is worse with weightbearing or walking.  Patient states his pain feels like a dull ache that he rates a 5/10.  Denies any numbness or tingling.  Patient reports no pain at this time.  Patient denies any lower leg swelling or weakness.  Patient denies any chest pain shortness of breath headache or fever or chills.    Location: Left ankle    Quality: Dull ache    Duration: Few days    Timing: Constant    Severity: Mild to moderate    Associated Symptoms: None    PCP: Casper Saldaña      History provided by:  Patient      Review of Systems   Constitutional: Negative for fever.   HENT: Negative for sore throat and trouble swallowing.    Respiratory: Negative for shortness of breath and wheezing.    Cardiovascular: Negative for chest pain.   Gastrointestinal: Negative for abdominal pain, diarrhea, nausea and vomiting.   Genitourinary: Negative for dysuria.   Musculoskeletal: Positive for arthralgias.        Left ankle pain   Skin: Negative for pallor, rash and wound.   Neurological: Negative for weakness, numbness and headaches.   Psychiatric/Behavioral: Negative for behavioral problems.   All other systems reviewed and are negative.      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    Social History     Socioeconomic History   • Marital status:      Spouse name: Not on file   • Number of children: Not on file   • Years of education: Not on file   • Highest education level: Not on file   Tobacco Use   • Smoking status: Former Smoker   • Smokeless tobacco: Never Used   Substance and Sexual Activity   • Alcohol use: No   • Drug use: No   • Sexual activity: Defer           Objective   Physical Exam  Vitals and nursing note reviewed.   Constitutional:       General: He is not in acute distress.     Appearance: Normal appearance. He is normal weight. He is not diaphoretic.      Comments: Very hard of hearing   HENT:      Head: Normocephalic and atraumatic.      Right Ear: Tympanic membrane normal.      Left Ear: Tympanic membrane normal.   Cardiovascular:      Rate and Rhythm: Normal rate and regular rhythm.      Pulses: Normal pulses.      Heart sounds: Normal heart sounds.   Musculoskeletal:         General: Tenderness present. No signs of injury. Normal range of motion.      Cervical back: Normal range of motion.      Right lower leg: No edema.      Left lower leg: No edema.      Comments: Very mild tenderness to palpation medial left malleolus without obvious deformity or swelling  Pedal pulses present 2+ bilaterally    Sensation soft and intact, motor strength 5/5 bilaterally.   Skin:     General: Skin is warm.      Capillary Refill: Capillary refill takes less than 2 seconds.      Findings: No erythema, lesion or rash.   Neurological:      General: No focal deficit present.      Mental Status: He is alert and oriented to person, place, and time.   Psychiatric:         Mood and Affect: Mood normal.         Behavior: Behavior normal.         Procedures           ED Course    /92   Pulse 111   Temp  "97.5 °F (36.4 °C) (Oral)   Resp 19   Ht 172.7 cm (68\")   Wt 77.1 kg (170 lb)   SpO2 96%   BMI 25.85 kg/m²   Labs Reviewed - No data to display  Medications - No data to display  XR Ankle 3+ View Left    Result Date: 4/12/2021  No acute findings.  Electronically Signed By-Hammad Ragland MD On:4/12/2021 10:49 AM This report was finalized on [id number] by  Hammad Ragland MD.                                           MDM  Number of Diagnoses or Management Options  Acute left ankle pain  Diagnosis management comments: MEDICAL DECISION  Epic Chart Review:  Comorbidities: CAD, depression, MI  Differentials: Fracture, contusion, sprain, strain, DVT; this list is not all inclusive and does not constitute the entirety of considered causes  Radiology interpretation:  Images reviewed by me and interpreted by radiologist,   X-ray left ankle shows no acute findings, no fractures or dislocations  Left lower extremity Doppler negative for acute DVT or SVT.  Lab interpretation: Not warranted    While in the ED  appropriate PPE was worn during exam and throughout all encounters with the patient.  Patient had the above evaluation.  Patient reported pain left ankle for few days, however there was minimal tenderness on exam.  No obvious deformity, however there appears to be mild swelling compared to right ankle.  X-ray left ankle negative for acute osseous abnormality.  Doppler left extremity negative for acute DVT or SVT.  Suspect possible musculoskeletal strain based on HPI and physical exam.  Patient has no other complaints.  Patient placed in Ace bandage for comfort, he was advised to take Tylenol as needed for pain, rest, ice and elevate his left ankle for additional pain relief.  Patient will follow up with Dr. Saldaña this week as well as orthopedic specialist.    Discharge plan and instructions were discussed with the patient who verbalized understanding and is in agreement with the plan, all questions were answered at " this time.  Patient is aware of signs symptoms that would require immediate return to the emergency room.  Patient understands importance of following up with primary care provider for further evaluation and worsening concerns as well as blood pressure recheck in the next 4 weeks.    Patient remained afebrile, nontoxic-appearing, no acute respiratory distress throughout entire emergency room stay.  Patient was discharged in improved stable condition.         Amount and/or Complexity of Data Reviewed  Tests in the radiology section of CPT®: reviewed and ordered    Patient Progress  Patient progress: stable      Final diagnoses:   Acute left ankle pain       ED Disposition  ED Disposition     ED Disposition Condition Comment    Discharge Stable           Casper Saldaña MD  [address] [facility] [facility] [address]  [address]  [place] [address] [phone]    Schedule an appointment as soon as possible for a visit in 2 days  As needed, If symptoms worsen    Neel Acevedo MD  [address] [facility] [facility]  [place] [address] [phone]    Schedule an appointment as soon as possible for a visit in 2 days  As needed, If symptoms worsen         Medication List      No changes were made to your prescriptions during this visit.          Dena Flores PA  04/12/21 1739

## 2021-04-12 NOTE — ED NOTES
C/o left foot and ankle pain x 1 week no known injury states it's worse when he gets up in the morning.     Dena Murphy RN  04/12/21 [id number]

## 2021-04-13 NOTE — OUTREACH NOTE
Patient Outreach Note    Pt discharged from [facility] ED on 4/12/21, seen for left ankle pain. RN-ACM outreach call made to pt, spoke very briefly with pt's wife Nini. Explained role of RN-ACM. Nini states pt's ankle is sore but he's doing fine. Attempted to review ED AVS with Nini, Nini thanks RN-ACM for calling and ended call. Follow up outreach as needed.     Alex Zuniga RN  Ambulatory     4/13/2021, 12:56 EDT

## 2021-05-19 NOTE — TELEPHONE ENCOUNTER
Left Message at [facility] to see if they take the patients insurance plan.     Anila from [facility] called back and they do not take that insurance either.

## 2021-05-19 NOTE — TELEPHONE ENCOUNTER
Caller: STEPHEN ([facility])    Relationship to patient: Home Health    Best call back number: [phone]    Patient is needing: STEPHEN WITH [facility] IS CALLING IN TO STATE THEY ARE UNABLE TO ACCEPT INSURANCE FOR PATIENT AND CANNOT FULFILL REFERRAL REQUEST.

## 2021-06-08 NOTE — PROGRESS NOTES
Subjective   Niko Servin is a 89 y.o. male.     Niko Servin is in for follow up on his high blood pressure. He has been deteriorating of late behaviorally, showing signs of dementia. There is no history of chest pain or dyspnea. There is no history of issue with bowel or bladder dysfunction. There is no history of dizziness or lightheadedness. There is no history of issue with sleep or mood. There is no history of issue with present medication.            /69 (BP Location: Left arm, Patient Position: Sitting, Cuff Size: Large Adult)   Pulse 56   Temp 98.4 °F (36.9 °C) (Temporal)   Wt 83.9 kg (185 lb)   SpO2 95%   BMI 28.13 kg/m²       Chief Complaint   Patient presents with   • Med Refill           Current Outpatient Medications:   •  aspirin 325 MG tablet, Take 81 mg by mouth Daily., Disp: , Rfl:   •  lisinopril-hydrochlorothiazide (PRINZIDE,ZESTORETIC) 20-12.5 MG per tablet, Take 1 tablet by mouth Daily., Disp: 30 tablet, Rfl: 3  •  risperiDONE (RisperDAL) 1 MG tablet, Take 1 tablet by mouth Daily With Dinner., Disp: 30 tablet, Rfl: 1  •  rosuvastatin (CRESTOR) 10 MG tablet, TAKE ONE TABLET BY MOUTH EVERY NIGHT AT BEDTIME, Disp: 30 tablet, Rfl: 0  •  sertraline (ZOLOFT) 100 MG tablet, TAKE ONE TABLET BY MOUTH DAILY, Disp: 30 tablet, Rfl: 3  •  warfarin (COUMADIN) 5 MG tablet, TAKE TWO TABLETS BY MOUTH DAILY AT 5PM, Disp: 60 tablet, Rfl: 3  •  warfarin (COUMADIN) 7.5 MG tablet, TAKE ONE TABLET BY MOUTH DAILY, Disp: 30 tablet, Rfl: 3        The following portions of the patient's history were reviewed and updated as appropriate: allergies, current medications, past family history, past medical history, past social history, past surgical history, and problem list.    Review of Systems   Constitutional: Negative for activity change, fatigue and fever.   HENT: Positive for hearing loss. Negative for congestion, sinus pressure, sinus pain, sore throat and trouble swallowing.    Eyes: Positive for  visual disturbance.   Respiratory: Negative for chest tightness, shortness of breath and wheezing.    Cardiovascular: Negative for chest pain.   Gastrointestinal: Negative for abdominal distention, abdominal pain, constipation, diarrhea, nausea and vomiting.   Genitourinary: Negative for difficulty urinating and dysuria.   Musculoskeletal: Positive for gait problem. Negative for back pain and neck pain.   Psychiatric/Behavioral: Positive for confusion and hallucinations (Improving on medication). Negative for agitation, sleep disturbance (Better on medication) and suicidal ideas.       Objective   Physical Exam  Vitals and nursing note reviewed.   Constitutional:       Appearance: He is obese.   HENT:      Right Ear: Tympanic membrane and external ear normal.      Left Ear: Tympanic membrane and external ear normal.      Ears:      Comments: Not wearing hearing aids, extremely deaf  Eyes:      Comments: Appears to have cataracts bilaterally   Cardiovascular:      Rate and Rhythm: Normal rate and regular rhythm.      Heart sounds: Murmur heard.     Pulmonary:      Effort: Pulmonary effort is normal.      Breath sounds: No wheezing or rales.   Abdominal:      General: Bowel sounds are normal.      Palpations: Abdomen is soft.      Tenderness: There is no abdominal tenderness. There is no guarding.   Musculoskeletal:      Cervical back: Neck supple.      Right lower leg: No edema.      Left lower leg: No edema.      Comments: Right ankle a little stiff after recent sprain and subsequent fall   Lymphadenopathy:      Cervical: No cervical adenopathy.   Neurological:      Gait: Gait abnormal (Using his walker though).      Comments: Hard to know his true mental status due to the poor hearing and inability to truly respond           Assessment/Plan   Problems Addressed this Visit        Cardiac and Vasculature    HTN (hypertension) - Primary    Relevant Orders    Comprehensive metabolic panel    Lipid panel    Mechanical  heart valve present    Relevant Orders    CBC w AUTO Differential      Other Visit Diagnoses     Cataract of both eyes, unspecified cataract type        Relevant Orders    Ambulatory Referral to Ophthalmology (Completed)      Diagnoses       Codes Comments    Essential hypertension    -  Primary ICD-10-CM: I10  ICD-9-CM: 401.9     Mechanical heart valve present     ICD-10-CM: Z95.2  ICD-9-CM: V43.3     Cataract of both eyes, unspecified cataract type     ICD-10-CM: H26.9  ICD-9-CM: 366.9         I have asked him to continue to use his walker  I have asked him to have his eyes examined and to fix his hearing aids  I have told him not to drive  I will get some labs and recheck later in the year  The medication given previously have stopped the hallucinations and behavior issues  His wife is to call for new concerns or issues

## 2021-08-10 PROBLEM — R79.1 ELEVATED INR (INTERNATIONAL NORMALIZED RATIO) DUE TO PRIOR ANTICOAGULANT MEDICATION INGESTION: Status: ACTIVE | Noted: 2021-01-01

## 2021-08-10 PROBLEM — E78.2 MIXED HYPERLIPIDEMIA: Chronic | Status: ACTIVE | Noted: 2021-01-01

## 2021-08-10 PROBLEM — L03.116 CELLULITIS OF LEFT LOWER EXTREMITY: Status: ACTIVE | Noted: 2021-01-01

## 2021-08-10 PROBLEM — Z85.72 HISTORY OF B-CELL LYMPHOMA: Chronic | Status: ACTIVE | Noted: 2021-01-01

## 2021-08-10 NOTE — H&P
Forced surgery.      [facility]      Patient Name: Niko Servin  : 1929  MRN: [id number]  Primary Care Physician:  Casper Saldaña MD  Date of admission: 2021      Subjective      Chief Complaint: left foot edema    History of Present Illness:  Niko Servin is a 89 y.o. male w/PMH of B-cell lymphoma, CAD, MVR W/mechanical valve long-term anticoagulation, depression, [place], HTN presents to [facility] ED due to left lower extremity cellulitis.  Patient is extremely hard of hearing and is a poor historian, unable to complete review of systems at this time.  Family reports patient injured his left great toe approximately 2 weeks prior but did not tell anyone.  Patient's wife reports he has fallen multiple times over the last week due to gait abnormality.  Patient's wife also reports deterioration of cognition with intermittent hallucinations.  Wife reports patient is vaccinated for Covid.          Upon arrival to the ED vitals temp 98, HR 77, RR 17, /65, O2 sat 100% on room air.  Abs notable for troponin less than 0.015, glucose 113, , K4.1, creatinine 1.25, BUN 35, GFR 54, ALT 8, AST 15, total bili 0.3, CRP 28.7, lactic 1.0, INR greater than 8, WBC 12, Hgb 9.4, platelets 302, neutrophils 87.3.  Sed rate 42.  Blood cultures drawn.  CT of the head shows no acute intracranial abnormality, no hemorrhage.  Moderate chronic age-related intracranial findings.  EKG shows sinus rhythm rate 73 multiple PVCs, ventricular and supraventricular, borderline ST depression anterolateral leads.  Patient treated in the ED with Tdap, IV Zosyn, albuterol nebulizer, IV vancomycin.  UA, chest x-ray, x-ray left foot ordered.      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    Social History:  reports that he has quit smoking. He has never used smokeless tobacco. He reports current alcohol use of about 5.0 standard drinks of alcohol per week. He reports that he does not use drugs.    Home Medications:  Prior to Admission Medications     Prescriptions Last Dose Informant Patient Reported? Taking?    aspirin 325 MG tablet   Yes No    Take 81 mg by mouth Daily.    lisinopril-hydrochlorothiazide (PRINZIDE,ZESTORETIC) 20-12.5 MG per tablet   No No    Take 1 tablet by mouth Daily.    risperiDONE (RisperDAL) 1 MG tablet   No No    Take 1 tablet by mouth Daily With Dinner.    rosuvastatin (CRESTOR) 10 MG tablet   No No    TAKE ONE TABLET BY MOUTH EVERY NIGHT AT BEDTIME    sertraline (ZOLOFT) 100 MG tablet   No No    TAKE ONE TABLET BY MOUTH DAILY    warfarin (COUMADIN) 5 MG tablet   No No    TAKE TWO TABLETS BY MOUTH DAILY AT 5PM    warfarin (COUMADIN) 7.5 MG tablet   No No    TAKE ONE TABLET BY MOUTH DAILY            Allergies:  No Known Allergies    Objective      Vitals:   Temp:  [98 °F (36.7 °C)-99.4 °F (37.4 °C)] 98.9 °F (37.2 °C)  Heart Rate:  [76-89] 89  Resp:  [17-30] 26  BP: (113-169)/(45-75) 169/75    Physical Exam  Vitals and nursing note reviewed.   Constitutional:       Appearance: He is ill-appearing.   HENT:      Head: Atraumatic.      Nose: Congestion present.      Mouth/Throat:      Mouth: Mucous  membranes are dry.   Eyes:      General: No scleral icterus.  Cardiovascular:      Rate and Rhythm: Normal rate and regular rhythm.      Comments: Mechanical valve click  Pulmonary:      Effort: Pulmonary effort is normal.      Breath sounds: Normal air entry.   Abdominal:      General: Bowel sounds are normal.      Palpations: Abdomen is soft.      Tenderness: There is no abdominal tenderness. There is no guarding.   Musculoskeletal:      Right lower le+ Edema present.      Left lower leg: 3+ Edema present.   Skin:     Coloration: Skin is sallow.      Findings: Bruising, ecchymosis and signs of injury present.             Comments: Bilateral PVD   Neurological:      Mental Status: He is disoriented.      Motor: Weakness present.      Coordination: Coordination abnormal.      Gait: Gait abnormal.   Psychiatric:         Cognition and Memory: Cognition is impaired. Memory is impaired.      Comments: Family reports recent  hallucinations          Result Review    Result Review:  I have personally reviewed the results from the time of this admission to 8/10/2021 06:57 EDT and agree with these findings:  [x]  Laboratory  [x]  Microbiology  [x]  Radiology  [x]  EKG/Telemetry   [x]  Cardiology/Vascular   []  Pathology  []  Old records  []  Other:        Assessment/Plan        Active Hospital Problems:  Active Hospital Problems    Diagnosis    • **Cellulitis of left lower extremity    • Elevated INR (international normalized ratio) due to prior anticoagulant medication ingestion    • Mixed hyperlipidemia    • Essential hypertension    • Chronic coronary artery disease    • History of mitral valve replacement    • History of B-cell lymphoma    • Depression    • Mechanical heart valve present    • Hearing problem    • Long term current use of anticoagulant therapy        Cellulitis of left lower extremity:  -Wife reports multiple falls over the last week due to gait abnormality  -Left great toe with foul sweet smelling  odor, potential gangrene  -Stat x-ray left foot 3 view ordered  -Lactic now and every 6 hours x 2 to monitor for potential sepsis  -In the ED patient given IV Zosyn, IV vancomycin, Tdap  -2 g IV Rocephin, pharmacy consulted to dose IV vancomycin  -Consult podiatry for management  - consult for discharge planning  -Wound care consult  -Fall precautions  -Electrolyte protocol  -O2 as needed to keep sats greater than 90%    Elevated INR:  -INR greater than 8 upon arrival to the ED  -1 unit FFP given in the ED  -Monitor INR    Mixed hyperlipidemia:  -Encourage lifestyle modifications  -Encourage dietary modifications  -Continue home medication rosuvastatin 10 mg p.o. nightly    Essential hypertension:  -No available home medications at this time      Chronic coronary artery disease:  -Home medication ASA 81 mg p.o. daily, hold due to elevated INR  -EKG shows sinus rhythm rate 73 multiple PVCs, ventricular and supraventricular, borderline ST depression anterolateral leads    History of MVR/mechanical valve present/long-term use of anticoagulant therapy:  -Pharmacy to dose Coumadin during admission.  Target are 2.5-3.5.  Hold anticoagulants for surgery    Depression:  -Home medication sertraline 100 mg p.o. daily    Hard of hearing:  -Continue to monitor          DVT prophylaxis:  Medical DVT prophylaxis orders are present.    CODE STATUS:    Code Status: CPR  Medical Interventions (Level of Support Prior to Arrest): Full    Admission Status:  I believe this patient meets observation status.      I discussed the patient's findings and my recommendations with the patient.        This patient has been interviewed wearing appropriate personal protective equipment and findings discussed with the ED physician.    Signature: Electronically signed by MARIZA Peter, 08/10/21, 7:04 AM EDT.      Hospitalist Physician Assessment/Plan    History:*  Co of pain lt foot  drenies cp  soa    Exam:**  Gangrenous foul  smelling lt fooot  tachpnic  bilat basal rales      Medical Decision Making:*  Add clinda  See orders  cxr  Iv lasix  Surgery per dr Woo  Id conslt      Attending Physician Attestation    For this patient encounter, I have reviewed the mid-level provider documentation, medical decision making and treatment plan, and I have personally spent time with the patient.  All procedures were done by me, and/or all procedures were performe*d by the mid-level under my supervision.

## 2021-08-10 NOTE — PLAN OF CARE
Goal Outcome Evaluation:  Plan of Care Reviewed With: patient, spouse           Outcome Summary: Pt is a 89 YO M admitted with cellulits of L great toe. Pt [place] but spouse in room to assist with PLOF. Pt lives at home with spouse, and she reports he typically is independent with all ADLs, ambulation without AD, driving approx 2 weeks ago and no recent falls. Pt appears to be functioning well below this level so unsure of accuracy of reported PLOF. Pt this date severly SOA, requiring VC for PLB. Pt came to standing with MOD A and RWx, but unable to remain standing. Pt requires rehab following d/c, due to severe loss of functional mobility, but likely to refuse.

## 2021-08-10 NOTE — THERAPY EVALUATION
Patient Name: Niko Servin  : 1929    MRN: [id number]                              Today's Date: 8/10/2021       Admit Date: 2021    Visit Dx:     ICD-10-CM ICD-9-CM   1. Cellulitis of left lower extremity  L03.116 682.6   2. Supratherapeutic INR  R79.1 790.92     Patient Active Problem List   Diagnosis   • Ataxia   • B-cell lymphoma (CMS/HCC)   • Chronic coronary artery disease   • Depression   • Diffuse large B cell lymphoma (CMS/HCC)   • Hearing problem   • History of mitral valve replacement   • Essential hypertension   • Long term current use of anticoagulant therapy   • Mechanical heart valve present   • Postural hypotension   • Protein calorie malnutrition (CMS/HCC)   • Soft tissue mass   • ST elevation (STEMI) myocardial infarction (CMS/HCC)   • Cellulitis of left lower extremity   • History of B-cell lymphoma   • Elevated INR (international normalized ratio) due to prior anticoagulant medication ingestion   • Mixed hyperlipidemia     Past Medical History:   Diagnosis Date   • Ataxia    • CAD (coronary artery disease)    • CHF (congestive heart failure) (CMS/HCC)    • Depression    • Hx of mitral valve replacement    • Long term (current) use of anticoagulants    • 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)    • Mass of soft tissue     Of left arm   • Myocardial infarction (CMS/HCC)    • Postural hypotension    • Problems with hearing    • Protein calorie malnutrition (CMS/HCC)    • Unspecified injury of head, initial encounter      Past Surgical History:   Procedure Laterality Date   • VEIN BYPASS SURGERY       General Information     Row Name 08/10/21 1626          Physical Therapy Time and Intention    Document Type  evaluation  -EL     Mode of Treatment  individual therapy;physical therapy  -EL     Row Name 08/10/21 1626          General Information    Patient Profile Reviewed  yes  -EL     Prior Level of Function  independent:;all household mobility;ADL's;driving per spouse,  pt appears to be functioning at much lower level  -EL     Existing Precautions/Restrictions  fall  -EL     Barriers to Rehab  medically complex;hearing deficit  -[facility] Name 08/10/21 1626          Living Environment    Lives With  spouse  -[facility] Name 08/10/21 1626          Home Main Entrance    Number of Stairs, Main Entrance  none  -[facility] Name 08/10/21 1626          Stairs Within Home, Primary    Number of Stairs, Within Home, Primary  none  -[facility] Name 08/10/21 1626          Cognition    Orientation Status (Cognition)  oriented to;person;place;verbal cues/prompts needed for orientation  -[facility] Name 08/10/21 1626          Safety Issues, Functional Mobility    Impairments Affecting Function (Mobility)  balance;cognition;endurance/activity tolerance;pain;range of motion (ROM);shortness of breath  -       User Key  (r) = Recorded By, (t) = Taken By, (c) = Cosigned By    Initials Name Provider Type    Shola Enciso, PT Physical Therapist        Mobility     Row Name 08/10/21 1629          Bed Mobility    Bed Mobility  supine-sit  -EL     Supine-Sit [place] (Bed Mobility)  moderate assist (50% patient effort)  -EL     Assistive Device (Bed Mobility)  bed rails  -[facility] Name 08/10/21 1629          Sit-Stand Transfer    Sit-Stand [place] (Transfers)  moderate assist (50% patient effort)  -EL     Assistive Device (Sit-Stand Transfers)  walker, front-wheeled  -[facility] Name 08/10/21 1629          Gait/Stairs (Locomotion)    Comment (Gait/Stairs)  did not assess gait due to SOA  -       User Key  (r) = Recorded By, (t) = Taken By, (c) = Cosigned By    Initials Name Provider Type    Shola Enciso PT Physical Therapist        Obj/Interventions     [facility] Name 08/10/21 1630          Range of Motion Comprehensive    General Range of Motion  bilateral lower extremity ROM WFL  -[facility] Name 08/10/21 1630          Strength Comprehensive (MMT)    General Manual Muscle Testing (MMT)  Assessment  lower extremity strength deficits identified  -EL     Comment, General Manual Muscle Testing (MMT) Assessment  BLE 3+/5 gross, did not assess L knee flex/ext or ankle dorsi/plantar flexion  -EL     Row Name 08/10/21 1630          Balance    Balance Assessment  sitting static balance;standing static balance;standing dynamic balance  -EL     Static Sitting Balance  WFL  -EL     Static Standing Balance  mild impairment  -EL     Dynamic Standing Balance  mild impairment  -EL     Row Name 08/10/21 1630          Sensory Assessment (Somatosensory)    Sensory Assessment (Somatosensory)  sensation intact;other (see comments) per pt  -EL       User Key  (r) = Recorded By, (t) = Taken By, (c) = Cosigned By    Initials Name Provider Type    Shola Enciso, PT Physical Therapist        Goals/Plan     Row Name 08/10/21 1635          Bed Mobility Goal 1 (PT)    Activity/Assistive Device (Bed Mobility Goal 1, PT)  bed mobility activities, all  -EL     [place] Level/Cues Needed (Bed Mobility Goal 1, PT)  supervision required  -EL     Time Frame (Bed Mobility Goal 1, PT)  long term goal (LTG);2 weeks  -     Row Name 08/10/21 1635          Transfer Goal 1 (PT)    Activity/Assistive Device (Transfer Goal 1, PT)  transfers, all;walker, rolling  -EL     [place] Level/Cues Needed (Transfer Goal 1, PT)  minimum assist (75% or more patient effort)  -EL     Time Frame (Transfer Goal 1, PT)  long term goal (LTG);2 weeks  -     Row Name 08/10/21 1635          Gait Training Goal 1 (PT)    Activity/Assistive Device (Gait Training Goal 1, PT)  gait (walking locomotion);walker, rolling  -EL     [place] Level (Gait Training Goal 1, PT)  minimum assist (75% or more patient effort)  -EL     Distance (Gait Training Goal 1, PT)  25  -EL     Time Frame (Gait Training Goal 1, PT)  long term goal (LTG);2 weeks  -EL       User Key  (r) = Recorded By, (t) = Taken By, (c) = Cosigned By    Initials Name Provider Type    PORTIA Nunez  Shola, PT Physical Therapist        Clinical Impression     Row Name 08/10/21 1631          Pain    Additional Documentation  Pain Scale: FACES Pre/Post-Treatment (Group)  -     Row Name 08/10/21 1631          Pain Scale: FACES Pre/Post-Treatment    Pain: FACES Scale, Pretreatment  0-->no hurt  -EL     Posttreatment Pain Rating  0-->no hurt  -EL     Pre/Posttreatment Pain Comment  pt reports no pain  -EL     Row Name 08/10/21 1631          Plan of Care Review    Plan of Care Reviewed With  patient;spouse  -EL     Outcome Summary  Pt is a 89 YO M admitted with cellulits of L great toe. Pt [place] but spouse in room to assist with PLOF. Pt lives at home with spouse, and she reports he typically is independent with all ADLs, ambulation without AD, driving approx 2 weeks ago and no recent falls. Pt appears to be functioning well below this level so unsure of accuracy of reported PLOF. Pt this date severly SOA, requiring VC for PLB. Pt came to standing with MOD A and RWx, but unable to remain standing. Pt requires rehab following d/c, due to severe loss of functional mobility, but likely to refuse.  -     Row Name 08/10/21 1631          Therapy Assessment/Plan (PT)    Rehab Potential (PT)  good, to achieve stated therapy goals  -EL     Criteria for Skilled Interventions Met (PT)  yes  -EL     Predicted Duration of Therapy Intervention (PT)  Until d/c  -     Row Name 08/10/21 1631          Vital Signs    Pre SpO2 (%)  95  -EL     O2 Delivery Pre Treatment  supplemental O2  -EL     Intra SpO2 (%)  94  -EL     O2 Delivery Intra Treatment  supplemental O2  -EL     Post SpO2 (%)  94  -EL     O2 Delivery Post Treatment  supplemental O2  -EL     Pre Patient Position  Supine  -EL     Intra Patient Position  Standing  -EL     Post Patient Position  Supine  -EL     Row Name 08/10/21 1631          Positioning and Restraints    Pre-Treatment Position  in bed  -EL     Post Treatment Position  bed  -EL     In Bed  notified  nsg;supine;call light within reach;encouraged to call for assist;exit alarm on  -EL       User Key  (r) = Recorded By, (t) = Taken By, (c) = Cosigned By    Initials Name Provider Type    Shola Enciso PT Physical Therapist        Outcome Measures     Row Name 08/10/21 1635          How much help from another person do you currently need...    Turning from your back to your side while in flat bed without using bedrails?  3  -EL     Moving from lying on back to sitting on the side of a flat bed without bedrails?  2  -EL     Moving to and from a bed to a chair (including a wheelchair)?  2  -EL     Standing up from a chair using your arms (e.g., wheelchair, bedside chair)?  2  -EL     Climbing 3-5 steps with a railing?  1  -EL     To walk in hospital room?  1  -EL     AM-PAC 6 Clicks Score (PT)  11  -EL     Row Name 08/10/21 1635          Functional Assessment    Outcome Measure Options  AM-PAC 6 Clicks Basic Mobility (PT)  -EL       User Key  (r) = Recorded By, (t) = Taken By, (c) = Cosigned By    Initials Name Provider Type    Shola Enciso PT Physical Therapist                       Physical Therapy Education                 Title: PT OT SLP Therapies (Done)     Topic: Physical Therapy (Done)     Point: Mobility training (Done)     Learning Progress Summary           Patient Acceptance, E,TB, VU by  at 8/10/2021 1636                   Point: Precautions (Done)     Learning Progress Summary           Patient Acceptance, E,TB, VU by  at 8/10/2021 1636                               User Key     Initials Effective Dates Name Provider Type [facility] 06/23/20 -  Shola Nunez PT Physical Therapist PT              PT Recommendation and Plan  Planned Therapy Interventions (PT): balance training, neuromuscular re-education, bed mobility training, gait training, transfer training, patient/family education, strengthening  Plan of Care Reviewed With: patient, spouse  Outcome Summary: Pt is a 89 YO M admitted with  cellulits of L great toe. Pt [place] but spouse in room to assist with PLOF. Pt lives at home with spouse, and she reports he typically is independent with all ADLs, ambulation without AD, driving approx 2 weeks ago and no recent falls. Pt appears to be functioning well below this level so unsure of accuracy of reported PLOF. Pt this date severly SOA, requiring VC for PLB. Pt came to standing with MOD A and RWx, but unable to remain standing. Pt requires rehab following d/c, due to severe loss of functional mobility, but likely to refuse.     Time Calculation:   PT Charges     Row Name 08/10/21 1637             Time Calculation    Start Time  1459  -EL      Stop Time  1517  -EL      Time Calculation (min)  18 min  -EL      PT Received On  08/10/21  -EL      PT - Next Appointment  08/11/21  -EL      PT Goal Re-Cert Due Date  08/24/21  -EL        User Key  (r) = Recorded By, (t) = Taken By, (c) = Cosigned By    Initials Name Provider Type    Shola Enciso PT Physical Therapist        Therapy Charges for Today     Code Description Service Date Service Provider Modifiers Qty    [id number] HC PT EVAL MOD COMPLEXITY 3 8/10/2021 Shola Nunez PT GP 1          PT G-Codes  Outcome Measure Options: AM-PAC 6 Clicks Basic Mobility (PT)  AM-PAC 6 Clicks Score (PT): 11    Shola Nunez PT  8/10/2021

## 2021-08-10 NOTE — CONSULTS
08/10/21   Foot and Ankle Surgery - Consult  Provider: Dr. Willy Woo DPM  Location: [facility]    Subjective:  Niko Servin is a 89 y.o. male.     Chief Complaint   Patient presents with   • Toe Injury       Consulting Physician: Primary service    Reason for Consult: Gangrenous left toe    HPI: Patient is a 89-year-old male with multiple comorbidities including B-cell lymphoma, CAD, and MVR with mechanical valve that has been admitted for concerns of a gangrenous changes involving the left great toe.  Patient has his wife at bedside who provides majority of the history.  Apparently patient injured the toe several weeks ago and has tried to manage the issue himself.  His wife states that he has had gradual progression in redness, swelling, and deterioration of the toe prompting him to report to the ED.  After further questioning, patient has noticed gradual and progressive rest pain and claudication type symptoms involving the left lower extremity.  Patient denies any constitutional signs of infection.    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 No pertinent family history.    Social History     Socioeconomic History   • Marital status:      Spouse name: Not on file   • Number of children: Not on file   • Years of education: Not on file   • Highest education level: Not on file   Tobacco Use   •  Smoking status: Former Smoker   • Smokeless tobacco: Never Used   Vaping Use   • Vaping Use: Never used   Substance and Sexual Activity   • Alcohol use: Yes     Alcohol/week: 5.0 standard drinks     Types: 5 Cans of beer per week   • Drug use: No   • Sexual activity: Defer          Current Facility-Administered Medications:   •  acetaminophen (TYLENOL) tablet 650 mg, 650 mg, Oral, Q4H PRN **OR** acetaminophen (TYLENOL) 160 MG/5ML solution 650 mg, 650 mg, Oral, Q4H PRN **OR** acetaminophen (TYLENOL) suppository 650 mg, 650 mg, Rectal, Q4H PRN, Amisha Taveras, APRN  •  aluminum-magnesium hydroxide-simethicone (MAALOX MAX) 400-400-40 MG/5ML suspension 15 mL, 15 mL, Oral, Q6H PRN, Amisha Taveras, APRN  •  cefTRIAXone (ROCEPHIN) 2 g in sodium chloride 0.9 % 100 mL IVPB, 2 g, Intravenous, Q24H, Amisha Taveras APRN, Last Rate: 200 mL/hr at 08/10/21 0546, 2 g at 08/10/21 0546  •  Magnesium Sulfate 2 gram infusion - Mg less than or equal to 1.5 mg/dL, 2 g, Intravenous, PRN **OR** Magnesium Sulfate 1 gram infusion - Mg 1.6-1.9 mg/dL, 1 g, Intravenous, PRN, Amisha Taveras, APRN  •  melatonin tablet 5 mg, 5 mg, Oral, Nightly PRN, Amisha Taveras, APRN  •  nitroglycerin (NITROSTAT) SL tablet 0.4 mg, 0.4 mg, Sublingual, Q5 Min PRN, Amisha Taveras, APRN  •  ondansetron (ZOFRAN) tablet 4 mg, 4 mg, Oral, Q6H PRN **OR** ondansetron (ZOFRAN) injection 4 mg, 4 mg, Intravenous, Q6H PRN, Amisha Taveras, APRN  •  Pharmacy to dose vancomycin, , Does not apply, Continuous PRN, Amisha Taveras, APRN  •  Pharmacy to dose warfarin, , Does not apply, Continuous PRN, Nitin, Amisha, APRN  •  potassium chloride (K-DUR,KLOR-CON) CR tablet 40 mEq, 40 mEq, Oral, PRN, Amisha Taveras APRN  •  rosuvastatin (CRESTOR) tablet 10 mg, 10 mg, Oral, Nightly, Amisha Taveras APRN  •  sertraline (ZOLOFT) tablet 100 mg, 100 mg, Oral, Daily, Amisha Taveras APRN  •  sodium chloride 0.9 % flush 10 mL, 10 mL, Intravenous, PRN, Tushar Poon MD  •  sodium chloride  "0.9 % flush 10 mL, 10 mL, Intravenous, Q12H, Amisha Taveras, MARIZA  •  sodium chloride 0.9 % flush 10 mL, 10 mL, Intravenous, PRN, Amisha Taveras APRN  •  [START ON 2021] vancomycin (VANCOCIN) 1,000 mg in sodium chloride 0.9 % 250 mL IVPB, 1,000 mg, Intravenous, Q24H, Amisha Taveras APRN    Review of Systems:  General: Denies fever, chills, fatigue, and weakness.  Eyes: Denies vision loss, blurry vision, and excessive redness.  ENT: Denies hearing issues and difficulty swallowing.  Cardiovascular: Denies palpitations, chest pain, or syncopal episodes.  Respiratory: Denies shortness of breath, wheezing, and coughing.  GI: Denies abdominal pain, nausea, and vomiting.   : Denies frequency, hematuria, and urgency.  Musculoskeletal: Denies muscle cramps, joint pains, and stiffness.  Derm: + Ulcer and dysvascular appearance involving the left great toe  Neuro: Denies headaches, numbness, loss of coordination, and tremors.  Psych: Denies anxiety and depression.  Endocrine: Denies temperature intolerance and changes in appetite.  Heme: Denies bleeding disorders or abnormal bruising.     Objective   /74 (BP Location: Right arm, Patient Position: Lying)   Pulse 88   Temp 98 °F (36.7 °C) (Oral)   Resp 22   Ht 182.9 cm (72.01\")   Wt 83.8 kg (184 lb 11.9 oz)   SpO2 92%   BMI 25.05 kg/m²     Foot/Ankle Exam:       General:   Appearance: elderly    Orientation: AAOx3    Affect: appropriate      VASCULAR      Right Foot Vascularity   Dorsalis pedis:  Doppler  Posterior tibial:  Doppler  Skin Temperature: cool    Edema Grading:  None  CFT:  5  Pedal Hair Growth:  Absent     Left Foot Vascularity   Dorsalis pedis:  Doppler  Posterior tibial:  Doppler  Skin Temperature: warm    Edema Gradin+ and pitting  CFT:  5  Pedal Hair Growth:  Absent      NEUROLOGIC     Right Foot Neurologic   Light touch sensation:  Normal  Hot/Cold sensation: normal    Achilles reflex:  1+     Left Foot Neurologic   Light touch " sensation:  Normal  Hot/cold sensation: normal    Achilles reflex:  1+     MUSCULOSKELETAL      Right Foot Musculoskeletal    Amputation   Right toes amputated: No    Ecchymosis:  None  Tenderness: none       Left Foot Musculoskeletal    Amputation   Left toes amputated: No    Ecchymosis:  None  Tenderness: toe 1    Tenderness comment:  Calf and lower leg discomfort     MUSCLE STRENGTH     Right Foot Muscle Strength   Normal strength    Foot dorsiflexion:  5  Foot plantar flexion:  5  Foot inversion:  5  Foot eversion:  5     Left Foot Muscle Strength   Normal strength    Foot dorsiflexion:  5  Foot plantar flexion:  5  Foot inversion:  5  Foot eversion:  5     DERMATOLOGIC     Right Foot Dermatologic   Skin: skin intact and atrophic       Left Foot Dermatologic   Skin: cellulitis, color abnormal, drainage, skin changes, ulcer and atrophic    Skin: no left foot maceration       Image:             Results from last 7 days   Lab Units 08/10/21  0450   WBC 10*3/mm3 9.10   HEMOGLOBIN g/dL 8.3*   HEMATOCRIT % 25.2*   PLATELETS 10*3/mm3 261       Assessment/Plan   Patient Active Problem List   Diagnosis   • Ataxia   • B-cell lymphoma (CMS/HCC)   • Chronic coronary artery disease   • Depression   • Diffuse large B cell lymphoma (CMS/HCC)   • Hearing problem   • History of mitral valve replacement   • Essential hypertension   • Long term current use of anticoagulant therapy   • Mechanical heart valve present   • Postural hypotension   • Protein calorie malnutrition (CMS/HCC)   • Soft tissue mass   • ST elevation (STEMI) myocardial infarction (CMS/HCC)   • Cellulitis of left lower extremity   • History of B-cell lymphoma   • Elevated INR (international normalized ratio) due to prior anticoagulant medication ingestion   • Mixed hyperlipidemia     Patient has been admitted for progressive changes involving the left foot.  Clinically, he does have redness, swelling, and dysvascular appearance involving the left foot.  Plain  film imaging was reviewed showing destructive changes involving the proximal and distal phalanx.  Patient does have baseline infection but I do feel that he has underlying peripheral arterial occlusive disease which is likely complicating the issue.  I have ordered noninvasive vascular testing.  We will consider vascular consultation pending results.  Patient does understand that he will likely require great toe amputation for definitive management of this issue.  I have recommended to continue IV antibiotics at this time.  Will continue to follow    Thank you for the consultation and allowing me to participate in this patient's care. Please call with any additional questions or concerns.     Note is dictated utilizing voice recognition software. Unfortunately this leads to occasional typographical errors. I apologize in advance if the situation occurs. If questions occur please do not hesitate to call our office.

## 2021-08-10 NOTE — NURSING NOTE
WOCN note:  Orders noted for consult and chart reviewed. Will continue to follow as needed but will defer to Podiatry at this time.

## 2021-08-10 NOTE — NURSING NOTE
Patient's status began changing late this afternoon. Patient became confused and stating he needs to get up and leave to go home. Temp-100.6 RR 34. Notified Dr. Mansfield and received new orders for breathing treatments, ABGs, cardiology consult and to transfer patient to [facility] or PCU. Dr. Woo also called and consulted vascular. Wife aware of changes, was at bedside.

## 2021-08-10 NOTE — ED NOTES
Called and spoke with wife Nini at [phone] to obtain consent for pt to receive FFP due to pt extreme difficulty hearing. Wife gave verbal consent and LUCY Corbett RN double verified      Kenna Martinez RN  08/10/21 0143

## 2021-08-10 NOTE — CASE MANAGEMENT/SOCIAL WORK
Discharge Planning Assessment   Richard     Patient Name: Niko Servin  MRN: [id number]  Today's Date: 8/10/2021    Admit Date: 8/9/2021    Discharge Needs Assessment     Row Name 08/10/21 1246       Living Environment    Lives With  spouse    Current Living Arrangements  home/apartment/condo    Primary Care Provided by  spouse/significant other    Provides Primary Care For  no one, unable/limited ability to care for self    Family Caregiver if Needed  spouse    Quality of Family Relationships  supportive    Able to Return to Prior Arrangements  yes       Resource/Environmental Concerns    Resource/Environmental Concerns  none    Transportation Concerns  car, none       Transition Planning    Patient/Family Anticipates Transition to  home with family    Patient/Family Anticipated Services at Transition  home health care    Transportation Anticipated  family or friend will provide       Discharge Needs Assessment    Readmission Within the Last 30 Days  no previous admission in last 30 days    Equipment Currently Used at Home  walker, rolling;walker, standard;shower chair    Concerns to be Addressed  discharge planning    Anticipated Changes Related to Illness  inability to care for self    Equipment Needed After Discharge  none    Outpatient/Agency/Support Group Needs  inpatient rehabilitation facility;skilled nursing facility;homecare agency    Discharge Facility/Level of Care Needs  home with home health;nursing facility, skilled;rehabilitation facility    Provided Post Acute Provider List?  Yes    Post Acute Provider List  Home Health;Nursing Home;Inpatient Rehab    Provided Post Acute Provider Quality & Resource List?  Yes    Post Acute Provider Quality and Resource List  Home Health;Inpatient Rehab;Nursing Home    Delivered To  Support Person    Support Person  spouse Nini    Method of Delivery  In person    Discharge Coordination/Progress  Spoke with patient and spouse at bedside. Spouse states she does  not want patient to go to inpatient reahb. home health and rehab list given. PT and OT are pending.        Discharge Plan     Row Name 08/10/21 1250       Plan    Plan  Pending progress with PT/OT.    Plan Comments  see assessment notes        Continued Care and Services - Admitted Since 8/9/2021    Coordination has not been started for this encounter.         Demographic Summary     Row Name 08/10/21 1239       General Information    Admission Type  inpatient    Arrived From  emergency department    Required Notices Provided  Important Message from Medicare    Referral Source  admission list    Reason for Consult  discharge planning    Preferred Language  English        Functional Status     Row Name 08/10/21 1239       Functional Status    Usual Activity Tolerance  fair    Current Activity Tolerance  poor       Functional Status, IADL    Medications  assistive person    Meal Preparation  completely dependent    Housekeeping  completely dependent    Laundry  completely dependent    Shopping  completely dependent    IADL Comments  Spouse hlep patient with adls       Mental Status    General Appearance WDL  WDL       Mental Status Summary    Recent Changes in Mental Status/Cognitive Functioning  no changes    Mental Status Comments  patient is very [place]       Employment/    Employment Status  retired        Met with patient in room wearing PPE: mask, face shield/goggles, gloves, gown.      Maintained distance greater than six feet and spent less than 15 minutes in the room.      Vidhya Haynes RN  Complex Case Manager  [facility] Care Coordination  [phone]  [phone]  [phone]  [email or web address]  Helen Haynes RN

## 2021-08-10 NOTE — PLAN OF CARE
Goal Outcome Evaluation:  Plan of Care Reviewed With: patient        Progress: no change  Outcome Summary: pt admitted from er to floor for cellulitis, pt voiced no complaints, pt extremely [place], receiving iv abx, vss, will continue to monitor.

## 2021-08-10 NOTE — ED NOTES
"Patient states he presents to the ED tonight with his wife after injuring his left foot two weeks ago; no numbness or tingling; pain that radiates up ankle, unable to rate but states, \"it doesn't hurt that bad\"; patient wife states,\"he has been using an ointment to heal it on his own, but I didn't know that it was that bad, he didn't tell me.\"      Miguelina Huertas, RN  08/09/21 9389    "

## 2021-08-10 NOTE — PLAN OF CARE
Goal Outcome Evaluation:              Outcome Summary: Patient very [place]. Wife at bedside. Continues IV ABTs. Denies pain. Podiatry seen this morning.

## 2021-08-10 NOTE — ED PROVIDER NOTES
Subjective   89-year-old male injured his left great toe on a piece of furniture several weeks ago.  Family was unaware that he had a wound in swelling with erythema.  Patient admits to mild pain with this but is otherwise a poor historian and very hard of hearing.  The wife reports patient has had numerous falls over the past 1 week secondary to being unsteady on his feet.  Patient is anticoagulated for an artificial valve.  Patient has had intermittent hallucinations as well per family.  Patient was vaccinated for Covid earlier in the year.          Review of Systems   Constitutional: Positive for fatigue.   Respiratory: Positive for wheezing.    Musculoskeletal:        Left foot pain, redness, swelling with associated wound.   Psychiatric/Behavioral: Positive for hallucinations.   All other systems reviewed and are negative.      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    Social History     Socioeconomic History   • Marital status:      Spouse name: Not on file   • Number of children: Not on file   • Years of education: Not on file   • Highest education level: Not on file   Tobacco Use   • Smoking status: Former Smoker   • Smokeless tobacco: Never Used   Substance and Sexual Activity   • Alcohol use: No   • Drug use: No   • Sexual activity: Defer           Objective   Physical Exam  Constitutional:        Comments: Elderly male in no acute distress, very hard of hearing.   HENT:      Head: Normocephalic and atraumatic.      Mouth/Throat:      Mouth: Mucous membranes are moist.      Pharynx: Oropharynx is clear.   Eyes:      Extraocular Movements: Extraocular movements intact.      Conjunctiva/sclera: Conjunctivae normal.      Pupils: Pupils are equal, round, and reactive to light.   Cardiovascular:      Rate and Rhythm: Normal rate and regular rhythm.   Pulmonary:      Comments: There is mild wheezing, no rib tenderness, good air movement, no respiratory distress  Abdominal:      General: Bowel sounds are normal. There is no distension.      Palpations: Abdomen is soft.   Musculoskeletal:      Cervical back: Normal range of motion and neck supple. No tenderness.      Comments: There is moderate swelling and erythema of the left foot and distal leg.  There is an associated wound of the left first toe with some wet gangrene and odor.   Skin:     General: Skin is warm and dry.      Capillary Refill: Capillary refill takes less than 2 seconds.   Neurological:      General: No focal deficit present.      Mental Status: Mental status is at baseline.   Psychiatric:         Mood and Affect: Mood normal.         Behavior: Behavior normal.         Procedures           ED Course  ED Course as of Aug 10 0240   Tue Aug 10, 2021   0010 EKG interpretation: Normal sinus rhythm with occasional PVCs, no acute ST change    [JR]      ED Course User Index  [JR] Tushar Poon MD                                           [facility]  Number of Diagnoses or Management Options  Cellulitis of left lower extremity  Supratherapeutic INR  Diagnosis management comments: Results for orders placed or performed during the hospital encounter of 08/09/21  -Protime-INR  Specimen: Blood       Result                      Value             Ref Range           Protime                     90.0 (H)          19.4 - 28.5 *       INR                         >=8.00  (C)        2.00 - 3.00    -Comprehensive Metabolic Panel  Specimen: Blood       Result                      Value             Ref Range           Glucose                     113 (H)           65 - 99 mg/dL       BUN                         35 (H)            8 - 23 mg/dL        Creatinine                  1.25              0.76 - 1.27 *       Sodium                      141               136 - 145 mm*       Potassium                   4.1               3.5 - 5.2 mm*       Chloride                    106               98 - 107 mmo*       CO2                         26.0              22.0 - 29.0 *       Calcium                     8.5               8.2 - 9.6 mg*       Total Protein               6.4               6.0 - 8.5 g/*       Albumin                     2.70 (L)          3.50 - 5.20 *       ALT (SGPT)                  8                 1 - 41 U/L          AST (SGOT)                  15                1 - 40 U/L          Alkaline Phosphatase        74                39 - 117 U/L        Total Bilirubin             0.3               0.0 - 1.2 mg*       eGFR Non  Amer       54 (L)            >60 mL/min/1*       Globulin                    3.7               gm/dL               A/G Ratio                   0.7               g/dL                BUN/Creatinine Ratio        28.0 (H)          7.0 - 25.0          Anion Gap                   9.0               5.0 - 15.0 m*  -CBC Auto Differential  Specimen: Blood       Result                      Value             Ref Range           WBC                         12.00 (H)         3.40 - 10.80*       RBC                         3.56 (L)          4.14 - 5.80 *       Hemoglobin                  9.4 (L)           13.0 - 17.7 *       Hematocrit                  29.5 (L)          37.5 - 51.0 %       MCV                         83.1              79.0 - 97.0 *       MCH                         26.6              26.6 - 33.0 *       MCHC                        32.0               31.5 - 35.7 *       RDW                         17.8 (H)          12.3 - 15.4 %       RDW-SD                      52.1              37.0 - 54.0 *       MPV                         7.7               6.0 - 12.0 fL       Platelets                   302               140 - 450 10*       Neutrophil %                87.3 (H)          42.7 - 76.0 %       Lymphocyte %                6.5 (L)           19.6 - 45.3 %       Monocyte %                  5.1               5.0 - 12.0 %        Eosinophil %                0.9               0.3 - 6.2 %         Basophil %                  0.2               0.0 - 1.5 %         Neutrophils, Absolute       10.50 (H)         1.70 - 7.00 *       Lymphocytes, Absolute       0.80              0.70 - 3.10 *       Monocytes, Absolute         0.60              0.10 - 0.90 *       Eosinophils, Absolute       0.10              0.00 - 0.40 *       Basophils, Absolute         0.00              0.00 - 0.20 *       nRBC                        0.0               0.0 - 0.2 /1*  -Troponin  Specimen: Blood       Result                      Value             Ref Range           Troponin T                  0.015             0.000 - 0.03*  -Sedimentation Rate  Specimen: Blood       Result                      Value             Ref Range           Sed Rate                    42 (H)            0 - 20 mm/hr   -C-reactive Protein  Specimen: Blood       Result                      Value             Ref Range           C-Reactive Protein          28.87 (H)         0.00 - 0.50 *  -POC Lactate  Specimen: Blood       Result                      Value             Ref Range           Lactate                     1.0               0.5 - 2.0 mm*  -ECG 12 Lead       Result                      Value             Ref Range           QT Interval                 383               ms             -Type & Screen  Specimen: Blood       Result                      Value             Ref Range           ABO Type                    A                                      RH type                     Positive                              Antibody Screen             Negative                              T&S Expiration Date                                           8/13/2021 11:59:59 PM  -Prepare Fresh Frozen Plasma, 1 Units       Result                      Value             Ref Range           Product Code                [id number]                              Unit Number                                                   [id number]       UNIT  ABO                   A                                     UNIT  RH                    NEG                                   Dispense Status             IS                                    Blood Expiration Date       [id number]                          Blood Type Barcode          0600                             -Green Top (Gel)       Result                      Value             Ref Range           Extra Tube                                                    Hold for add-ons.  -Lavender Top       Result                      Value             Ref Range           Extra Tube                                                    hold for add-on  -Gold Top - SST       Result                      Value             Ref Range           Extra Tube                                                    Hold for add-ons.  CT Head Without Contrast   Final Result        1. No acute intracranial abnormality. No hemorrhage.    2. No calvarial fracture.    3. Moderate chronic age-related intracranial findings as above.                 This examination was interpreted by Aneesh Ordonez M.D.        Electronically signed by:  Aneesh Ordonez M.D.      8/9/2021 11:53 PM     XR Chest 1 View    (Results Pending)  XR Foot 3+ View Left    (Results Pending)    Patient appears well on repeat evaluation, vital signs stable, wheezing resolved.  Patient treated with IV antibiotics, there is concern for underlying osteomyelitis.   Patient has elevated INR in the setting of an artificial valve without any clinically apparent bleeding.  Will give 1 unit of FFP and reevaluate.       Amount and/or Complexity of Data Reviewed  Clinical lab tests: ordered and reviewed  Tests in the radiology section of CPT®: ordered and reviewed  Tests in the medicine section of CPT®: reviewed and ordered  Decide to obtain previous medical records or to obtain history from someone other than the patient: yes        Final diagnoses:   Cellulitis of left lower extremity   Supratherapeutic INR       ED Disposition  ED Disposition     ED Disposition Condition Comment    Decision to Admit  Level of Care: Med/Surg [1]   Admitting Physician: RD NAZARIO [[id number]]            No follow-up provider specified.       Medication List      No changes were made to your prescriptions during this visit.          Tushar Poon MD  08/10/21 0242

## 2021-08-10 NOTE — PLAN OF CARE
Goal Outcome Evaluation:  Plan of Care Reviewed With: patient           Outcome Summary: Pt is a 89 y/o male presenting to [facility] with presents [facility] with left lower extremity cellulitis. CT head (-), XR chest (-).  PMH: of B-cell lymphoma, CAD, MVR W/mechanical valve long-term anticoagulation, depression, [place], and HTN. Pt with mutiple falls the last few weeks. OT noted contusions and wound on back from recent falls. Pt lives with wife and reports independent PLOF with ADLs, driving, and functional mobility. Pt required mod-min A for supine to sitting EOB, CGA for donning sock at EOB (only R secondary to L toe bleeding), and SBA for washing face. Pt presenting below baseline for ADL participation secondary to weakness and poor activity tolerance. Pt on 2L NC and stating 95%, however c/o feeling SOA following donning sock and bed mobility. OT recommending IP rehab. OT will follow at [facility].

## 2021-08-10 NOTE — PROGRESS NOTES
"Pharmacy Antimicrobial Dosing Service    Subjective:  Niko Servin is a 89 y.o.male admitted with cellulitis. Pharmacy has been consulted to dose Vancomycin for possible SSTI.    PMH: mechanical valve on warfarin, recently prone to falls      Assessment/Plan    1. Day #1 Vancomycin: Goal -600 mcg*h/mL. Vancomycin 1750mg (~19 mg/kg ABW). Will schedule vancomycin 1000mg (~11 mg/kg ABW) q24h given pt age and renal function. Will obtain a level prior to 3rd total dose on 8/12.     2. Day #1 Ceftriaxone: 2g IV q24h.    Will continue to monitor drug levels, renal function, culture and sensitivities, and patient clinical status.       Objective:  Relevant clinical data and objective history reviewed:  182.9 cm (72.01\")   83.8 kg (184 lb 11.9 oz)   Ideal body weight: 77.6 kg (171 lb 1.9 oz)  Adjusted ideal body weight: 80.1 kg (176 lb 9.1 oz)  Body mass index is 25.05 kg/m².        Results from last 7 days   Lab Units 08/10/21  0018   CREATININE mg/dL 1.25     Estimated Creatinine Clearance: 44.7 mL/min (by C-G formula based on SCr of 1.25 mg/dL).  No intake/output data recorded.    Results from last 7 days   Lab Units 08/10/21  0018   WBC 10*3/mm3 12.00*     Temperature    08/10/21 0252 08/10/21 0357 08/10/21 0431   Temp: 99.4 °F (37.4 °C) 98.4 °F (36.9 °C) 98.9 °F (37.2 °C)     Baseline culture/source/susceptibility:  Microbiology Results (last 10 days)       ** No results found for the last 240 hours. **            Anti-Infectives (From admission, onward)      Ordered     Dose/Rate Route Frequency Start Stop    08/10/21 0434  vancomycin (VANCOCIN) 1,000 mg in sodium chloride 0.9 % 250 mL IVPB     Ordering Provider: Amisha Taveras APRN    1,000 mg  over 60 Minutes Intravenous Every 24 Hours 08/11/21 0400 08/17/21 0359    08/10/21 0429  cefTRIAXone (ROCEPHIN) 2 g in sodium chloride 0.9 % 100 mL IVPB     Ordering Provider: Amisha Taveras APRN    2 g  200 mL/hr over 30 Minutes Intravenous Every 24 Hours " 08/10/21 0600 08/17/21 0559    08/10/21 0321  Pharmacy to dose vancomycin     Ordering Provider: Amisha Taveras APRN     Does not apply Continuous PRN 08/10/21 0321 08/17/21 0320    08/09/21 2354  piperacillin-tazobactam (ZOSYN) IVPB 3.375 g in 100 mL NS (CD)     Ordering Provider: Tushar Poon MD    3.375 g  over 30 Minutes Intravenous Once 08/09/21 2356 08/10/21 0310    08/09/21 2354  vancomycin IVPB 1750 mg in 0.9% Sodium Chloride (premix) 500 mL     Ordering Provider: Tushar Poon MD    20 mg/kg × 90.7 kg  over 120 Minutes Intravenous Once 08/09/21 2356              Kayleigh Guan RPH  08/10/21 04:35 EDT

## 2021-08-10 NOTE — CONSULTS
Infectious Diseases Consult Note    Referring Provider: Zaire Mansfield *    Reason for Consultation: Left toe infection, cellulitis    Patient Care Team:  Casper Saldaña MD as PCP - General  Casper Saldaña MD as PCP - Family Medicine    Chief complaint left toe wound, redness and swelling of left foot and lower leg    Subjective     The patient has been afebrile for the last 24 hours.  The patient is on 4 L of oxygen by nasal cannula, hemodynamically stable, and is tolerating antimicrobial therapy.    History of present illness:      This is a 89-year-old male who presents the hospital 8/9/2021 with complaints of a worsening left foot wound.  Patient's wife states the patient either stopped or dropped something on his left great toe approximately 2 weeks ago and has been putting cream and tending to the wound himself.  Wife finally looked at the wound yesterday and felt that the patient needed to come to the hospital.  Patient states that the redness and swelling to the left foot and lower leg is been going on for several days.  Patient denies fever, chills, diaphoresis, shortness of breath, cough, GI symptoms, any urinary symptoms.    Patient is currently on 4 L of oxygen by nasal cannula but does not appear to be in acute distress.  Patient's been afebrile since admission.  Patient has a creatinine of 1.2, white blood cell count 9.1, hemoglobin 8.3, platelets of 261, CRP of 28.8, sed rate of 42, and INR was over 8 at admission.  Urinalysis is negative, blood cultures are pending, COVID-19 screen is pending.,  CT of the head is negative and chest x-ray is negative for acute findings.  An x-ray of the foot shows soft tissue swelling with areas of gas production and evidence for osteomyelitis involving the proximal distal phalanges of the great toe.  Patient is currently on IV clindamycin, IV Merrem and IV vancomycin.  He has no known allergies.    Patient has a history of ataxia, CAD,  congestive heart failure, mitral valve replacement, B-cell lymphoma, hardness of hearing and vein surgery in the past.  Patient is a former smoker denies any alcohol or illicit drug abuse.      Review of Systems   Review of Systems   Constitutional: Negative.    HENT: Negative.    Eyes: Negative.    Respiratory: Negative.    Cardiovascular: Negative.    Gastrointestinal: Negative.    Endocrine: Negative.    Genitourinary: Negative.    Musculoskeletal: Negative.    Skin: Positive for color change and wound.   Neurological: Negative.    Psychiatric/Behavioral: Negative.    All other systems reviewed and are negative.      Medications  Medications Prior to Admission   Medication Sig Dispense Refill Last Dose   • aspirin 81 MG chewable tablet Chew 81 mg Daily.   8/9/2021 at Unknown time   • rosuvastatin (CRESTOR) 10 MG tablet TAKE ONE TABLET BY MOUTH EVERY NIGHT AT BEDTIME 30 tablet 5 8/9/2021 at Unknown time   • sertraline (ZOLOFT) 100 MG tablet TAKE ONE TABLET BY MOUTH DAILY 30 tablet 2 8/9/2021 at Unknown time   • warfarin (COUMADIN) 7.5 MG tablet TAKE ONE TABLET BY MOUTH DAILY 30 tablet 2 8/9/2021 at Unknown time       History  Past Medical History:   Diagnosis Date   • Ataxia    • CAD (coronary artery disease)    • CHF (congestive heart failure) (CMS/HCC)    • Depression    • Hx of mitral valve replacement    • Long term (current) use of anticoagulants    • 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 No pertinent family history.    Social History   reports that he has quit smoking. He has never used smokeless tobacco. He reports current alcohol use of about 5.0  standard drinks of alcohol per week. He reports that he does not use drugs.    Allergies  Patient has no known allergies.    Objective     Vital Signs   Vital Signs (last 24 hours)       08/09 0700  -  08/10 0659 08/10 0700  -  08/10 1307   Most Recent    Temp (°F) 98 -  99.4    98 -  98.1     98.1 (36.7)    Heart Rate 76 -  89    84 -  88     88    Resp 17 -  30    22 -  24     22    /56 -  169/75    136/74 -  153/70     146/80    SpO2 (%) 90 -  100    92 -  100     100          Physical Exam:  Physical Exam  Vitals and nursing note reviewed.   Constitutional:       General: He is not in acute distress.     Appearance: Normal appearance. He is well-developed. He is not diaphoretic.      Comments: Appears thin   HENT:      Head: Normocephalic and atraumatic.      Ears:      Comments: Patient is very hard of hearing  Eyes:      Extraocular Movements: Extraocular movements intact.      Conjunctiva/sclera: Conjunctivae normal.      Pupils: Pupils are equal, round, and reactive to light.   Cardiovascular:      Rate and Rhythm: Normal rate and regular rhythm.      Heart sounds: Normal heart sounds, S1 normal and S2 normal.   Pulmonary:      Effort: Pulmonary effort is normal. No respiratory distress.      Breath sounds: Normal breath sounds. No stridor. No wheezing or rales.   Abdominal:      General: Bowel sounds are normal. There is no distension.      Palpations: Abdomen is soft. There is no mass.      Tenderness: There is no abdominal tenderness. There is no guarding.   Musculoskeletal:         General: No deformity. Normal range of motion.      Cervical back: Neck supple.      Left lower leg: Edema present.      Comments: Patient has a significant wound to the left great toe that has black eschar around the entire circumference of the toe.  There is erythema and swelling to the dorsal aspect of the left foot that extends into the left lower leg.  Pulses are very weak   Skin:     General: Skin is warm and dry.       Coloration: Skin is not pale.      Findings: Erythema present. No rash.   Neurological:      Mental Status: He is alert and oriented to person, place, and time.      Cranial Nerves: No cranial nerve deficit.   Psychiatric:         Mood and Affect: Mood normal.         Microbiology  Microbiology Results (last 10 days)     Procedure Component Value - Date/Time    COVID PRE-OP / PRE-PROCEDURE SCREENING ORDER (NO ISOLATION) - Swab, Nasopharynx [[id number]]  (Normal) Collected: 08/10/21 1114    Lab Status: Final result Specimen: Swab from Nasopharynx Updated: 08/10/21 1241    Narrative:      The following orders were created for panel order COVID PRE-OP / PRE-PROCEDURE SCREENING ORDER (NO ISOLATION) - Swab, Nasopharynx.  Procedure                               Abnormality         Status                     ---------                               -----------         ------                     COVID-19,CEPHEID/XUAN/BD...[[id number]]  Normal              Final result                 Please view results for these tests on the individual orders.    COVID-19,CEPHEID/XUAN/BDMAX,COR/LOREN/PAD/FEI IN-HOUSE(OR EMERGENT/ADD-ON),NP SWAB IN TRANSPORT MEDIA 3-4 HR TAT, RT-PCR - Swab, Nasopharynx [[id number]]  (Normal) Collected: 08/10/21 1114    Lab Status: Final result Specimen: Swab from Nasopharynx Updated: 08/10/21 1241     COVID19 Not Detected    Narrative:      Fact sheet for providers: https://www.fda.gov/media/805757/download     Fact sheet for patients: https://www.fda.gov/media/383668/download  Fact sheet for providers: https://www.fda.gov/media/147800/download    Fact sheet for patients: https://www.fda.gov/media/377965/download    Test performed by PCR.          Laboratory  Results from last 7 days   Lab Units 08/10/21  0450   WBC 10*3/mm3 9.10   HEMOGLOBIN g/dL 8.3*   HEMATOCRIT % 25.2*   PLATELETS 10*3/mm3 261     Results from last 7 days   Lab Units 08/10/21  0450   SODIUM mmol/L 142   POTASSIUM mmol/L 3.9   CHLORIDE  mmol/L 106   CO2 mmol/L 26.0   BUN mg/dL 36*   CREATININE mg/dL 1.20   GLUCOSE mg/dL 115*   CALCIUM mg/dL 8.4     Results from last 7 days   Lab Units 08/10/21  0450   SODIUM mmol/L 142   POTASSIUM mmol/L 3.9   CHLORIDE mmol/L 106   CO2 mmol/L 26.0   BUN mg/dL 36*   CREATININE mg/dL 1.20   GLUCOSE mg/dL 115*   CALCIUM mg/dL 8.4         Results from last 7 days   Lab Units 08/10/21  0018   SED RATE mm/hr 42*           Radiology  Imaging Results (Last 72 Hours)     Procedure Component Value Units Date/Time    XR Chest 2 View [[id number]] Collected: 08/10/21 1240     Updated: 08/10/21 1245    Narrative:      DATE OF EXAM:  8/10/2021 12:20 PM     PROCEDURE:  XR CHEST 2 VW-     INDICATIONS:  SOA; L03.116-Cellulitis of left lower limb; R79.1-Abnormal coagulation  profile     COMPARISON:  A 10/20/2021 at 12:40 AM, 6/8/2016 at 5:39 PM, 6/7/2016 at 9:47 PM     TECHNIQUE:   Two radiologic views of the chest , PA and lateral were obtained.     FINDINGS:  Extensive chronic changes are again seen throughout the lungs. These  findings are again stable given differences in technique. No new  consolidations or pleural effusions are observed. The cardiac silhouette  and mediastinum are unchanged. Stable cardiomegaly is noted.  Postoperative changes are noted. No acute osseous abnormalities are  seen.       Impression:      Chronic changes are again noted which are stable. There is no definitive  evidence for acute cardiopulmonary process.     Electronically Signed By-Aneesh Judd MD On:8/10/2021 12:43 PM  This report was finalized on [id number] by  Aneesh Judd MD.    XR Foot 3+ View Left [[id number]] Collected: 08/10/21 0743     Updated: 08/10/21 0748    Narrative:      DATE OF EXAM:  8/10/2021 12:40 AM     PROCEDURE:  XR FOOT 3+ VW LEFT-     INDICATIONS:  Prior injury to left great toe on furniture several weeks ago. Recent  discovery of injury with wound and swelling and erythema involving the  great toe.      COMPARISON:  No Comparisons Available     TECHNIQUE:   A minimum of three routine standard radiographic views were obtained of  the left foot.     FINDINGS:  Soft tissue swelling is seen throughout the great toe and extending into  the medial aspect of the forefoot/midfoot. There is suggestion of  possible gas production throughout the soft tissues. Multiple areas of  cortical erosion are identified throughout the proximal and distal  phalanges of the great toe. There is also abnormal lucency throughout  the bone. These findings indicate changes of osteomyelitis.        Impression:      1.Evidence for soft tissue swelling throughout the great toe with areas  of gas production. The findings indicate changes of soft tissue  cellulitis.  2.Evidence for osteomyelitis involving the proximal and distal phalanges  of the great toe.     Electronically Signed By-Aneesh Judd MD On:8/10/2021 7:46 AM  This report was finalized on [id number] by  Aneesh Judd MD.    XR Chest 1 View [[id number]] Collected: 08/10/21 0741     Updated: 08/10/21 0746    Narrative:         DATE OF EXAM:   8/10/2021 12:40 AM     PROCEDURE:   XR CHEST 1 VW-     INDICATIONS:   Shortness of breath. Prior Covid 19 infection. Unsteady gait.     COMPARISON:  6/8/2016 at 5:39 PM, 6/7/2016 at 9:47 PM, 12/30/2015 at 8:28 PM     TECHNIQUE:   [Portable chest radiograph]     FINDINGS:  Extensive chronic changes are again seen throughout the lungs which are  stable. No new consolidations or pleural effusions are observed. The  cardiac silhouette and mediastinum are stable. Postoperative changes are  noted. Stable cardiomegaly is noted. No acute osseous abnormalities are  identified.       Impression:      Chronic changes are noted which are stable. There is no evidence for  definitive acute cardiopulmonary process.     Electronically Signed By-Aneesh Judd MD On:8/10/2021 7:43 AM  This report was finalized on [id number] by  Aneesh Judd MD.    CT  Head Without Contrast [[id number]] Collected: 08/10/21 0148     Updated: 08/10/21 0154    Narrative:      EXAMINATION: CT HEAD WO CONTRAST    DATE: 8/10/2021 1:29 AM     INDICATION: Trauma, recent falls, anticoagulated     COMPARISON: CT head from 8/8/2016     TECHNIQUE: Thin section noncontrast axial images were obtained through the head. Coronal reformats were created.  CT dose lowering techniques were used, to include: automated exposure control, adjustment for patient size, and or use of iterative   reconstruction.     FINDINGS:     Intracranial contents:    No acute intracranial hemorrhage, evidence of acute territorial infarct, mass, mass effect or hydrocephalus. Moderate intracranial atherosclerosis and moderate chronic small vessel ischemic changes in the white matter. Moderate global cerebral volume   loss.    Bones and extracranial soft tissues:     No fracture or focal osseous lesion in the calvarium or skull base. Paranasal sinuses are clear where visualized. Mastoid air cells are clear. Orbits are unremarkable.         Impression:        1. No acute intracranial abnormality. No hemorrhage.  2. No calvarial fracture.  3. Moderate chronic age-related intracranial findings as above.         This examination was interpreted by Aneesh Ordonez M.D.    Electronically signed by:  Aneesh Ordonez M.D.    8/9/2021 11:53 PM          Cardiology      Results Review:  I have reviewed all clinical data, test, lab, and imaging results.       Schedule Meds  clindamycin, 900 mg, Intravenous, Q8H  meropenem, 500 mg, Intravenous, Q8H  rosuvastatin, 10 mg, Oral, Nightly  sertraline, 100 mg, Oral, Daily  sodium chloride, 10 mL, Intravenous, Q12H  [START ON 8/11/2021] vancomycin, 1,000 mg, Intravenous, Q24H        Infusion Meds  Pharmacy to Dose meropenem (MERREM),   Pharmacy to dose vancomycin,   Pharmacy to dose warfarin,         PRN Meds  •  acetaminophen **OR** acetaminophen **OR** acetaminophen  •  aluminum-magnesium  hydroxide-simethicone  •  magnesium sulfate **OR** magnesium sulfate in D5W 1g/100mL (PREMIX)  •  melatonin  •  nitroglycerin  •  ondansetron **OR** ondansetron  •  [COMPLETED] meropenem **AND** Pharmacy to Dose meropenem (MERREM)  •  Pharmacy to dose vancomycin  •  Pharmacy to dose warfarin  •  potassium chloride  •  sodium chloride  •  sodium chloride      Assessment/Plan       Assessment    Left great toe wound infection with cellulitis.  Patient apparently stubbed or dropped something on his left great toe approximately 2 weeks ago and has been trying to take care of it himself.  Patient has black eschar around the entirety of the right great toe and there is erythema and warmth extending to the dorsal aspect of the left foot and into the left lower leg.  -Pulses are difficult to palpate  -X-ray does show osteomyelitis to the proximal and distal phalanges of the great toe  -Patient denies history of diabetes    Patient with supratherapeutic with his INR at admission    History of mitral valve replacement    B cell lymphoma-wife denies any current treatment    CAD, congestive heart failure    COVID-19 screen is negative-patient is vaccinated for Covid    Plan    Continue IV vancomycin  Continue IV clindamycin for now  Discontinue IV Merrem  Start IV Unasyn 3 g every 8 hours  Podiatry is already been consulted-patient will likely need an amputation of the left great toe  ABIs are pending  Waiting on blood cultures  Continue supportive care  Mono Osuna, APRN  08/10/21  13:07 EDT

## 2021-08-10 NOTE — CONSULTS
Name: Niko Servin ADMIT: 2021   : 1929  PCP: Casper Saldaña MD    MRN: [id number] LOS: 0 days   AGE/SEX: 89 y.o. male  ROOM: [facility]     Consults  Vito Elizabeth MD    Location: [facility]     LOS: 0 days   Patient Care Team:  Casper Saldaña MD as PCP - General  Casper Saldaña MD as PCP - Family Medicine    Subjective     History of Present Illness  89 y.o. male admitted through emergency room with left great toe gangrene and cellulitis.  History taken from chart primarily in discussion with nurses at time of consultation.  Patient very hard of hearing and poor historian.  Patient's wife says patient has had numerous falls recently.    Patient had noninvasive studies with incompressible vessels bilaterally and moderate right and severe left digital ischemia consistent with gangrene of left great toe.    Patient is anticoagulation for mitral valve replaced.  Patient with multiple medical problems including lymphoproliferative low-grade B-cell disease as well as heart failure coronary artery disease.  Patient with history of open heart surgery for the mitral valve and has a history of vein bypass surgery.        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 No pertinent family history.    Social History     Tobacco Use   • Smoking status: Former  Smoker   • Smokeless tobacco: Never Used   Vaping Use   • Vaping Use: Never used   Substance Use Topics   • Alcohol use: Yes     Alcohol/week: 5.0 standard drinks     Types: 5 Cans of beer per week   • Drug use: No       Allergies: Patient has no known allergies.    Medications Prior to Admission   Medication Sig Dispense Refill Last Dose   • aspirin 81 MG chewable tablet Chew 81 mg Daily.   8/9/2021 at Unknown time   • rosuvastatin (CRESTOR) 10 MG tablet TAKE ONE TABLET BY MOUTH EVERY NIGHT AT BEDTIME 30 tablet 5 8/9/2021 at Unknown time   • sertraline (ZOLOFT) 100 MG tablet TAKE ONE TABLET BY MOUTH DAILY 30 tablet 2 8/9/2021 at Unknown time   • warfarin (COUMADIN) 7.5 MG tablet TAKE ONE TABLET BY MOUTH DAILY 30 tablet 2 8/9/2021 at Unknown time     ampicillin-sulbactam, 3 g, Intravenous, Q8H  clindamycin, 900 mg, Intravenous, Q8H  furosemide, 20 mg, Intravenous, Q12H  ipratropium-albuterol, 3 mL, Nebulization, Q6H - RT  rosuvastatin, 10 mg, Oral, Nightly  sertraline, 100 mg, Oral, Daily  sodium chloride, 10 mL, Intravenous, Q12H  [START ON 8/11/2021] vancomycin, 1,000 mg, Intravenous, Q24H      Pharmacy to dose vancomycin,   Pharmacy to dose warfarin,       •  acetaminophen **OR** acetaminophen **OR** acetaminophen  •  aluminum-magnesium hydroxide-simethicone  •  haloperidol lactate  •  magnesium sulfate **OR** magnesium sulfate in D5W 1g/100mL (PREMIX)  •  melatonin  •  nitroglycerin  •  ondansetron **OR** ondansetron  •  Pharmacy to dose vancomycin  •  Pharmacy to dose warfarin  •  potassium chloride  •  sodium chloride  •  sodium chloride      Objective     Physical Exam  Vitals reviewed.   Constitutional:       Appearance: He is ill-appearing.   Eyes:      Extraocular Movements: Extraocular movements intact.      Pupils: Pupils are equal, round, and reactive to light.   Cardiovascular:      Rate and Rhythm: Normal rate and regular rhythm. Occasional extrasystoles are present.     Pulses:           Radial  pulses are 2+ on the right side and 2+ on the left side.        Femoral pulses are 2+ on the right side and 0 on the left side.       Dorsalis pedis pulses are detected w/ Doppler on the right side and detected w/ Doppler on the left side.   Pulmonary:      Breath sounds: Rales present.   Abdominal:      General: Abdomen is flat. Bowel sounds are normal.      Palpations: Abdomen is soft.   Musculoskeletal:      Cervical back: Neck supple.      Right lower leg: No edema.      Left lower leg: No edema.        Feet:    Neurological:      Mental Status: He is alert.      Comments: Moves all extremities but disoriented and does not understand circumstances or worries   Psychiatric:      Comments: Not reliably obtainable with age hearing loss and acuity of condition         Results from last 7 days   Lab Units 08/10/21  0450 08/10/21  0018   WBC 10*3/mm3 9.10 12.00*   HEMOGLOBIN g/dL 8.3* 9.4*   PLATELETS 10*3/mm3 261 302     Results from last 7 days   Lab Units 08/10/21  0450 08/10/21  0018   SODIUM mmol/L 142 141   POTASSIUM mmol/L 3.9 4.1   CHLORIDE mmol/L 106 106   CO2 mmol/L 26.0 26.0   BUN mg/dL 36* 35*   CREATININE mg/dL 1.20 1.25   GLUCOSE mg/dL 115* 113*   Estimated Creatinine Clearance: 46.6 mL/min (by C-G formula based on SCr of 1.2 mg/dL).  Results from last 7 days   Lab Units 08/10/21  0603 08/10/21  0450 08/10/21  0018   PROTIME Seconds 42.3* 39.3* 90.0*   INR  4.24* 3.92* >=8.00*       Imaging Studies:    Coding  Active Hospital Problems    Diagnosis  POA   • **Cellulitis of left lower extremity [L03.116]  Yes   • History of B-cell lymphoma [Z85.72]  Not Applicable   • Elevated INR (international normalized ratio) due to prior anticoagulant medication ingestion [R79.1]  Yes   • Mixed hyperlipidemia [E78.2]  Yes   • Depression [F32.9]  Yes   • Essential hypertension [I10]  Yes   • Mechanical heart valve present [Z95.2]  Not Applicable   • Hearing problem [H91.90]  Yes   • Chronic coronary artery disease  [I25.10]  Yes   • Long term current use of anticoagulant therapy [Z79.01]  Not Applicable   • History of mitral valve replacement [Z95.2]  Not Applicable      Resolved Hospital Problems   No resolved problems to display.     Problem Points:  4:  Patient has a new problem, with additional work-up planned  Total problem points:4 or more    Data Points:  1:  I have reviewed or order clinical lab test  1:  I have reviewed or order radiology test (except heart catheterization or echo)  2:  I have personally and independently review of image, tracing, or specimen  1:  I have personally decided to obtain of records  2:  I have reviewed and summation of old records and/or discussed the patients care with another health care provider  Total data points:4 or more    Risk Points:  High:  Any illness that poses threat to life or body funciton    MDM Prob point Data point Risk   SF 1 1 Minimal   Low 2 2 Low   Mod 3 3 Moderate   High 4 4 High     Code MDM History Exam Time   31792 SF/Low Detailed Detailed 30   76474 Mod Comprehensive Comprehensive 50   52687 High Comprehensive Comprehensive 70     Detailed history:  4 elements HPI or status of 3 chronic problems; 2-9 system ROS  Comprehensive:  4 elements HPI or status of 3 chronic problems;  10 system ROS    Detailed Exam:  12 findings from any organ system  Comprehensive Exam:  2 findings from each of 9 systems.     Billin    Assessment/Plan       Cellulitis of left lower extremity    Chronic coronary artery disease    Depression    Hearing problem    History of mitral valve replacement    Essential hypertension    Long term current use of anticoagulant therapy    Mechanical heart valve present    History of B-cell lymphoma    Elevated INR (international normalized ratio) due to prior anticoagulant medication ingestion    Mixed hyperlipidemia        89 y.o. male nonambulatory elderly patient with tachypnea and shortness of breath undergoing respiratory treatment at time  of consultation.  Patient with history of significant coronary disease mitral valve replacement and open heart surgery.  Patient very poor historian due to age.  Patient had gangrene of left great toe.  Doppler studies reviewed with incompressible vessels and severe digital ischemia left and right moderate.  Exam with left femoral-popliteal tibial disease with no palpable left femoral pulse but is present right femoral pulse.  My records patient has had multiple falls recently.  Patient nonambulatory.  Patient may not be revascularization candidate.  Will need to be stabilized medically prior to any kind of further intervention or work-up.  Currently has dry gangrene with paint with Betadine to keep stable and use waffle boots to prevent further sores from occurring on feet.    I discussed the patients findings and my recommendations with patient and nursing staff.  Please call my office with any question: [phone]    Vito Elizabeth MD  08/10/21  18:08 EDT

## 2021-08-10 NOTE — THERAPY EVALUATION
Patient Name: Niko Servin  : 1929    MRN: [id number]                              Today's Date: 8/10/2021       Admit Date: 2021    Visit Dx:     ICD-10-CM ICD-9-CM   1. Cellulitis of left lower extremity  L03.116 682.6   2. Supratherapeutic INR  R79.1 790.92     Patient Active Problem List   Diagnosis   • Ataxia   • B-cell lymphoma (CMS/HCC)   • Chronic coronary artery disease   • Depression   • Diffuse large B cell lymphoma (CMS/HCC)   • Hearing problem   • History of mitral valve replacement   • Essential hypertension   • Long term current use of anticoagulant therapy   • Mechanical heart valve present   • Postural hypotension   • Protein calorie malnutrition (CMS/HCC)   • Soft tissue mass   • ST elevation (STEMI) myocardial infarction (CMS/HCC)   • Cellulitis of left lower extremity   • History of B-cell lymphoma   • Elevated INR (international normalized ratio) due to prior anticoagulant medication ingestion   • Mixed hyperlipidemia     Past Medical History:   Diagnosis Date   • Ataxia    • CAD (coronary artery disease)    • CHF (congestive heart failure) (CMS/HCC)    • Depression    • Hx of mitral valve replacement    • Long term (current) use of anticoagulants    • 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)    • Mass of soft tissue     Of left arm   • Myocardial infarction (CMS/HCC)    • Postural hypotension    • Problems with hearing    • Protein calorie malnutrition (CMS/HCC)    • Unspecified injury of head, initial encounter      Past Surgical History:   Procedure Laterality Date   • VEIN BYPASS SURGERY       General Information     Row Name 08/10/21 1602          OT Time and Intention    Document Type  evaluation  -BL     Mode of Treatment  individual therapy;occupational therapy  -     Row Name 08/10/21 1602          General Information    Patient Profile Reviewed  yes  -BL     Prior Level of Function  independent:;ADL's;driving  -BL     Existing Precautions/Restrictions   fall  -     Barriers to Rehab  medically complex;hearing deficit  -     Row Name 08/10/21 1602          Living Environment    Lives With  spouse  -     Row Name 08/10/21 1602          Cognition    Orientation Status (Cognition)  oriented x 4  -BL       User Key  (r) = Recorded By, (t) = Taken By, (c) = Cosigned By    Initials Name Provider Type     Lucía Monterroso OT Occupational Therapist          Mobility/ADL's     Row Name 08/10/21 1606          Bed Mobility    Bed Mobility  supine-sit  -     Supine-Sit [place] (Bed Mobility)  minimum assist (75% patient effort);verbal cues;moderate assist (50% patient effort)  -     Assistive Device (Bed Mobility)  bed rails  -     Row Name 08/10/21 1606          Transfers    Comment (Transfers)  OT deferred transfers secondary to L toe bleeding  -     Row Name 08/10/21 1606          Activities of Daily Living    BADL Assessment/Intervention  lower body dressing;grooming  -[facility] Name 08/10/21 1606          Lower Body Dressing Assessment/Training    [place] Level (Lower Body Dressing)  don;socks;contact guard assist  -     Position (Lower Body Dressing)  edge of bed sitting  -     Row Name 08/10/21 1606          Grooming Assessment/Training    [place] Level (Grooming)  standby assist;wash face, hands  -     Position (Grooming)  edge of bed sitting  -       User Key  (r) = Recorded By, (t) = Taken By, (c) = Cosigned By    Initials Name Provider Type     Lucía Monterroso OT Occupational Therapist        Obj/Interventions     Row Name 08/10/21 1608          Sensory Assessment (Somatosensory)    Sensory Assessment (Somatosensory)  sensation intact  -[facility] Name 08/10/21 1608          Vision Assessment/Intervention    Visual Impairment/Limitations  WFL  -     Row Name 08/10/21 1608          Range of Motion Comprehensive    General Range of Motion  bilateral upper extremity ROM WFL  -[facility] Name 08/10/21 1608          Strength  Comprehensive (MMT)    Comment, General Manual Muscle Testing (MMT) Assessment  BUE grossly 4/5  -BL     [facility] Name 08/10/21 1608          Balance    Balance Assessment  sitting static balance;sitting dynamic balance  -BL     Static Sitting Balance  WFL  -BL     Dynamic Sitting Balance  mild impairment;sitting, edge of bed  -BL       User Key  (r) = Recorded By, (t) = Taken By, (c) = Cosigned By    Initials Name Provider Type     Lucía Monterroso, OT Occupational Therapist        Goals/Plan     Row Name 08/10/21 1618          Bathing Goal 1 (OT)    Activity/Device (Bathing Goal 1, OT)  bathing skills, all  -BL     [place] Level/Cues Needed (Bathing Goal 1, OT)  modified independence  -BL     Time Frame (Bathing Goal 1, OT)  long term goal (LTG);2 weeks  -BL     Row Name 08/10/21 1618          Dressing Goal 1 (OT)    Activity/Device (Dressing Goal 1, OT)  dressing skills, all  -BL     [place]/Cues Needed (Dressing Goal 1, OT)  modified independence  -BL     Time Frame (Dressing Goal 1, OT)  long term goal (LTG);2 weeks  -BL     Row Name 08/10/21 1618          Grooming Goal 1 (OT)    Activity/Device (Grooming Goal 1, OT)  grooming skills, all  -BL     [place] (Grooming Goal 1, OT)  modified independence  -BL     Time Frame (Grooming Goal 1, OT)  long term goal (LTG);2 weeks  -BL       User Key  (r) = Recorded By, (t) = Taken By, (c) = Cosigned By    Initials Name Provider Type     Lucía Monterroso, OT Occupational Therapist        Clinical Impression     Row Name 08/10/21 1609          Pain Assessment    Additional Documentation  Pain Scale: FACES Pre/Post-Treatment (Group)  -BL     Row Name 08/10/21 1609          Pain Scale: FACES Pre/Post-Treatment    Pain: FACES Scale, Pretreatment  2-->hurts little bit  -BL     Posttreatment Pain Rating  2-->hurts little bit  -BL     Pain Location - Side  Left  -BL     Pain Location  toe  -BL     Row Name 08/10/21 1608          Plan of Care Review    Plan of Care  Reviewed With  patient  -BL     Outcome Summary  Pt is a 89 y/o male presenting to [facility] with presents [facility] with left lower extremity cellulitis. CT head (-), XR chest (-).  PMH: of B-cell lymphoma, CAD, MVR W/mechanical valve long-term anticoagulation, depression, [place], and HTN. Pt with mutiple falls the last few weeks. OT noted contusions and wound on back from recent falls. Pt lives with wife and reports independent PLOF with ADLs, driving, and functional mobility. Pt required mod-min A for supine to sitting EOB, CGA for donning sock at EOB (only R secondary to L toe bleeding), and SBA for washing face. Pt presenting below baseline for ADL participation secondary to weakness and poor activity tolerance. Pt on 2L NC and stating 95%, however c/o feeling SOA following donning sock and bed mobility. OT recommending IP rehab. OT will follow at [facility].  -     Row Name 08/10/21 1609          Therapy Assessment/Plan (OT)    Therapy Frequency (OT)  5 times/wk  -     Row Name 08/10/21 1609          Therapy Plan Review/Discharge Plan (OT)    Anticipated Discharge Disposition (OT)  inpatient rehabilitation facility  -     Row Name 08/10/21 1609          Vital Signs    Pre SpO2 (%)  95  -BL     O2 Delivery Pre Treatment  supplemental O2  -BL     Intra SpO2 (%)  94  -BL     O2 Delivery Intra Treatment  supplemental O2  -BL     Post SpO2 (%)  94  -BL     O2 Delivery Post Treatment  supplemental O2  -BL     Pre Patient Position  Supine  -BL     Intra Patient Position  Sitting  -BL     Post Patient Position  Supine  -BL     Row Name 08/10/21 1609          Positioning and Restraints    Pre-Treatment Position  in bed  -BL     Post Treatment Position  bed  -BL     In Bed  notified nsg;call light within reach;encouraged to call for assist;exit alarm on;with family/caregiver  -BL       User Key  (r) = Recorded By, (t) = Taken By, (c) = Cosigned By    Initials Name Provider Type    BL Lucía Monterroso OT Occupational Therapist         Outcome Measures    No documentation.         Occupational Therapy Education                 Title: PT OT SLP Therapies (Done)     Topic: Occupational Therapy (Done)     Point: ADL training (Done)     Description:   Instruct learner(s) on proper safety adaptation and remediation techniques during self care or transfers.   Instruct in proper use of assistive devices.              Learning Progress Summary           Patient Acceptance, E,TB, VU by  at 8/10/2021 1309                               User Key     Initials Effective Dates Name Provider Type Discipline     04/13/21 -  Lucía Monterroso OT Occupational Therapist OT              OT Recommendation and Plan  Therapy Frequency (OT): 5 times/wk  Plan of Care Review  Plan of Care Reviewed With: patient  Outcome Summary: Pt is a 89 y/o male presenting to [facility] with presents [facility] with left lower extremity cellulitis. CT head (-), XR chest (-).  PMH: of B-cell lymphoma, CAD, MVR W/mechanical valve long-term anticoagulation, depression, [place], and HTN. Pt with mutiple falls the last few weeks. OT noted contusions and wound on back from recent falls. Pt lives with wife and reports independent PLOF with ADLs, driving, and functional mobility. Pt required mod-min A for supine to sitting EOB, CGA for donning sock at EOB (only R secondary to L toe bleeding), and SBA for washing face. Pt presenting below baseline for ADL participation secondary to weakness and poor activity tolerance. Pt on 2L NC and stating 95%, however c/o feeling SOA following donning sock and bed mobility. OT recommending IP rehab. OT will follow at [facility].     Time Calculation:   Time Calculation- OT     Row Name 08/10/21 1621             Time Calculation- OT    OT Start Time  1532  -BL      OT Stop Time  1545  -BL      OT Time Calculation (min)  13 min  -BL      OT Received On  08/10/21  -      OT - Next Appointment  08/11/21  -      OT Goal Re-Cert Due Date  08/24/21  -        User Key  (r) =  Recorded By, (t) = Taken By, (c) = Cosigned By    Initials Name Provider Type    BL Trista James, OT Occupational Therapist        Therapy Charges for Today     Code Description Service Date Service Provider Modifiers Qty    [id number] HC OT EVAL MOD COMPLEXITY 3 8/10/2021 Trista James OT GO 1               TRISTA JAMES OT  8/10/2021

## 2021-08-10 NOTE — PROGRESS NOTES
"Pharmacy dosing service  Anticoagulant  Warfarin     Subjective:    Niko Servin is a 89 y.o.male being continued on warfarin for valve replacement.    INR Goal: 2.5 - 3.5 - per chart review for valve replacement  Home medication?: Yes, warfarin 7.5 mg PO every day at 1800  Bridge Therapy Present?:  No  Interacting Medications Evaluation (New/Present/Discontinued): sertraline and rosuvastatin home med, CTX inpt  Additional Contributing Factors: n/a      Assessment/Plan:    INR supratherapeutic at time of consult. Will hold dose today.     Continue to monitor and adjust based on INR.         Date 8/10           INR >8           Dose HOLD               Objective:  [Ht: 182.9 cm (72\"); Wt: 90.7 kg (200 lb); BMI: Body mass index is 27.12 kg/m².]    Lab Results   Component Value Date    ALBUMIN 2.70 (L) 08/10/2021     Lab Results   Component Value Date    INR >=8.00 (C) 08/10/2021    INR 3.60 (A) 07/06/2021    INR 2.60 (A) 06/08/2021    PROTIME 90.0 (H) 08/10/2021    PROTIME 30.0 (H) 03/19/2021    PROTIME 28.0 01/09/2018     Lab Results   Component Value Date    HGB 9.4 (L) 08/10/2021    HGB 10.4 (L) 06/08/2021    HGB 10.4 (L) 05/14/2021     Lab Results   Component Value Date    HCT 29.5 (L) 08/10/2021    HCT 33.2 (L) 06/08/2021    HCT 32.2 (L) 05/14/2021       Kayleigh Guan RPH  08/10/21 03:19 EDT     "

## 2021-08-11 NOTE — CONSULTS
[facility]: [facility]         CONSULT NOTE    Patient Identification:  Niko Servin  89 y.o.  male  5/6/1929  [id number]            Requesting physician: Attending physician    Reason for Consultation: Surgical work up        History of Present Illness:    89 y.o. male with a PMH of B-cell lymphoma, CAD, MVR W/mechanical valve long-term anticoagulation, depression, [place], HTN presents to [facility] ED on 8/9/21 due to left lower extremity cellulitis.  Patient is extremely hard of hearing and is a poor historian, unable to complete review of systems at this time.  Family reports patient injured his left great toe approximately 2 weeks prior but did not tell anyone.  Patient's wife reports he has fallen multiple times over the last week due to gait abnormality.  Patient's wife also reports deterioration of cognition with intermittent hallucinations.  Wife reports patient is vaccinated for Covid.      In the ED vitals temp 98, HR 77, RR 17, /65, O2 sat 100% on room air.  EKG shows sinus rhythm rate 73 multiple PVCs, ventricular and supraventricular, borderline ST depression anterolateral leads.  Patient treated in the ED with Tdap, IV Zosyn, albuterol nebulizer, IV vancomycin.      8/11/21 Pulmonary was consulted for vascular surgical clearance    Assessment:    Cellulitis of left lower extremity  -Left great toe infection    Bactermia    History of B-[place] Lymphoma  Mechanical heart valve  Coronary Artery Disease  Long term use of anticoagulation    Recommendations:    Chest CT without contrast  2D echo-pending    Antibiotic-Unasyn, Vancomycin, and Clindamycin (ID following)  Diuretic-Lasix 20 IV BID  Titrate oxygen  Bronchodilator          Review of Sytems:  Review of Systems   Unable to perform ROS: Mental status change   Respiratory: Positive for cough and shortness of breath.        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   8/9/2021 at Unknown time   • rosuvastatin (CRESTOR) 10 MG tablet TAKE ONE TABLET BY MOUTH EVERY NIGHT AT BEDTIME 30 tablet 5 8/9/2021 at Unknown time   • sertraline (ZOLOFT) 100 MG tablet TAKE ONE TABLET BY MOUTH DAILY 30 tablet 2 8/9/2021 at Unknown time   • warfarin (COUMADIN) 7.5 MG tablet TAKE ONE TABLET BY MOUTH DAILY 30 tablet 2 8/9/2021 at Unknown time       Allergies:  No Known Allergies    Social History:   Social History     Socioeconomic History   • Marital status:      Spouse name: Not on file   • Number of children: Not on file   • Years of education: Not on file   • Highest education level: Not on file   Tobacco Use   • Smoking status: Former Smoker   • Smokeless tobacco: Never Used   Vaping Use   • Vaping Use: Never used   Substance and Sexual Activity   • Alcohol use: Yes     Alcohol/week: 5.0 standard drinks     Types: 5 Cans of beer per week   • Drug use: No   • Sexual activity: Defer       Family History:  History reviewed. No pertinent family history.    Physical Exam:  /63   Pulse 74   Temp 97.6 °F (36.4 °C) (Oral)   Resp 20   Ht 182.9 cm (72.01\")   Wt 82.3 kg (181 lb 7 oz)   SpO2 93%   BMI 24.60 kg/m²  Body mass index is 24.6 kg/m². 93% 82.3 kg (181 lb 7 oz)  Physical Exam  Constitutional:       " Appearance: He is ill-appearing.   Cardiovascular:      Heart sounds: Murmur heard.     Pulmonary:      Breath sounds: Decreased breath sounds and rhonchi present.   Musculoskeletal:      Comments: Left great toe nectrosis   Neurological:      Mental Status: He is alert.         LABS:  Lab Results   Component Value Date    CALCIUM 8.2 08/11/2021     Results from last 7 days   Lab Units 08/11/21  0522 08/10/21  1835 08/10/21  1835 08/10/21  0450 08/10/21  0450 08/10/21  0018 08/10/21  0018   MAGNESIUM mg/dL 1.5*  --   --   --   --   --   --    SODIUM mmol/L 142  --  141  --  142   < > 141   POTASSIUM mmol/L 3.8  --  4.0  --  3.9   < > 4.1   CHLORIDE mmol/L 105  --  105  --  106   < > 106   CO2 mmol/L 28.0  --  26.0  --  26.0   < > 26.0   BUN mg/dL 30*  --  32*  --  36*   < > 35*   CREATININE mg/dL 1.28*  --  1.27  --  1.20   < > 1.25   GLUCOSE mg/dL 95   < > 123*   < > 115*   < > 113*   CALCIUM mg/dL 8.2  --  8.2  --  8.4   < > 8.5   WBC 10*3/mm3 10.30  --  10.90*  --  9.10   < > 12.00*   HEMOGLOBIN g/dL 8.7*  --  8.7*  --  8.3*   < > 9.4*   PLATELETS 10*3/mm3 267  --  292  --  261   < > 302   ALT (SGPT) U/L 10  --   --   --   --   --  8   AST (SGOT) U/L 22  --   --   --   --   --  15   PROBNP pg/mL  --   --   --   --  5,874.0*  --   --     < > = values in this interval not displayed.     Lab Results   Component Value Date    TROPONINT 0.015 08/10/2021     Results from last 7 days   Lab Units 08/10/21  0018   TROPONIN T ng/mL 0.015     Results from last 7 days   Lab Units 08/10/21  0031 08/10/21  0018   BLOODCX  Proteus species* No growth at 24 hours   BCIDPCR  Proteus spp. Identification by BCID PCR.*  --      Results from last 7 days   Lab Units 08/10/21  1835 08/10/21  1012 08/10/21  0450 08/10/21  0026   LACTATE mmol/L 0.8 1.1 1.4 1.0     Results from last 7 days   Lab Units 08/10/21  1806   PH, ARTERIAL pH units 7.384   PCO2, ARTERIAL mm Hg 42.5   PO2 ART mm Hg 81.1*   O2 SATURATION ART % 95.7   MODALITY   Cannula     Results from last 7 days   Lab Units 08/10/21  1346   ADENOVIRUS DETECTION BY PCR  Not Detected   CORONAVIRUS 229E  Not Detected   CORONAVIRUS HKU1  Not Detected   CORONAVIRUS NL63  Not Detected   CORONAVIRUS OC43  Not Detected   HUMAN METAPNEUMOVIRUS  Not Detected   HUMAN RHINOVIRUS/ENTEROVIRUS  Not Detected   INFLUENZA B PCR  Not Detected   PARAINFLUENZA 1  Not Detected   PARAINFLUENZA VIRUS 2  Not Detected   PARAINFLUENZA VIRUS 3  Not Detected   PARAINFLUENZA VIRUS 4  Not Detected   BORDETELLA PERTUSSIS PCR  Not Detected   CHLAMYDOPHILA PNEUMONIAE PCR  Not Detected   MYCOPLAMA PNEUMO PCR  Not Detected   INFLUENZA A PCR  Not Detected   RSV, PCR  Not Detected     Results from last 7 days   Lab Units 08/11/21  0522 08/10/21  0603 08/10/21  0450 08/10/21  0018   INR  >=8.00* 4.24* 3.92* >=8.00*     Results from last 7 days   Lab Units 08/10/21  0031 08/10/21  0018   BLOODCX  Proteus species* No growth at 24 hours   BCIDPCR  Proteus spp. Identification by BCID PCR.*  --      Lab Results   Component Value Date    TSH 1.790 08/10/2021     Estimated Creatinine Clearance: 42.9 mL/min (A) (by C-G formula based on SCr of 1.28 mg/dL (H)).  Results from last 7 days   Lab Units 08/10/21  0402   NITRITE UA  Negative   WBC UA /HPF 3-5*   BACTERIA UA /HPF None Seen   SQUAM EPITHEL UA /HPF 0-2        Imaging:  Imaging Results (Last 24 Hours)     ** No results found for the last 24 hours. **            Current Meds:   SCHEDULE  ampicillin-sulbactam, 3 g, Intravenous, Q8H  clindamycin, 900 mg, Intravenous, Q8H  furosemide, 20 mg, Intravenous, Q12H  ipratropium-albuterol, 3 mL, Nebulization, Q6H - RT  rosuvastatin, 10 mg, Oral, Nightly  sertraline, 100 mg, Oral, Daily  sodium chloride, 10 mL, Intravenous, Q12H  vancomycin, 1,000 mg, Intravenous, Q24H      Infusions  Pharmacy to dose vancomycin,   Pharmacy to dose warfarin,       PRNs  •  acetaminophen **OR** acetaminophen **OR** acetaminophen  •  aluminum-magnesium  hydroxide-simethicone  •  haloperidol lactate  •  magnesium sulfate **OR** magnesium sulfate in D5W 1g/100mL (PREMIX)  •  magnesium sulfate **OR** magnesium sulfate **OR** magnesium sulfate  •  melatonin  •  Morphine  •  nitroglycerin  •  ondansetron **OR** ondansetron  •  Pharmacy to dose vancomycin  •  Pharmacy to dose warfarin  •  potassium chloride  •  potassium chloride  •  potassium phosphate infusion greater than 15 mMoles **OR** potassium phosphate infusion greater than 15 mMoles **OR** potassium phosphate **OR** sodium phosphate IVPB **OR** sodium phosphate IVPB **OR** sodium phosphate IVPB  •  sodium chloride  •  sodium chloride        I reviewed the patient's new clinical results.    Electronically signed by MARIZA Rich, 08/11/21 at 13:16 EDT.

## 2021-08-11 NOTE — PLAN OF CARE
Goal Outcome Evaluation:              Outcome Summary: Pt continues with celluliltis to toe. He continues with sitter.  No distress noted.  Will continue to monitor.

## 2021-08-11 NOTE — PROGRESS NOTES
[facility] Medicine Services Daily Progress Note    Patient Name: Niko Servin  : 1929  MRN: [id number]  Primary Care Physician:  Casper Saldaña MD  Date of admission: 2021      Subjective      Chief Complaint: *Left foot pain      Niko Servin is a 89 y.o. male w/PMH of B-cell lymphoma, CAD, MVR W/mechanical valve long-term anticoagulation, depression, Circle, HTN presents to [facility] ED due to left lower extremity cellulitis.  Patient is extremely hard of hearing and is a poor historian, unable to complete review of systems at this time.  Family reports patient injured his left great toe approximately 2 weeks prior but did not tell anyone.  Patient's wife reports he has fallen multiple times over the last week due to gait abnormality.  Patient's wife also reports deterioration of cognition with intermittent hallucinations.  Wife reports patient is vaccinated for Covid.              Upon arrival to the ED vitals temp 98, HR 77, RR 17, /65, O2 sat 100% on room air.  Abs notable for troponin less than 0.015, glucose 113, , K4.1, creatinine 1.25, BUN 35, GFR 54, ALT 8, AST 15, total bili 0.3, CRP 28.7, lactic 1.0, INR greater than 8, WBC 12, Hgb 9.4, platelets 302, neutrophils 87.3.  Sed rate 42.  Blood cultures drawn.  CT of the head shows no acute intracranial abnormality, no hemorrhage.  Moderate chronic age-related intracranial findings.  EKG shows sinus rhythm rate 73 multiple PVCs, ventricular and supraventricular, borderline ST depression anterolateral leads.  Patient treated in the ED with Tdap, IV Zosyn, albuterol nebulizer, IV vancomycin.  UA, chest x-ray, x-ray left foot ordered.          Patient Reports *    Patient reports persistent pain left foot  Going for surgery today  Less tachypneic than yesterday  Very hard of hearing  Dr. Saldaña sent me a message yesterday that he has also underlying dementia that has not been  fully evaluated.    Patient had persistent low hemoglobin 8.7  Has been febrile to 200.6 overnight yesterday  Tachypnea has improved  Saturation 93% on 3 L  Low magnesium 1.5  Supratherapeutic INR more than 8.  I have seen  FFP are being prepared.  We will defer vitamin K to cardiology service given mechanical the mitral valve.        Review of Systems   Unable to perform ROS: dementia            Objective      Vitals:   Temp:  [98.1 °F (36.7 °C)-100.6 °F (38.1 °C)] 98.8 °F (37.1 °C)  Heart Rate:  [70-88] 73  Resp:  [16-34] 28  BP: (130-153)/(52-80) 130/52  Flow (L/min):  [2-4] 3    Physical Exam  Vitals and nursing note reviewed.   Constitutional:       General: He is not in acute distress.     Appearance: Normal appearance. He is well-developed. He is not ill-appearing, toxic-appearing or diaphoretic.   HENT:      Head: Normocephalic and atraumatic.      Right Ear: Ear canal and external ear normal.      Left Ear: Ear canal and external ear normal.      Nose: Nose normal. No congestion or rhinorrhea.      Mouth/Throat:      Mouth: Mucous membranes are moist.      Pharynx: No oropharyngeal exudate.   Eyes:      General: No scleral icterus.        Right eye: No discharge.         Left eye: No discharge.      Extraocular Movements: Extraocular movements intact.      Conjunctiva/sclera: Conjunctivae normal.      Pupils: Pupils are equal, round, and reactive to light.   Neck:      Thyroid: No thyromegaly.      Vascular: No carotid bruit or JVD.      Trachea: No tracheal deviation.   Cardiovascular:      Rate and Rhythm: Normal rate and regular rhythm.      Pulses: Normal pulses.      Heart sounds: Normal heart sounds. No murmur heard.   No friction rub. No gallop.    Pulmonary:      Effort: Pulmonary effort is normal. No respiratory distress.      Breath sounds: Normal breath sounds. No stridor. No wheezing, rhonchi or rales.   Chest:      Chest wall: No tenderness.   Abdominal:      General: Bowel sounds are normal.  There is no distension.      Palpations: Abdomen is soft. There is no mass.      Tenderness: There is no abdominal tenderness. There is no guarding or rebound.      Hernia: No hernia is present.   Musculoskeletal:         General: No swelling, tenderness, deformity or signs of injury. Normal range of motion.      Cervical back: Normal range of motion and neck supple. No rigidity. No muscular tenderness.      Right lower leg: No edema.      Left lower leg: No edema.      Comments: Gangrenous changes left great toe and swelling and drainage from metacarpophalangeal junction.  Quite swollen and tender in the foot lower part of the leg.  Unable to assess pulsation due to splinting.     Lymphadenopathy:      Cervical: No cervical adenopathy.   Skin:     General: Skin is warm and dry.      Coloration: Skin is not jaundiced or pale.      Findings: No bruising, erythema or rash.   Neurological:      General: No focal deficit present.      Mental Status: He is alert and oriented to person, place, and time. Mental status is at baseline.      Cranial Nerves: No cranial nerve deficit.      Sensory: No sensory deficit.      Motor: No weakness or abnormal muscle tone.      Coordination: Coordination normal.   Psychiatric:      Comments: Cooperative      *       Result Review    Result Review:  I have personally reviewed the results from the time of this admission to 8/11/2021 08:18 EDT and agree with these findings:  [x]  Laboratory  [x]  Microbiology  [x]  Radiology  [x]  EKG/Telemetry   [x]  Cardiology/Vascular   [x]  Pathology  []  Old records  []  Other:  Most notable findings include: *  As above       Wounds (last 24 hours)      LDA Wound     Row Name 08/11/21 0751 08/11/21 0325 08/10/21 2317       Wound 08/10/21 0505 Left anterior great toe Soft Tissue Necrosis    Wound - Properties Group Placement Date: 08/10/21  - Placement Time: 0505 -JM Side: Left  - Orientation: anterior  - Location: great toe  - Primary  Wound Type: Soft tissue  -JM    Wound Image  -- images linked  -SC  --  --    Dressing Appearance  moist drainage  -SC  moist drainage;open to air  -CM  moist drainage;open to air  -CM    Closure  Open to air  -SC  Open to air  -CM  Open to air  -CM    Base  black eschar  -SC  black eschar  -CM  black eschar  -CM    Periwound  dry  -SC  --  --    Periwound Temperature  hot  -SC  --  --    Drainage Characteristics/Odor  malodorous  -SC  --  --    Drainage Amount  small  -SC  --  --    Retired Wound - Properties Group Date first assessed: 08/10/21  - Time first assessed: 0505 - Side: Left  - Location: great toe  -JM Primary Wound Type: Soft tissue  -JM       Wound 08/10/21 0508 Left distal leg Other (comment)    Wound - Properties Group Placement Date: 08/10/21  - Placement Time: 0508 - Side: Left  - Orientation: distal  - Location: leg  -JM Primary Wound Type: Other  -JM, Cellulitis per md     Wound Image  -- images linked  -SC  --  --    Dressing Appearance  open to air;no drainage  -SC  no drainage;open to air  -CM  no drainage;open to air  -CM    Closure  Open to air  -SC  Open to air  -CM  Open to air  -CM    Base  red;dry  -SC  dry;red  -CM  dry;red  -CM    Periwound  intact;dry;warm  -SC  --  --    Periwound Temperature  warm  -SC  --  --    Drainage Amount  none  -SC  --  --    Retired Wound - Properties Group Date first assessed: 08/10/21  - Time first assessed: 0508 - Side: Left  - Location: leg  -JM Primary Wound Type: Other  -JM, Cellulitis per md     Row Name 08/10/21 1927             Wound 08/10/21 0505 Left anterior great toe Soft Tissue Necrosis    Wound - Properties Group Placement Date: 08/10/21  -JM Placement Time: 0505 -JM Side: Left  - Orientation: anterior  -JM Location: great toe  -JM Primary Wound Type: Soft tissue  -JM    Dressing Appearance  moist drainage;open to air  -CM      Closure  Open to air  -CM      Base  black eschar  -CM      Retired Wound - Properties  Group Date first assessed: 08/10/21  - Time first assessed: 0505 -JM Side: Left  -JM Location: great toe  -JM Primary Wound Type: Soft tissue  -JM       Wound 08/10/21 0508 Left distal leg Other (comment)    Wound - Properties Group Placement Date: 08/10/21  -JM Placement Time: 0508 -JM Side: Left  -JM Orientation: distal  -JM Location: leg  -JM Primary Wound Type: Other  -JM, Cellulitis per md     Dressing Appearance  no drainage;open to air  -CM      Closure  Open to air  -CM      Base  dry;red  -CM      Retired Wound - Properties Group Date first assessed: 08/10/21  -JM Time first assessed: 0508 -JM Side: Left  -JM Location: leg  -JM Primary Wound Type: Other  -JM, Cellulitis per md       User Key  (r) = Recorded By, (t) = Taken By, (c) = Cosigned By    Initials Name Provider Type    Cynthia Holt, RN Registered Nurse    Rashard Persaud LPN Licensed Nurse    Corbin Esposito LPN Licensed Nurse            Assessment/Plan      Brief Patient Summary:  Niko Servin is a 89 y.o. male who *presenting with gangrenous changes left great toe    ampicillin-sulbactam, 3 g, Intravenous, Q8H  clindamycin, 900 mg, Intravenous, Q8H  furosemide, 20 mg, Intravenous, Q12H  ipratropium-albuterol, 3 mL, Nebulization, Q6H - RT  rosuvastatin, 10 mg, Oral, Nightly  sertraline, 100 mg, Oral, Daily  sodium chloride, 10 mL, Intravenous, Q12H  vancomycin, 1,000 mg, Intravenous, Q24H       Pharmacy to dose vancomycin,   Pharmacy to dose warfarin,          Active Hospital Problems:  Active Hospital Problems    Diagnosis    • **Cellulitis of left lower extremity    • History of B-cell lymphoma    • Elevated INR (international normalized ratio) due to prior anticoagulant medication ingestion    • Mixed hyperlipidemia    • Depression    • Essential hypertension    • Mechanical heart valve present    • Hearing problem    • Chronic coronary artery disease    • Long term current use of anticoagulant therapy    • History of mitral  valve replacement      Plan:      Gangrenous changes of the left lower extremity involving the left great toe wound and foot:  -X-ray shows positive gas production but thought to be related to the wound by Dr. Woo.  Patient on clindamycin Unasyn and vancomycin.  ID following.  No available wound cultures  Patient has positive blood cultures  Podiatry taken to surgery today 8/11/2021  Vascular surgery consulted as well.  Pending vein mapping/CT angiogram      Bacteremia with Proteus species  Present on admission  Pending identification and sensitivity  On Unasyn  Possibly related to the foot infection  ID following    Coumadin toxicity  Elevated INR:  Defer to cardiology  On Coumadin for mechanical mitral valve       Mixed hyperlipidemia:  -On statin    Essential hypertension:  On Lasix for now       Chronic coronary artery disease:  -Cardiology is consulted  On aspirin and statin  Negative initial troponin    Suspect CHF exacerbation  Echo pending  On IV Lasix  Suspect underlying interstitial lung disease based on x-ray  Consider pulmonary evaluation if worsening respiratory distress or hypoxemia      History of MVR/mechanical valve present/long-term use of anticoagulant therapy:    As above    Depression:  -Home medication sertraline 100 mg p.o. daily  Possible underlying dementia per Dr. Saldaña as well  Likely need long-term rehab placement  We will get palliative care involved    Hard of hearing:  -Continue to monitor       Complex case high risk    DVT prophylaxis:  Medical DVT prophylaxis orders are present.    CODE STATUS:    Code Status: CPR  Medical Interventions (Level of Support Prior to Arrest): Full      Disposition:  I expect patient to be discharged ECF  This patient has been examined wearing appropriate Personal Protective Equipment and discussed with hospital infection control department. 08/11/21      Electronically signed by Zaire Mansfield MD, 08/11/21, 08:18 EDT.  Wilfredo Ramos Hospitalist  Team

## 2021-08-11 NOTE — PROGRESS NOTES
"08/11/21   Foot and Ankle Surgery - Inpatient Follow-up  Provider: Dr. Willy Woo DPM  Location: [facility]    Chief Complaint: left great toe infection    Subjective:  Niko Servin is a 89 y.o. male.     Confused. Wife and daughter at bedside. Receiving echo during encounter.     No Known Allergies    No current facility-administered medications on file prior to encounter.     Current Outpatient Medications on File Prior to Encounter   Medication Sig Dispense Refill   • aspirin 81 MG chewable tablet Chew 81 mg Daily.     • rosuvastatin (CRESTOR) 10 MG tablet TAKE ONE TABLET BY MOUTH EVERY NIGHT AT BEDTIME 30 tablet 5   • sertraline (ZOLOFT) 100 MG tablet TAKE ONE TABLET BY MOUTH DAILY 30 tablet 2   • warfarin (COUMADIN) 7.5 MG tablet TAKE ONE TABLET BY MOUTH DAILY 30 tablet 2       Objective   /63   Pulse 74   Temp 97.6 °F (36.4 °C) (Oral)   Resp 20   Ht 182.9 cm (72.01\")   Wt 82.3 kg (181 lb 7 oz)   SpO2 93%   BMI 24.60 kg/m²     General: Confused. Mild distress  Vascular: DP and PT pulses are non-palpable. CFT sluggish/absent to left foot   Neuro: Sensation difficult to appreciate. Motor function is intact.    MSK: No progressive deformity or instability to the foot.    Derm: Continued necrosis to the hallux with malodor.       Results from last 7 days   Lab Units 08/11/21  0522   WBC 10*3/mm3 10.30   HEMOGLOBIN g/dL 8.7*   HEMATOCRIT % 26.3*   PLATELETS 10*3/mm3 267       Assessment/Plan     Patient Active Problem List   Diagnosis   • Ataxia   • B-cell lymphoma (CMS/HCC)   • Chronic coronary artery disease   • Depression   • Diffuse large B cell lymphoma (CMS/HCC)   • Hearing problem   • History of mitral valve replacement   • Essential hypertension   • Long term current use of anticoagulant therapy   • Mechanical heart valve present   • Postural hypotension   • Protein calorie malnutrition (CMS/HCC)   • Soft tissue mass   • ST elevation (STEMI) myocardial infarction (CMS/HCC) "   • Cellulitis of left lower extremity   • History of B-cell lymphoma   • Elevated INR (international normalized ratio) due to prior anticoagulant medication ingestion   • Mixed hyperlipidemia     Left toe is relatively unchanged with continued necrosis and malodor.  Vascular, cardiology, and infectious disease services are now following.  Patient receiving echo during evaluation.  We will need to determine best course of care pending cardiology work-up and risk assessment.  INR continues to remain supratherapeutic.  Case was discussed with vascular service.  Situation was explained at length to patient's daughter and wife.  We will continue supportive measures at this time.  Will closely follow        Note is dictated utilizing voice recognition software. Unfortunately this leads to occasional typographical errors. I apologize in advance if the situation occurs. If questions occur please do not hesitate to call our office.

## 2021-08-11 NOTE — PROGRESS NOTES
Name: Niko Servin ADMIT: 2021   : 1929  PCP: Casper Saldaña MD    MRN: [id number] LOS: 0 days   AGE/SEX: 89 y.o. male  ROOM: [address] [place] [address]    [place], Subsequent Hospital Care    Chief Complaint   Patient presents with   • Toe Injury     CC: left great toe wound  Subjective     89 y.o. male resting in bed. No new complaints.     Review of Systems   Unable to perform ROS: Other   Hearing deficit    Objective     Scheduled Medications:   ampicillin-sulbactam, 3 g, Intravenous, Q8H  clindamycin, 900 mg, Intravenous, Q8H  furosemide, 20 mg, Intravenous, Q12H  ipratropium-albuterol, 3 mL, Nebulization, Q6H - RT  rosuvastatin, 10 mg, Oral, Nightly  sertraline, 100 mg, Oral, Daily  sodium chloride, 10 mL, Intravenous, Q12H  vancomycin, 1,000 mg, Intravenous, Q24H        Active Infusions:  Pharmacy to dose vancomycin,   Pharmacy to dose warfarin,         As Needed Medications:  •  acetaminophen **OR** acetaminophen **OR** acetaminophen  •  aluminum-magnesium hydroxide-simethicone  •  haloperidol lactate  •  magnesium sulfate **OR** magnesium sulfate in D5W 1g/100mL (PREMIX)  •  melatonin  •  nitroglycerin  •  ondansetron **OR** ondansetron  •  Pharmacy to dose vancomycin  •  Pharmacy to dose warfarin  •  potassium chloride  •  sodium chloride  •  sodium chloride    Vital Signs  Vital Signs Patient Vitals for the past 24 hrs:   BP Temp Temp src Pulse Resp SpO2 Weight   21 0730 -- -- -- 73 28 97 % --   21 0313 130/52 98.8 °F (37.1 °C) Oral 70 18 95 % 82.3 kg (181 lb 7 oz)   21 0005 -- -- -- 72 16 95 % --   21 0000 -- -- -- 72 16 95 % --   08/10/21 2332 138/53 99.5 °F (37.5 °C) Oral 71 18 95 % --   08/10/21 1957 -- -- -- 84 18 100 % --   08/10/21 1952 -- -- -- 88 18 97 % --   08/10/21 1927 135/75 100.4 °F (38 °C) Oral 88 18 98 % --   08/10/21 1816 -- 99.9 °F (37.7 °C) Oral -- -- -- --   08/10/21 1700 148/72 (!) 100.6 °F (38.1 °C) Oral 74 (!) 34 91 % --   08/10/21  1142 146/80 98.1 °F (36.7 °C) Oral 88 22 100 % --   08/10/21 1006 153/70 -- -- 84 24 94 % --     I/O:  I/O last 3 completed shifts:  In: 1860 [P.O.:660; Blood:700; IV Piggyback:500]  Out: 100 [Urine:100]  BMI:  Body mass index is 24.6 kg/m².    Physical Exam:  Physical Exam  Constitutional:       Appearance: He is ill-appearing.       Cardiovascular:      Rate and Rhythm: Normal rate and regular rhythm. Occasional extrasystoles are present.     Pulses:           Radial pulses are 2+ on the right side and 2+ on the left side.        Femoral pulses are 2+ on the right side and 0 on the left side.       Dorsalis pedis pulses are detected w/ Doppler on the right side and detected w/ Doppler on the left side.   Pulmonary:      Breath sounds: Rales present.   Abdominal:      General: Abdomen is flat. Bowel sounds are normal.      Palpations: Abdomen is soft.  Left great toe, gangrenous changes and malodorous.        Results Review:     CBC    Results from last 7 days   Lab Units 08/11/21  0522 08/10/21  1835 08/10/21  0450 08/10/21  0018   WBC 10*3/mm3 10.30 10.90* 9.10 12.00*   HEMOGLOBIN g/dL 8.7* 8.7* 8.3* 9.4*   PLATELETS 10*3/mm3 267 292 261 302     BMP   Results from last 7 days   Lab Units 08/11/21  0522 08/10/21  1835 08/10/21  0450 08/10/21  0018   SODIUM mmol/L 142 141 142 141   POTASSIUM mmol/L 3.8 4.0 3.9 4.1   CHLORIDE mmol/L 105 105 106 106   CO2 mmol/L 28.0 26.0 26.0 26.0   BUN mg/dL 30* 32* 36* 35*   CREATININE mg/dL 1.28* 1.27 1.20 1.25   GLUCOSE mg/dL 95 123* 115* 113*   MAGNESIUM mg/dL 1.5*  --   --   --      Coag   Results from last 7 days   Lab Units 08/11/21  0522 08/10/21  0603 08/10/21  0450 08/10/21  0018   INR  >=8.00* 4.24* 3.92* >=8.00*     HbA1C No results found for: HGBA1C  Infection   Results from last 7 days   Lab Units 08/10/21  0031 08/10/21  0018   BLOODCX  Abnormal Stain* No growth at 24 hours   BCIDPCR  Proteus spp. Identification by BCID PCR.*  --      Radiology(recent) XR Chest 2  View    Result Date: 8/10/2021  Chronic changes are again noted which are stable. There is no definitive evidence for acute cardiopulmonary process.  Electronically Signed By-Aneesh Judd MD On:8/10/2021 12:43 PM This report was finalized on [id number] by  Aneesh Judd MD.    XR Foot 3+ View Left    Result Date: 8/10/2021  1.Evidence for soft tissue swelling throughout the great toe with areas of gas production. The findings indicate changes of soft tissue cellulitis. 2.Evidence for osteomyelitis involving the proximal and distal phalanges of the great toe.  Electronically Signed By-Aneesh Judd MD On:8/10/2021 7:46 AM This report was finalized on [id number] by  Aneesh Judd MD.    CT Head Without Contrast    Result Date: 8/10/2021  1. No acute intracranial abnormality. No hemorrhage. 2. No calvarial fracture. 3. Moderate chronic age-related intracranial findings as above.  This examination was interpreted by Aneesh Ordonez M.D. Electronically signed by:  Aneesh Ordonez M.D.  8/9/2021 11:53 PM    XR Chest 1 View    Result Date: 8/10/2021  Chronic changes are noted which are stable. There is no evidence for definitive acute cardiopulmonary process.  Electronically Signed By-Aneesh Judd MD On:8/10/2021 7:43 AM This report was finalized on [id number] by  Aneesh Judd MD.      Assessment/Plan     Assessment & Plan      Cellulitis of left lower extremity    Chronic coronary artery disease    Depression    Hearing problem    History of mitral valve replacement    Essential hypertension    Long term current use of anticoagulant therapy    Mechanical heart valve present    History of B-cell lymphoma    Elevated INR (international normalized ratio) due to prior anticoagulant medication ingestion    Mixed hyperlipidemia      89 y.o. male left gangrenous great toe, malodorous.  Discussed patient with wife via phone and she wishes us to speak with her daughter who is a nurse to help make decisions  regarding intervention. Davida Baxter, daughter, Mrs. Servin is going to call our office and leave her phone number for us to discuss.  We will check back if she does not leave phone number.      Patient requiring 3LO2  nasal cannula, not on home oxygen per wife. Patient with hearing difficults, wife is POA.    For vascular work up, will need CTA runoff and vein mapping.   Cr elevated, will have nephrology assess.     Podiatry following, Dr. Woo  Discussed with patient that he will likely require a great toe amputation for definitive management of the issue.  He recommends continued IV antibiotics at this time.    Cardiology following, Dr. Montoya  Patient with mechanical mitral valve, patient currently supratherapeutic INR is greater than 8.0 with plan to bridge with heparin.  Warfarin has been discontinued.  Echocardiogram pending    Vascular surgery options include revascularization work-up for endovascular versus bypass or possible palliative wound care with continued Betadine paint.  Surgery depending on medical status from cardiology and pulmonology standpoint.  Will speak with daughter today regarding options prior to vein mapping/CTA.      Nancy Paulson PA-C  08/11/21  08:11 EDT    Please call my office with any question: [phone]    Active Hospital Problems    Diagnosis  POA   • **Cellulitis of left lower extremity [L03.116]  Yes   • History of B-cell lymphoma [Z85.72]  Not Applicable   • Elevated INR (international normalized ratio) due to prior anticoagulant medication ingestion [R79.1]  Yes   • Mixed hyperlipidemia [E78.2]  Yes   • Depression [F32.9]  Yes   • Essential hypertension [I10]  Yes   • Mechanical heart valve present [Z95.2]  Not Applicable   • Hearing problem [H91.90]  Yes   • Chronic coronary artery disease [I25.10]  Yes   • Long term current use of anticoagulant therapy [Z79.01]  Not Applicable   • History of mitral valve replacement [Z95.2]  Not Applicable      Resolved Hospital  Problems   No resolved problems to display.

## 2021-08-11 NOTE — PROGRESS NOTES
"Pharmacy dosing service  Anticoagulant  Warfarin     Subjective:    Niko Servin is a 89 y.o.male being continued on warfarin for valve replacement.    INR Goal: 2.5 - 3.5 - per chart review for valve replacement  Home medication?: Yes, warfarin 7.5 mg PO every day at 1800  Bridge Therapy Present?:  No  Interacting Medications Evaluation (New/Present/Discontinued): sertraline and rosuvastatin home med, CTX inpt  Additional Contributing Factors: n/a      Assessment/Plan:    INR continues to be supratherapeutic. Will continue to hold dose today.     Continue to monitor and adjust based on INR.         Date 8/10 8/11          INR >8/ 3.92/ 4.24 >8          Dose HOLD HOLD              Objective:  [Ht: 182.9 cm (72.01\"); Wt: 82.3 kg (181 lb 7 oz); BMI: Body mass index is 24.6 kg/m².]    Lab Results   Component Value Date    ALBUMIN 2.60 (L) 08/11/2021     Lab Results   Component Value Date    INR >=8.00 (C) 08/11/2021    INR 4.24 (H) 08/10/2021    INR 3.92 (H) 08/10/2021    PROTIME >90.0 (H) 08/11/2021    PROTIME 42.3 (H) 08/10/2021    PROTIME 39.3 (H) 08/10/2021     Lab Results   Component Value Date    HGB 8.7 (L) 08/11/2021    HGB 8.7 (L) 08/10/2021    HGB 8.3 (L) 08/10/2021     Lab Results   Component Value Date    HCT 26.3 (L) 08/11/2021    HCT 27.0 (L) 08/10/2021    HCT 25.2 (L) 08/10/2021       Rafael Kent [facility]  08/11/21 09:04 EDT         "

## 2021-08-11 NOTE — NURSING NOTE
Dr. Mendoza notified of patients status being stable.  All vitals wnl.  New orders to not transfer patient to another unit.  Will continue to monitor.

## 2021-08-11 NOTE — CASE MANAGEMENT/SOCIAL WORK
Continued Stay Note  KIRSTIN Ramos     Patient Name: Niko Servin  MRN: [id number]  Today's Date: 8/11/2021    Admit Date: 8/9/2021    Discharge Plan     Row Name 08/11/21 1231       Plan    Plan  DC Plan: Follow-up regarding IP rehab vs home health. ECF list provided to pt spouse. Declining rehab at this time, but may consider if surgery is needed. Walking oximetry may be needed at d/c.    Plan Comments  Met with patient spouse outside of room, she is patient's caregiver.  Informed patient spouse of PT recommendation of IP rehab - spouse said that she will more than likely take patient home, but waiting to see if patient will need surgery and wants to assess afterward.  ECF list provided to patient spouse and she is going to review with her daughter.  DC Barriers: IV ABX, IV lasix, O2 @ 3L, podiatry consult.                  Tuyet Corona RN

## 2021-08-11 NOTE — CONSULTS
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  Supratherapeutic INR  History of MI and CAD status post bypass remotely  History of essential hypertension hyperlipidemia  Perioperative evaluation prior to possible left lower extremity revascularization    HISTORY OF PRESENT ILLNESS:    89-year-old gentleman with motor history as described above with CAD MI status post bypass surgery remotely presenting with cellulitis after lower extremity injury with arterial insufficiency and dry gangrene under evaluation by vascular surgery for possible revascularization and limb salvage, patient very hard of hearing denies any anginal chest pain, EKG reviewed interpreted by me demonstrates sinus rhythm with no ischemic ST-T wave findings, no recent revascularization has not been seen by cardiology in the [facility] system in a number of years and anticoagulation has been managed by primary care with goal INR 2.5-3.5, who presented with INR greater than 8 and has multiple falls recently with traumatic injury to the left foot likely responsible for nonhealing wound.  Denies any syncope or palpitations, no ICD in place no palpitations reported, falls are mechanical in nature as described as well as per documentation in the chart.  Patient on broad-spectrum IV antibiotics with cellulitis, unloading measures encouraged by vascular surgery agree       perioperative ischemic evaluation  Get 2D echo for structural and functional evaluation  Continue perioperative aspirin and antiplatelets  Needs bridging perioperatively with heparin once INR is less than 2.5  Contraindication for FFP with mechanical mitral valve with high risk of valve thrombosis and stroke and embolic event, no further reversal of his INR is warranted  Given no  symptoms of his EF is normal would likely defer at 89 years old for perioperative ischemic evaluation given asymptomatic status    We will hold off on stress testing at this point in time  Get 2D echo  Bridging with heparin as described above        Past Medical History:   Diagnosis Date   • Ataxia    • CAD (coronary artery disease)    • CHF (congestive heart failure) (CMS/HCC)    • Depression    • Hx of mitral valve replacement    • Long term (current) use of anticoagulants    • 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 No pertinent family history.  Social History     Tobacco Use   • Smoking status: Former Smoker   • Smokeless tobacco: Never Used   Vaping Use   • Vaping Use: Never used   Substance Use Topics   • Alcohol use: Yes     Alcohol/week: 5.0 standard drinks     Types: 5 Cans of beer per week   • Drug use: No     Medications Prior to Admission   Medication Sig Dispense Refill Last Dose   • aspirin 81 MG chewable tablet Chew 81 mg Daily.   8/9/2021 at Unknown time   • rosuvastatin (CRESTOR) 10 MG tablet TAKE ONE TABLET BY MOUTH EVERY NIGHT AT BEDTIME 30 tablet 5 8/9/2021 at Unknown time   • sertraline (ZOLOFT) 100 MG tablet TAKE ONE TABLET BY MOUTH DAILY 30 tablet 2 8/9/2021 at Unknown time   • warfarin (COUMADIN) 7.5 MG tablet TAKE ONE TABLET BY MOUTH DAILY 30 tablet 2 8/9/2021 at Unknown time     Allergies:  Patient has no known allergies.    REVIEW OF SYSTEMS:  Please see the above history of present illness for pertinent positives and negatives.  The remainder of the patient's systems have been reviewed and are negative.    Vital Signs  Temp:  [98 °F (36.7 °C)-100.6  "°F (38.1 °C)] 99.5 °F (37.5 °C)  Heart Rate:  [71-89] 71  Resp:  [18-34] 18  BP: (113-169)/(45-80) 138/53    Flowsheet Rows      First Filed Value   Admission Height  182.9 cm (72\") Documented at 08/09/2021 2156   Admission Weight  90.7 kg (200 lb) Documented at 08/09/2021 2156           Physical Exam:  Physical Exam   Constitutional: Patient appears stated age, deconditioned in no acute distress, hard of hearing   HEENT:   Head: Normocephalic and atraumatic.   Eyes:  Pupils are equal, round, and reactive to light. EOM are intact. Sclerae are anicteric and noninjected.  Mouth and Throat: Patient has moist mucous membranes. Oropharynx is clear of any erythema or exudate.     Neck: Neck supple. No JVD present. No thyromegaly present. No lymphadenopathy present.  Cardiovascular: Regular rate, regular rhythm, chemical click, S1 normal and S2 normal.  Exam reveals no gallop and no friction rub.  1/6 systolic murmur left sternal border  Pulmonary/Chest: Lungs are clear to auscultation bilaterally. No respiratory distress. No wheezes. No rhonchi. No rales.   Abdominal: Soft. Bowel sounds are normal. No distension and no mass. There is no hepatosplenomegaly. There is no tenderness.   Musculoskeletal: Normal muscle tone  Extremities: Left lower extremity wound, bandaged, unloading measures, left pulses diminished lower extremity right pulses okay , Radials okay  Neurological: Patient is alert and oriented to person, place, and time. Cranial nerves II-XII are grossly intact with no focal deficits.  Skin: Skin is warm. No rash noted. Nails show no clubbing.  No cyanosis or erythema.     Results Review:    I reviewed the patient's new clinical results.  Lab Results (most recent)     Procedure Component Value Units Date/Time    TSH [[id number]]  (Normal) Collected: 08/10/21 0450    Specimen: Blood Updated: 08/10/21 2005     TSH 1.790 uIU/mL     BNP [[id number]]  (Abnormal) Collected: 08/10/21 0450    Specimen: Blood Updated: " 08/10/21 2005     proBNP 5,874.0 pg/mL     Narrative:      Among patients with dyspnea, NT-proBNP is highly sensitive for the detection of acute congestive heart failure. In addition NT-proBNP of <300 pg/ml effectively rules out acute congestive heart failure with 99% negative predictive value.    Results may be falsely decreased if patient taking Biotin.      Lactic Acid, Plasma [[id number]]  (Normal) Collected: 08/10/21 1835    Specimen: Blood Updated: 08/10/21 1920     Lactate 0.8 mmol/L     Basic Metabolic Panel [[id number]]  (Abnormal) Collected: 08/10/21 1835    Specimen: Blood Updated: 08/10/21 1918     Glucose 123 mg/dL      BUN 32 mg/dL      Creatinine 1.27 mg/dL      Sodium 141 mmol/L      Potassium 4.0 mmol/L      Chloride 105 mmol/L      CO2 26.0 mmol/L      Calcium 8.2 mg/dL      eGFR Non African Amer 53 mL/min/1.73      BUN/Creatinine Ratio 25.2     Anion Gap 10.0 mmol/L     Narrative:      GFR Normal >60  Chronic Kidney Disease <60  Kidney Failure <15      CBC & Differential [[id number]]  (Abnormal) Collected: 08/10/21 1835    Specimen: Blood Updated: 08/10/21 1855    Narrative:      The following orders were created for panel order CBC & Differential.  Procedure                               Abnormality         Status                     ---------                               -----------         ------                     CBC Auto Differential[[id number]]        Abnormal            Final result                 Please view results for these tests on the individual orders.    CBC Auto Differential [[id number]]  (Abnormal) Collected: 08/10/21 1835    Specimen: Blood Updated: 08/10/21 1855     WBC 10.90 10*3/mm3      RBC 3.26 10*6/mm3      Hemoglobin 8.7 g/dL      Hematocrit 27.0 %      MCV 82.7 fL      MCH 26.8 pg      MCHC 32.4 g/dL      RDW 17.4 %      RDW-SD 50.8 fl      MPV 8.0 fL      Platelets 292 10*3/mm3      Neutrophil % 89.8 %      Lymphocyte % 2.9 %      Monocyte % 5.4 %      Eosinophil % 1.2 %       Basophil % 0.7 %      Neutrophils, Absolute 9.80 10*3/mm3      Lymphocytes, Absolute 0.30 10*3/mm3      Monocytes, Absolute 0.60 10*3/mm3      Eosinophils, Absolute 0.10 10*3/mm3      Basophils, Absolute 0.10 10*3/mm3      nRBC 0.0 /100 WBC     Blood Gas, Arterial - [[id number]]  (Abnormal) Collected: 08/10/21 1806    Specimen: Arterial Blood Updated: 08/10/21 1808     Site Right Radial     Maurice's Test Positive     pH, Arterial 7.384 pH units      pCO2, Arterial 42.5 mm Hg      pO2, Arterial 81.1 mm Hg      HCO3, Arterial 25.3 mmol/L      Base Excess, Arterial 0.2 mmol/L      Comment: Serial Number: [id number]:  [id number]        O2 Saturation, Arterial 95.7 %      CO2 Content 26.6 mmol/L      Barometric Pressure for Blood Gas --     Comment: N/A        Modality Cannula     FIO2 32 %      Hemodilution No    COVID PRE-OP / PRE-PROCEDURE SCREENING ORDER (NO ISOLATION) - Swab, Nasopharynx [[id number]]  (Normal) Collected: 08/10/21 0403    Specimen: Swab from Nasopharynx Updated: 08/10/21 1445    Narrative:      The following orders were created for panel order COVID PRE-OP / PRE-PROCEDURE SCREENING ORDER (NO ISOLATION) - Swab, Nasopharynx.  Procedure                               Abnormality         Status                     ---------                               -----------         ------                     COVID-19,APTIMA PANTHER(...[[id number]]  Normal              Final result                 Please view results for these tests on the individual orders.    COVID-19,APTIMA PANTHER(ISAAK), FELICITA, NP/OP SWAB IN UTM/VTM/SALINE TRANSPORT MEDIA,24 HR TAT - Swab, Nasopharynx [[id number]]  (Normal) Collected: 08/10/21 0403    Specimen: Swab from Nasopharynx Updated: 08/10/21 1445     COVID19 Not Detected    Narrative:      Fact sheet for providers: https://www.fda.gov/media/101262/download     Fact sheet for patients: https://www.fda.gov/media/842091/download    Test performed by RT PCR.    Respiratory Panel, PCR (WITHOUT  COVID) - Swab, Nasopharynx [[id number]]  (Normal) Collected: 08/10/21 1346    Specimen: Swab from Nasopharynx Updated: 08/10/21 1437     ADENOVIRUS, PCR Not Detected     Coronavirus 229E Not Detected     Coronavirus HKU1 Not Detected     Coronavirus NL63 Not Detected     Coronavirus OC43 Not Detected     Human Metapneumovirus Not Detected     Human Rhinovirus/Enterovirus Not Detected     Influenza B PCR Not Detected     Parainfluenza Virus 1 Not Detected     Parainfluenza Virus 2 Not Detected     Parainfluenza Virus 3 Not Detected     Parainfluenza Virus 4 Not Detected     Bordetella pertussis pcr Not Detected     Chlamydophila pneumoniae PCR Not Detected     Mycoplasma pneumo by PCR Not Detected     Influenza A PCR Not Detected     RSV, PCR Not Detected     Bordetella parapertussis PCR Not Detected    Narrative:      The coronavirus on the RVP is NOT COVID-19 and is NOT indicative of infection with COVID-19.    In the setting of a positive respiratory panel with a viral infection PLUS a negative procalcitonin without other underlying concern for bacterial infection, consider observing off antibiotics or discontinuation of antibiotics and continue supportive care. If the respiratory panel is positive for atypical bacterial infection (Bordetella pertussis, Chlamydophila pneumoniae, or Mycoplasma pneumoniae), consider antibiotic de-escalation to target atypical bacterial infection.    COVID PRE-OP / PRE-PROCEDURE SCREENING ORDER (NO ISOLATION) - Swab, Nasopharynx [[id number]]  (Normal) Collected: 08/10/21 1114    Specimen: Swab from Nasopharynx Updated: 08/10/21 1241    Narrative:      The following orders were created for panel order COVID PRE-OP / PRE-PROCEDURE SCREENING ORDER (NO ISOLATION) - Swab, Nasopharynx.  Procedure                               Abnormality         Status                     ---------                               -----------         ------                      COVID-19,CEPHEID/XUAN/BD...[[id number]]  Normal              Final result                 Please view results for these tests on the individual orders.    COVID-19,CEPHEID/XUAN/BDMAX,COR/LOREN/PAD/FEI IN-HOUSE(OR EMERGENT/ADD-ON),NP SWAB IN TRANSPORT MEDIA 3-4 HR TAT, RT-PCR - Swab, Nasopharynx [[id number]]  (Normal) Collected: 08/10/21 1114    Specimen: Swab from Nasopharynx Updated: 08/10/21 1241     COVID19 Not Detected    Narrative:      Fact sheet for providers: https://www.fda.gov/media/391571/download     Fact sheet for patients: https://www.fda.gov/media/172386/download  Fact sheet for providers: https://www.fda.gov/media/530640/download    Fact sheet for patients: https://www.fda.gov/media/035104/download    Test performed by PCR.    Lactic Acid, Plasma [[id number]]  (Normal) Collected: 08/10/21 1012    Specimen: Blood Updated: 08/10/21 1107     Lactate 1.1 mmol/L     Protime-INR [[id number]]  (Abnormal) Collected: 08/10/21 0603    Specimen: Blood Updated: 08/10/21 0639     Protime 42.3 Seconds      INR 4.24    Protime-INR [[id number]]  (Abnormal) Collected: 08/10/21 0450    Specimen: Blood Updated: 08/10/21 0615     Protime 39.3 Seconds      INR 3.92    Basic Metabolic Panel [[id number]]  (Abnormal) Collected: 08/10/21 0450    Specimen: Blood Updated: 08/10/21 0538     Glucose 115 mg/dL      BUN 36 mg/dL      Creatinine 1.20 mg/dL      Sodium 142 mmol/L      Potassium 3.9 mmol/L      Chloride 106 mmol/L      CO2 26.0 mmol/L      Calcium 8.4 mg/dL      eGFR Non African Amer 57 mL/min/1.73      BUN/Creatinine Ratio 30.0     Anion Gap 10.0 mmol/L     Narrative:      GFR Normal >60  Chronic Kidney Disease <60  Kidney Failure <15      CBC Auto Differential [[id number]]  (Abnormal) Collected: 08/10/21 0450    Specimen: Blood Updated: 08/10/21 0513     WBC 9.10 10*3/mm3      RBC 3.01 10*6/mm3      Hemoglobin 8.3 g/dL      Hematocrit 25.2 %      MCV 83.6 fL      MCH 27.5 pg      MCHC 32.9 g/dL      RDW 17.1 %      RDW-SD  50.3 fl      MPV 8.0 fL      Platelets 261 10*3/mm3      Neutrophil % 89.8 %      Lymphocyte % 4.2 %      Monocyte % 5.1 %      Eosinophil % 0.6 %      Basophil % 0.3 %      Neutrophils, Absolute 8.20 10*3/mm3      Lymphocytes, Absolute 0.40 10*3/mm3      Monocytes, Absolute 0.50 10*3/mm3      Eosinophils, Absolute 0.10 10*3/mm3      Basophils, Absolute 0.00 10*3/mm3      nRBC 0.0 /100 WBC     Urinalysis, Microscopic Only - Urine, Clean Catch [[id number]]  (Abnormal) Collected: 08/10/21 0402    Specimen: Urine, Clean Catch Updated: 08/10/21 0424     RBC, UA 6-12 /HPF      WBC, UA 3-5 /HPF      Bacteria, UA None Seen /HPF      Squamous Epithelial Cells, UA 0-2 /HPF      Hyaline Casts, UA 3-6 /LPF      Methodology Automated Microscopy    Urinalysis With Culture If Indicated - Urine, Clean Catch [[id number]]  (Abnormal) Collected: 08/10/21 0402    Specimen: Urine, Clean Catch Updated: 08/10/21 0424     Color, UA Dark Yellow     Appearance, UA Cloudy     Comment: Result checked         pH, UA 5.5     Specific Gravity, UA 1.021     Glucose, UA Negative     Ketones, UA Negative     Bilirubin, UA Negative     Blood, UA Large (3+)     Protein, UA 30 mg/dL (1+)     Leuk Esterase, UA Negative     Nitrite, UA Negative     Urobilinogen, UA 0.2 E.U./dL    [place] Draw [[id number]] Collected: 08/10/21 0018    Specimen: Blood Updated: 08/10/21 0130    Narrative:      The following orders were created for panel order [place] Draw.  Procedure                               Abnormality         Status                     ---------                               -----------         ------                     Green Top (Gel)[[id number]]                                  Final result               Lavender Top[[id number]]                                     Final result               Gold Top - SST[[id number]]                                   Final result                 Please view results for these tests on the individual orders.    Lavender  Top [[id number]] Collected: 08/10/21 0018    Specimen: Blood Updated: 08/10/21 0130     Extra Tube hold for add-on     Comment: Auto resulted       Green Top (Gel) [[id number]] Collected: 08/10/21 0018    Specimen: Blood Updated: 08/10/21 0130     Extra Tube Hold for add-ons.     Comment: Auto resulted.       Gold Top - SST [[id number]] Collected: 08/10/21 0018    Specimen: Blood Updated: 08/10/21 0130     Extra Tube Hold for add-ons.     Comment: Auto resulted.       Sedimentation Rate [[id number]]  (Abnormal) Collected: 08/10/21 0018    Specimen: Blood Updated: 08/10/21 0055     Sed Rate 42 mm/hr     Comprehensive Metabolic Panel [[id number]]  (Abnormal) Collected: 08/10/21 0018    Specimen: Blood Updated: 08/10/21 0054     Glucose 113 mg/dL      BUN 35 mg/dL      Creatinine 1.25 mg/dL      Sodium 141 mmol/L      Potassium 4.1 mmol/L      Chloride 106 mmol/L      CO2 26.0 mmol/L      Calcium 8.5 mg/dL      Total Protein 6.4 g/dL      Albumin 2.70 g/dL      ALT (SGPT) 8 U/L      AST (SGOT) 15 U/L      Alkaline Phosphatase 74 U/L      Total Bilirubin 0.3 mg/dL      eGFR Non African Amer 54 mL/min/1.73      Globulin 3.7 gm/dL      A/G Ratio 0.7 g/dL      BUN/Creatinine Ratio 28.0     Anion Gap 9.0 mmol/L     Narrative:      GFR Normal >60  Chronic Kidney Disease <60  Kidney Failure <15      Troponin [[id number]]  (Normal) Collected: 08/10/21 0018    Specimen: Blood Updated: 08/10/21 0053     Troponin T 0.015 ng/mL     Narrative:      Troponin T Reference Range:  <= 0.03 ng/mL-   Negative for AMI  >0.03 ng/mL-     Abnormal for myocardial necrosis.  Clinicians would have to utilize clinical acumen, EKG, Troponin and serial changes to determine if it is an Acute Myocardial Infarction or myocardial injury due to an underlying chronic condition.       Results may be falsely decreased if patient taking Biotin.      C-reactive Protein [[id number]]  (Abnormal) Collected: 08/10/21 0018    Specimen: Blood Updated: 08/10/21 0053      C-Reactive Protein 28.87 mg/dL     CBC & Differential [[id number]]  (Abnormal) Collected: 08/10/21 0018    Specimen: Blood Updated: 08/10/21 0039    Narrative:      The following orders were created for panel order CBC & Differential.  Procedure                               Abnormality         Status                     ---------                               -----------         ------                     CBC Auto Differential[[id number]]        Abnormal            Final result                 Please view results for these tests on the individual orders.    Blood Culture - Blood, Arm, Right [[id number]] Collected: 08/10/21 0031    Specimen: Blood from Arm, Right Updated: 08/10/21 0036    POC Lactate [[id number]]  (Normal) Collected: 08/10/21 0026    Specimen: Blood Updated: 08/10/21 0027     Lactate 1.0 mmol/L      Comment: Serial Number: [id number]:  [id number]       Blood Culture - Blood, Arm, Left [[id number]] Collected: 08/10/21 0018    Specimen: Blood from Arm, Left Updated: 08/10/21 0026          Imaging Results (Most Recent)     Procedure Component Value Units Date/Time    XR Chest 2 View [[id number]] Collected: 08/10/21 1240     Updated: 08/10/21 1245    Narrative:      DATE OF EXAM:  8/10/2021 12:20 PM     PROCEDURE:  XR CHEST 2 VW-     INDICATIONS:  SOA; L03.116-Cellulitis of left lower limb; R79.1-Abnormal coagulation  profile     COMPARISON:  A 10/20/2021 at 12:40 AM, 6/8/2016 at 5:39 PM, 6/7/2016 at 9:47 PM     TECHNIQUE:   Two radiologic views of the chest , PA and lateral were obtained.     FINDINGS:  Extensive chronic changes are again seen throughout the lungs. These  findings are again stable given differences in technique. No new  consolidations or pleural effusions are observed. The cardiac silhouette  and mediastinum are unchanged. Stable cardiomegaly is noted.  Postoperative changes are noted. No acute osseous abnormalities are  seen.       Impression:      Chronic changes are again noted  which are stable. There is no definitive  evidence for acute cardiopulmonary process.     Electronically Signed By-Aneesh Judd MD On:8/10/2021 12:43 PM  This report was finalized on [id number] by  Aneesh Judd MD.    XR Foot 3+ View Left [[id number]] Collected: 08/10/21 0743     Updated: 08/10/21 0748    Narrative:      DATE OF EXAM:  8/10/2021 12:40 AM     PROCEDURE:  XR FOOT 3+ VW LEFT-     INDICATIONS:  Prior injury to left great toe on furniture several weeks ago. Recent  discovery of injury with wound and swelling and erythema involving the  great toe.     COMPARISON:  No Comparisons Available     TECHNIQUE:   A minimum of three routine standard radiographic views were obtained of  the left foot.     FINDINGS:  Soft tissue swelling is seen throughout the great toe and extending into  the medial aspect of the forefoot/midfoot. There is suggestion of  possible gas production throughout the soft tissues. Multiple areas of  cortical erosion are identified throughout the proximal and distal  phalanges of the great toe. There is also abnormal lucency throughout  the bone. These findings indicate changes of osteomyelitis.        Impression:      1.Evidence for soft tissue swelling throughout the great toe with areas  of gas production. The findings indicate changes of soft tissue  cellulitis.  2.Evidence for osteomyelitis involving the proximal and distal phalanges  of the great toe.     Electronically Signed By-Aneesh Judd MD On:8/10/2021 7:46 AM  This report was finalized on [id number] by  Aneesh Judd MD.    XR Chest 1 View [[id number]] Collected: 08/10/21 0741     Updated: 08/10/21 0746    Narrative:         DATE OF EXAM:   8/10/2021 12:40 AM     PROCEDURE:   XR CHEST 1 VW-     INDICATIONS:   Shortness of breath. Prior Covid 19 infection. Unsteady gait.     COMPARISON:  6/8/2016 at 5:39 PM, 6/7/2016 at 9:47 PM, 12/30/2015 at 8:28 PM     TECHNIQUE:   [Portable chest radiograph]      FINDINGS:  Extensive chronic changes are again seen throughout the lungs which are  stable. No new consolidations or pleural effusions are observed. The  cardiac silhouette and mediastinum are stable. Postoperative changes are  noted. Stable cardiomegaly is noted. No acute osseous abnormalities are  identified.       Impression:      Chronic changes are noted which are stable. There is no evidence for  definitive acute cardiopulmonary process.     Electronically Signed By-Aneesh Judd MD On:8/10/2021 7:43 AM  This report was finalized on [id number] by  Aneesh Judd MD.    CT Head Without Contrast [[id number]] Collected: 08/10/21 0148     Updated: 08/10/21 0154    Narrative:      EXAMINATION: CT HEAD WO CONTRAST    DATE: 8/10/2021 1:29 AM     INDICATION: Trauma, recent falls, anticoagulated     COMPARISON: CT head from 8/8/2016     TECHNIQUE: Thin section noncontrast axial images were obtained through the head. Coronal reformats were created.  CT dose lowering techniques were used, to include: automated exposure control, adjustment for patient size, and or use of iterative   reconstruction.     FINDINGS:     Intracranial contents:    No acute intracranial hemorrhage, evidence of acute territorial infarct, mass, mass effect or hydrocephalus. Moderate intracranial atherosclerosis and moderate chronic small vessel ischemic changes in the white matter. Moderate global cerebral volume   loss.    Bones and extracranial soft tissues:     No fracture or focal osseous lesion in the calvarium or skull base. Paranasal sinuses are clear where visualized. Mastoid air cells are clear. Orbits are unremarkable.         Impression:        1. No acute intracranial abnormality. No hemorrhage.  2. No calvarial fracture.  3. Moderate chronic age-related intracranial findings as above.         This examination was interpreted by Aneesh Ordonez M.D.    Electronically signed by:  Aneesh Ordonez M.D.    8/9/2021 11:53 PM         reviewed    ECG/EMG Results (most recent)     Procedure Component Value Units Date/Time    ECG 12 Lead [[id number]] Collected: 08/09/21 2358     Updated: 08/10/21 1042     QT Interval 383 ms     Narrative:      HEART RATE= 73  bpm  RR Interval= 852  ms  DC Interval= 210  ms  P Horizontal Axis= 30  deg  P Front Axis= 44  deg  QRSD Interval= 103  ms  QT Interval= 383  ms  QRS Axis= 22  deg  T Wave Axis= 30  deg  - ABNORMAL ECG -  Sinus rhythm  Multiple premature complexes, vent & supraven  Borderline ST depression, anterolateral leads  When compared with ECG of 31-Dec-2015 10:48:16,  Significant change in rhythm: previously junctional  Electronically Signed By: Tushar Poon (Luis A) 10-Aug-2021 10:42:04  Date and Time of Study: 2021-08-09 23:58:23        reviewed    Assessment/Plan     Perioperative ischemic evaluation  Described above extensively  Defer ischemic evaluation presently 89 years old  Possibly not surgical candidate, will defer to vascular surgery on endovascular versus surgical approach depending on findings and recommendation  Will need bridging with heparin preoperatively once INR is less than 2.5 with mechanical mitral valve  No reversal of INR is recommended with FFP with mechanical mitral valve  Continue perioperative aspirin therapy  Underlying hemoglobin 8.7    CAD remote history of bypass revascularization  Get 2D echo for structural functional valuation  Asymptomatic with no anginal chest pain  Likely no benefit to invasive or noninvasive ischemic evaluation revascularization prior to left lower extremity revascularization with threatened limb,  Optimize medicines perioperatively avoid hypotension  Continue perioperative aspirin statin therapy      Left lower extremity cellulitis  Multiple antibiotics on board, management per primary    Appears well compensated euvolemic    Further recommendations based on clinical course and findings    Jamal Montoya MD, PhD    New patient to me with new  problems above      Jamal Montoya MD  08/10/21  23:41 EDT

## 2021-08-11 NOTE — PROGRESS NOTES
Infectious Diseases Progress Note      LOS: 0 days   Patient Care Team:  Casper Saldaña MD as PCP - General  Casper Saldaña MD as PCP - Family Medicine    Chief Complaint: Left great toe infection, left foot redness and swelling    Subjective       The patient has been afebrile for the last 24 hours.  The patient is on 3 L of oxygen by nasal cannula, hemodynamically stable, and is tolerating antimicrobial therapy.      Review of Systems:   Review of Systems   Constitutional: Negative.    HENT: Negative.    Eyes: Negative.    Respiratory: Negative.    Cardiovascular: Negative.    Gastrointestinal: Negative.    Endocrine: Negative.    Genitourinary: Negative.    Musculoskeletal: Negative.    Skin: Positive for color change and wound.   Neurological: Negative.    Psychiatric/Behavioral: Negative.    All other systems reviewed and are negative.       Objective     Vital Signs  Temp:  [97.6 °F (36.4 °C)-100.6 °F (38.1 °C)] 97.6 °F (36.4 °C)  Heart Rate:  [70-88] 74  Resp:  [16-34] 20  BP: (118-148)/(52-75) 119/63    Physical Exam:  Physical Exam  Vitals and nursing note reviewed.   Constitutional:       General: He is not in acute distress.     Appearance: Normal appearance. He is well-developed. He is not diaphoretic.      Comments: Appears thin   HENT:      Head: Normocephalic and atraumatic.   Eyes:      Extraocular Movements: Extraocular movements intact.      Conjunctiva/sclera: Conjunctivae normal.      Pupils: Pupils are equal, round, and reactive to light.   Cardiovascular:      Rate and Rhythm: Normal rate and regular rhythm.      Heart sounds: Normal heart sounds, S1 normal and S2 normal.   Pulmonary:      Effort: Pulmonary effort is normal. No respiratory distress.      Breath sounds: Normal breath sounds. No stridor. No wheezing or rales.   Abdominal:      General: Bowel sounds are normal. There is no distension.      Palpations: Abdomen is soft. There is no mass.      Tenderness: There is  no abdominal tenderness. There is no guarding.   Musculoskeletal:         General: No deformity. Normal range of motion.      Cervical back: Neck supple.      Comments: Patient has a significant wound to the left great toe that has black eschar around the entire circumference of the toe.  There is erythema and swelling to the dorsal aspect of the left foot that extends into the left lower leg.  Pulses are very weak   -Erythema slightly improved today   Skin:     General: Skin is warm and dry.      Coloration: Skin is not pale.      Findings: Erythema present. No rash.   Neurological:      Mental Status: He is alert and oriented to person, place, and time.      Cranial Nerves: No cranial nerve deficit.   Psychiatric:         Mood and Affect: Mood normal.          Results Review:    I have reviewed all clinical data, test, lab, and imaging results.     Radiology  XR Chest 2 View    Result Date: 8/10/2021  DATE OF EXAM: 8/10/2021 12:20 PM  PROCEDURE: XR CHEST 2 VW-  INDICATIONS: SOA; L03.116-Cellulitis of left lower limb; R79.1-Abnormal coagulation profile  COMPARISON: A 10/20/2021 at 12:40 AM, 6/8/2016 at 5:39 PM, 6/7/2016 at 9:47 PM  TECHNIQUE: Two radiologic views of the chest , PA and lateral were obtained.  FINDINGS: Extensive chronic changes are again seen throughout the lungs. These findings are again stable given differences in technique. No new consolidations or pleural effusions are observed. The cardiac silhouette and mediastinum are unchanged. Stable cardiomegaly is noted. Postoperative changes are noted. No acute osseous abnormalities are seen.      Chronic changes are again noted which are stable. There is no definitive evidence for acute cardiopulmonary process.  Electronically Signed By-Aneesh Judd MD On:8/10/2021 12:43 PM This report was finalized on [id number] by  Aneesh Judd MD.    Doppler Ankle Brachial Index Single Level CAR    Result Date: 8/10/2021  · Right Conclusion: The right JONA is  unable to be assessed due to vessel incompressibility. Normal digital pressures. · Left Conclusion: The left JONA is unable to be assessed due to vessel incompressibility. Severe digital ischemia.        Cardiology    Laboratory    Results from last 7 days   Lab Units 08/11/21  0522 08/10/21  1835 08/10/21  0450 08/10/21  0018   WBC 10*3/mm3 10.30 10.90* 9.10 12.00*   HEMOGLOBIN g/dL 8.7* 8.7* 8.3* 9.4*   HEMATOCRIT % 26.3* 27.0* 25.2* 29.5*   PLATELETS 10*3/mm3 267 292 261 302     Results from last 7 days   Lab Units 08/11/21  0522 08/10/21  1835 08/10/21  0450 08/10/21  0018   SODIUM mmol/L 142 141 142 141   POTASSIUM mmol/L 3.8 4.0 3.9 4.1   CHLORIDE mmol/L 105 105 106 106   CO2 mmol/L 28.0 26.0 26.0 26.0   BUN mg/dL 30* 32* 36* 35*   CREATININE mg/dL 1.28* 1.27 1.20 1.25   GLUCOSE mg/dL 95 123* 115* 113*   ALBUMIN g/dL 2.60*  --   --  2.70*   BILIRUBIN mg/dL 0.2  --   --  0.3   ALK PHOS U/L 72  --   --  74   AST (SGOT) U/L 22  --   --  15   ALT (SGPT) U/L 10  --   --  8   CALCIUM mg/dL 8.2 8.2 8.4 8.5         Results from last 7 days   Lab Units 08/10/21  0018   SED RATE mm/hr 42*         Microbiology   Microbiology Results (last 10 days)       Procedure Component Value - Date/Time    Respiratory Panel, PCR (WITHOUT COVID) - Swab, Nasopharynx [[id number]]  (Normal) Collected: 08/10/21 1346    Lab Status: Final result Specimen: Swab from Nasopharynx Updated: 08/10/21 5887     ADENOVIRUS, PCR Not Detected     Coronavirus 229E Not Detected     Coronavirus HKU1 Not Detected     Coronavirus NL63 Not Detected     Coronavirus OC43 Not Detected     Human Metapneumovirus Not Detected     Human Rhinovirus/Enterovirus Not Detected     Influenza B PCR Not Detected     Parainfluenza Virus 1 Not Detected     Parainfluenza Virus 2 Not Detected     Parainfluenza Virus 3 Not Detected     Parainfluenza Virus 4 Not Detected     Bordetella pertussis pcr Not Detected     Chlamydophila pneumoniae PCR Not Detected     Mycoplasma pneumo  by PCR Not Detected     Influenza A PCR Not Detected     RSV, PCR Not Detected     Bordetella parapertussis PCR Not Detected    Narrative:      The coronavirus on the RVP is NOT COVID-19 and is NOT indicative of infection with COVID-19.    In the setting of a positive respiratory panel with a viral infection PLUS a negative procalcitonin without other underlying concern for bacterial infection, consider observing off antibiotics or discontinuation of antibiotics and continue supportive care. If the respiratory panel is positive for atypical bacterial infection (Bordetella pertussis, Chlamydophila pneumoniae, or Mycoplasma pneumoniae), consider antibiotic de-escalation to target atypical bacterial infection.    COVID PRE-OP / PRE-PROCEDURE SCREENING ORDER (NO ISOLATION) - Swab, Nasopharynx [[id number]]  (Normal) Collected: 08/10/21 1114    Lab Status: Final result Specimen: Swab from Nasopharynx Updated: 08/10/21 1241    Narrative:      The following orders were created for panel order COVID PRE-OP / PRE-PROCEDURE SCREENING ORDER (NO ISOLATION) - Swab, Nasopharynx.  Procedure                               Abnormality         Status                     ---------                               -----------         ------                     COVID-19,CEPHEID/XUAN/BD...[[id number]]  Normal              Final result                 Please view results for these tests on the individual orders.    COVID-19,CEPHEID/XUAN/BDMAX,COR/LOREN/PAD/FEI IN-HOUSE(OR EMERGENT/ADD-ON),NP SWAB IN TRANSPORT MEDIA 3-4 HR TAT, RT-PCR - Swab, Nasopharynx [[id number]]  (Normal) Collected: 08/10/21 1114    Lab Status: Final result Specimen: Swab from Nasopharynx Updated: 08/10/21 1241     COVID19 Not Detected    Narrative:      Fact sheet for providers: https://www.fda.gov/media/010028/download     Fact sheet for patients: https://www.fda.gov/media/413728/download  Fact sheet for providers: https://www.fda.gov/media/995388/download    Fact sheet for  patients: https://www.fda.gov/media/207664/download    Test performed by PCR.    COVID PRE-OP / PRE-PROCEDURE SCREENING ORDER (NO ISOLATION) - Swab, Nasopharynx [[id number]]  (Normal) Collected: 08/10/21 0403    Lab Status: Final result Specimen: Swab from Nasopharynx Updated: 08/10/21 1445    Narrative:      The following orders were created for panel order COVID PRE-OP / PRE-PROCEDURE SCREENING ORDER (NO ISOLATION) - Swab, Nasopharynx.  Procedure                               Abnormality         Status                     ---------                               -----------         ------                     COVID-19,APTIMA PANTHER(...[[id number]]  Normal              Final result                 Please view results for these tests on the individual orders.    COVID-19,APTIMA PANTHER(ISAAK),[facility], NP/OP SWAB IN UTM/VTM/SALINE TRANSPORT MEDIA,24 HR TAT - Swab, Nasopharynx [[id number]]  (Normal) Collected: 08/10/21 0403    Lab Status: Final result Specimen: Swab from Nasopharynx Updated: 08/10/21 1445     COVID19 Not Detected    Narrative:      Fact sheet for providers: https://www.fda.gov/media/328214/download     Fact sheet for patients: https://www.fda.gov/media/883634/download    Test performed by RT PCR.    Blood Culture - Blood, Arm, Right [[id number]]  (Abnormal) Collected: 08/10/21 0031    Lab Status: Preliminary result Specimen: Blood from Arm, Right Updated: 08/11/21 1152     Blood Culture Proteus species     Gram Stain Anaerobic Bottle Gram negative bacilli    Blood Culture ID, PCR - Blood, Arm, Right [[id number]]  (Abnormal) Collected: 08/10/21 0031    Lab Status: Final result Specimen: Blood from Arm, Right Updated: 08/11/21 0602     BCID, PCR Proteus spp. Identification by BCID PCR.    Blood Culture - Blood, Arm, Left [[id number]] Collected: 08/10/21 0018    Lab Status: Preliminary result Specimen: Blood from Arm, Left Updated: 08/11/21 0030     Blood Culture No growth at 24 hours            Medication Review:        Schedule Meds  ampicillin-sulbactam, 3 g, Intravenous, Q8H  clindamycin, 900 mg, Intravenous, Q8H  furosemide, 20 mg, Intravenous, Q12H  ipratropium-albuterol, 3 mL, Nebulization, Q6H - RT  rosuvastatin, 10 mg, Oral, Nightly  sertraline, 100 mg, Oral, Daily  sodium chloride, 10 mL, Intravenous, Q12H  vancomycin, 1,000 mg, Intravenous, Q24H        Infusion Meds  Pharmacy to dose vancomycin,   Pharmacy to dose warfarin,         PRN Meds  •  acetaminophen **OR** acetaminophen **OR** acetaminophen  •  aluminum-magnesium hydroxide-simethicone  •  haloperidol lactate  •  magnesium sulfate **OR** magnesium sulfate in D5W 1g/100mL (PREMIX)  •  magnesium sulfate **OR** magnesium sulfate **OR** magnesium sulfate  •  melatonin  •  Morphine  •  nitroglycerin  •  ondansetron **OR** ondansetron  •  Pharmacy to dose vancomycin  •  Pharmacy to dose warfarin  •  potassium chloride  •  potassium chloride  •  potassium phosphate infusion greater than 15 mMoles **OR** potassium phosphate infusion greater than 15 mMoles **OR** potassium phosphate **OR** sodium phosphate IVPB **OR** sodium phosphate IVPB **OR** sodium phosphate IVPB  •  sodium chloride  •  sodium chloride        Assessment/Plan       Antimicrobial Therapy   1.  Vancomycin      day  2.  Clindamycin      day  3.  Unasyn      day  4.      Day  5.      Day      Assessment     Left great toe wound infection with cellulitis.  Patient apparently stubbed or dropped something on his left great toe approximately 2 weeks ago and has been trying to take care of it himself.  Patient has black eschar around the entirety of the right great toe and there is erythema and warmth extending to the dorsal aspect of the left foot and into the left lower leg.  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  May discontinue IV clindamycin tomorrow of leg cellulitis continue to improve  Podiatry is already been consulted-patient will likely need an amputation of the left great toe   Vascular surgery is working the patient up for possible vascular intervention  Cardiology was already consulted for surgical clearance  2D echo pending report  Continue supportive care  A.m. labs  Case discussed with patient, wife, and daughter at bedside  Case discussed with RN    Lissette Osuna, MARIZA  08/11/21  12:15 EDT

## 2021-08-11 NOTE — CONSULTS
NEPHROLOGY CONSULTATION-----[facility]    [facility]  [phone]  Cory Bermeo MD    Patient Care Team:  Casper Saldaña MD as PCP - General  Casper Saldaña MD as PCP - Family Medicine  Elvie, Cory BERMUDEZ MD as Consulting Physician (Nephrology)    CC/REASON FOR CONSULTATION: Elevated creatinine    PHYSICIAN REQUESTING CONSULTATION: Dr Mansfield    History of Present Illness  89 year old male with PMHx B cell lymphoma, CAD, HTN, [place] admitted with left lower leg cellulitis. His creatinine is 1.2. patient appears to have CKD with previous creatinines 1.1-1.3. UA with large blood, previously negative. His INR > 8. No dysuria, gross hematuria. No chest pain or SOA.     Review of Systems   As noted above, otherwise 10 systems reviewed and were negative.    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 No pertinent family history.    Social History     Tobacco Use   • Smoking status: Former Smoker   • Smokeless tobacco: Never Used   Vaping Use   • Vaping Use: Never used   Substance Use Topics   • Alcohol use: Yes     Alcohol/week: 5.0 standard drinks     Types: 5 Cans of beer per week   • Drug use: No       Home Meds:   Medications Prior to Admission   Medication Sig Dispense Refill Last Dose   • aspirin 81 MG chewable tablet Chew 81 mg Daily.   8/9/2021 at Unknown time   •  rosuvastatin (CRESTOR) 10 MG tablet TAKE ONE TABLET BY MOUTH EVERY NIGHT AT BEDTIME 30 tablet 5 8/9/2021 at Unknown time   • sertraline (ZOLOFT) 100 MG tablet TAKE ONE TABLET BY MOUTH DAILY 30 tablet 2 8/9/2021 at Unknown time   • warfarin (COUMADIN) 7.5 MG tablet TAKE ONE TABLET BY MOUTH DAILY 30 tablet 2 8/9/2021 at Unknown time       Scheduled Meds:  ampicillin-sulbactam, 3 g, Intravenous, Q8H  clindamycin, 900 mg, Intravenous, Q8H  furosemide, 20 mg, Intravenous, Q12H  ipratropium-albuterol, 3 mL, Nebulization, Q6H - RT  rosuvastatin, 10 mg, Oral, Nightly  sertraline, 100 mg, Oral, Daily  sodium chloride, 10 mL, Intravenous, Q12H  vancomycin, 1,000 mg, Intravenous, Q24H        Continuous Infusions:  Pharmacy to dose vancomycin,   Pharmacy to dose warfarin,         PRN Meds:  •  acetaminophen **OR** acetaminophen **OR** acetaminophen  •  aluminum-magnesium hydroxide-simethicone  •  haloperidol lactate  •  magnesium sulfate **OR** magnesium sulfate in D5W 1g/100mL (PREMIX)  •  magnesium sulfate **OR** magnesium sulfate **OR** magnesium sulfate  •  melatonin  •  Morphine  •  nitroglycerin  •  ondansetron **OR** ondansetron  •  Pharmacy to dose vancomycin  •  Pharmacy to dose warfarin  •  potassium chloride  •  potassium chloride  •  potassium phosphate infusion greater than 15 mMoles **OR** potassium phosphate infusion greater than 15 mMoles **OR** potassium phosphate **OR** sodium phosphate IVPB **OR** sodium phosphate IVPB **OR** sodium phosphate IVPB  •  sodium chloride  •  sodium chloride    Allergies:  Patient has no known allergies.    OBJECTIVE    Vital Signs  Temp:  [97.6 °F (36.4 °C)-100.6 °F (38.1 °C)] 97.8 °F (36.6 °C)  Heart Rate:  [70-88] 73  Resp:  [16-34] 20  BP: (118-148)/(52-75) 118/63    I/O this shift:  In: 1760 [P.O.:600; I.V.:830; Blood:330]  Out: -   I/O last 3 completed shifts:  In: 1860 [P.O.:660; Blood:700; IV Piggyback:500]  Out: 100 [Urine:100]    Physical Exam:  General Appearance:  alert, appears stated age and cooperative  Head: normocephalic, without obvious abnormality and atraumatic  Eyes: conjunctivae and sclerae normal and no icterus  Neck: supple and no JVD  Lungs: crackles present  Heart: regular rhythm & normal rate and normal S1, S2  Chest Wall: no abnormalities observed  Abdomen: normal bowel sounds and soft non-tender  Extremities: moves extremities well, trace edema, no cyanosis  Skin: left LE erythema, left great toe gangrenous  Neurologic: Alert, and oriented. No focal deficits    Results Review:    I reviewed the patient's new clinical results.    WBC WBC   Date Value Ref Range Status   08/11/2021 10.30 3.40 - 10.80 10*3/mm3 Final   08/10/2021 10.90 (H) 3.40 - 10.80 10*3/mm3 Final   08/10/2021 9.10 3.40 - 10.80 10*3/mm3 Final   08/10/2021 12.00 (H) 3.40 - 10.80 10*3/mm3 Final      HGB Hemoglobin   Date Value Ref Range Status   08/11/2021 8.7 (L) 13.0 - 17.7 g/dL Final   08/10/2021 8.7 (L) 13.0 - 17.7 g/dL Final   08/10/2021 8.3 (L) 13.0 - 17.7 g/dL Final   08/10/2021 9.4 (L) 13.0 - 17.7 g/dL Final      HCT Hematocrit   Date Value Ref Range Status   08/11/2021 26.3 (L) 37.5 - 51.0 % Final   08/10/2021 27.0 (L) 37.5 - 51.0 % Final   08/10/2021 25.2 (L) 37.5 - 51.0 % Final   08/10/2021 29.5 (L) 37.5 - 51.0 % Final      Platlets No results found for: LABPLAT   MCV MCV   Date Value Ref Range Status   08/11/2021 83.9 79.0 - 97.0 fL Final   08/10/2021 82.7 79.0 - 97.0 fL Final   08/10/2021 83.6 79.0 - 97.0 fL Final   08/10/2021 83.1 79.0 - 97.0 fL Final          Sodium Sodium   Date Value Ref Range Status   08/11/2021 142 136 - 145 mmol/L Final   08/10/2021 141 136 - 145 mmol/L Final   08/10/2021 142 136 - 145 mmol/L Final   08/10/2021 141 136 - 145 mmol/L Final      Potassium Potassium   Date Value Ref Range Status   08/11/2021 3.8 3.5 - 5.2 mmol/L Final   08/10/2021 4.0 3.5 - 5.2 mmol/L Final   08/10/2021 3.9 3.5 - 5.2 mmol/L Final   08/10/2021 4.1 3.5 - 5.2 mmol/L Final       Chloride Chloride   Date Value Ref Range Status   08/11/2021 105 98 - 107 mmol/L Final   08/10/2021 105 98 - 107 mmol/L Final   08/10/2021 106 98 - 107 mmol/L Final   08/10/2021 106 98 - 107 mmol/L Final      CO2 CO2   Date Value Ref Range Status   08/11/2021 28.0 22.0 - 29.0 mmol/L Final   08/10/2021 26.0 22.0 - 29.0 mmol/L Final   08/10/2021 26.0 22.0 - 29.0 mmol/L Final   08/10/2021 26.0 22.0 - 29.0 mmol/L Final      BUN BUN   Date Value Ref Range Status   08/11/2021 30 (H) 8 - 23 mg/dL Final   08/10/2021 32 (H) 8 - 23 mg/dL Final   08/10/2021 36 (H) 8 - 23 mg/dL Final   08/10/2021 35 (H) 8 - 23 mg/dL Final      Creatinine Creatinine   Date Value Ref Range Status   08/11/2021 1.28 (H) 0.76 - 1.27 mg/dL Final   08/10/2021 1.27 0.76 - 1.27 mg/dL Final   08/10/2021 1.20 0.76 - 1.27 mg/dL Final   08/10/2021 1.25 0.76 - 1.27 mg/dL Final      Calcium Calcium   Date Value Ref Range Status   08/11/2021 8.2 8.2 - 9.6 mg/dL Final   08/10/2021 8.2 8.2 - 9.6 mg/dL Final   08/10/2021 8.4 8.2 - 9.6 mg/dL Final   08/10/2021 8.5 8.2 - 9.6 mg/dL Final      PO4 No results found for: CAPO4   Albumin Albumin   Date Value Ref Range Status   08/11/2021 2.60 (L) 3.50 - 5.20 g/dL Final   08/10/2021 2.70 (L) 3.50 - 5.20 g/dL Final      Magnesium Magnesium   Date Value Ref Range Status   08/11/2021 1.5 (L) 1.7 - 2.3 mg/dL Final      Uric Acid No results found for: URICACID       Imaging Results (Last 72 Hours)     Procedure Component Value Units Date/Time    XR Chest 2 View [[id number]] Collected: 08/10/21 1240     Updated: 08/10/21 1245    Narrative:      DATE OF EXAM:  8/10/2021 12:20 PM     PROCEDURE:  XR CHEST 2 VW-     INDICATIONS:  SOA; L03.116-Cellulitis of left lower limb; R79.1-Abnormal coagulation  profile     COMPARISON:  A 10/20/2021 at 12:40 AM, 6/8/2016 at 5:39 PM, 6/7/2016 at 9:47 PM     TECHNIQUE:   Two radiologic views of the chest , PA and lateral were obtained.     FINDINGS:  Extensive chronic changes are again seen  throughout the lungs. These  findings are again stable given differences in technique. No new  consolidations or pleural effusions are observed. The cardiac silhouette  and mediastinum are unchanged. Stable cardiomegaly is noted.  Postoperative changes are noted. No acute osseous abnormalities are  seen.       Impression:      Chronic changes are again noted which are stable. There is no definitive  evidence for acute cardiopulmonary process.     Electronically Signed By-Aneesh Judd MD On:8/10/2021 12:43 PM  This report was finalized on [id number] by  Aneesh Judd MD.    XR Foot 3+ View Left [[id number]] Collected: 08/10/21 0743     Updated: 08/10/21 0748    Narrative:      DATE OF EXAM:  8/10/2021 12:40 AM     PROCEDURE:  XR FOOT 3+ VW LEFT-     INDICATIONS:  Prior injury to left great toe on furniture several weeks ago. Recent  discovery of injury with wound and swelling and erythema involving the  great toe.     COMPARISON:  No Comparisons Available     TECHNIQUE:   A minimum of three routine standard radiographic views were obtained of  the left foot.     FINDINGS:  Soft tissue swelling is seen throughout the great toe and extending into  the medial aspect of the forefoot/midfoot. There is suggestion of  possible gas production throughout the soft tissues. Multiple areas of  cortical erosion are identified throughout the proximal and distal  phalanges of the great toe. There is also abnormal lucency throughout  the bone. These findings indicate changes of osteomyelitis.        Impression:      1.Evidence for soft tissue swelling throughout the great toe with areas  of gas production. The findings indicate changes of soft tissue  cellulitis.  2.Evidence for osteomyelitis involving the proximal and distal phalanges  of the great toe.     Electronically Signed By-Aneesh Judd MD On:8/10/2021 7:46 AM  This report was finalized on [id number] by  Aneesh Judd MD.    XR Chest 1 View [[id number]]  Collected: 08/10/21 0741     Updated: 08/10/21 0746    Narrative:         DATE OF EXAM:   8/10/2021 12:40 AM     PROCEDURE:   XR CHEST 1 VW-     INDICATIONS:   Shortness of breath. Prior Covid 19 infection. Unsteady gait.     COMPARISON:  6/8/2016 at 5:39 PM, 6/7/2016 at 9:47 PM, 12/30/2015 at 8:28 PM     TECHNIQUE:   [Portable chest radiograph]     FINDINGS:  Extensive chronic changes are again seen throughout the lungs which are  stable. No new consolidations or pleural effusions are observed. The  cardiac silhouette and mediastinum are stable. Postoperative changes are  noted. Stable cardiomegaly is noted. No acute osseous abnormalities are  identified.       Impression:      Chronic changes are noted which are stable. There is no evidence for  definitive acute cardiopulmonary process.     Electronically Signed By-Aneesh Judd MD On:8/10/2021 7:43 AM  This report was finalized on [id number] by  Aneesh Judd MD.    CT Head Without Contrast [[id number]] Collected: 08/10/21 0148     Updated: 08/10/21 0154    Narrative:      EXAMINATION: CT HEAD WO CONTRAST    DATE: 8/10/2021 1:29 AM     INDICATION: Trauma, recent falls, anticoagulated     COMPARISON: CT head from 8/8/2016     TECHNIQUE: Thin section noncontrast axial images were obtained through the head. Coronal reformats were created.  CT dose lowering techniques were used, to include: automated exposure control, adjustment for patient size, and or use of iterative   reconstruction.     FINDINGS:     Intracranial contents:    No acute intracranial hemorrhage, evidence of acute territorial infarct, mass, mass effect or hydrocephalus. Moderate intracranial atherosclerosis and moderate chronic small vessel ischemic changes in the white matter. Moderate global cerebral volume   loss.    Bones and extracranial soft tissues:     No fracture or focal osseous lesion in the calvarium or skull base. Paranasal sinuses are clear where visualized. Mastoid air cells are  clear. Orbits are unremarkable.         Impression:        1. No acute intracranial abnormality. No hemorrhage.  2. No calvarial fracture.  3. Moderate chronic age-related intracranial findings as above.         This examination was interpreted by Aneesh Ordonez M.D.    Electronically signed by:  Aneehs Ordonez M.D.    8/9/2021 11:53 PM            Results for orders placed during the hospital encounter of 08/09/21    XR Chest 2 View    Narrative  DATE OF EXAM:  8/10/2021 12:20 PM    PROCEDURE:  XR CHEST 2 VW-    INDICATIONS:  SOA; L03.116-Cellulitis of left lower limb; R79.1-Abnormal coagulation  profile    COMPARISON:  A 10/20/2021 at 12:40 AM, 6/8/2016 at 5:39 PM, 6/7/2016 at 9:47 PM    TECHNIQUE:  Two radiologic views of the chest , PA and lateral were obtained.    FINDINGS:  Extensive chronic changes are again seen throughout the lungs. These  findings are again stable given differences in technique. No new  consolidations or pleural effusions are observed. The cardiac silhouette  and mediastinum are unchanged. Stable cardiomegaly is noted.  Postoperative changes are noted. No acute osseous abnormalities are  seen.    Impression  Chronic changes are again noted which are stable. There is no definitive  evidence for acute cardiopulmonary process.    Electronically Signed By-Aneesh Judd MD On:8/10/2021 12:43 PM  This report was finalized on [id number] by  Aneesh Judd MD.      XR Foot 3+ View Left    Narrative  DATE OF EXAM:  8/10/2021 12:40 AM    PROCEDURE:  XR FOOT 3+ VW LEFT-    INDICATIONS:  Prior injury to left great toe on furniture several weeks ago. Recent  discovery of injury with wound and swelling and erythema involving the  great toe.    COMPARISON:  No Comparisons Available    TECHNIQUE:  A minimum of three routine standard radiographic views were obtained of  the left foot.    FINDINGS:  Soft tissue swelling is seen throughout the great toe and extending into  the medial aspect of the  forefoot/midfoot. There is suggestion of  possible gas production throughout the soft tissues. Multiple areas of  cortical erosion are identified throughout the proximal and distal  phalanges of the great toe. There is also abnormal lucency throughout  the bone. These findings indicate changes of osteomyelitis.    Impression  1.Evidence for soft tissue swelling throughout the great toe with areas  of gas production. The findings indicate changes of soft tissue  cellulitis.  2.Evidence for osteomyelitis involving the proximal and distal phalanges  of the great toe.    Electronically Signed By-Aneesh Judd MD On:8/10/2021 7:46 AM  This report was finalized on [id number] by  Aneesh Judd MD.      XR Chest 1 View    Narrative  DATE OF EXAM:  8/10/2021 12:40 AM    PROCEDURE:  XR CHEST 1 VW-    INDICATIONS:  Shortness of breath. Prior Covid 19 infection. Unsteady gait.    COMPARISON:  6/8/2016 at 5:39 PM, 6/7/2016 at 9:47 PM, 12/30/2015 at 8:28 PM    TECHNIQUE:  [Portable chest radiograph]    FINDINGS:  Extensive chronic changes are again seen throughout the lungs which are  stable. No new consolidations or pleural effusions are observed. The  cardiac silhouette and mediastinum are stable. Postoperative changes are  noted. Stable cardiomegaly is noted. No acute osseous abnormalities are  identified.    Impression  Chronic changes are noted which are stable. There is no evidence for  definitive acute cardiopulmonary process.    Electronically Signed By-Aneesh Judd MD On:8/10/2021 7:43 AM  This report was finalized on [id number] by  Aneesh Judd MD.        Results for orders placed during the hospital encounter of 08/09/21    Doppler Ankle Brachial Index Single Level CAR    Interpretation Summary  · Right Conclusion: The right JONA is unable to be assessed due to vessel incompressibility. Normal digital pressures.  · Left Conclusion: The left JONA is unable to be assessed due to vessel incompressibility.  Severe digital ischemia.      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 Previous UA neg. Will get renal US.  · HTN  · Left LE cellulitis with gangrenous great toe  · CHF--on lasix  · Coumadin toxicity.    Cr stable  Continue lasix  Check renal US      I discussed the patients findings and my recommendations with patient, family and consulting provider    Will follow along closely. Thank you for allowing us to see this patient in renal consultation.    [facility]  [phone]  MD Cory Simons MD  08/11/21  15:13 EDT

## 2021-08-11 NOTE — PLAN OF CARE
Goal Outcome Evaluation:              Outcome Summary: Patient continues to be confused. Patient is recieving IV antibiotics due to the cellulitis to left foot as well as possible gangrene. It has been recommended that the patient have left great toe amputated. Awaiting Cardiology, vascular, podiatry as well as infectious disease to see patient and decide what the best treatment plan will be. Patient's wife and daughter are at bedside with no complaints at this time,will continue to monitor.

## 2021-08-11 NOTE — PROGRESS NOTES
[facility]        LOS:  LOS: 0 days   Patient Name: Niko Servin  Age/Sex: 89 y.o. male  : 1929  MRN: [id number]    Day of Service: 21   Length of Stay: 0  Encounter Provider: MARIZA Tellez  Place of Service: [facility] CARDIOLOGY  Patient Care Team:  Casper Saldaña MD as PCP - General  Casper Saldaña MD as PCP - Family Medicine    Subjective:     Chief Complaint: f/u preoperative risk assessment    Subjective: Patient has no complaints of chest pain. His INR remains > 8. He is undergoing evaluation by vascular surgery.  As discussed in prior notes would wish to avoid FFP, he can be given vitamin K subcutaneously or orally for reversal of his INR, high risk of valve thrombosis with FFP with mechanical mitral valve position.  Wife at bedside  2D echo reviewed interpreted by me demonstrates normal LV and RV size and function EF of 55 to 60%  He has no anginal chest pain of any note he is well compensated with no clinical heart failure  He does not need ischemic evaluation prior to consideration for urgent revascularization with threatened limb  No shortness of breath.  SR at present time.    Current Medications:   Scheduled Meds:ampicillin-sulbactam, 3 g, Intravenous, Q8H  clindamycin, 900 mg, Intravenous, Q8H  furosemide, 20 mg, Intravenous, Q12H  ipratropium-albuterol, 3 mL, Nebulization, Q6H - RT  rosuvastatin, 10 mg, Oral, Nightly  sertraline, 100 mg, Oral, Daily  sodium chloride, 10 mL, Intravenous, Q12H  vancomycin, 1,000 mg, Intravenous, Q24H      Continuous Infusions:Pharmacy to dose vancomycin,   Pharmacy to dose warfarin,         Allergies:  No Known Allergies    Review of Systems   Constitutional: Negative.   Cardiovascular: Negative for chest pain.   Respiratory: Negative.    Musculoskeletal: Positive for falls, joint pain and joint swelling.   Neurological: Negative.          Objective:     Temp:  [97.8 °F (36.6  °C)-100.6 °F (38.1 °C)] 97.8 °F (36.6 °C)  Heart Rate:  [70-88] 78  Resp:  [16-34] 23  BP: (126-148)/(52-80) 126/59     Intake/Output Summary (Last 24 hours) at 8/11/2021 1035  Last data filed at 8/11/2021 1002  Gross per 24 hour   Intake 1590 ml   Output 100 ml   Net 1490 ml     Body mass index is 24.6 kg/m².      08/09/21  2156 08/10/21  0431 08/11/21  0313   Weight: 90.7 kg (200 lb) 83.8 kg (184 lb 11.9 oz) 82.3 kg (181 lb 7 oz)         Physical Exam:  General Appearance:    Alert, cooperative, in no acute distress               Neck:   supple, no JVD   Lungs:     Clear to auscultation,respirations regular, even and                  unlabored    Heart:    Regular rhythm and normal rate, normal S1 and S2, + click   Abdomen:     Normal bowel sounds, soft   Extremities:   Moves all extremities well, no edema, no cyanosis, no             Redness, left foot injury   Pulses:   Pulses palpable and equal bilaterally   Skin:   No bleeding, bruising or rash   Neurologic:   Awake, alert, oriented x3    Agree with exam as discussed with nurse practitioner after my face-to-face encounter                 Lab Review:   Results from last 7 days   Lab Units 08/11/21  0522 08/10/21  1835 08/10/21  0450 08/10/21  0018   SODIUM mmol/L 142 141   < > 141   POTASSIUM mmol/L 3.8 4.0   < > 4.1   CHLORIDE mmol/L 105 105   < > 106   CO2 mmol/L 28.0 26.0   < > 26.0   BUN mg/dL 30* 32*   < > 35*   CREATININE mg/dL 1.28* 1.27   < > 1.25   GLUCOSE mg/dL 95 123*   < > 113*   CALCIUM mg/dL 8.2 8.2   < > 8.5   AST (SGOT) U/L 22  --   --  15   ALT (SGPT) U/L 10  --   --  8    < > = values in this interval not displayed.     Results from last 7 days   Lab Units 08/10/21  0018   TROPONIN T ng/mL 0.015     Results from last 7 days   Lab Units 08/11/21  0522 08/10/21  1835   WBC 10*3/mm3 10.30 10.90*   HEMOGLOBIN g/dL 8.7* 8.7*   HEMATOCRIT % 26.3* 27.0*   PLATELETS 10*3/mm3 267 292     Results from last 7 days   Lab Units 08/11/21  0522  08/10/21  0603   INR  >=8.00* 4.24*     Results from last 7 days   Lab Units 08/11/21  0522   MAGNESIUM mg/dL 1.5*           Invalid input(s): LDLCALC  Results from last 7 days   Lab Units 08/10/21  0450   PROBNP pg/mL 5,874.0*     Results from last 7 days   Lab Units 08/10/21  0450   TSH uIU/mL 1.790       Recent Radiology:  Imaging Results (Most Recent)     Procedure Component Value Units Date/Time    XR Chest 2 View [[id number]] Collected: 08/10/21 1240     Updated: 08/10/21 1245    Narrative:      DATE OF EXAM:  8/10/2021 12:20 PM     PROCEDURE:  XR CHEST 2 VW-     INDICATIONS:  SOA; L03.116-Cellulitis of left lower limb; R79.1-Abnormal coagulation  profile     COMPARISON:  A 10/20/2021 at 12:40 AM, 6/8/2016 at 5:39 PM, 6/7/2016 at 9:47 PM     TECHNIQUE:   Two radiologic views of the chest , PA and lateral were obtained.     FINDINGS:  Extensive chronic changes are again seen throughout the lungs. These  findings are again stable given differences in technique. No new  consolidations or pleural effusions are observed. The cardiac silhouette  and mediastinum are unchanged. Stable cardiomegaly is noted.  Postoperative changes are noted. No acute osseous abnormalities are  seen.       Impression:      Chronic changes are again noted which are stable. There is no definitive  evidence for acute cardiopulmonary process.     Electronically Signed By-Aneesh Judd MD On:8/10/2021 12:43 PM  This report was finalized on [id number] by  Aneesh Judd MD.    XR Foot 3+ View Left [[id number]] Collected: 08/10/21 0743     Updated: 08/10/21 0748    Narrative:      DATE OF EXAM:  8/10/2021 12:40 AM     PROCEDURE:  XR FOOT 3+ VW LEFT-     INDICATIONS:  Prior injury to left great toe on furniture several weeks ago. Recent  discovery of injury with wound and swelling and erythema involving the  great toe.     COMPARISON:  No Comparisons Available     TECHNIQUE:   A minimum of three routine standard radiographic views were  obtained of  the left foot.     FINDINGS:  Soft tissue swelling is seen throughout the great toe and extending into  the medial aspect of the forefoot/midfoot. There is suggestion of  possible gas production throughout the soft tissues. Multiple areas of  cortical erosion are identified throughout the proximal and distal  phalanges of the great toe. There is also abnormal lucency throughout  the bone. These findings indicate changes of osteomyelitis.        Impression:      1.Evidence for soft tissue swelling throughout the great toe with areas  of gas production. The findings indicate changes of soft tissue  cellulitis.  2.Evidence for osteomyelitis involving the proximal and distal phalanges  of the great toe.     Electronically Signed By-Aneesh Judd MD On:8/10/2021 7:46 AM  This report was finalized on [id number] by  Aneesh Judd MD.    XR Chest 1 View [[id number]] Collected: 08/10/21 0741     Updated: 08/10/21 0746    Narrative:         DATE OF EXAM:   8/10/2021 12:40 AM     PROCEDURE:   XR CHEST 1 VW-     INDICATIONS:   Shortness of breath. Prior Covid 19 infection. Unsteady gait.     COMPARISON:  6/8/2016 at 5:39 PM, 6/7/2016 at 9:47 PM, 12/30/2015 at 8:28 PM     TECHNIQUE:   [Portable chest radiograph]     FINDINGS:  Extensive chronic changes are again seen throughout the lungs which are  stable. No new consolidations or pleural effusions are observed. The  cardiac silhouette and mediastinum are stable. Postoperative changes are  noted. Stable cardiomegaly is noted. No acute osseous abnormalities are  identified.       Impression:      Chronic changes are noted which are stable. There is no evidence for  definitive acute cardiopulmonary process.     Electronically Signed By-Aneesh Judd MD On:8/10/2021 7:43 AM  This report was finalized on [id number] by  Aneesh Judd MD.    CT Head Without Contrast [[id number]] Collected: 08/10/21 0148     Updated: 08/10/21 0154    Narrative:      EXAMINATION:  CT HEAD WO CONTRAST    DATE: 8/10/2021 1:29 AM     INDICATION: Trauma, recent falls, anticoagulated     COMPARISON: CT head from 8/8/2016     TECHNIQUE: Thin section noncontrast axial images were obtained through the head. Coronal reformats were created.  CT dose lowering techniques were used, to include: automated exposure control, adjustment for patient size, and or use of iterative   reconstruction.     FINDINGS:     Intracranial contents:    No acute intracranial hemorrhage, evidence of acute territorial infarct, mass, mass effect or hydrocephalus. Moderate intracranial atherosclerosis and moderate chronic small vessel ischemic changes in the white matter. Moderate global cerebral volume   loss.    Bones and extracranial soft tissues:     No fracture or focal osseous lesion in the calvarium or skull base. Paranasal sinuses are clear where visualized. Mastoid air cells are clear. Orbits are unremarkable.         Impression:        1. No acute intracranial abnormality. No hemorrhage.  2. No calvarial fracture.  3. Moderate chronic age-related intracranial findings as above.         This examination was interpreted by Aneesh Ordonez M.D.    Electronically signed by:  Aneesh Ordonez M.D.    8/9/2021 11:53 PM          ECHOCARDIOGRAM:          I reviewed the patient's new clinical results.    EKG:          Assessment:       Cellulitis of left lower extremity    Chronic coronary artery disease    Depression    Hearing problem    History of mitral valve replacement    Essential hypertension    Long term current use of anticoagulant therapy    Mechanical heart valve present    History of B-cell lymphoma    Elevated INR (international normalized ratio) due to prior anticoagulant medication ingestion    Mixed hyperlipidemia    1. Perioperative ischemic evaluation  - 2D ECHO preserved LV and RV size and function EF 55 to 60%  -Defer ischemic evaluation presently 89 years old  -Possibly not surgical candidate, will  defer to vascular surgery on endovascular versus surgical approach depending on findings and recommendation, unclear if he is an endovascular candidate  -Will need bridging with heparin preoperatively once INR is less than 2.5 with mechanical mitral valve  -No reversal of INR is recommended with FFP with mechanical mitral valve, high valve thrombosis risk  -Continue perioperative aspirin therapy  -Underlying hemoglobin 8.7     2. Supratherapeutic INR  - INR remains > 8  Would recommend vitamin K, holding Coumadin, heparin bridge once INR less than 2.5    3. LE cellulitis  - abx per primary/ vascular     4. CAD remote history of bypass revascularization  -Asymptomatic with no anginal chest pain  -Likely no benefit to invasive or noninvasive ischemic evaluation revascularization prior to left lower extremity revascularization with threatened limb,  -Optimize medicines perioperatively avoid hypotension  -Continue perioperative aspirin statin therapy    5. VHD with mechanical valve, goal INR 2.5-3.5  Plan:     No benefit to ischemic evaluation pre operatively, preserved LVEF with asymptomatic patient, no clinical heart failure  Will defer surgical decision to vascular and family/patient  INR remains supratherapeutic with INR > 8, would avoid FFP in setting of mechanical valve, okay with vitamin K and heparin bridge once INR less than 2.5    Continue to follow, closely manage with aid of vascular surgery    Please call with any questions or concerns    Jamal Montoya MD, PhD    Greater than 35-minute spent face-to-face with the patient as well as in coordination of care, long discussion with wife on cardiac results of 2D echo, perioperative evaluation indications risks and benefits          Marybeth Roy, MARIZA  08/11/21  10:35 EDT

## 2021-08-12 NOTE — PLAN OF CARE
Problem: Adult Inpatient Plan of Care  Goal: Plan of Care Review  Outcome: Ongoing, Progressing  Flowsheets (Taken 8/10/2021 1631 by Shola Nunez, PT)  Plan of Care Reviewed With:   patient   spouse  Goal: Patient-Specific Goal (Individualized)  Outcome: Ongoing, Progressing  Goal: Absence of Hospital-Acquired Illness or Injury  Outcome: Ongoing, Progressing  Intervention: Identify and Manage Fall Risk  Recent Flowsheet Documentation  Taken 8/12/2021 1104 by Gretel Richards RN  Safety Promotion/Fall Prevention: safety round/check completed  Taken 8/12/2021 0958 by Gretel Richards RN  Safety Promotion/Fall Prevention: safety round/check completed  Taken 8/12/2021 0705 by Gretel Richards RN  Safety Promotion/Fall Prevention:   assistive device/personal items within reach   clutter free environment maintained   fall prevention program maintained   nonskid shoes/slippers when out of bed   safety round/check completed  Intervention: Prevent Skin Injury  Recent Flowsheet Documentation  Taken 8/12/2021 0745 by Gretel Richards RN  Skin Protection: adhesive use limited  Intervention: Prevent Infection  Recent Flowsheet Documentation  Taken 8/12/2021 1104 by Gretel Richards RN  Infection Prevention: hand hygiene promoted  Taken 8/12/2021 0958 by Gretel Richards RN  Infection Prevention: hand hygiene promoted  Taken 8/12/2021 0705 by Gretel Richards RN  Infection Prevention: hand hygiene promoted  Goal: Optimal Comfort and Wellbeing  Outcome: Ongoing, Progressing  Intervention: Provide Person-Centered Care  Recent Flowsheet Documentation  Taken 8/12/2021 0745 by Gretel Richards RN  Trust Relationship/Rapport: care explained  Goal: Readiness for Transition of Care  Outcome: Ongoing, Progressing   Goal Outcome Evaluation:   Pt rested throughout shift. Pt showing signs of pain, see MAR. Drssg was changed today. Plan is IV antibiotics & pt is to have surgery at 4 pm today with MD Woo. Made NPO this am.  Awaiting other orders and plan of care. Will continue to observe.

## 2021-08-12 NOTE — NURSING NOTE
This nurse completed dressing change.While completing dressing change, nurse observed live parasites coming from wound. Completed dressing change and notified Dr. Doyle.No new orders Will continue to monitor.

## 2021-08-12 NOTE — PLAN OF CARE
Goal Outcome Evaluation:              Outcome Summary: Pt continues with fall precautions.  No issues noted.  Will continue to monitor.

## 2021-08-12 NOTE — NURSING NOTE
Dr. Mendoza notified of inr >8. MD stated he will look over case and place new orders.  No orders placed as of this writing.

## 2021-08-12 NOTE — SIGNIFICANT NOTE
08/12/21 1530   OTHER   Discipline occupational therapist   Rehab Time/Intention   Session Not Performed other (see comments)  (Pt scheduled for toe amputation today. OT will f/u after sx)   Recommendation   OT - Next Appointment 08/13/21

## 2021-08-12 NOTE — PROGRESS NOTES
"Pharmacy dosing service  Anticoagulant  Warfarin     Subjective:    Niko Servin is a 89 y.o.male being continued on warfarin for mechanical mitral valve replacement.    INR Goal: 2.5 - 3.5 - per chart review for valve replacement  Home medication?: Yes, warfarin 7.5 mg PO every day at 1800  Bridge Therapy Present?:  No  Interacting Medications Evaluation (New/Present/Discontinued): sertraline and rosuvastatin (home meds, may increase INR); ampicillin/sulbactam (new med, may increase INR)  Additional Contributing Factors: n/a      Assessment/Plan:    INR continues to be supratherapeutic. Pt to receive vitamin K today prior to surgery for toe amputation.  Will continue to hold dose today.     Continue to monitor and adjust based on INR.         Date 8/10 8/11 8/12         INR >8/ 3.92/ 4.24 >8 >8         Dose HOLD HOLD HOLD             Objective:  [Ht: 182.9 cm (72.01\"); Wt: 80.7 kg (177 lb 14.6 oz); BMI: Body mass index is 24.12 kg/m².]    Lab Results   Component Value Date    ALBUMIN 2.60 (L) 08/11/2021     Lab Results   Component Value Date    INR >=8.00 (C) 08/12/2021    INR >=8.00 (C) 08/11/2021    INR 4.24 (H) 08/10/2021    PROTIME >90.0 (H) 08/12/2021    PROTIME >90.0 (H) 08/11/2021    PROTIME 42.3 (H) 08/10/2021     Lab Results   Component Value Date    HGB 7.9 (L) 08/12/2021    HGB 8.7 (L) 08/11/2021    HGB 8.7 (L) 08/10/2021     Lab Results   Component Value Date    HCT 25.1 (L) 08/12/2021    HCT 26.3 (L) 08/11/2021    HCT 27.0 (L) 08/10/2021       Marian Cuevas PharmD, BCPS  08/12/21 13:14 EDT   "

## 2021-08-12 NOTE — PROGRESS NOTES
"NEPHROLOGY PROGRESS NOTE------[facility]/OPT    [facility]  [phone]  Cory Bermeo MD      Patient Care Team:  Casper Saldaña MD as PCP - General  Casper Saldaña MD as PCP - Family Medicine  Elvie, Cory BERMUDEZ MD as Consulting Physician (Nephrology)      Provider:  Cory Bermeo MD  Patient Name: Niko Servin  :  1929    SUBJECTIVE:  F/u CKD  NO chest pain or SOA    Medication:  ampicillin-sulbactam, 3 g, Intravenous, Q8H  clindamycin, 900 mg, Intravenous, Q8H  furosemide, 20 mg, Intravenous, Q12H  ipratropium-albuterol, 3 mL, Nebulization, Q6H - RT  rosuvastatin, 10 mg, Oral, Nightly  sertraline, 100 mg, Oral, Daily  sodium chloride, 10 mL, Intravenous, Q12H  vancomycin, 1,000 mg, Intravenous, Q24H      Pharmacy to dose vancomycin,   Pharmacy to dose warfarin,   sodium chloride, 75 mL/hr, Last Rate: 75 mL/hr (21 1754)        OBJECTIVE    Vital Sign Min/Max for last 24 hours  Temp  Min: 97.6 °F (36.4 °C)  Max: 98.5 °F (36.9 °C)   BP  Min: 118/63  Max: 155/65   Pulse  Min: 69  Max: 84   Resp  Min: 20  Max: 24   SpO2  Min: 90 %  Max: 99 %   No data recorded   Weight  Min: 80.7 kg (177 lb 14.6 oz)  Max: 80.7 kg (177 lb 14.6 oz)     Flowsheet Rows      First Filed Value   Admission Height  182.9 cm (72\") Documented at 2021   Admission Weight  90.7 kg (200 lb) Documented at 2021          I/O this shift:  In: -   Out: 200 [Urine:200]  I/O last 3 completed shifts:  In: 2800 [P.O.:840; I.V.:830; Blood:330; IV Piggyback:800]  Out: -     Physical Exam:  General Appearance: alert, appears stated age and cooperative. NAD. [place]  Head: normocephalic, without obvious abnormality and atraumatic  Eyes: conjunctivae and sclerae normal and no icterus  Neck: supple and no JVD  Lungs: clear to auscultation and respirations regular  Heart: regular rhythm & normal rate and normal S1, S2  Chest: Wall no abnormalities " observed  Abdomen: normal bowel sounds and soft non-tender  Extremities: moves extremities well, no edema, no cyanosis and no redness  Skin: no bleeding, left great toe gangrenous. Left LE erythema  Neurologic: Alert No focal deficits    Labs:    WBC WBC   Date Value Ref Range Status   08/12/2021 8.70 3.40 - 10.80 10*3/mm3 Final   08/11/2021 10.30 3.40 - 10.80 10*3/mm3 Final   08/10/2021 10.90 (H) 3.40 - 10.80 10*3/mm3 Final   08/10/2021 9.10 3.40 - 10.80 10*3/mm3 Final   08/10/2021 12.00 (H) 3.40 - 10.80 10*3/mm3 Final      HGB Hemoglobin   Date Value Ref Range Status   08/12/2021 7.9 (L) 13.0 - 17.7 g/dL Final   08/11/2021 8.7 (L) 13.0 - 17.7 g/dL Final   08/10/2021 8.7 (L) 13.0 - 17.7 g/dL Final   08/10/2021 8.3 (L) 13.0 - 17.7 g/dL Final   08/10/2021 9.4 (L) 13.0 - 17.7 g/dL Final      HCT Hematocrit   Date Value Ref Range Status   08/12/2021 25.1 (L) 37.5 - 51.0 % Final   08/11/2021 26.3 (L) 37.5 - 51.0 % Final   08/10/2021 27.0 (L) 37.5 - 51.0 % Final   08/10/2021 25.2 (L) 37.5 - 51.0 % Final   08/10/2021 29.5 (L) 37.5 - 51.0 % Final      Platlets No results found for: LABPLAT   MCV MCV   Date Value Ref Range Status   08/12/2021 83.2 79.0 - 97.0 fL Final   08/11/2021 83.9 79.0 - 97.0 fL Final   08/10/2021 82.7 79.0 - 97.0 fL Final   08/10/2021 83.6 79.0 - 97.0 fL Final   08/10/2021 83.1 79.0 - 97.0 fL Final          Sodium Sodium   Date Value Ref Range Status   08/12/2021 143 136 - 145 mmol/L Final   08/11/2021 142 136 - 145 mmol/L Final   08/10/2021 141 136 - 145 mmol/L Final   08/10/2021 142 136 - 145 mmol/L Final   08/10/2021 141 136 - 145 mmol/L Final      Potassium Potassium   Date Value Ref Range Status   08/12/2021 3.9 3.5 - 5.2 mmol/L Final   08/11/2021 3.8 3.5 - 5.2 mmol/L Final   08/10/2021 4.0 3.5 - 5.2 mmol/L Final   08/10/2021 3.9 3.5 - 5.2 mmol/L Final   08/10/2021 4.1 3.5 - 5.2 mmol/L Final      Chloride Chloride   Date Value Ref Range Status   08/12/2021 106 98 - 107 mmol/L Final   08/11/2021  105 98 - 107 mmol/L Final   08/10/2021 105 98 - 107 mmol/L Final   08/10/2021 106 98 - 107 mmol/L Final   08/10/2021 106 98 - 107 mmol/L Final      CO2 CO2   Date Value Ref Range Status   08/12/2021 28.0 22.0 - 29.0 mmol/L Final   08/11/2021 28.0 22.0 - 29.0 mmol/L Final   08/10/2021 26.0 22.0 - 29.0 mmol/L Final   08/10/2021 26.0 22.0 - 29.0 mmol/L Final   08/10/2021 26.0 22.0 - 29.0 mmol/L Final      BUN BUN   Date Value Ref Range Status   08/12/2021 28 (H) 8 - 23 mg/dL Final   08/11/2021 30 (H) 8 - 23 mg/dL Final   08/10/2021 32 (H) 8 - 23 mg/dL Final   08/10/2021 36 (H) 8 - 23 mg/dL Final   08/10/2021 35 (H) 8 - 23 mg/dL Final      Creatinine Creatinine   Date Value Ref Range Status   08/12/2021 1.19 0.76 - 1.27 mg/dL Final   08/11/2021 1.28 (H) 0.76 - 1.27 mg/dL Final   08/10/2021 1.27 0.76 - 1.27 mg/dL Final   08/10/2021 1.20 0.76 - 1.27 mg/dL Final   08/10/2021 1.25 0.76 - 1.27 mg/dL Final      Calcium Calcium   Date Value Ref Range Status   08/12/2021 7.7 (L) 8.2 - 9.6 mg/dL Final   08/11/2021 8.2 8.2 - 9.6 mg/dL Final   08/10/2021 8.2 8.2 - 9.6 mg/dL Final   08/10/2021 8.4 8.2 - 9.6 mg/dL Final   08/10/2021 8.5 8.2 - 9.6 mg/dL Final      PO4 No components found for: PO4   Albumin Albumin   Date Value Ref Range Status   08/11/2021 2.60 (L) 3.50 - 5.20 g/dL Final   08/10/2021 2.70 (L) 3.50 - 5.20 g/dL Final      Magnesium Magnesium   Date Value Ref Range Status   08/12/2021 1.6 (L) 1.7 - 2.3 mg/dL Final   08/11/2021 1.5 (L) 1.7 - 2.3 mg/dL Final      Uric Acid No components found for: URIC ACID     Imaging Results (Last 72 Hours)     Procedure Component Value Units Date/Time    CT Chest Without Contrast Diagnostic [[id number]] Collected: 08/11/21 1931     Updated: 08/11/21 1937    Narrative:         DATE OF EXAM:  8/11/2021 5:13 PM     PROCEDURE:  CT CHEST WO CONTRAST DIAGNOSTIC-     INDICATIONS:   Shortness of breath and confusion.     COMPARISON:   12/31/2015.     TECHNIQUE:  Routine transaxial slices were  obtained through the chest without the  administration of intravenous contrast. Reconstructed coronal and  sagittal images were also obtained. Automated exposure control and  iterative construction methods were used.      FINDINGS:  There are tiny layering pleural effusions. There is respiratory motion  artifact. There is mild bilateral basilar subsegmental atelectasis.  There is suggestion of some areas of interstitial thickening and some  hazy areas of groundglass density. This may be secondary to pulmonary  edema. An atypical infection is considered less likely. The heart is  enlarged. There are extensive atherosclerotic vascular calcifications  including involvement of the coronary arteries. The patient has had a  previous CABG procedure. There are no enlarged mediastinal lymph nodes.  The hilar regions are difficult to evaluate without contrast. The  patient has dense sludge within the gallbladder. There are no acute  findings beneath the hemidiaphragms. There are no suspicious osteolytic  or sclerotic lesions within the bony structures. There are old healed  bilateral rib fractures.        Impression:         1. Tiny layering pleural effusions with mild bilateral basilar  subsegmental atelectasis.  2. The study is difficult to interpret due to respiratory motion  artifact. There are some areas of interstitial thickening and hazy  groundglass density. I would favor pulmonary edema over an atypical  infection.  3. Cardiomegaly with extensive coronary artery atherosclerotic vascular  disease.  4. Dense sludge within the gallbladder.     Electronically Signed By-Hammad Ragland MD On:8/11/2021 7:35 PM  This report was finalized on [id number] by  Hammad Ragland MD.    XR Chest 2 View [[id number]] Collected: 08/10/21 1240     Updated: 08/10/21 1245    Narrative:      DATE OF EXAM:  8/10/2021 12:20 PM     PROCEDURE:  XR CHEST 2 VW-     INDICATIONS:  SOA; L03.116-Cellulitis of left lower limb; R79.1-Abnormal  coagulation  profile     COMPARISON:  A 10/20/2021 at 12:40 AM, 6/8/2016 at 5:39 PM, 6/7/2016 at 9:47 PM     TECHNIQUE:   Two radiologic views of the chest , PA and lateral were obtained.     FINDINGS:  Extensive chronic changes are again seen throughout the lungs. These  findings are again stable given differences in technique. No new  consolidations or pleural effusions are observed. The cardiac silhouette  and mediastinum are unchanged. Stable cardiomegaly is noted.  Postoperative changes are noted. No acute osseous abnormalities are  seen.       Impression:      Chronic changes are again noted which are stable. There is no definitive  evidence for acute cardiopulmonary process.     Electronically Signed By-Aneesh Judd MD On:8/10/2021 12:43 PM  This report was finalized on [id number] by  Aneesh Judd MD.    XR Foot 3+ View Left [[id number]] Collected: 08/10/21 0743     Updated: 08/10/21 0748    Narrative:      DATE OF EXAM:  8/10/2021 12:40 AM     PROCEDURE:  XR FOOT 3+ VW LEFT-     INDICATIONS:  Prior injury to left great toe on furniture several weeks ago. Recent  discovery of injury with wound and swelling and erythema involving the  great toe.     COMPARISON:  No Comparisons Available     TECHNIQUE:   A minimum of three routine standard radiographic views were obtained of  the left foot.     FINDINGS:  Soft tissue swelling is seen throughout the great toe and extending into  the medial aspect of the forefoot/midfoot. There is suggestion of  possible gas production throughout the soft tissues. Multiple areas of  cortical erosion are identified throughout the proximal and distal  phalanges of the great toe. There is also abnormal lucency throughout  the bone. These findings indicate changes of osteomyelitis.        Impression:      1.Evidence for soft tissue swelling throughout the great toe with areas  of gas production. The findings indicate changes of soft tissue  cellulitis.  2.Evidence for  osteomyelitis involving the proximal and distal phalanges  of the great toe.     Electronically Signed By-Aneesh Judd MD On:8/10/2021 7:46 AM  This report was finalized on [id number] by  Aneesh Judd MD.    XR Chest 1 View [[id number]] Collected: 08/10/21 0741     Updated: 08/10/21 0746    Narrative:         DATE OF EXAM:   8/10/2021 12:40 AM     PROCEDURE:   XR CHEST 1 VW-     INDICATIONS:   Shortness of breath. Prior Covid 19 infection. Unsteady gait.     COMPARISON:  6/8/2016 at 5:39 PM, 6/7/2016 at 9:47 PM, 12/30/2015 at 8:28 PM     TECHNIQUE:   [Portable chest radiograph]     FINDINGS:  Extensive chronic changes are again seen throughout the lungs which are  stable. No new consolidations or pleural effusions are observed. The  cardiac silhouette and mediastinum are stable. Postoperative changes are  noted. Stable cardiomegaly is noted. No acute osseous abnormalities are  identified.       Impression:      Chronic changes are noted which are stable. There is no evidence for  definitive acute cardiopulmonary process.     Electronically Signed By-Aneesh Judd MD On:8/10/2021 7:43 AM  This report was finalized on [id number] by  Aneesh Judd MD.    CT Head Without Contrast [[id number]] Collected: 08/10/21 0148     Updated: 08/10/21 0154    Narrative:      EXAMINATION: CT HEAD WO CONTRAST    DATE: 8/10/2021 1:29 AM     INDICATION: Trauma, recent falls, anticoagulated     COMPARISON: CT head from 8/8/2016     TECHNIQUE: Thin section noncontrast axial images were obtained through the head. Coronal reformats were created.  CT dose lowering techniques were used, to include: automated exposure control, adjustment for patient size, and or use of iterative   reconstruction.     FINDINGS:     Intracranial contents:    No acute intracranial hemorrhage, evidence of acute territorial infarct, mass, mass effect or hydrocephalus. Moderate intracranial atherosclerosis and moderate chronic small vessel ischemic  changes in the white matter. Moderate global cerebral volume   loss.    Bones and extracranial soft tissues:     No fracture or focal osseous lesion in the calvarium or skull base. Paranasal sinuses are clear where visualized. Mastoid air cells are clear. Orbits are unremarkable.         Impression:        1. No acute intracranial abnormality. No hemorrhage.  2. No calvarial fracture.  3. Moderate chronic age-related intracranial findings as above.         This examination was interpreted by Aneesh Ordonez M.D.    Electronically signed by:  Aneesh Ordonez M.D.    8/9/2021 11:53 PM          Results for orders placed during the hospital encounter of 08/09/21    XR Chest 2 View    Narrative  DATE OF EXAM:  8/10/2021 12:20 PM    PROCEDURE:  XR CHEST 2 VW-    INDICATIONS:  SOA; L03.116-Cellulitis of left lower limb; R79.1-Abnormal coagulation  profile    COMPARISON:  A 10/20/2021 at 12:40 AM, 6/8/2016 at 5:39 PM, 6/7/2016 at 9:47 PM    TECHNIQUE:  Two radiologic views of the chest , PA and lateral were obtained.    FINDINGS:  Extensive chronic changes are again seen throughout the lungs. These  findings are again stable given differences in technique. No new  consolidations or pleural effusions are observed. The cardiac silhouette  and mediastinum are unchanged. Stable cardiomegaly is noted.  Postoperative changes are noted. No acute osseous abnormalities are  seen.    Impression  Chronic changes are again noted which are stable. There is no definitive  evidence for acute cardiopulmonary process.    Electronically Signed By-Aneesh Judd MD On:8/10/2021 12:43 PM  This report was finalized on [id number] by  Aneesh Judd MD.      XR Foot 3+ View Left    Narrative  DATE OF EXAM:  8/10/2021 12:40 AM    PROCEDURE:  XR FOOT 3+ VW LEFT-    INDICATIONS:  Prior injury to left great toe on furniture several weeks ago. Recent  discovery of injury with wound and swelling and erythema involving the  great  toe.    COMPARISON:  No Comparisons Available    TECHNIQUE:  A minimum of three routine standard radiographic views were obtained of  the left foot.    FINDINGS:  Soft tissue swelling is seen throughout the great toe and extending into  the medial aspect of the forefoot/midfoot. There is suggestion of  possible gas production throughout the soft tissues. Multiple areas of  cortical erosion are identified throughout the proximal and distal  phalanges of the great toe. There is also abnormal lucency throughout  the bone. These findings indicate changes of osteomyelitis.    Impression  1.Evidence for soft tissue swelling throughout the great toe with areas  of gas production. The findings indicate changes of soft tissue  cellulitis.  2.Evidence for osteomyelitis involving the proximal and distal phalanges  of the great toe.    Electronically Signed By-Aneesh Judd MD On:8/10/2021 7:46 AM  This report was finalized on [id number] by  Aneesh Judd MD.      XR Chest 1 View    Narrative  DATE OF EXAM:  8/10/2021 12:40 AM    PROCEDURE:  XR CHEST 1 VW-    INDICATIONS:  Shortness of breath. Prior Covid 19 infection. Unsteady gait.    COMPARISON:  6/8/2016 at 5:39 PM, 6/7/2016 at 9:47 PM, 12/30/2015 at 8:28 PM    TECHNIQUE:  [Portable chest radiograph]    FINDINGS:  Extensive chronic changes are again seen throughout the lungs which are  stable. No new consolidations or pleural effusions are observed. The  cardiac silhouette and mediastinum are stable. Postoperative changes are  noted. Stable cardiomegaly is noted. No acute osseous abnormalities are  identified.    Impression  Chronic changes are noted which are stable. There is no evidence for  definitive acute cardiopulmonary process.    Electronically Signed By-Aneesh Judd MD On:8/10/2021 7:43 AM  This report was finalized on [id number] by  Aneesh Judd MD.      Results for orders placed during the hospital encounter of 08/09/21    Duplex Vein Mapping Lower  Extremity - Bilateral CAR    Interpretation Summary  · The left greater saphenous vein is patent and of adequate size in the thigh.  · The left greater saphenous vein is patent and of adequate size in the calf.        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 Previous UA neg. Will get renal US.  · HTN  · Left LE cellulitis with gangrenous great toe  · CHF--on lasix  · Coumadin toxicity.    Cr better  For CTA today, hold lasix, and gentle IV hydration      Cory Bermeo MD  [facility]  [phone]  08/12/21  09:42 EDT

## 2021-08-12 NOTE — PROGRESS NOTES
Name: Niko Servin ADMIT: 2021   : 1929  PCP: Casper Saldaña MD    MRN: [id number] LOS: 1 days   AGE/SEX: 89 y.o. male  ROOM: [address] [place] [address]    [place], Subsequent Hospital Care    Chief Complaint   Patient presents with   • Toe Injury     CC: left great toe wound  Subjective     89 y.o. male resting in bed. No new complaints.     Review of Systems   Unable to perform ROS: Other   Hearing deficit    Objective     Scheduled Medications:   ampicillin-sulbactam, 3 g, Intravenous, Q8H  clindamycin, 900 mg, Intravenous, Q8H  furosemide, 20 mg, Intravenous, Q12H  ipratropium-albuterol, 3 mL, Nebulization, Q6H - RT  rosuvastatin, 10 mg, Oral, Nightly  sertraline, 100 mg, Oral, Daily  sodium chloride, 10 mL, Intravenous, Q12H  vancomycin, 1,000 mg, Intravenous, Q24H        Active Infusions:  Pharmacy to dose vancomycin,   Pharmacy to dose warfarin,   sodium chloride, 75 mL/hr, Last Rate: 75 mL/hr (21 1754)        As Needed Medications:  •  acetaminophen **OR** acetaminophen **OR** acetaminophen  •  aluminum-magnesium hydroxide-simethicone  •  haloperidol lactate  •  magnesium sulfate **OR** magnesium sulfate **OR** magnesium sulfate  •  melatonin  •  Morphine  •  nitroglycerin  •  ondansetron **OR** ondansetron  •  Pharmacy to dose vancomycin  •  Pharmacy to dose warfarin  •  potassium chloride  •  potassium chloride  •  potassium phosphate infusion greater than 15 mMoles **OR** potassium phosphate infusion greater than 15 mMoles **OR** potassium phosphate **OR** sodium phosphate IVPB **OR** sodium phosphate IVPB **OR** sodium phosphate IVPB  •  sodium chloride  •  sodium chloride    Vital Signs  Vital Signs   Patient Vitals for the past 24 hrs:   BP Temp Temp src Pulse Resp SpO2 Weight   21 -- -- -- 71 24 -- --   21 0705 -- -- -- 69 20 96 % --   21 0340 119/57 98.1 °F (36.7 °C) Oral 72 21 90 % 80.7 kg (177 lb 14.6 oz)   21 0219 -- -- -- 73 20 99 % --    08/12/21 0215 -- -- -- 71 20 97 % --   08/12/21 0024 155/65 97.9 °F (36.6 °C) Oral 74 22 96 % --   08/11/21 2016 122/57 98.5 °F (36.9 °C) Oral 84 22 99 % --   08/11/21 1834 -- -- -- 73 20 99 % --   08/11/21 1830 -- -- -- 71 20 96 % --   08/11/21 1300 118/63 97.8 °F (36.6 °C) Oral 73 20 94 % --   08/11/21 1157 -- -- -- 74 20 93 % --   08/11/21 1151 -- -- -- 73 20 93 % --   08/11/21 1139 119/63 97.6 °F (36.4 °C) Oral 72 22 92 % --   08/11/21 1122 120/64 97.8 °F (36.6 °C) Oral 70 22 92 % --   08/11/21 1053 118/62 97.6 °F (36.4 °C) Oral 73 24 92 % --   08/11/21 0900 126/59 97.8 °F (36.6 °C) Oral 78 23 97 % --     I/O:  I/O last 3 completed shifts:  In: 2800 [P.O.:840; I.V.:830; Blood:330; IV Piggyback:800]  Out: -   BMI:  Body mass index is 24.12 kg/m².    Physical Exam:  Physical Exam  Constitutional:       Appearance: He is ill-appearing.       Cardiovascular:      Rate and Rhythm: Normal rate and regular rhythm. Occasional extrasystoles are present.     Pulses:           Radial pulses are 2+ on the right side and 2+ on the left side.        Femoral pulses are 2+ on the right side and 0 on the left side.       Dorsalis pedis pulses are detected w/ Doppler on the right side and detected w/ Doppler on the left side.   Pulmonary:      Breath sounds: Rales present.   Abdominal:      General: Abdomen is flat. Bowel sounds are normal.      Palpations: Abdomen is soft.  Left great toe, gangrenous changes and malodorous.        Results Review:     CBC    Results from last 7 days   Lab Units 08/12/21  0302 08/11/21  0522 08/10/21  1835 08/10/21  0450 08/10/21  0018   WBC 10*3/mm3 8.70 10.30 10.90* 9.10 12.00*   HEMOGLOBIN g/dL 7.9* 8.7* 8.7* 8.3* 9.4*   PLATELETS 10*3/mm3 254 267 292 261 302     BMP   Results from last 7 days   Lab Units 08/12/21  0302 08/11/21  0522 08/10/21  1835 08/10/21  0450 08/10/21  0018   SODIUM mmol/L 143 142 141 142 141   POTASSIUM mmol/L 3.9 3.8 4.0 3.9 4.1   CHLORIDE mmol/L 106 105 105 106 106    CO2 mmol/L 28.0 28.0 26.0 26.0 26.0   BUN mg/dL 28* 30* 32* 36* 35*   CREATININE mg/dL 1.19 1.28* 1.27 1.20 1.25   GLUCOSE mg/dL 89 95 123* 115* 113*   MAGNESIUM mg/dL 1.6* 1.5*  --   --   --      Coag   Results from last 7 days   Lab Units 08/12/21  0302 08/11/21  0522 08/10/21  0603 08/10/21  0450 08/10/21  0018   INR  >=8.00* >=8.00* 4.24* 3.92* >=8.00*     HbA1C   Lab Results   Component Value Date    HGBA1C 6.0 (H) 08/11/2021     Infection   Results from last 7 days   Lab Units 08/10/21  0031 08/10/21  0018   BLOODCX  Proteus species* No growth at 2 days   BCIDPCR  Proteus spp. Identification by BCID PCR.*  --      Radiology(recent) XR Chest 2 View    Result Date: 8/10/2021  Chronic changes are again noted which are stable. There is no definitive evidence for acute cardiopulmonary process.  Electronically Signed By-Aneesh Judd MD On:8/10/2021 12:43 PM This report was finalized on [id number] by  Aneesh Judd MD.    CT Chest Without Contrast Diagnostic    Result Date: 8/11/2021   1. Tiny layering pleural effusions with mild bilateral basilar subsegmental atelectasis. 2. The study is difficult to interpret due to respiratory motion artifact. There are some areas of interstitial thickening and hazy groundglass density. I would favor pulmonary edema over an atypical infection. 3. Cardiomegaly with extensive coronary artery atherosclerotic vascular disease. 4. Dense sludge within the gallbladder.  Electronically Signed By-Hammad Ragland MD On:8/11/2021 7:35 PM This report was finalized on [id number] by  Hammad Ragland MD.      Assessment/Plan     Assessment & Plan      Cellulitis of left lower extremity    Chronic coronary artery disease    Depression    Hearing problem    History of mitral valve replacement    Essential hypertension    Long term current use of anticoagulant therapy    Mechanical heart valve present    History of B-cell lymphoma    Elevated INR (international normalized ratio) due to prior  anticoagulant medication ingestion    Mixed hyperlipidemia      89 y.o. male left gangrenous great toe, malodorous.  Discussed patient with wife via phone and she wishes us to speak with her daughter who is a nurse to help make decisions regarding intervention. Davida Baxter, daughter, Mrs. Servin is going to call our office and leave her phone number for us to discuss.  We will check back if she does not leave phone number.      Patient requiring 3LO2  nasal cannula, not on home oxygen per wife. Patient with hearing difficults, wife is POA.    Spoke with wife and daughter, Davida on 8/11/2021.  Davida would like to move forward with evaluation for revascularization and discuss options.  Vein mapping has been ordered, discussed CTA with Dr. Bermeo, IV fluids were started yesterday and okayed to have CTA on 8/12/2021.    Podiatry following, Dr. Woo  Discussed with patient that he will likely require a great toe amputation for definitive management of the issue.  He recommends continued IV antibiotics at this time.    Cardiology following, Dr. Montoya  Patient with mechanical mitral valve, patient currently supratherapeutic INR is greater than 8.0 with plan to bridge with heparin.  Warfarin has been discontinued.  Echocardiogram pending  Will need cardiac clearance prior to any revascularization effort    Vascular surgery options include revascularization work-up for endovascular versus bypass or possible palliative wound care with continued Betadine paint.  Surgery depending on medical status from cardiology and pulmonology standpoint.        Nancy Paulson PA-C  08/12/21  08:20 EDT    Please call my office with any question: [phone]    Active Hospital Problems    Diagnosis  POA   • **Cellulitis of left lower extremity [L03.116]  Yes   • History of B-cell lymphoma [Z85.72]  Not Applicable   • Elevated INR (international normalized ratio) due to prior anticoagulant medication ingestion [R79.1]  Yes   •  Mixed hyperlipidemia [E78.2]  Yes   • Depression [F32.9]  Yes   • Essential hypertension [I10]  Yes   • Mechanical heart valve present [Z95.2]  Not Applicable   • Hearing problem [H91.90]  Yes   • Chronic coronary artery disease [I25.10]  Yes   • Long term current use of anticoagulant therapy [Z79.01]  Not Applicable   • History of mitral valve replacement [Z95.2]  Not Applicable      Resolved Hospital Problems   No resolved problems to display.

## 2021-08-12 NOTE — PROGRESS NOTES
Infectious Diseases Progress Note      LOS: 1 day   Patient Care Team:  Casper Saldaña MD as PCP - General  Casper Saldaña MD as PCP - Family Medicine  Cory Bermeo MD as Consulting Physician (Nephrology)    Chief Complaint: Left great toe infection, left foot redness and swelling    Subjective       The patient has been afebrile for the last 24 hours.  The patient is on 3 L of oxygen by nasal cannula, hemodynamically stable, and is tolerating antimicrobial therapy.  Night nurse noticed maggots in patient's toe last evening.      Review of Systems:   Review of Systems   Constitutional: Negative.    HENT: Negative.    Eyes: Negative.    Respiratory: Negative.    Cardiovascular: Negative.    Gastrointestinal: Negative.    Endocrine: Negative.    Genitourinary: Negative.    Musculoskeletal: Negative.    Skin: Positive for color change and wound.   Neurological: Negative.    Psychiatric/Behavioral: Negative.    All other systems reviewed and are negative.       Objective     Vital Signs  Temp:  [97.8 °F (36.6 °C)-98.5 °F (36.9 °C)] 98.2 °F (36.8 °C)  Heart Rate:  [69-84] 72  Resp:  [18-24] 18  BP: (118-155)/(57-65) 138/63    Physical Exam:  Physical Exam  Vitals and nursing note reviewed.   Constitutional:       General: He is not in acute distress.     Appearance: Normal appearance. He is well-developed. He is not diaphoretic.      Comments: Appears thin   HENT:      Head: Normocephalic and atraumatic.   Eyes:      Extraocular Movements: Extraocular movements intact.      Conjunctiva/sclera: Conjunctivae normal.      Pupils: Pupils are equal, round, and reactive to light.   Cardiovascular:      Rate and Rhythm: Normal rate and regular rhythm.      Heart sounds: Normal heart sounds, S1 normal and S2 normal.   Pulmonary:      Effort: Pulmonary effort is normal. No respiratory distress.      Breath sounds: Normal breath sounds. No stridor. No wheezing or rales.   Abdominal:      General: Bowel  sounds are normal. There is no distension.      Palpations: Abdomen is soft. There is no mass.      Tenderness: There is no abdominal tenderness. There is no guarding.   Musculoskeletal:         General: No deformity. Normal range of motion.      Cervical back: Neck supple.      Comments: The distal aspect of the patient's toe has become very macerated with completely black eschar there is a number of maggots crawling throughout the toe.  The skin of the distal aspect great toe is very loose.  Erythema to the dorsal aspect of the foot has improved.   Skin:     General: Skin is warm and dry.      Coloration: Skin is not pale.      Findings: Erythema present. No rash.   Neurological:      Mental Status: He is alert and oriented to person, place, and time.      Cranial Nerves: No cranial nerve deficit.   Psychiatric:         Mood and Affect: Mood normal.          Results Review:    I have reviewed all clinical data, test, lab, and imaging results.     Radiology  Adult Transthoracic Echo Complete W/ Cont if Necessary Per Protocol    Result Date: 8/11/2021  · Left ventricular wall thickness is consistent with mild concentric hypertrophy. · Estimated left ventricular EF was in agreement with the calculated left ventricular EF. Left ventricular ejection fraction appears to be 56 - 60%. Left ventricular systolic function is normal. · Estimated right ventricular systolic pressure from tricuspid regurgitation is mildly elevated (35-45 mmHg). · Mild pulmonary hypertension is present. · Left ventricular diastolic function is consistent with (grade Ia w/high LAP) impaired relaxation. · Mild to moderate aortic valve stenosis is present.      CT Chest Without Contrast Diagnostic    Result Date: 8/11/2021   DATE OF EXAM: 8/11/2021 5:13 PM  PROCEDURE: CT CHEST WO CONTRAST DIAGNOSTIC-  INDICATIONS: Shortness of breath and confusion.  COMPARISON: 12/31/2015.  TECHNIQUE: Routine transaxial slices were obtained through the chest without  the administration of intravenous contrast. Reconstructed coronal and sagittal images were also obtained. Automated exposure control and iterative construction methods were used.  FINDINGS: There are tiny layering pleural effusions. There is respiratory motion artifact. There is mild bilateral basilar subsegmental atelectasis. There is suggestion of some areas of interstitial thickening and some hazy areas of groundglass density. This may be secondary to pulmonary edema. An atypical infection is considered less likely. The heart is enlarged. There are extensive atherosclerotic vascular calcifications including involvement of the coronary arteries. The patient has had a previous CABG procedure. There are no enlarged mediastinal lymph nodes. The hilar regions are difficult to evaluate without contrast. The patient has dense sludge within the gallbladder. There are no acute findings beneath the hemidiaphragms. There are no suspicious osteolytic or sclerotic lesions within the bony structures. There are old healed bilateral rib fractures.       1. Tiny layering pleural effusions with mild bilateral basilar subsegmental atelectasis. 2. The study is difficult to interpret due to respiratory motion artifact. There are some areas of interstitial thickening and hazy groundglass density. I would favor pulmonary edema over an atypical infection. 3. Cardiomegaly with extensive coronary artery atherosclerotic vascular disease. 4. Dense sludge within the gallbladder.  Electronically Signed By-Hammad Ragland MD On:8/11/2021 7:35 PM This report was finalized on [id number] by  Hammad Ragland MD.    Duplex Vein Mapping Lower Extremity - Bilateral CAR    Result Date: 8/11/2021  · The left greater saphenous vein is patent and of adequate size in the thigh. · The left greater saphenous vein is patent and of adequate size in the calf.        Cardiology    Laboratory    Results from last 7 days   Lab Units 08/12/21  0302 08/11/21  7418  08/10/21  1835 08/10/21  0450 08/10/21  0018   WBC 10*3/mm3 8.70 10.30 10.90* 9.10 12.00*   HEMOGLOBIN g/dL 7.9* 8.7* 8.7* 8.3* 9.4*   HEMATOCRIT % 25.1* 26.3* 27.0* 25.2* 29.5*   PLATELETS 10*3/mm3 254 267 292 261 302     Results from last 7 days   Lab Units 08/12/21  0302 08/11/21  0522 08/10/21  1835 08/10/21  0450 08/10/21  0018   SODIUM mmol/L 143 142 141 142 141   POTASSIUM mmol/L 3.9 3.8 4.0 3.9 4.1   CHLORIDE mmol/L 106 105 105 106 106   CO2 mmol/L 28.0 28.0 26.0 26.0 26.0   BUN mg/dL 28* 30* 32* 36* 35*   CREATININE mg/dL 1.19 1.28* 1.27 1.20 1.25   GLUCOSE mg/dL 89 95 123* 115* 113*   ALBUMIN g/dL  --  2.60*  --   --  2.70*   BILIRUBIN mg/dL  --  0.2  --   --  0.3   ALK PHOS U/L  --  72  --   --  74   AST (SGOT) U/L  --  22  --   --  15   ALT (SGPT) U/L  --  10  --   --  8   CALCIUM mg/dL 7.7* 8.2 8.2 8.4 8.5         Results from last 7 days   Lab Units 08/10/21  0018   SED RATE mm/hr 42*         Microbiology   Microbiology Results (last 10 days)       Procedure Component Value - Date/Time    Respiratory Panel, PCR (WITHOUT COVID) - Swab, Nasopharynx [[id number]]  (Normal) Collected: 08/10/21 1346    Lab Status: Final result Specimen: Swab from Nasopharynx Updated: 08/10/21 1437     ADENOVIRUS, PCR Not Detected     Coronavirus 229E Not Detected     Coronavirus HKU1 Not Detected     Coronavirus NL63 Not Detected     Coronavirus OC43 Not Detected     Human Metapneumovirus Not Detected     Human Rhinovirus/Enterovirus Not Detected     Influenza B PCR Not Detected     Parainfluenza Virus 1 Not Detected     Parainfluenza Virus 2 Not Detected     Parainfluenza Virus 3 Not Detected     Parainfluenza Virus 4 Not Detected     Bordetella pertussis pcr Not Detected     Chlamydophila pneumoniae PCR Not Detected     Mycoplasma pneumo by PCR Not Detected     Influenza A PCR Not Detected     RSV, PCR Not Detected     Bordetella parapertussis PCR Not Detected    Narrative:      The coronavirus on the RVP is NOT COVID-19  and is NOT indicative of infection with COVID-19.    In the setting of a positive respiratory panel with a viral infection PLUS a negative procalcitonin without other underlying concern for bacterial infection, consider observing off antibiotics or discontinuation of antibiotics and continue supportive care. If the respiratory panel is positive for atypical bacterial infection (Bordetella pertussis, Chlamydophila pneumoniae, or Mycoplasma pneumoniae), consider antibiotic de-escalation to target atypical bacterial infection.    COVID PRE-OP / PRE-PROCEDURE SCREENING ORDER (NO ISOLATION) - Swab, Nasopharynx [[id number]]  (Normal) Collected: 08/10/21 1114    Lab Status: Final result Specimen: Swab from Nasopharynx Updated: 08/10/21 1241    Narrative:      The following orders were created for panel order COVID PRE-OP / PRE-PROCEDURE SCREENING ORDER (NO ISOLATION) - Swab, Nasopharynx.  Procedure                               Abnormality         Status                     ---------                               -----------         ------                     COVID-19,CEPHEID/XUAN/BD...[[id number]]  Normal              Final result                 Please view results for these tests on the individual orders.    COVID-19,CEPHEID/XUAN/BDMAX,COR/LOREN/PAD/FEI IN-HOUSE(OR EMERGENT/ADD-ON),NP SWAB IN TRANSPORT MEDIA 3-4 HR TAT, RT-PCR - Swab, Nasopharynx [[id number]]  (Normal) Collected: 08/10/21 1114    Lab Status: Final result Specimen: Swab from Nasopharynx Updated: 08/10/21 1241     COVID19 Not Detected    Narrative:      Fact sheet for providers: https://www.fda.gov/media/945421/download     Fact sheet for patients: https://www.fda.gov/media/974490/download  Fact sheet for providers: https://www.fda.gov/media/663247/download    Fact sheet for patients: https://www.fda.gov/media/443345/download    Test performed by PCR.    COVID PRE-OP / PRE-PROCEDURE SCREENING ORDER (NO ISOLATION) - Swab, Nasopharynx [[id number]]  (Normal)  Collected: 08/10/21 0403    Lab Status: Final result Specimen: Swab from Nasopharynx Updated: 08/10/21 1445    Narrative:      The following orders were created for panel order COVID PRE-OP / PRE-PROCEDURE SCREENING ORDER (NO ISOLATION) - Swab, Nasopharynx.  Procedure                               Abnormality         Status                     ---------                               -----------         ------                     COVID-19,APTIMA PANTHER(...[[id number]]  Normal              Final result                 Please view results for these tests on the individual orders.    COVID-19,APTIMA PANTHER(ISAAK),[facility], NP/OP SWAB IN UTM/VTM/SALINE TRANSPORT MEDIA,24 HR TAT - Swab, Nasopharynx [[id number]]  (Normal) Collected: 08/10/21 0403    Lab Status: Final result Specimen: Swab from Nasopharynx Updated: 08/10/21 1445     COVID19 Not Detected    Narrative:      Fact sheet for providers: https://www.fda.gov/media/214654/download     Fact sheet for patients: https://www.fda.gov/media/060840/download    Test performed by RT PCR.    Blood Culture - Blood, Arm, Right [[id number]]  (Abnormal)  (Susceptibility) Collected: 08/10/21 0031    Lab Status: Final result Specimen: Blood from Arm, Right Updated: 08/12/21 1229     Blood Culture Proteus mirabilis     Gram Stain Anaerobic Bottle Gram negative bacilli    Susceptibility        Proteus mirabilis      SULMA      Ampicillin Susceptible      Ampicillin + Sulbactam Susceptible      Cefepime Susceptible      Ceftazidime Susceptible      Ceftriaxone Susceptible      Gentamicin Susceptible      Levofloxacin Susceptible      Piperacillin + Tazobactam Susceptible      Trimethoprim + Sulfamethoxazole Susceptible                 Linear View                   Susceptibility Comments       Proteus mirabilis    Cefazolin sensitivity will not be reported for Enterobacteriaceae in non-urine isolates. If cefazolin is preferred, please call the microbiology lab to request an E-test.  With the  exception of urinary-sourced infections, aminoglycosides should not be used as monotherapy.               Blood Culture ID, PCR - Blood, Arm, Right [[id number]]  (Abnormal) Collected: 08/10/21 0031    Lab Status: Final result Specimen: Blood from Arm, Right Updated: 08/11/21 0602     BCID, PCR Proteus spp. Identification by BCID PCR.    Blood Culture - Blood, Arm, Left [[id number]] Collected: 08/10/21 0018    Lab Status: Preliminary result Specimen: Blood from Arm, Left Updated: 08/12/21 0030     Blood Culture No growth at 2 days            Medication Review:       Schedule Meds  Acetylcysteine, 600 mg, Oral, BID  ampicillin-sulbactam, 3 g, Intravenous, Q8H  clindamycin, 900 mg, Intravenous, Q8H  ipratropium-albuterol, 3 mL, Nebulization, Q6H - RT  rosuvastatin, 10 mg, Oral, Nightly  sertraline, 100 mg, Oral, Daily  sodium chloride, 10 mL, Intravenous, Q12H  vancomycin, 1,000 mg, Intravenous, Q24H  vitamin K1, 5 mg, Oral, Once        Infusion Meds  Pharmacy to dose vancomycin,   Pharmacy to dose warfarin,   sodium chloride, 75 mL/hr, Last Rate: 75 mL/hr (08/12/21 1030)        PRN Meds    acetaminophen **OR** acetaminophen **OR** acetaminophen    aluminum-magnesium hydroxide-simethicone    haloperidol lactate    magnesium sulfate **OR** magnesium sulfate **OR** magnesium sulfate    melatonin    Morphine    nitroglycerin    ondansetron **OR** ondansetron    Pharmacy to dose vancomycin    Pharmacy to dose warfarin    potassium chloride    potassium chloride    potassium phosphate infusion greater than 15 mMoles **OR** potassium phosphate infusion greater than 15 mMoles **OR** potassium phosphate **OR** sodium phosphate IVPB **OR** sodium phosphate IVPB **OR** sodium phosphate IVPB    sodium chloride    sodium chloride        Assessment/Plan       Antimicrobial Therapy   1.  Vancomycin      day  2.  Clindamycin      day  3.  Unasyn      day  4.      Day  5.      Day      Assessment     Left great toe wound infection with  cellulitis.  Patient apparently stubbed or dropped something on his left great toe approximately 2 weeks ago and has been trying to take care of it himself.  Patient has black eschar around the entirety of the right great toe and there is erythema and warmth extending to the dorsal aspect of the left foot and into the left lower leg.  -Pulses are difficult to palpate  -X-ray does show osteomyelitis to the proximal and distal phalanges of the great toe  -Patient denies history of diabetes  -ABIs showed severe digit ischemia in the left foot  -Maggots found in toe in the evening of 8/11/2021    Bacteremia with 1 out of 2 cultures growing Proteus species-likely source is left foot wound  -2D echo was negative for vegetation     Patient with supratherapeutic with his INR at admission     History of mitral valve replacement     B cell lymphoma-wife denies any current treatment     CAD, congestive heart failure     COVID-19 screen is negative-patient is vaccinated for Covid     Plan     Continue IV vancomycin and Unasyn  for now.    Discontinue IV clindamycin  Vascular surgery is working the patient up for possible vascular intervention-CTA is pending  Continue supportive care  A.m. labs  Case discussed with patient, wife, and daughter at bedside  Case discussed with RN  Case discussed with podiatry at bedside-we will attempt to amputate toe later this afternoon however INR was greater than 8    Apparently patient is scheduled for left big toe amputation later today by podiatry service    Lissette Osuna, MARIZA  08/12/21  12:40 EDT

## 2021-08-12 NOTE — PROGRESS NOTES
Cardiology    Patient Care Team:  Casper Saldaña MD as PCP - General  Casper Saldaña MD as PCP - Family Medicine  Cory Bermeo MD as Consulting Physician (Nephrology)        CHIEF COMPLAINT: Foot pain    ADMITTING DIAGNOSES: Limb ischemia    SUBJECTIVE: No complaints from a cardiovascular standpoint.  INR still registering greater than 8.  Recommend vitamin K subcu for reversal as high risk of valve thrombosis with FFP.  Echo shows normal LV systolic function    Review of Symptoms:  Constitutional: Patient afebrile no chills or unexpected weight changes  Respiratory: No cough, no wheezing or dyspnea  Cardiovascular: No chest pain, palpitations, dyspnea, orthopnea and no edema  Gastrointestinal: No nausea, vomiting, constipation or diarrhea.  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 No pertinent family history.      SOCIAL HISTORY:   Social History     Socioeconomic History   • Marital status:      Spouse name: Not on file   • Number of children: Not on file   • Years of education: Not on file   • Highest education level: Not on file   Tobacco Use   • Smoking status: Former Smoker   • Smokeless tobacco: Never Used   Vaping Use   •  Vaping Use: Never used   Substance and Sexual Activity   • Alcohol use: Yes     Alcohol/week: 5.0 standard drinks     Types: 5 Cans of beer per week   • Drug use: No   • Sexual activity: Defer       CURRENT MEDICATIONS:     Current Facility-Administered Medications:   •  acetaminophen (TYLENOL) tablet 650 mg, 650 mg, Oral, Q4H PRN, 650 mg at 08/11/21 1126 **OR** acetaminophen (TYLENOL) 160 MG/5ML solution 650 mg, 650 mg, Oral, Q4H PRN **OR** acetaminophen (TYLENOL) suppository 650 mg, 650 mg, Rectal, Q4H PRN, Amisha Taveras, APRN  •  aluminum-magnesium hydroxide-simethicone (MAALOX MAX) 400-400-40 MG/5ML suspension 15 mL, 15 mL, Oral, Q6H PRN, Amisha Taveras, APRN  •  ampicillin-sulbactam (UNASYN) 3 g in sodium chloride 0.9 % 100 mL IVPB-MBP, 3 g, Intravenous, Q8H, Lissette Osuna, APRN, Last Rate: 0 mL/hr at 08/11/21 1050, 3 g at 08/12/21 1023  •  clindamycin (CLEOCIN) 900 mg in sodium chloride 0.9% 50 mL IVPB (premix), 900 mg, Intravenous, Q8H, Zaire Mansfield MD, Last Rate: 50 mL/hr at 08/12/21 0249, 900 mg at 08/12/21 0249  •  haloperidol lactate (HALDOL) injection 1 mg, 1 mg, Intravenous, Q6H PRN, Zaire Mansfield MD  •  ipratropium-albuterol (DUO-NEB) nebulizer solution 3 mL, 3 mL, Nebulization, Q6H - RT, Zaire Mansfield MD, 3 mL at 08/12/21 0705  •  Magnesium Sulfate 2 gram Bolus, followed by 8 gram infusion (total Mg dose 10 grams)- Mg less than or equal to 1mg/dL, 2 g, Intravenous, PRN **OR** Magnesium Sulfate 2 gram / 50mL Infusion (GIVE X 3 BAGS TO EQUAL 6GM TOTAL DOSE) - Mg 1.1 - 1.5 mg/dl, 2 g, Intravenous, PRN **OR** Magnesium Sulfate 4 gram infusion- Mg 1.6-1.9 mg/dL, 4 g, Intravenous, PRN, Zaire Mansfield MD  •  melatonin tablet 5 mg, 5 mg, Oral, Nightly PRN, Amisha Taveras, APRN  •  Morphine sulfate (PF) injection 2 mg, 2 mg, Intravenous, Q4H PRN, Zaire Mansfield MD, 2 mg at 08/12/21 1034  •  nitroglycerin  (NITROSTAT) SL tablet 0.4 mg, 0.4 mg, Sublingual, Q5 Min PRN, Amisha Taveras APRN  •  ondansetron (ZOFRAN) tablet 4 mg, 4 mg, Oral, Q6H PRN **OR** ondansetron (ZOFRAN) injection 4 mg, 4 mg, Intravenous, Q6H PRN, Amisha Taveras APRN  •  Pharmacy to dose vancomycin, , Does not apply, Continuous PRN, Amisha Taveras APRN  •  Pharmacy to dose warfarin, , Does not apply, Continuous PRN, Zaire Mansfield MD  •  potassium chloride (K-DUR,KLOR-CON) CR tablet 40 mEq, 40 mEq, Oral, PRN, Amisha Taveras APRN  •  potassium chloride 10 mEq in 100 mL IVPB, 10 mEq, Intravenous, Q1H PRN, Zaire Mansfield MD  •  potassium phosphate 45 mmol in sodium chloride 0.9 % 500 mL infusion, 45 mmol, Intravenous, PRN **OR** potassium phosphate 30 mmol in sodium chloride 0.9 % 250 mL infusion, 30 mmol, Intravenous, PRN **OR** potassium phosphate 15 mmol in 0.9% sodium chloride 100 mL IVPB, 15 mmol, Intravenous, PRN **OR** sodium phosphates 45 mmol in sodium chloride 0.9 % 500 mL IVPB, 45 mmol, Intravenous, PRN **OR** sodium phosphates 30 mmol in sodium chloride 0.9 % 250 mL IVPB, 30 mmol, Intravenous, PRN **OR** sodium phosphates 15 mmol in sodium chloride 0.9 % 250 mL IVPB, 15 mmol, Intravenous, PRN, Zaire Mansfield MD  •  rosuvastatin (CRESTOR) tablet 10 mg, 10 mg, Oral, Nightly, Amisha Taveras APRN, 10 mg at 08/11/21 2202  •  sertraline (ZOLOFT) tablet 100 mg, 100 mg, Oral, Daily, Amisha Taveras APRN, 100 mg at 08/12/21 1024  •  sodium chloride 0.45 % infusion, 75 mL/hr, Intravenous, Continuous, Nancy Linares PA-C, Last Rate: 75 mL/hr at 08/12/21 1030, 75 mL/hr at 08/12/21 1030  •  sodium chloride 0.9 % flush 10 mL, 10 mL, Intravenous, PRN, Tushar Poon MD  •  sodium chloride 0.9 % flush 10 mL, 10 mL, Intravenous, Q12H, Amisha Taveras APRN, 10 mL at 08/12/21 0952  •  sodium chloride 0.9 % flush 10 mL, 10 mL, Intravenous, PRN, Amisha Taveras APRN  •  vancomycin (VANCOCIN) 1,000  mg in sodium chloride 0.9 % 250 mL IVPB, 1,000 mg, Intravenous, Q24H, NitinVikas paytony, APRN, 1,000 mg at 08/12/21 0339      DIAGNOSTIC DATA:     I reviewed the patient's new clinical results.    Lab Results (last 24 hours)     Procedure Component Value Units Date/Time    Protime-INR [[id number]]  (Abnormal) Collected: 08/12/21 0302    Specimen: Blood Updated: 08/12/21 0440     Protime >90.0 Seconds      INR >=8.00    Basic Metabolic Panel [[id number]]  (Abnormal) Collected: 08/12/21 0302    Specimen: Blood Updated: 08/12/21 0427     Glucose 89 mg/dL      BUN 28 mg/dL      Creatinine 1.19 mg/dL      Sodium 143 mmol/L      Potassium 3.9 mmol/L      Chloride 106 mmol/L      CO2 28.0 mmol/L      Calcium 7.7 mg/dL      eGFR Non African Amer 57 mL/min/1.73      BUN/Creatinine Ratio 23.5     Anion Gap 9.0 mmol/L     Narrative:      GFR Normal >60  Chronic Kidney Disease <60  Kidney Failure <15      Magnesium [[id number]]  (Abnormal) Collected: 08/12/21 0302    Specimen: Blood Updated: 08/12/21 0427     Magnesium 1.6 mg/dL     Vancomycin, Random [[id number]]  (Normal) Collected: 08/12/21 0302    Specimen: Blood Updated: 08/12/21 0427     Vancomycin Random 12.10 mcg/mL     Narrative:      Therapeutic Ranges for Vancomycin    Vancomycin Random   5.0-40.0 mcg/mL  Vancomycin Trough   5.0-20.0 mcg/mL  Vancomycin Peak     20.0-40.0 mcg/mL    CBC & Differential [[id number]]  (Abnormal) Collected: 08/12/21 0302    Specimen: Blood Updated: 08/12/21 0406    Narrative:      The following orders were created for panel order CBC & Differential.  Procedure                               Abnormality         Status                     ---------                               -----------         ------                     CBC Auto Differential[[id number]]        Abnormal            Final result                 Please view results for these tests on the individual orders.    CBC Auto Differential [[id number]]  (Abnormal) Collected: 08/12/21 0302     Specimen: Blood Updated: 08/12/21 0406     WBC 8.70 10*3/mm3      RBC 3.01 10*6/mm3      Hemoglobin 7.9 g/dL      Hematocrit 25.1 %      MCV 83.2 fL      MCH 26.4 pg      MCHC 31.7 g/dL      RDW 17.5 %      RDW-SD 52.1 fl      MPV 7.9 fL      Platelets 254 10*3/mm3      Neutrophil % 86.8 %      Lymphocyte % 4.5 %      Monocyte % 5.9 %      Eosinophil % 2.5 %      Basophil % 0.3 %      Neutrophils, Absolute 7.50 10*3/mm3      Lymphocytes, Absolute 0.40 10*3/mm3      Monocytes, Absolute 0.50 10*3/mm3      Eosinophils, Absolute 0.20 10*3/mm3      Basophils, Absolute 0.00 10*3/mm3      nRBC 0.0 /100 WBC     Blood Culture - Blood, Arm, Left [[id number]] Collected: 08/10/21 0018    Specimen: Blood from Arm, Left Updated: 08/12/21 0030     Blood Culture No growth at 2 days    Hemoglobin A1c [[id number]]  (Abnormal) Collected: 08/11/21 0522    Specimen: Blood Updated: 08/11/21 1835     Hemoglobin A1C 6.0 %     Narrative:      Hemoglobin A1C Reference Range:    <5.7 %        Normal  5.7-6.4 %     Increased risk for diabetes  > 6.4 %        Diabetes       These guidelines have been recommended by the American Diabetic Association for Hgb A1c.      The following 2010 guidelines have been recommended by the American Diabetes Association for Hemoglobin A1c.    HBA1c 5.7-6.4% Increased risk for future diabetes (pre-diabetes)  HBA1c     >6.4% Diabetes            Imaging Results (Last 24 Hours)     Procedure Component Value Units Date/Time    CT Angio Abdominal Aorta Bilateral Iliofem Runoff [[id number]] Resulted: 08/12/21 1015     Updated: 08/12/21 1021    CT Chest Without Contrast Diagnostic [[id number]] Collected: 08/11/21 1931     Updated: 08/11/21 1937    Narrative:         DATE OF EXAM:  8/11/2021 5:13 PM     PROCEDURE:  CT CHEST WO CONTRAST DIAGNOSTIC-     INDICATIONS:   Shortness of breath and confusion.     COMPARISON:   12/31/2015.     TECHNIQUE:  Routine transaxial slices were obtained through the chest without  the  administration of intravenous contrast. Reconstructed coronal and  sagittal images were also obtained. Automated exposure control and  iterative construction methods were used.      FINDINGS:  There are tiny layering pleural effusions. There is respiratory motion  artifact. There is mild bilateral basilar subsegmental atelectasis.  There is suggestion of some areas of interstitial thickening and some  hazy areas of groundglass density. This may be secondary to pulmonary  edema. An atypical infection is considered less likely. The heart is  enlarged. There are extensive atherosclerotic vascular calcifications  including involvement of the coronary arteries. The patient has had a  previous CABG procedure. There are no enlarged mediastinal lymph nodes.  The hilar regions are difficult to evaluate without contrast. The  patient has dense sludge within the gallbladder. There are no acute  findings beneath the hemidiaphragms. There are no suspicious osteolytic  or sclerotic lesions within the bony structures. There are old healed  bilateral rib fractures.        Impression:         1. Tiny layering pleural effusions with mild bilateral basilar  subsegmental atelectasis.  2. The study is difficult to interpret due to respiratory motion  artifact. There are some areas of interstitial thickening and hazy  groundglass density. I would favor pulmonary edema over an atypical  infection.  3. Cardiomegaly with extensive coronary artery atherosclerotic vascular  disease.  4. Dense sludge within the gallbladder.     Electronically Signed By-Hammad Ragland MD On:8/11/2021 7:35 PM  This report was finalized on [id number] by  Hammad Ragland MD.          Xray reviewed personally by physician.      ECG reviewed personally by physician  ECG/EMG Results (most recent)     Procedure Component Value Units Date/Time    ECG 12 Lead [[id number]] Collected: 08/09/21 2358     Updated: 08/10/21 1042     QT Interval 383 ms     Narrative:       HEART RATE= 73  bpm  RR Interval= 852  ms  KS Interval= 210  ms  P Horizontal Axis= 30  deg  P Front Axis= 44  deg  QRSD Interval= 103  ms  QT Interval= 383  ms  QRS Axis= 22  deg  T Wave Axis= 30  deg  - ABNORMAL ECG -  Sinus rhythm  Multiple premature complexes, vent & supraven  Borderline ST depression, anterolateral leads  When compared with ECG of 31-Dec-2015 10:48:16,  Significant change in rhythm: previously junctional  Electronically Signed By: Tushar Poon (Luis A) 10-Aug-2021 10:42:04  Date and Time of Study: 2021-08-09 23:58:23    Adult Transthoracic Echo Complete W/ Cont if Necessary Per Protocol [[id number]] Collected: 08/11/21 1155     Updated: 08/11/21 1509     BSA 2.0 m^2      IVSd 1.1 cm      LVIDd 4.8 cm      LVIDs 3.6 cm      LVPWd 1.1 cm      IVS/LVPW 0.94     FS 26.1 %      EDV(Teich) 108.8 ml      ESV(Teich) 53.1 ml      EF(Teich) 51.2 %      EDV(cubed) 112.3 ml      ESV(cubed) 45.3 ml      EF(cubed) 59.7 %      LV mass(C)d 196.7 grams      LV mass(C)dI 96.4 grams/m^2      SV(Teich) 55.7 ml      SI(Teich) 27.3 ml/m^2      SV(cubed) 67.1 ml      SI(cubed) 32.8 ml/m^2      Ao root diam 3.1 cm      Ao root area 7.4 cm^2      LVOT diam 2.3 cm      LVOT area 4.3 cm^2      EDV(MOD-sp4) 86.8 ml      ESV(MOD-sp4) 33.2 ml      EF(MOD-sp4) 61.8 %      EDV(MOD-sp2) 100.8 ml      ESV(MOD-sp2) 48.8 ml      EF(MOD-sp2) 51.6 %      SV(MOD-sp4) 53.6 ml      SI(MOD-sp4) 26.3 ml/m^2      SV(MOD-sp2) 52.0 ml      SI(MOD-sp2) 25.5 ml/m^2      Ao root area (BSA corrected) 1.5     LV Escobar Vol (BSA corrected) 42.5 ml/m^2      LV Sys Vol (BSA corrected) 16.3 ml/m^2      Aortic R-R 0.79 sec      Aortic HR 76.2 BPM      MV E max mike 87.9 cm/sec      MV A max mike 121.6 cm/sec      MV E/A 0.72     MV V2 max 135.6 cm/sec      MV max PG 7.4 mmHg      MV V2 mean 85.8 cm/sec      MV mean PG 3.3 mmHg      MV V2 VTI 50.4 cm      MVA(VTI) 1.8 cm^2      MV dec slope 363.9 cm/sec^2      MV dec time 0.24 sec      Ao pk mike 317.0 cm/sec       Ao max PG 40.3 mmHg      Ao max PG (full) 37.5 mmHg      Ao V2 mean 212.6 cm/sec      Ao mean PG 20.0 mmHg      Ao mean PG (full) 18.2 mmHg      Ao V2 VTI 63.1 cm      DEMETRA(I,A) 1.4 cm^2      DEMETRA(I,D) 1.4 cm^2      DEMETRA(V,A) 1.1 cm^2      DEMETRA(V,D) 1.1 cm^2      LV V1 max PG 2.9 mmHg      LV V1 mean PG 1.8 mmHg      LV V1 max 84.5 cm/sec      LV V1 mean 63.6 cm/sec      LV V1 VTI 21.1 cm      MR max gwyn 392.5 cm/sec      MR max PG 61.7 mmHg      CO(Ao) 35.4 l/min      CI(Ao) 17.3 l/min/m^2      SV(Ao) 465.1 ml      SI(Ao) 227.8 ml/m^2      CO(LVOT) 6.9 l/min      CI(LVOT) 3.4 l/min/m^2      SV(LVOT) 90.3 ml      SI(LVOT) 44.2 ml/m^2      PA V2 max 137.8 cm/sec      PA max PG 7.6 mmHg      PA max PG (full) 5.2 mmHg      PA V2 mean 89.7 cm/sec      PA mean PG 3.6 mmHg      PA mean PG (full) 2.4 mmHg      PA V2 VTI 26.6 cm      RV V1 max PG 2.4 mmHg      RV V1 mean PG 1.2 mmHg      RV V1 max 77.2 cm/sec      RV V1 mean 51.5 cm/sec      RV V1 VTI 14.1 cm      TR max gwyn 279.5 cm/sec      Pulm Sys Gwyn 56.3 cm/sec      Pulm Escobar Gwyn 49.6 cm/sec      Pulm S/D 1.1     Pulm A Revs Dur 0.13 sec      Pulm A Revs Gwyn 25.4 cm/sec       CV ECHO KANDY - BZI_BMI 24.5 kilograms/m^2       CV ECHO KANDY - BSA(HAYCOCK) 2.0 m^2       CV ECHO KANDY - BZI_METRIC_WEIGHT 82.1 kg       CV ECHO KANDY - BZI_METRIC_HEIGHT 182.9 cm      EF(MOD-bp) 57.0 %      LA dimension(2D) 4.5 cm     Narrative:      · Left ventricular wall thickness is consistent with mild concentric   hypertrophy.  · Estimated left ventricular EF was in agreement with the calculated left   ventricular EF. Left ventricular ejection fraction appears to be 56 - 60%.   Left ventricular systolic function is normal.  · Estimated right ventricular systolic pressure from tricuspid   regurgitation is mildly elevated (35-45 mmHg).  · Mild pulmonary hypertension is present.  · Left ventricular diastolic function is consistent with (grade Ia w/high   LAP) impaired relaxation.  ·  "Mild to moderate aortic valve stenosis is present.                 VITAL SIGNS: Temp:  [97.6 °F (36.4 °C)-98.5 °F (36.9 °C)] 98.2 °F (36.8 °C)  Heart Rate:  [69-84] 72  Resp:  [18-24] 18  BP: (118-155)/(57-65) 138/63   Flowsheet Rows      First Filed Value   Admission Height  182.9 cm (72\") Documented at 08/09/2021 2156   Admission Weight  90.7 kg (200 lb) Documented at 08/09/2021 2156        Physical exam  Constitutional: well-nourished, and appears stated age in no acute distress  PERRL: Conjunctiva clear, no pallor, anicteric  HENMT: normocephalic, normal dentition, no cyanosis or pallor  Neck:no bruits, or thrills and bilateral normal carotid upstroke. Normal jugular venous pressure  Cardiovascular: No parasternal heaves an non-displaced focal PMI. Normal rate and rhythm: no rub, gallop, murmur or click and normal S1 and S2; no lower or upper extremity edema.   Lungs: unlabored, no wheezing with no rales or rhonchi on auscultation.  Extremities: Warm, no clubbing, cyanosis, or edema. Full and equal peripheral pulses in extremities with no bruits appreciated.   Abdomen: soft, non-tender, non-distended  Musculoskeletal: no joint tenderness or swelling and no erythema  Skin: Warm and dry, non-erythematous   Neuro:alert and normal affect. Oriented to time, place and person.     ASSESSMENT AND PLAN:   Cellulitis of left lower extremity    Chronic coronary artery disease    Depression    Hearing problem    History of mitral valve replacement    Essential hypertension    Long term current use of anticoagulant therapy    Mechanical heart valve present    History of B-cell lymphoma    Elevated INR (international normalized ratio) due to prior anticoagulant medication ingestion    Mixed hyperlipidemia     1. Perioperative ischemic evaluation  - 2D ECHO preserved LV and RV size and function EF 55 to 60%  -Defer ischemic evaluation presently 89 years old  -Possibly not surgical candidate, will defer to vascular surgery on " endovascular versus surgical approach depending on findings and recommendation, unclear if he is an endovascular candidate  -Will need bridging with heparin preoperatively once INR is less than 2.5 with mechanical mitral valve  -No reversal of INR is recommended with FFP with mechanical mitral valve, high valve thrombosis risk  -Continue perioperative aspirin therapy  -Underlying hemoglobin 8.7     2. Supratherapeutic INR  - INR remains > 8  Would recommend vitamin K, holding Coumadin, heparin bridge once INR less than 2.5     3. LE cellulitis  - abx per primary/ vascular      4. CAD remote history of bypass revascularization  -Asymptomatic with no anginal chest pain  -Likely no benefit to invasive or noninvasive ischemic evaluation revascularization prior to left lower extremity revascularization with threatened limb,  -Optimize medicines perioperatively avoid hypotension  -Continue perioperative aspirin statin therapy     5. VHD with mechanical valve, goal INR 2.5-3.5  Plan:   At this time there is no benefit for an urgent ischemic evaluation preoperatively.  At this time there are no contraindications to proceeding forward with surgery from cardiovascular standpoint.  This is primarily due to patient's threatened limb outweighing the benefit of ischemic INR still supratherapeutic.  We will continue to follow closely and manage cardiovascular issues.     Greater than 35 minutes total of face-to-face/floor  time was spent with the patient and family and nursing coordinating plan and patient management.  Of which counseling of patient with regard specifically to cardiovascular issues comprised 50% of this total time.            Casper Laura MD  08/12/21  11:20 EDT

## 2021-08-12 NOTE — ANESTHESIA PREPROCEDURE EVALUATION
Anesthesia Evaluation     Patient summary reviewed and Nursing notes reviewed   no history of anesthetic complications:  NPO Solid Status: > 8 hours  NPO Liquid Status: > 8 hours           Airway   Mallampati: II  TM distance: >3 FB  Neck ROM: full  No difficulty expected  Dental    (+) poor dentition    Comment: Denies loose teeth    Pulmonary - normal exam   (+) a smoker Former,   Cardiovascular - normal exam    (+) hypertension, past MI , CAD, CHF Diastolic >=55%, hyperlipidemia,       Neuro/Psych  (+) psychiatric history Depression,     GI/Hepatic/Renal/Endo - negative ROS     Musculoskeletal (-) negative ROS    Abdominal  - normal exam   Substance History - negative use     OB/GYN negative ob/gyn ROS         Other      history of cancer      Other Comment: Elevated INR. Surgeon aware. Wishes to proceed with operation.                  Anesthesia Plan    ASA 4     general     intravenous induction     Anesthetic plan, all risks, benefits, and alternatives have been provided, discussed and informed consent has been obtained with: patient.

## 2021-08-12 NOTE — OP NOTE
Operative Note   Foot and Ankle Surgery   Provider: Dr. Willy Woo   Location: [facility]      Procedure:  1.  Left hallux amputation at the metatarsophalangeal joint    Pre-operative Diagnosis:   1.  Wet gangrene, left hallux  2.  Peripheral arterial disease  3.  Supratherapeutic INR    Post-operative Diagnosis: Same    Surgeon: Willy Woo    Assistant: None    Anesthesia: MAC    Implants: None    Findings: Significant soft tissue necrosis involving the flexor tendon.  Mild tracking purulence.  No grossly abnormal features at the level of the metatarsophalangeal joint.    Specimen: Great toe sent to pathology as permanent.  Swab culture obtained from the metatarsophalangeal joint region    Blood Loss: Less than 5cc    Complications: None    Post Op Plan: Return to floor.  Continue IV antibiotics and monitor.  Revascularization recommendations per vascular.  Nursing is to proceed with daily Dakin's dressing changes.  Patient will likely require return to the operating room for closure of the foot wound versus more proximal amputation    Summary:    Patient is a 89-year-old male that presented with wound involving the left great toe.  Patient has shown progressive signs of infection.  Recently maggots were noted to the wound.  He has had a complicated medical history given that he has a mechanical valve and is currently supratherapeutic.  Patient also has baseline confusion and significant occlusive disease involving the left lower extremity.  We did discuss treatment options with patient's power of  and family.  I do feel that the most appropriate option is to proceed with toe amputation to allow for better foundation for recovery.  Case has been discussed with infectious disease and vascular services.  Patient remains at risk of more proximal amputation and further surgery.    Procedure, risks, complications, and goals were discussed with the patient at bedside.  Risks include but are not  limited to infection, complications from anesthesia (including death), chronic pain or numbness, hematoma/seroma, deep vein thrombosis, wound complications, and potential for additional surgical procedures.  Patient understands and elects to proceed with surgery at this time. Informed consent was obtained before proceeding to the operating suite.  All questions were answered to the patient's satisfaction. No guarantees or assurances were given or implied.    Procedure:    Patient was brought to the operating room and placed on the operative table in a supine position. The lower extremity was scrubbed prepped and draped in a usual sterile fashion.  A formal time-out was conducted prior skin incision.    Attention was then directed to the left hallux.Two semi-elliptical converging incisions were performed in a vertical fashion encompassing the digit.  Full-thickness incisions were performed to the level of the proximal phalanx.  Dissection continued along the proximal phalanx to the level of the metatarsophalangeal joint.  The digit was disarticulated in its entirety without complication.  The digit was sent off to pathology as permanent.  A significant amount of necrotic soft tissue was identified especially to the flexor tendon.  Mild purulence was noted to track along the flexor tendon sheath.  The purulence was adequately decompressed.  A swab culture was obtained from the metatarsophalangeal joint region.  All nonviable tissue was removed.  The wound was left open.  The wound was packed with Betadine soaked 4 x 4 and sterile compressive dressing.  He tolerated the procedure well.  He was transferred from the operating room to the recovery room with vital signs stable and neurovascular is unchanged to the left lower extremity.  Dr. Willy Woo, DPM  [facility]  [phone]    Note is dictated utilizing voice recognition software. Unfortunately this leads to occasional typographical errors. I  apologize in advance if the situation occurs. If questions occur please do not hesitate to call our office.

## 2021-08-12 NOTE — PROGRESS NOTES
"Pharmacy Antimicrobial Dosing Service    Subjective:  Niko Servin is a 89 y.o.male admitted with cellulitis. Pharmacy has been consulted to dose Vancomycin for possible SSTI.  Left great toe wound infection w/cellulitis, osteomyelitis of great toe.  Blood Cx 8/10: Proteus mirabilis.    PMH: mechanical valve on warfarin, recently prone to falls      Assessment/Plan    1. Day #3 Vancomycin: Goal -600 mcg*h/mL.   Pt received load dose of 1750 mg x1 (~22 mg/kg TBW) followed by maintenance dosing of 1000 mg Q24h (~12 mg/kg TBW). Based on level prior to 3rd total dose, Bayesian modeling software estimates current , steady state .  D/t therapeutic AUC, will continue current regimen. Repeat trough ~8/16 (or sooner if clinically warranted).    2. Day #3 Ampicillin/Sulbactam: 3g IV Q6h for estCrCl > 30 mL/min.    Will continue to monitor drug levels, renal function, culture and sensitivities, and patient clinical status.       Objective:  Relevant clinical data and objective history reviewed:  182.9 cm (72.01\")   80.7 kg (177 lb 14.6 oz)   Ideal body weight: 77.6 kg (171 lb 1.9 oz)  Adjusted ideal body weight: 78.9 kg (173 lb 13.4 oz)  Body mass index is 24.12 kg/m².    Results from last 7 days   Lab Units 08/12/21  0302   VANCOMYCIN RM mcg/mL 12.10     Results from last 7 days   Lab Units 08/12/21  0302 08/11/21  0522 08/10/21  1835   CREATININE mg/dL 1.19 1.28* 1.27     Estimated Creatinine Clearance: 45.2 mL/min (by C-G formula based on SCr of 1.19 mg/dL).  I/O last 3 completed shifts:  In: 2800 [P.O.:840; I.V.:830; Blood:330; IV Piggyback:800]  Out: -     Results from last 7 days   Lab Units 08/12/21  0302 08/11/21  0522 08/10/21  1835   WBC 10*3/mm3 8.70 10.30 10.90*     Temperature    08/12/21 0024 08/12/21 0340 08/12/21 1056   Temp: 97.9 °F (36.6 °C) 98.1 °F (36.7 °C) 98.2 °F (36.8 °C)     Baseline culture/source/susceptibility:  Microbiology Results (last 10 days)       Procedure Component " Value - Date/Time    Respiratory Panel, PCR (WITHOUT COVID) - Swab, Nasopharynx [[id number]]  (Normal) Collected: 08/10/21 1346    Lab Status: Final result Specimen: Swab from Nasopharynx Updated: 08/10/21 1437     ADENOVIRUS, PCR Not Detected     Coronavirus 229E Not Detected     Coronavirus HKU1 Not Detected     Coronavirus NL63 Not Detected     Coronavirus OC43 Not Detected     Human Metapneumovirus Not Detected     Human Rhinovirus/Enterovirus Not Detected     Influenza B PCR Not Detected     Parainfluenza Virus 1 Not Detected     Parainfluenza Virus 2 Not Detected     Parainfluenza Virus 3 Not Detected     Parainfluenza Virus 4 Not Detected     Bordetella pertussis pcr Not Detected     Chlamydophila pneumoniae PCR Not Detected     Mycoplasma pneumo by PCR Not Detected     Influenza A PCR Not Detected     RSV, PCR Not Detected     Bordetella parapertussis PCR Not Detected    Narrative:      The coronavirus on the RVP is NOT COVID-19 and is NOT indicative of infection with COVID-19.    In the setting of a positive respiratory panel with a viral infection PLUS a negative procalcitonin without other underlying concern for bacterial infection, consider observing off antibiotics or discontinuation of antibiotics and continue supportive care. If the respiratory panel is positive for atypical bacterial infection (Bordetella pertussis, Chlamydophila pneumoniae, or Mycoplasma pneumoniae), consider antibiotic de-escalation to target atypical bacterial infection.    COVID PRE-OP / PRE-PROCEDURE SCREENING ORDER (NO ISOLATION) - Swab, Nasopharynx [[id number]]  (Normal) Collected: 08/10/21 1114    Lab Status: Final result Specimen: Swab from Nasopharynx Updated: 08/10/21 1241    Narrative:      The following orders were created for panel order COVID PRE-OP / PRE-PROCEDURE SCREENING ORDER (NO ISOLATION) - Swab, Nasopharynx.  Procedure                               Abnormality         Status                     ---------                                -----------         ------                     COVID-19,CEPHEID/XUAN/BD...[[id number]]  Normal              Final result                 Please view results for these tests on the individual orders.    COVID-19,CEPHEID/XUAN/BDMAX,COR/LOREN/PAD/FEI IN-HOUSE(OR EMERGENT/ADD-ON),NP SWAB IN TRANSPORT MEDIA 3-4 HR TAT, RT-PCR - Swab, Nasopharynx [[id number]]  (Normal) Collected: 08/10/21 1114    Lab Status: Final result Specimen: Swab from Nasopharynx Updated: 08/10/21 1241     COVID19 Not Detected    Narrative:      Fact sheet for providers: https://www.fda.gov/media/797104/download     Fact sheet for patients: https://www.fda.gov/media/903071/download  Fact sheet for providers: https://www.fda.gov/media/010792/download    Fact sheet for patients: https://www.fda.gov/media/472186/download    Test performed by PCR.    COVID PRE-OP / PRE-PROCEDURE SCREENING ORDER (NO ISOLATION) - Swab, Nasopharynx [[id number]]  (Normal) Collected: 08/10/21 0403    Lab Status: Final result Specimen: Swab from Nasopharynx Updated: 08/10/21 1445    Narrative:      The following orders were created for panel order COVID PRE-OP / PRE-PROCEDURE SCREENING ORDER (NO ISOLATION) - Swab, Nasopharynx.  Procedure                               Abnormality         Status                     ---------                               -----------         ------                     COVID-19,APTIMA PANTHER(...[[id number]]  Normal              Final result                 Please view results for these tests on the individual orders.    COVID-19,APTIMA PANTHER(ISAAK),[facility], NP/OP SWAB IN UTM/VTM/SALINE TRANSPORT MEDIA,24 HR TAT - Swab, Nasopharynx [[id number]]  (Normal) Collected: 08/10/21 0403    Lab Status: Final result Specimen: Swab from Nasopharynx Updated: 08/10/21 1445     COVID19 Not Detected    Narrative:      Fact sheet for providers: https://www.fda.gov/media/276924/download     Fact sheet for patients:  https://www.fda.gov/media/009109/download    Test performed by RT PCR.    Blood Culture - Blood, Arm, Right [[id number]]  (Abnormal)  (Susceptibility) Collected: 08/10/21 0031    Lab Status: Final result Specimen: Blood from Arm, Right Updated: 08/12/21 1229     Blood Culture Proteus mirabilis     Gram Stain Anaerobic Bottle Gram negative bacilli    Susceptibility        Proteus mirabilis      SULMA      Ampicillin Susceptible      Ampicillin + Sulbactam Susceptible      Cefepime Susceptible      Ceftazidime Susceptible      Ceftriaxone Susceptible      Gentamicin Susceptible      Levofloxacin Susceptible      Piperacillin + Tazobactam Susceptible      Trimethoprim + Sulfamethoxazole Susceptible                 Linear View                   Susceptibility Comments       Proteus mirabilis    Cefazolin sensitivity will not be reported for Enterobacteriaceae in non-urine isolates. If cefazolin is preferred, please call the microbiology lab to request an E-test.  With the exception of urinary-sourced infections, aminoglycosides should not be used as monotherapy.               Blood Culture ID, PCR - Blood, Arm, Right [[id number]]  (Abnormal) Collected: 08/10/21 0031    Lab Status: Final result Specimen: Blood from Arm, Right Updated: 08/11/21 0602     BCID, PCR Proteus spp. Identification by BCID PCR.    Blood Culture - Blood, Arm, Left [[id number]] Collected: 08/10/21 0018    Lab Status: Preliminary result Specimen: Blood from Arm, Left Updated: 08/12/21 0030     Blood Culture No growth at 2 days            Anti-Infectives (From admission, onward)      Ordered     Dose/Rate Route Frequency Start Stop    08/10/21 0434  vancomycin (VANCOCIN) 1,000 mg in sodium chloride 0.9 % 250 mL IVPB     Ordering Provider: Amisha Taveras APRN    1,000 mg  over 60 Minutes Intravenous Every 24 Hours 08/11/21 0400 08/17/21 0359    08/10/21 1315  ampicillin-sulbactam (UNASYN) 3 g in sodium chloride 0.9 % 100 mL IVPB-MBP     Ordering Provider:  Lissette Osuna, MARIZA    3 g Intravenous Every 8 Hours 08/10/21 1700 08/24/21 1659    08/10/21 0835  meropenem (MERREM) 1 g in sodium chloride 0.9 % 100 mL IVPB     Ordering Provider: Zaire Mansfield MD    1 g  over 30 Minutes Intravenous Once 08/10/21 0930 08/10/21 0921    08/10/21 0321  Pharmacy to dose vancomycin     Ordering Provider: Amisha Taveras APRN     Does not apply Continuous PRN 08/10/21 0321 08/17/21 0320    08/09/21 2354  piperacillin-tazobactam (ZOSYN) IVPB 3.375 g in 100 mL NS (CD)     Ordering Provider: Tushar Poon MD    3.375 g  over 30 Minutes Intravenous Once 08/09/21 2356 08/10/21 0310    08/09/21 2354  vancomycin IVPB 1750 mg in 0.9% Sodium Chloride (premix) 500 mL     Ordering Provider: Tushar Poon MD    20 mg/kg × 90.7 kg  over 120 Minutes Intravenous Once 08/09/21 2356 08/10/21 0850            Marian Cuevas PharmD, BCPS  08/12/21 13:19 EDT

## 2021-08-12 NOTE — PROGRESS NOTES
[facility] Medicine Services Daily Progress Note    Patient Name: Niko Servin  : 1929  MRN: [id number]  Primary Care Physician:  Casper Saldaña MD  Date of admission: 2021      Subjective      Chief Complaint: *Left foot pain      Niko Servin is a 89 y.o. male w/PMH of B-cell lymphoma, CAD, MVR W/mechanical valve long-term anticoagulation, depression, [place], HTN presents to [facility] ED due to left lower extremity cellulitis.  Patient is extremely hard of hearing and is a poor historian, unable to complete review of systems at this time.  Family reports patient injured his left great toe approximately 2 weeks prior but did not tell anyone.  Patient's wife reports he has fallen multiple times over the last week due to gait abnormality.  Patient's wife also reports deterioration of cognition with intermittent hallucinations.  Wife reports patient is vaccinated for Covid.              Upon arrival to the ED vitals temp 98, HR 77, RR 17, /65, O2 sat 100% on room air.  Abs notable for troponin less than 0.015, glucose 113, , K4.1, creatinine 1.25, BUN 35, GFR 54, ALT 8, AST 15, total bili 0.3, CRP 28.7, lactic 1.0, INR greater than 8, WBC 12, Hgb 9.4, platelets 302, neutrophils 87.3.  Sed rate 42.  Blood cultures drawn.  CT of the head shows no acute intracranial abnormality, no hemorrhage.  Moderate chronic age-related intracranial findings.  EKG shows sinus rhythm rate 73 multiple PVCs, ventricular and supraventricular, borderline ST depression anterolateral leads.  Patient treated in the ED with Tdap, IV Zosyn, albuterol nebulizer, IV vancomycin.  UA, chest x-ray, x-ray left foot ordered.          Patient Reports *    Patient reports persistent pain left foot  Going for surgery today  Less tachypneic than yesterday  Very hard of hearing  Dr. Saldaña sent me a message yesterday that he has also underlying dementia that has not been  fully evaluated.    Patient had persistent low hemoglobin 8.7  Has been febrile to 200.6 overnight yesterday  Tachypnea has improved  Saturation 93% on 3 L  Low magnesium 1.5  Supratherapeutic INR more than 8.  I have seen  FFP are being prepared.  We will defer vitamin K to cardiology service given mechanical the mitral valve.      8/12  Patient accompanied by family today  He does not seem to be in pain  He appears comfortable  He is going for surgery today for left great toe amputation  Discussed with Dr. Woo  Unfortunately his INR still high.  Discussed with cardiology service who recommended vitamin K only for now.  Patient was apparently seen by pulmonary and nephrology as well      Review of Systems   Unable to perform ROS: dementia            Objective      Vitals:   Temp:  [97.8 °F (36.6 °C)-98.5 °F (36.9 °C)] 98.2 °F (36.8 °C)  Heart Rate:  [69-84] 72  Resp:  [18-24] 18  BP: (118-155)/(57-65) 138/63  Flow (L/min):  [3] 3    Physical Exam  Vitals and nursing note reviewed.   Constitutional:       General: He is not in acute distress.     Appearance: Normal appearance. He is well-developed. He is not ill-appearing, toxic-appearing or diaphoretic.   HENT:      Head: Normocephalic and atraumatic.      Right Ear: Ear canal and external ear normal.      Left Ear: Ear canal and external ear normal.      Nose: Nose normal. No congestion or rhinorrhea.      Mouth/Throat:      Mouth: Mucous membranes are moist.      Pharynx: No oropharyngeal exudate.   Eyes:      General: No scleral icterus.        Right eye: No discharge.         Left eye: No discharge.      Extraocular Movements: Extraocular movements intact.      Conjunctiva/sclera: Conjunctivae normal.      Pupils: Pupils are equal, round, and reactive to light.   Neck:      Thyroid: No thyromegaly.      Vascular: No carotid bruit or JVD.      Trachea: No tracheal deviation.   Cardiovascular:      Rate and Rhythm: Normal rate and regular rhythm.      Pulses:  Normal pulses.      Heart sounds: Normal heart sounds. No murmur heard.   No friction rub. No gallop.    Pulmonary:      Effort: Pulmonary effort is normal. No respiratory distress.      Breath sounds: Normal breath sounds. No stridor. No wheezing, rhonchi or rales.   Chest:      Chest wall: No tenderness.   Abdominal:      General: Bowel sounds are normal. There is no distension.      Palpations: Abdomen is soft. There is no mass.      Tenderness: There is no abdominal tenderness. There is no guarding or rebound.      Hernia: No hernia is present.   Musculoskeletal:         General: No swelling, tenderness, deformity or signs of injury. Normal range of motion.      Cervical back: Normal range of motion and neck supple. No rigidity. No muscular tenderness.      Right lower leg: No edema.      Left lower leg: No edema.      Comments: Gangrenous changes left great toe and swelling and drainage from metacarpophalangeal junction.  Quite swollen and tender in the foot lower part of the leg.  Unable to assess pulsation due to splinting.     Lymphadenopathy:      Cervical: No cervical adenopathy.   Skin:     General: Skin is warm and dry.      Coloration: Skin is not jaundiced or pale.      Findings: No bruising, erythema or rash.   Neurological:      General: No focal deficit present.      Mental Status: He is alert and oriented to person, place, and time. Mental status is at baseline.      Cranial Nerves: No cranial nerve deficit.      Sensory: No sensory deficit.      Motor: No weakness or abnormal muscle tone.      Coordination: Coordination normal.   Psychiatric:      Comments: Cooperative      *       Result Review    Result Review:  I have personally reviewed the results from the time of this admission to 8/12/2021 11:56 EDT and agree with these findings:  [x]  Laboratory  [x]  Microbiology  [x]  Radiology  [x]  EKG/Telemetry   [x]  Cardiology/Vascular   [x]  Pathology  []  Old records  []  Other:  Most notable  findings include: *  As above       Wounds (last 24 hours)      LDA Wound     Row Name 08/12/21 0745 08/12/21 0331 08/11/21 2321       Wound 08/10/21 0505 Left anterior great toe Soft Tissue Necrosis    Wound - Properties Group Placement Date: 08/10/21  - Placement Time: 0505 -JM Side: Left  - Orientation: anterior  -JM Location: great toe  -JM Primary Wound Type: Soft tissue  -JM    Dressing Appearance  --  moist drainage  -CM  moist drainage  -CM    Closure  Open to air  -MA  Open to air  -CM  Open to air  -CM    Base  black eschar  -MA  black eschar  -CM  black eschar  -CM    Periwound  dry  -MA  --  --    Periwound Temperature  hot  -MA  --  --    Retired Wound - Properties Group Date first assessed: 08/10/21  - Time first assessed: 0505 -JM Side: Left  - Location: great toe  -JM Primary Wound Type: Soft tissue  -JM       Wound 08/10/21 0508 Left distal leg Other (comment)    Wound - Properties Group Placement Date: 08/10/21  - Placement Time: 0508 - Side: Left  - Orientation: distal  -JM Location: leg  -JM Primary Wound Type: Other  -JM, Cellulitis per md     Dressing Appearance  open to air  -MA  open to air  -CM  open to air  -CM    Closure  Open to air  -MA  Open to air  -CM  Open to air  -CM    Base  dry;red  -MA  dry;red  -CM  dry;red  -CM    Periwound  dry;warm;intact  -MA  --  --    Periwound Temperature  warm  -MA  --  --    Drainage Amount  none  -MA  --  --    Retired Wound - Properties Group Date first assessed: 08/10/21  - Time first assessed: 0508 -JM Side: Left  - Location: leg  -JM Primary Wound Type: Other  -JM, Cellulitis per md     Row Name 08/11/21 1935             Wound 08/10/21 0505 Left anterior great toe Soft Tissue Necrosis    Wound - Properties Group Placement Date: 08/10/21  - Placement Time: 0505 -JM Side: Left  - Orientation: anterior  -JM Location: great toe  -JM Primary Wound Type: Soft tissue  -JM    Dressing Appearance  moist drainage  -CM      Closure   Open to air  -CM      Base  black eschar  -CM      Retired Wound - Properties Group Date first assessed: 08/10/21  - Time first assessed: 0505 -JM Side: Left  -JM Location: great toe  -JM Primary Wound Type: Soft tissue  -JM       Wound 08/10/21 0508 Left distal leg Other (comment)    Wound - Properties Group Placement Date: 08/10/21  - Placement Time: 0508 -JM Side: Left  -JM Orientation: distal  -JM Location: leg  -JM Primary Wound Type: Other  -JM, Cellulitis per md     Dressing Appearance  open to air  -CM      Closure  Open to air  -CM      Base  dry;red  -CM      Retired Wound - Properties Group Date first assessed: 08/10/21  -JM Time first assessed: 0508 -JM Side: Left  -JM Location: leg  -JM Primary Wound Type: Other  -JM, Cellulitis per md       User Key  (r) = Recorded By, (t) = Taken By, (c) = Cosigned By    Initials Name Provider Type    Gretel Thompson, RN Registered Nurse    Rashard Persaud LPN Licensed Nurse    Corbin Esposito LPN Licensed Nurse            Assessment/Plan      Brief Patient Summary:  Niko Servin is a 89 y.o. male who *presenting with gangrenous changes left great toe    ampicillin-sulbactam, 3 g, Intravenous, Q8H  clindamycin, 900 mg, Intravenous, Q8H  ipratropium-albuterol, 3 mL, Nebulization, Q6H - RT  rosuvastatin, 10 mg, Oral, Nightly  sertraline, 100 mg, Oral, Daily  sodium chloride, 10 mL, Intravenous, Q12H  vancomycin, 1,000 mg, Intravenous, Q24H  vitamin K1, 5 mg, Oral, Once       Pharmacy to dose vancomycin,   Pharmacy to dose warfarin,   sodium chloride, 75 mL/hr, Last Rate: 75 mL/hr (08/12/21 1030)         Active Hospital Problems:  Active Hospital Problems    Diagnosis    • **Cellulitis of left lower extremity    • History of B-cell lymphoma    • Elevated INR (international normalized ratio) due to prior anticoagulant medication ingestion    • Mixed hyperlipidemia    • Depression    • Essential hypertension    • Mechanical heart valve present    •  Hearing problem    • Chronic coronary artery disease    • Long term current use of anticoagulant therapy    • History of mitral valve replacement      Plan:      Gangrenous changes of the left lower extremity involving the left great toe wound and foot:  -X-ray shows positive gas production but thought to be related to the wound by Dr. Woo.  Patient on clindamycin Unasyn and vancomycin.  ID following.  No available wound cultures  Patient has positive blood cultures  Podiatry taken to surgery today 8/11/2021  Vascular surgery consulted as well.  Pending vein mapping/CT angiogram      Bacteremia with Proteus species  Present on admission  Pending identification and sensitivity  On Unasyn  Possibly related to the foot infection  ID following    Coumadin toxicity  Elevated INR:  Defer to cardiology  On Coumadin for mechanical mitral valve       Mixed hyperlipidemia:  -On statin    Essential hypertension:  On Lasix for now       Chronic coronary artery disease:  -Cardiology is consulted  On aspirin and statin  Negative initial troponin    Suspect CHF exacerbation  Echo pending  On IV Lasix  Suspect underlying interstitial lung disease based on x-ray  Consider pulmonary evaluation if worsening respiratory distress or hypoxemia      History of MVR/mechanical valve present/long-term use of anticoagulant therapy:    As above    Depression:  -Home medication sertraline 100 mg p.o. daily  Possible underlying dementia per Dr. Saldaña as well  Likely need long-term rehab placement  We will get palliative care involved    Hard of hearing:  -Continue to monitor       Complex case high risk    DVT prophylaxis:  Medical DVT prophylaxis orders are present.    CODE STATUS:    Code Status: CPR  Medical Interventions (Level of Support Prior to Arrest): Full      Disposition:  I expect patient to be discharged ECF  This patient has been examined wearing appropriate Personal Protective Equipment and discussed with hospital  infection control department. 08/12/21      Electronically signed by Zaire Mansfield MD, 08/12/21, 11:56 EDT.  [facility] Hospitalist Team

## 2021-08-13 PROBLEM — L97.909 PERIPHERAL VASCULAR DISEASE OF LOWER EXTREMITY WITH ULCERATION (HCC): Status: ACTIVE | Noted: 2021-01-01

## 2021-08-13 PROBLEM — I73.9 PERIPHERAL VASCULAR DISEASE OF LOWER EXTREMITY WITH ULCERATION (HCC): Status: ACTIVE | Noted: 2021-01-01

## 2021-08-13 NOTE — PLAN OF CARE
Goal Outcome Evaluation:              Outcome Summary: Pt's surgery to amputate toe on 8/12.  Prn Pain medication given with effectiveness.  He continues with fluids and falls precautions.  Will continue to monitor.

## 2021-08-13 NOTE — PROGRESS NOTES
Daily Progress Note        Cellulitis of left lower extremity    Chronic coronary artery disease    Depression    Hearing problem    History of mitral valve replacement    Essential hypertension    Long term current use of anticoagulant therapy    Mechanical heart valve present    History of B-cell lymphoma    Elevated INR (international normalized ratio) due to prior anticoagulant medication ingestion    Mixed hyperlipidemia      Assessment:     Cellulitis of left lower extremity  -Left great toe infection     Bactermia     History of B-Aileen Lymphoma  Mechanical heart valve  Coronary Artery Disease  Long term use of anticoagulation     Recommendations:     Chest CT without contrast  2D echo-pending     Antibiotic-Unasyn, Vancomycin, and Clindamycin (ID following)  Diuretic-Lasix 20 IV BID  Titrate oxygen  Bronchodilator             LOS: 1 day     Subjective         Objective     Vital signs for last 24 hours:  Vitals:    08/12/21 1742 08/12/21 1757 08/12/21 1812 08/12/21 1936   BP: (!) 91/39 127/48 122/51 117/49   BP Location: Left arm Left arm Left arm Left arm   Patient Position: Lying Lying Lying Lying   Pulse: 72 70 77 70   Resp: 27 (!) 29 (!) 30 20   Temp:   98.7 °F (37.1 °C) 97.4 °F (36.3 °C)   TempSrc:   Temporal Oral   SpO2: 93% 95% 94% 93%   Weight:       Height:           Intake/Output last 3 shifts:  I/O last 3 completed shifts:  In: 2400 [P.O.:840; I.V.:830; Blood:330; IV Piggyback:400]  Out: 200 [Urine:200]  Intake/Output this shift:  I/O this shift:  In: 100 [IV Piggyback:100]  Out: -       Radiology  Imaging Results (Last 24 Hours)     Procedure Component Value Units Date/Time    CT Angio Abdominal Aorta Bilateral Iliofem Runoff [[id number]] Collected: 08/12/21 1320     Updated: 08/12/21 1345    Narrative:         DATE OF EXAM:  8/12/2021 10:04 AM     PROCEDURE:  CT ANGIO ABDOMINAL AORTA BILAT ILIOFEM RUNOFF-     INDICATIONS:   Gangrenous, necrotic malodorous left great toe wound;  L03.116-Cellulitis  of left lower limb; R79.1-Abnormal coagulation profile, pain and  weakness in both legs     COMPARISON:   CT of the abdomen and pelvis dated 01/01/2016     TECHNIQUE:  Routine transaxial slices were obtained through the abdomen, pelvis, and  both lower extremities after the intravenous administration of 100 mL  Isovue 370. Reconstructed coronal and sagittal images were also  obtained. In addition, a 3 D volume rendered image was obtained after  post processing. Automated exposure control and iterative reconstruction  methods were used.     FINDINGS:  Vascular findings: There is no significant aortic stenosis. There is  mild aortic plaque present. There is plaque at the origins of the SMA  and celiac axis and extensive plaque in the splenic artery and  throughout the course of the celiac axis. The degree of narrowing  appears less than 50% and celiac axis. There is calcified  atherosclerotic plaque in the right and left renal arteries without  hemodynamically significant stenosis. The JANUSZ is patent. There is  atherosclerotic plaque throughout the common and external iliac arteries  without significant stenosis.     Below the inguinal ligament on the right, the patient has plaque in the  superficial femoral artery without significant stenosis. There is  extensive plaque throughout the popliteal artery with narrowing in  several locations approaching 50%. There is diffuse disease in the  trifurcation vessels. The dominant runoff vessel is the peroneal which  passes to the ankle. The anterior tibial appears to fill segmentally.  The posterior tibial artery is occluded.     In the left lower extremity, there is plaque throughout the superficial  femoral artery without significant stenosis there is plaque in the  popliteal artery with at least one high-grade stenosis of greater than  70% just above the knee joint. This is relatively focal in nature. There  is plaque in the tibioperoneal trunk. The  dominant runoff vessel is the  peroneal artery which passes to the ankle. The anterior tibial artery  shows diffuse disease but is patent segmentally to the ankle. The  posterior tibial artery is essentially occluded.     Nonvascular findings: There are chronic interstitial fibrotic changes  and emphysematous changes present in the lung bases. There is cardiac  enlargement with postoperative changes of prior CABG. There is trace  pleural fluid bilaterally with mild by basilar atelectasis. The liver,  spleen, adrenal glands, and pancreas have a normal appearance. There are  multiple layering gallstones within the gallbladder. Nonobstructing  stones are present within the right kidney. There are calculi  dependently within the urinary bladder. There is bilateral  hydronephrosis. There is marked bladder distention with multiple bladder  diverticula. The prostate appears enlarged and slightly irregular in  appearance. No dilated loops of bowel are seen. There is extensive  sigmoid diverticulosis without diverticulitis. There is a right inguinal  hernia containing fat. There is advanced multilevel degenerative disc  and facet disease. There is diffuse osteopenia. 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 There is soft tissue edema of the  left foot and apparently an open wound over the great toe of the left  foot.          Impression:         1. Diffuse atherosclerotic disease to approximately the popliteal level  bilaterally without any significant stenosis above the popliteal artery.  2. Greater than 70% stenosis of the left popliteal artery.  3. Significant trifurcation level disease bilaterally with single vessel  runoff via the peroneal artery.  4. Right renal and bladder stones with bilateral hydronephrosis. There  is marked bladder distention with multiple bladder diverticula  and  prostatic enlargement suggesting bladder outlet obstruction.  5. Multiple incidental nonvascular findings as noted  above.     Electronically Signed By-Tushar Montes MD On:8/12/2021 1:32 PM  This report was finalized on [id number] by  Tushar Montes MD.          Labs:  Results from last 7 days   Lab Units 08/12/21  0302   WBC 10*3/mm3 8.70   HEMOGLOBIN g/dL 7.9*   HEMATOCRIT % 25.1*   PLATELETS 10*3/mm3 254     Results from last 7 days   Lab Units 08/12/21 0302 08/11/21 0522 08/11/21  0522   SODIUM mmol/L 143   < > 142   POTASSIUM mmol/L 3.9   < > 3.8   CHLORIDE mmol/L 106   < > 105   CO2 mmol/L 28.0   < > 28.0   BUN mg/dL 28*   < > 30*   CREATININE mg/dL 1.19   < > 1.28*   CALCIUM mg/dL 7.7*   < > 8.2   BILIRUBIN mg/dL  --   --  0.2   ALK PHOS U/L  --   --  72   ALT (SGPT) U/L  --   --  10   AST (SGOT) U/L  --   --  22   GLUCOSE mg/dL 89   < > 95    < > = values in this interval not displayed.     Results from last 7 days   Lab Units 08/10/21  1806   PH, ARTERIAL pH units 7.384   PO2 ART mm Hg 81.1*   PCO2, ARTERIAL mm Hg 42.5   HCO3 ART mmol/L 25.3     Results from last 7 days   Lab Units 08/11/21  0522 08/10/21  0018   ALBUMIN g/dL 2.60* 2.70*     Results from last 7 days   Lab Units 08/10/21  0018   TROPONIN T ng/mL 0.015         Results from last 7 days   Lab Units 08/12/21  0302   MAGNESIUM mg/dL 1.6*     Results from last 7 days   Lab Units 08/12/21  0302 08/11/21  0522 08/10/21  0603   INR  >=8.00* >=8.00* 4.24*     Results from last 7 days   Lab Units 08/10/21  0450   TSH uIU/mL 1.790           Meds:   SCHEDULE  [MAR Hold] Acetylcysteine, 600 mg, Oral, BID  ampicillin-sulbactam, 3 g, Intravenous, Q6H  ipratropium-albuterol, 3 mL, Nebulization, Q6H - RT  rosuvastatin, 10 mg, Oral, Nightly  sertraline, 100 mg, Oral, Daily  sodium chloride, 10 mL, Intravenous, Q12H  vancomycin, 1,000 mg, Intravenous, Q24H      Infusions  Pharmacy to dose vancomycin,   Pharmacy to dose warfarin,   sodium chloride, 75 mL/hr, Last Rate: 75 mL/hr (08/12/21 1030)      PRNs  •  acetaminophen **OR** acetaminophen **OR** acetaminophen  •   aluminum-magnesium hydroxide-simethicone  •  haloperidol lactate  •  magnesium sulfate **OR** magnesium sulfate **OR** magnesium sulfate  •  melatonin  •  Morphine  •  nitroglycerin  •  ondansetron **OR** ondansetron  •  Pharmacy to dose vancomycin  •  Pharmacy to dose warfarin  •  potassium chloride  •  potassium chloride  •  potassium phosphate infusion greater than 15 mMoles **OR** potassium phosphate infusion greater than 15 mMoles **OR** potassium phosphate **OR** sodium phosphate IVPB **OR** sodium phosphate IVPB **OR** sodium phosphate IVPB  •  sodium chloride  •  sodium chloride    Physical Exam:  Physical Exam  Cardiovascular:      Heart sounds: Murmur heard.     Pulmonary:      Breath sounds: Rhonchi and rales present.         ROS  Review of Systems   Respiratory: Positive for cough and shortness of breath.              Total time spent with patient greater than: 45 Minutes

## 2021-08-13 NOTE — PROGRESS NOTES
"PULMONARY CRITICAL CARE Progress  NOTE      PATIENT IDENTIFICATION:  Name: Niko Servin  MRN: [id number]  :  1929     Age: 89 y.o.  Sex: male    DATE OF Note:  2021   Referring Physician: Zaire Mansfield MD                  Subjective:   Weak cough  Still requiring oxygen  No SOB no chest pain, no nausea or vomiting, no change in bowel habit, no dysuria,  no new  skin rash or itching.      Objective:  tMax 24 hrs: Temp (24hrs), Av.5 °F (36.9 °C), Min:97.4 °F (36.3 °C), Max:99.8 °F (37.7 °C)      Vitals Ranges:   Temp:  [97.4 °F (36.3 °C)-99.8 °F (37.7 °C)] 98.9 °F (37.2 °C)  Heart Rate:  [65-83] 74  Resp:  [16-30] 18  BP: ()/(37-74) 108/65    Intake and Output Last 3 Shifts:   I/O last 3 completed shifts:  In: 1090 [P.O.:240; IV Piggyback:850]  Out: 200 [Urine:200]    Exam:  /65 (BP Location: Left arm, Patient Position: Lying)   Pulse 74   Temp 98.9 °F (37.2 °C) (Oral)   Resp 18   Ht 182.9 cm (72.01\")   Wt 80.7 kg (177 lb 14.6 oz)   SpO2 93%   BMI 24.12 kg/m²     General Appearance:   Hard hearing alert awake  HEENT:  Normocephalic, without obvious abnormality, Conjunctiva/corneas clear,.  Normal external ear canals, Nares normal, no drainage     Neck:  Supple, symmetrical, trachea midline. No JVD.  Lungs /Chest wall:   Bilateral basal rhonchi, respirations unlabored symmetrical wall movement.     Heart:  Regular rate and rhythm, systolic murmur PMI left sternal border  Abdomen: Soft, non-tender, no masses, no organomegaly.    Extremities: Trace edema no clubbing or Cyanosis        Medications:    Current Facility-Administered Medications:   •  acetaminophen (TYLENOL) tablet 650 mg, 650 mg, Oral, Q4H PRN, 650 mg at 21 **OR** acetaminophen (TYLENOL) 160 MG/5ML solution 650 mg, 650 mg, Oral, Q4H PRN **OR** acetaminophen (TYLENOL) suppository 650 mg, 650 mg, Rectal, Q4H PRN, CHITO Woo DPM  •  aluminum-magnesium hydroxide-simethicone (MAALOX " MAX) 400-400-40 MG/5ML suspension 15 mL, 15 mL, Oral, Q6H PRN, CHITO Woo DPM  •  ampicillin-sulbactam (UNASYN) 3 g in sodium chloride 0.9 % 100 mL IVPB-MBP, 3 g, Intravenous, Q6H, CHITO Woo DPM, 3 g at 08/13/21 0819  •  haloperidol lactate (HALDOL) injection 1 mg, 1 mg, Intravenous, Q6H PRN, CHITO Woo DPM  •  heparin 08217 units/250 mL (100 units/mL) in 0.45 % NaCl infusion, 12 Units/kg/hr, Intravenous, Titrated, Casper Laura MD, Last Rate: 9.68 mL/hr at 08/13/21 1140, 12 Units/kg/hr at 08/13/21 1140  •  heparin bolus from bag 2,400 Units, 30 Units/kg, Intravenous, Q6H PRN, Casper Laura MD  •  heparin bolus from bag 4,800 Units, 60 Units/kg, Intravenous, Q6H PRN, Casper Laura MD  •  ipratropium-albuterol (DUO-NEB) nebulizer solution 3 mL, 3 mL, Nebulization, Q6H - RT, CHITO Woo DPM, 3 mL at 08/13/21 1201  •  Magnesium Sulfate 2 gram Bolus, followed by 8 gram infusion (total Mg dose 10 grams)- Mg less than or equal to 1mg/dL, 2 g, Intravenous, PRN **OR** Magnesium Sulfate 2 gram / 50mL Infusion (GIVE X 3 BAGS TO EQUAL 6GM TOTAL DOSE) - Mg 1.1 - 1.5 mg/dl, 2 g, Intravenous, PRN **OR** Magnesium Sulfate 4 gram infusion- Mg 1.6-1.9 mg/dL, 4 g, Intravenous, PRN, CHITO Woo DPM, Last Rate: 25 mL/hr at 08/13/21 0818, 4 g at 08/13/21 0818  •  melatonin tablet 5 mg, 5 mg, Oral, Nightly PRN, CHITO Woo DPM  •  Morphine sulfate (PF) injection 2 mg, 2 mg, Intravenous, Q4H PRN, CHITO Woo DPM, 2 mg at 08/13/21 0818  •  nitroglycerin (NITROSTAT) SL tablet 0.4 mg, 0.4 mg, Sublingual, Q5 Min PRN, CHITO Woo DPM  •  ondansetron (ZOFRAN) tablet 4 mg, 4 mg, Oral, Q6H PRN **OR** ondansetron (ZOFRAN) injection 4 mg, 4 mg, Intravenous, Q6H PRN, CHITO Woo DPM  •  Pharmacy to dose vancomycin, , Does not apply, Continuous PRN, CHITO Woo DPM  •  potassium chloride (K-DUR,KLOR-CON) CR tablet 40 mEq, 40 mEq, Oral, PRN, Amna,  CHITO Aguilera DPM  •  potassium chloride 10 mEq in 100 mL IVPB, 10 mEq, Intravenous, Q1H PRN, CHITO Woo DPM  •  potassium phosphate 45 mmol in sodium chloride 0.9 % 500 mL infusion, 45 mmol, Intravenous, PRN **OR** potassium phosphate 30 mmol in sodium chloride 0.9 % 250 mL infusion, 30 mmol, Intravenous, PRN **OR** potassium phosphate 15 mmol in 0.9% sodium chloride 100 mL IVPB, 15 mmol, Intravenous, PRN **OR** sodium phosphates 45 mmol in sodium chloride 0.9 % 500 mL IVPB, 45 mmol, Intravenous, PRN **OR** sodium phosphates 30 mmol in sodium chloride 0.9 % 250 mL IVPB, 30 mmol, Intravenous, PRN **OR** sodium phosphates 15 mmol in sodium chloride 0.9 % 250 mL IVPB, 15 mmol, Intravenous, PRN, CHITO Woo DPM  •  rosuvastatin (CRESTOR) tablet 10 mg, 10 mg, Oral, Nightly, CHITO Woo DPM, 10 mg at 08/12/21 2020  •  sertraline (ZOLOFT) tablet 100 mg, 100 mg, Oral, Daily, CHITO Woo DPM, 100 mg at 08/13/21 0819  •  sodium chloride 0.9 % flush 10 mL, 10 mL, Intravenous, PRN, CHITO Woo DPM  •  sodium chloride 0.9 % flush 10 mL, 10 mL, Intravenous, Q12H, CHITO Woo DPM, 10 mL at 08/13/21 0818  •  sodium chloride 0.9 % flush 10 mL, 10 mL, Intravenous, PRN, CHITO Woo DPM  •  sodium hypochlorite (DAKIN'S 1/4 STRENGTH) 0.125 % topical solution 0.125% solution, , Topical, BID, CHITO Woo DPM  •  tamsulosin (FLOMAX) 24 hr capsule 0.4 mg, 0.4 mg, Oral, Daily, Donal, Zaire Valle MD, 0.4 mg at 08/13/21 1053  •  vancomycin (VANCOCIN) 1,000 mg in sodium chloride 0.9 % 250 mL IVPB, 1,000 mg, Intravenous, Q24H, CHITO Woo DPM, 1,000 mg at 08/13/21 0404    Data Review:  All labs (24hrs):   Recent Results (from the past 24 hour(s))   Tissue / Bone Culture - Tissue, Toe, Left    Collection Time: 08/12/21  5:01 PM    Specimen: Toe, Left; Tissue   Result Value Ref Range    Tissue Culture Moderate growth (3+) Gram Positive Cocci (A)     Gram Stain Moderate (3+) WBCs  per low power field     Gram Stain Few (2+) Gram positive cocci, intracellular     Gram Stain Few (2+) Gram positive cocci in clusters    Protime-INR    Collection Time: 08/13/21  3:32 AM    Specimen: Blood   Result Value Ref Range    Protime 20.1 19.4 - 28.5 Seconds    INR 1.90 (L) 2.00 - 3.00   Magnesium    Collection Time: 08/13/21  3:32 AM    Specimen: Blood   Result Value Ref Range    Magnesium 1.6 (L) 1.7 - 2.3 mg/dL   Basic Metabolic Panel    Collection Time: 08/13/21  3:32 AM    Specimen: Blood   Result Value Ref Range    Glucose 82 65 - 99 mg/dL    BUN 26 (H) 8 - 23 mg/dL    Creatinine 1.18 0.76 - 1.27 mg/dL    Sodium 143 136 - 145 mmol/L    Potassium 3.8 3.5 - 5.2 mmol/L    Chloride 103 98 - 107 mmol/L    CO2 31.0 (H) 22.0 - 29.0 mmol/L    Calcium 7.9 (L) 8.2 - 9.6 mg/dL    eGFR Non African Amer 58 (L) >60 mL/min/1.73    BUN/Creatinine Ratio 22.0 7.0 - 25.0    Anion Gap 9.0 5.0 - 15.0 mmol/L   CBC Auto Differential    Collection Time: 08/13/21  3:32 AM    Specimen: Blood   Result Value Ref Range    WBC 9.10 3.40 - 10.80 10*3/mm3    RBC 3.06 (L) 4.14 - 5.80 10*6/mm3    Hemoglobin 8.2 (L) 13.0 - 17.7 g/dL    Hematocrit 25.5 (L) 37.5 - 51.0 %    MCV 83.2 79.0 - 97.0 fL    MCH 27.0 26.6 - 33.0 pg    MCHC 32.4 31.5 - 35.7 g/dL    RDW 17.1 (H) 12.3 - 15.4 %    RDW-SD 50.3 37.0 - 54.0 fl    MPV 7.9 6.0 - 12.0 fL    Platelets 278 140 - 450 10*3/mm3    Neutrophil % 87.8 (H) 42.7 - 76.0 %    Lymphocyte % 4.2 (L) 19.6 - 45.3 %    Monocyte % 5.1 5.0 - 12.0 %    Eosinophil % 2.3 0.3 - 6.2 %    Basophil % 0.6 0.0 - 1.5 %    Neutrophils, Absolute 8.00 (H) 1.70 - 7.00 10*3/mm3    Lymphocytes, Absolute 0.40 (L) 0.70 - 3.10 10*3/mm3    Monocytes, Absolute 0.50 0.10 - 0.90 10*3/mm3    Eosinophils, Absolute 0.20 0.00 - 0.40 10*3/mm3    Basophils, Absolute 0.10 0.00 - 0.20 10*3/mm3    nRBC 0.0 0.0 - 0.2 /100 WBC   Protime-INR    Collection Time: 08/13/21  8:26 AM    Specimen: Blood   Result Value Ref Range    Protime 17.7 (H)  9.6 - 11.7 Seconds    INR 1.66 (H) 0.93 - 1.10   aPTT    Collection Time: 08/13/21  8:26 AM    Specimen: Blood   Result Value Ref Range    PTT 39.9 (L) 61.0 - 76.5 seconds   CBC Auto Differential    Collection Time: 08/13/21  8:26 AM    Specimen: Blood   Result Value Ref Range    WBC 10.00 3.40 - 10.80 10*3/mm3    RBC 2.90 (L) 4.14 - 5.80 10*6/mm3    Hemoglobin 8.0 (L) 13.0 - 17.7 g/dL    Hematocrit 24.4 (L) 37.5 - 51.0 %    MCV 84.2 79.0 - 97.0 fL    MCH 27.5 26.6 - 33.0 pg    MCHC 32.7 31.5 - 35.7 g/dL    RDW 17.6 (H) 12.3 - 15.4 %    RDW-SD 52.1 37.0 - 54.0 fl    MPV 7.9 6.0 - 12.0 fL    Platelets 265 140 - 450 10*3/mm3    Neutrophil % 89.6 (H) 42.7 - 76.0 %    Lymphocyte % 3.0 (L) 19.6 - 45.3 %    Monocyte % 5.5 5.0 - 12.0 %    Eosinophil % 1.5 0.3 - 6.2 %    Basophil % 0.4 0.0 - 1.5 %    Neutrophils, Absolute 9.00 (H) 1.70 - 7.00 10*3/mm3    Lymphocytes, Absolute 0.30 (L) 0.70 - 3.10 10*3/mm3    Monocytes, Absolute 0.50 0.10 - 0.90 10*3/mm3    Eosinophils, Absolute 0.20 0.00 - 0.40 10*3/mm3    Basophils, Absolute 0.00 0.00 - 0.20 10*3/mm3    nRBC 0.0 0.0 - 0.2 /100 WBC        Imaging:  CT Angio Abdominal Aorta Bilateral Iliofem Runoff  Narrative:    DATE OF EXAM:  8/12/2021 10:04 AM     PROCEDURE:  CT ANGIO ABDOMINAL AORTA BILAT ILIOFEM RUNOFF-     INDICATIONS:   Gangrenous, necrotic malodorous left great toe wound; L03.116-Cellulitis  of left lower limb; R79.1-Abnormal coagulation profile, pain and  weakness in both legs     COMPARISON:   CT of the abdomen and pelvis dated 01/01/2016     TECHNIQUE:  Routine transaxial slices were obtained through the abdomen, pelvis, and  both lower extremities after the intravenous administration of 100 mL  Isovue 370. Reconstructed coronal and sagittal images were also  obtained. In addition, a 3 D volume rendered image was obtained after  post processing. Automated exposure control and iterative reconstruction  methods were used.     FINDINGS:  Vascular findings: There is  no significant aortic stenosis. There is  mild aortic plaque present. There is plaque at the origins of the SMA  and celiac axis and extensive plaque in the splenic artery and  throughout the course of the celiac axis. The degree of narrowing  appears less than 50% and celiac axis. There is calcified  atherosclerotic plaque in the right and left renal arteries without  hemodynamically significant stenosis. The JANUSZ is patent. There is  atherosclerotic plaque throughout the common and external iliac arteries  without significant stenosis.     Below the inguinal ligament on the right, the patient has plaque in the  superficial femoral artery without significant stenosis. There is  extensive plaque throughout the popliteal artery with narrowing in  several locations approaching 50%. There is diffuse disease in the  trifurcation vessels. The dominant runoff vessel is the peroneal which  passes to the ankle. The anterior tibial appears to fill segmentally.  The posterior tibial artery is occluded.     In the left lower extremity, there is plaque throughout the superficial  femoral artery without significant stenosis there is plaque in the  popliteal artery with at least one high-grade stenosis of greater than  70% just above the knee joint. This is relatively focal in nature. There  is plaque in the tibioperoneal trunk. The dominant runoff vessel is the  peroneal artery which passes to the ankle. The anterior tibial artery  shows diffuse disease but is patent segmentally to the ankle. The  posterior tibial artery is essentially occluded.     Nonvascular findings: There are chronic interstitial fibrotic changes  and emphysematous changes present in the lung bases. There is cardiac  enlargement with postoperative changes of prior CABG. There is trace  pleural fluid bilaterally with mild by basilar atelectasis. The liver,  spleen, adrenal glands, and pancreas have a normal appearance. There are  multiple layering gallstones  within the gallbladder. Nonobstructing  stones are present within the right kidney. There are calculi  dependently within the urinary bladder. There is bilateral  hydronephrosis. There is marked bladder distention with multiple bladder  diverticula. The prostate appears enlarged and slightly irregular in  appearance. No dilated loops of bowel are seen. There is extensive  sigmoid diverticulosis without diverticulitis. There is a right inguinal  hernia containing fat. There is advanced multilevel degenerative disc  and facet disease. There is diffuse osteopenia. 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 There is soft tissue edema of the  left foot and apparently an open wound over the great toe of the left  foot.        Impression:    1. Diffuse atherosclerotic disease to approximately the popliteal level  bilaterally without any significant stenosis above the popliteal artery.  2. Greater than 70% stenosis of the left popliteal artery.  3. Significant trifurcation level disease bilaterally with single vessel  runoff via the peroneal artery.  4. Right renal and bladder stones with bilateral hydronephrosis. There  is marked bladder distention with multiple bladder diverticula  and  prostatic enlargement suggesting bladder outlet obstruction.  5. Multiple incidental nonvascular findings as noted above.     Electronically Signed By-Tushar Montes MD On:8/12/2021 1:32 PM  This report was finalized on [id number] by  Tushar Montes MD.       ASSESSMENT:   Cellulitis of left lower extremity    Chronic coronary artery disease    Depression    Hearing problem    History of mitral valve replacement    Essential hypertension    Long term current use of anticoagulant therapy    Mechanical heart valve present    History of B-cell lymphoma    Elevated INR (international normalized ratio) due to prior anticoagulant medication ingestion    Mixed hyperlipidemia    Peripheral vascular disease of lower extremity with  ulceration (CMS/[facility] [place])       PLAN:  O2 support  The effusion is very small no need for further work-up  Chest toileting  Encouraged to use flutter valve  Bronchodilator  Inhaled corticosteroids  Electrolytes/ glycemic control  DVT and GI prophylaxis.    Total Critical care time in direct medical management (   ) minutes  Randi Gayle MD. D, ABSM.     8/13/2021  14:01 EDT

## 2021-08-13 NOTE — PROGRESS NOTES
Infectious Diseases Progress Note      LOS: 2 days   Patient Care Team:  Casper Saladña MD as PCP - General  Casper Saldaña MD as PCP - Family Medicine  Cory Bermeo MD as Consulting Physician (Nephrology)    Chief Complaint: Left great toe infection, left foot redness and swelling    Subjective       The patient has been afebrile for the last 24 hours.  The patient is on 3 L of oxygen by nasal cannula, hemodynamically stable, and is tolerating antimicrobial therapy.  The patient is s/p amputation of the left big toe yesterday.      Review of Systems:   Review of Systems   Constitutional: Negative.    HENT: Negative.    Eyes: Negative.    Respiratory: Negative.    Cardiovascular: Negative.    Gastrointestinal: Negative.    Endocrine: Negative.    Genitourinary: Negative.    Musculoskeletal: Negative.    Skin: Positive for color change and wound.   Neurological: Negative.    Psychiatric/Behavioral: Negative.    All other systems reviewed and are negative.       Objective     Vital Signs  Temp:  [97.4 °F (36.3 °C)-98.9 °F (37.2 °C)] 98.9 °F (37.2 °C)  Heart Rate:  [65-83] 74  Resp:  [16-30] 18  BP: ()/(37-65) 108/65    Physical Exam:  Physical Exam  Vitals and nursing note reviewed.   Constitutional:       General: He is not in acute distress.     Appearance: Normal appearance. He is well-developed. He is not diaphoretic.      Comments: Appears thin   HENT:      Head: Normocephalic and atraumatic.   Eyes:      Extraocular Movements: Extraocular movements intact.      Conjunctiva/sclera: Conjunctivae normal.      Pupils: Pupils are equal, round, and reactive to light.   Cardiovascular:      Rate and Rhythm: Normal rate and regular rhythm.      Heart sounds: Normal heart sounds, S1 normal and S2 normal.   Pulmonary:      Effort: Pulmonary effort is normal. No respiratory distress.      Breath sounds: Normal breath sounds. No stridor. No wheezing or rales.   Abdominal:      General: Bowel  sounds are normal. There is no distension.      Palpations: Abdomen is soft. There is no mass.      Tenderness: There is no abdominal tenderness. There is no guarding.   Musculoskeletal:         General: No deformity. Normal range of motion.      Cervical back: Neck supple.      Comments: Bulky surgical dressing on the left foot   Skin:     General: Skin is warm and dry.      Coloration: Skin is not pale.      Findings: Erythema present. No rash.   Neurological:      Mental Status: He is alert and oriented to person, place, and time.      Cranial Nerves: No cranial nerve deficit.   Psychiatric:         Mood and Affect: Mood normal.          Results Review:    I have reviewed all clinical data, test, lab, and imaging results.     Radiology  No Radiology Exams Resulted Within Past 24 Hours    Cardiology    Laboratory    Results from last 7 days   Lab Units 08/13/21  0826 08/13/21  0332 08/12/21  0302 08/11/21  0522 08/10/21  1835 08/10/21  0450 08/10/21  0018   WBC 10*3/mm3 10.00 9.10 8.70 10.30 10.90* 9.10 12.00*   HEMOGLOBIN g/dL 8.0* 8.2* 7.9* 8.7* 8.7* 8.3* 9.4*   HEMATOCRIT % 24.4* 25.5* 25.1* 26.3* 27.0* 25.2* 29.5*   PLATELETS 10*3/mm3 265 278 254 267 292 261 302     Results from last 7 days   Lab Units 08/13/21  0332 08/12/21  0302 08/11/21  0522 08/10/21  1835 08/10/21  0450 08/10/21  0018   SODIUM mmol/L 143 143 142 141 142 141   POTASSIUM mmol/L 3.8 3.9 3.8 4.0 3.9 4.1   CHLORIDE mmol/L 103 106 105 105 106 106   CO2 mmol/L 31.0* 28.0 28.0 26.0 26.0 26.0   BUN mg/dL 26* 28* 30* 32* 36* 35*   CREATININE mg/dL 1.18 1.19 1.28* 1.27 1.20 1.25   GLUCOSE mg/dL 82 89 95 123* 115* 113*   ALBUMIN g/dL  --   --  2.60*  --   --  2.70*   BILIRUBIN mg/dL  --   --  0.2  --   --  0.3   ALK PHOS U/L  --   --  72  --   --  74   AST (SGOT) U/L  --   --  22  --   --  15   ALT (SGPT) U/L  --   --  10  --   --  8   CALCIUM mg/dL 7.9* 7.7* 8.2 8.2 8.4 8.5         Results from last 7 days   Lab Units 08/10/21  0018   SED RATE  mm/hr 42*         Microbiology   Microbiology Results (last 10 days)     Procedure Component Value - Date/Time    Tissue / Bone Culture - Tissue, Toe, Left [[id number]]  (Abnormal) Collected: 08/12/21 1701    Lab Status: Preliminary result Specimen: Tissue from Toe, Left Updated: 08/13/21 1009     Tissue Culture Moderate growth (3+) Gram Positive Cocci     Gram Stain Moderate (3+) WBCs per low power field      Few (2+) Gram positive cocci, intracellular      Few (2+) Gram positive cocci in clusters    Respiratory Panel, PCR (WITHOUT COVID) - Swab, Nasopharynx [[id number]]  (Normal) Collected: 08/10/21 1346    Lab Status: Final result Specimen: Swab from Nasopharynx Updated: 08/10/21 1437     ADENOVIRUS, PCR Not Detected     Coronavirus 229E Not Detected     Coronavirus HKU1 Not Detected     Coronavirus NL63 Not Detected     Coronavirus OC43 Not Detected     Human Metapneumovirus Not Detected     Human Rhinovirus/Enterovirus Not Detected     Influenza B PCR Not Detected     Parainfluenza Virus 1 Not Detected     Parainfluenza Virus 2 Not Detected     Parainfluenza Virus 3 Not Detected     Parainfluenza Virus 4 Not Detected     Bordetella pertussis pcr Not Detected     Chlamydophila pneumoniae PCR Not Detected     Mycoplasma pneumo by PCR Not Detected     Influenza A PCR Not Detected     RSV, PCR Not Detected     Bordetella parapertussis PCR Not Detected    Narrative:      The coronavirus on the RVP is NOT COVID-19 and is NOT indicative of infection with COVID-19.    In the setting of a positive respiratory panel with a viral infection PLUS a negative procalcitonin without other underlying concern for bacterial infection, consider observing off antibiotics or discontinuation of antibiotics and continue supportive care. If the respiratory panel is positive for atypical bacterial infection (Bordetella pertussis, Chlamydophila pneumoniae, or Mycoplasma pneumoniae), consider antibiotic de-escalation to target atypical  bacterial infection.    COVID PRE-OP / PRE-PROCEDURE SCREENING ORDER (NO ISOLATION) - Swab, Nasopharynx [[id number]]  (Normal) Collected: 08/10/21 1114    Lab Status: Final result Specimen: Swab from Nasopharynx Updated: 08/10/21 1241    Narrative:      The following orders were created for panel order COVID PRE-OP / PRE-PROCEDURE SCREENING ORDER (NO ISOLATION) - Swab, Nasopharynx.  Procedure                               Abnormality         Status                     ---------                               -----------         ------                     COVID-19,CEPHEID/XUAN/BD...[[id number]]  Normal              Final result                 Please view results for these tests on the individual orders.    COVID-19,CEPHEID/XUAN/BDMAX,COR/LOREN/PAD/FEI IN-HOUSE(OR EMERGENT/ADD-ON),NP SWAB IN TRANSPORT MEDIA 3-4 HR TAT, RT-PCR - Swab, Nasopharynx [[id number]]  (Normal) Collected: 08/10/21 1114    Lab Status: Final result Specimen: Swab from Nasopharynx Updated: 08/10/21 1241     COVID19 Not Detected    Narrative:      Fact sheet for providers: https://www.fda.gov/media/927690/download     Fact sheet for patients: https://www.fda.gov/media/711743/download  Fact sheet for providers: https://www.fda.gov/media/938412/download    Fact sheet for patients: https://www.fda.gov/media/355714/download    Test performed by PCR.    COVID PRE-OP / PRE-PROCEDURE SCREENING ORDER (NO ISOLATION) - Swab, Nasopharynx [[id number]]  (Normal) Collected: 08/10/21 0403    Lab Status: Final result Specimen: Swab from Nasopharynx Updated: 08/10/21 1445    Narrative:      The following orders were created for panel order COVID PRE-OP / PRE-PROCEDURE SCREENING ORDER (NO ISOLATION) - Swab, Nasopharynx.  Procedure                               Abnormality         Status                     ---------                               -----------         ------                     COVID-19,APTIMA PANTHER(...[[id number]]  Normal              Final result                  Please view results for these tests on the individual orders.    COVID-19,APTIMA PANTHER(ISAAK),[facility], NP/OP SWAB IN UTM/VTM/SALINE TRANSPORT MEDIA,24 HR TAT - Swab, Nasopharynx [[id number]]  (Normal) Collected: 08/10/21 0403    Lab Status: Final result Specimen: Swab from Nasopharynx Updated: 08/10/21 1445     COVID19 Not Detected    Narrative:      Fact sheet for providers: https://www.fda.gov/media/243272/download     Fact sheet for patients: https://www.fda.gov/media/219976/download    Test performed by RT PCR.    Blood Culture - Blood, Arm, Right [[id number]]  (Abnormal)  (Susceptibility) Collected: 08/10/21 0031    Lab Status: Final result Specimen: Blood from Arm, Right Updated: 08/12/21 1229     Blood Culture Proteus mirabilis     Gram Stain Anaerobic Bottle Gram negative bacilli    Susceptibility      Proteus mirabilis      SULMA      Ampicillin Susceptible      Ampicillin + Sulbactam Susceptible      Cefepime Susceptible      Ceftazidime Susceptible      Ceftriaxone Susceptible      Gentamicin Susceptible      Levofloxacin Susceptible      Piperacillin + Tazobactam Susceptible      Trimethoprim + Sulfamethoxazole Susceptible               Linear View               Susceptibility Comments     Proteus mirabilis    Cefazolin sensitivity will not be reported for Enterobacteriaceae in non-urine isolates. If cefazolin is preferred, please call the microbiology lab to request an E-test.  With the exception of urinary-sourced infections, aminoglycosides should not be used as monotherapy.             Blood Culture ID, PCR - Blood, Arm, Right [[id number]]  (Abnormal) Collected: 08/10/21 0031    Lab Status: Final result Specimen: Blood from Arm, Right Updated: 08/11/21 0602     BCID, PCR Proteus spp. Identification by BCID PCR.    Blood Culture - Blood, Arm, Left [[id number]] Collected: 08/10/21 0018    Lab Status: Preliminary result Specimen: Blood from Arm, Left Updated: 08/13/21 0030     Blood Culture No growth at  3 days          Medication Review:       Schedule Meds  ampicillin-sulbactam, 3 g, Intravenous, Q6H  ipratropium-albuterol, 3 mL, Nebulization, Q6H - RT  rosuvastatin, 10 mg, Oral, Nightly  sertraline, 100 mg, Oral, Daily  sodium chloride, 10 mL, Intravenous, Q12H  sodium hypochlorite, , Topical, BID  tamsulosin, 0.4 mg, Oral, Daily  vancomycin, 1,000 mg, Intravenous, Q24H        Infusion Meds  heparin, 12 Units/kg/hr, Last Rate: 12 Units/kg/hr (08/13/21 1140)  Pharmacy to dose vancomycin,         PRN Meds  •  acetaminophen **OR** acetaminophen **OR** acetaminophen  •  aluminum-magnesium hydroxide-simethicone  •  haloperidol lactate  •  heparin  •  heparin  •  magnesium sulfate **OR** magnesium sulfate **OR** magnesium sulfate  •  melatonin  •  Morphine  •  nitroglycerin  •  ondansetron **OR** ondansetron  •  Pharmacy to dose vancomycin  •  potassium chloride  •  potassium chloride  •  potassium phosphate infusion greater than 15 mMoles **OR** potassium phosphate infusion greater than 15 mMoles **OR** potassium phosphate **OR** sodium phosphate IVPB **OR** sodium phosphate IVPB **OR** sodium phosphate IVPB  •  sodium chloride  •  sodium chloride        Assessment/Plan       Antimicrobial Therapy   1.  Vancomycin      day  2.  Clindamycin      day  3.  Unasyn      day  4.      Day  5.      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 labs      Ammar  MD David  08/13/21  16:25 EDT     Note is dictated utilizing voice recognition software/Dragon

## 2021-08-13 NOTE — DISCHARGE PLACEMENT REQUEST
"Niko Servin (89 y.o. Male)     Date of Birth Social Security Number Address Home Phone MRN    05/06/1929  [address]   [place] [address] [phone] [id number]    [employer] Marital Status          Unknown        Admission Date Admission Type Admitting Provider Attending Provider Department, Room/Bed    8/9/21 Emergency Zaire Mansfield MD Gad, George Fayez Labib Youssief, MD [facility] 3C MEDICAL INPATIENT, 360/1    Discharge Date Discharge Disposition Discharge Destination                       Attending Provider: Zaire Mansfield MD    Allergies: No Known Allergies    Isolation: None   Infection: None   Code Status: CPR    Ht: 182.9 cm (72.01\")   Wt: 80.7 kg (177 lb 14.6 oz)    Admission Cmt: None   Principal Problem: Cellulitis of left lower extremity [L03.116]                 Active Insurance as of 8/9/2021     Primary Coverage     Payor Plan Insurance Group Employer/Plan Group    [facility] MEDICARE REPLACEMENT AARP MEDICARE COMPLETE [id number]     Payor Plan Address Payor Plan Phone Number Payor Plan Fax Number Effective Dates    [facility] [phone]  1/1/2020 - None Entered    [address]       [facility] [id number]       Subscriber Name Subscriber Birth Date Member ID       NIKO SERVIN 5/6/1929 [id number]                 Emergency Contacts      (Rel.) Home Phone Work Phone Mobile Phone    DEV SERVIN (Spouse) [phone] -- --    HOMA GARAY (Daughter) -- -- [phone]    ROBERTA KENNY (Daughter) -- -- --              "

## 2021-08-13 NOTE — PROGRESS NOTES
"08/13/21   Foot and Ankle Surgery - Inpatient Follow-up  Provider: Dr. Willy Woo DPM  Location: [facility]    Chief Complaint: s/p left great toe amp POD#1    Subjective:  Niko Servin is a 89 y.o. male.     Looking much better and responsive. Wife at bedside.     No Known Allergies    No current facility-administered medications on file prior to encounter.     Current Outpatient Medications on File Prior to Encounter   Medication Sig Dispense Refill   • aspirin 81 MG chewable tablet Chew 81 mg Daily.     • rosuvastatin (CRESTOR) 10 MG tablet TAKE ONE TABLET BY MOUTH EVERY NIGHT AT BEDTIME 30 tablet 5   • sertraline (ZOLOFT) 100 MG tablet TAKE ONE TABLET BY MOUTH DAILY 30 tablet 2   • warfarin (COUMADIN) 7.5 MG tablet TAKE ONE TABLET BY MOUTH DAILY 30 tablet 2       Objective   /65 (BP Location: Left arm, Patient Position: Lying)   Pulse 74   Temp 98.9 °F (37.2 °C) (Oral)   Resp 18   Ht 182.9 cm (72.01\")   Wt 80.7 kg (177 lb 14.6 oz)   SpO2 93%   BMI 24.12 kg/m²         Results from last 7 days   Lab Units 08/13/21  0826   WBC 10*3/mm3 10.00   HEMOGLOBIN g/dL 8.0*   HEMATOCRIT % 24.4*   PLATELETS 10*3/mm3 265       Assessment/Plan     Patient Active Problem List   Diagnosis   • Ataxia   • B-cell lymphoma (CMS/HCC)   • Chronic coronary artery disease   • Depression   • Diffuse large B cell lymphoma (CMS/HCC)   • Hearing problem   • History of mitral valve replacement   • Essential hypertension   • Long term current use of anticoagulant therapy   • Mechanical heart valve present   • Postural hypotension   • Protein calorie malnutrition (CMS/HCC)   • Soft tissue mass   • ST elevation (STEMI) myocardial infarction (CMS/HCC)   • Cellulitis of left lower extremity   • History of B-cell lymphoma   • Elevated INR (international normalized ratio) due to prior anticoagulant medication ingestion   • Mixed hyperlipidemia   • Peripheral vascular disease of lower extremity with ulceration " (CMS/[facility])     Patient is looking much improved. Nursing is to start daily Dakins dressing changes. Revascularization per vascular service at this time. He will likely require closure vs amputation revision pending vascular outcome. Will continue to follow.    Note is dictated utilizing voice recognition software. Unfortunately this leads to occasional typographical errors. I apologize in advance if the situation occurs. If questions occur please do not hesitate to call our office.

## 2021-08-13 NOTE — PROGRESS NOTES
Name: Niko Servin ADMIT: 2021   : 1929  PCP: Casper Saldaña MD    MRN: [id number] LOS: 2 days   AGE/SEX: 89 y.o. male  ROOM: [address] [place] [address]    [place], Subsequent Hospital Care    Chief Complaint   Patient presents with   • Toe Injury     CC: left great toe wound  Subjective     89 y.o. male resting in bed. No new complaints.  Patient is s/p left great toe amputation.     Review of Systems   Unable to perform ROS: Other   Hearing deficit    Objective     Scheduled Medications:   ampicillin-sulbactam, 3 g, Intravenous, Q6H  ipratropium-albuterol, 3 mL, Nebulization, Q6H - RT  rosuvastatin, 10 mg, Oral, Nightly  sertraline, 100 mg, Oral, Daily  sodium chloride, 10 mL, Intravenous, Q12H  vancomycin, 1,000 mg, Intravenous, Q24H        Active Infusions:  heparin, 12 Units/kg/hr  Pharmacy to dose vancomycin,   Pharmacy to dose warfarin,         As Needed Medications:  •  acetaminophen **OR** acetaminophen **OR** acetaminophen  •  aluminum-magnesium hydroxide-simethicone  •  haloperidol lactate  •  heparin  •  heparin  •  magnesium sulfate **OR** magnesium sulfate **OR** magnesium sulfate  •  melatonin  •  Morphine  •  nitroglycerin  •  ondansetron **OR** ondansetron  •  Pharmacy to dose vancomycin  •  Pharmacy to dose warfarin  •  potassium chloride  •  potassium chloride  •  potassium phosphate infusion greater than 15 mMoles **OR** potassium phosphate infusion greater than 15 mMoles **OR** potassium phosphate **OR** sodium phosphate IVPB **OR** sodium phosphate IVPB **OR** sodium phosphate IVPB  •  sodium chloride  •  sodium chloride    Vital Signs  Vital Signs   Patient Vitals for the past 24 hrs:   BP Temp Temp src Pulse Resp SpO2   21 0648 -- -- -- 83 18 93 %   21 0645 -- -- -- 78 18 91 %   21 0339 117/54 98.2 °F (36.8 °C) Oral 73 18 95 %   21 0025 107/61 98.4 °F (36.9 °C) Axillary 69 20 98 %   21 2354 -- -- -- 70 20 93 %   21 1982 -- -- -- 72 20  93 %   08/12/21 1936 117/49 97.4 °F (36.3 °C) Oral 70 20 93 %   08/12/21 1812 122/51 98.7 °F (37.1 °C) Temporal 77 (!) 30 94 %   08/12/21 1757 127/48 -- -- 70 (!) 29 95 %   08/12/21 1742 (!) 91/39 -- -- 72 27 93 %   08/12/21 1727 (!) 92/37 -- -- 70 24 94 %   08/12/21 1722 (!) 92/37 -- -- 70 26 98 %   08/12/21 1717 (!) 95/38 -- -- 70 23 95 %   08/12/21 1712 (!) 85/37 98.3 °F (36.8 °C) Temporal 65 21 96 %   08/12/21 1552 110/74 99.8 °F (37.7 °C) Temporal -- (!) 30 95 %   08/12/21 1345 -- -- -- 73 18 --   08/12/21 1340 -- -- -- 71 18 90 %   08/12/21 1056 138/63 98.2 °F (36.8 °C) -- 72 18 (!) 89 %     I/O:  I/O last 3 completed shifts:  In: 1090 [P.O.:240; IV Piggyback:850]  Out: 200 [Urine:200]  BMI:  Body mass index is 24.12 kg/m².    Physical Exam:  Physical Exam  Constitutional:       Appearance: He is ill-appearing.       Cardiovascular:      Rate and Rhythm: Normal rate and regular rhythm. Occasional extrasystoles are present.     Pulses:           Radial pulses are 2+ on the right side and 2+ on the left side.        Femoral pulses are 2+ on the right side and 0 on the left side.       Dorsalis pedis pulses are detected w/ Doppler on the right side and detected w/ Doppler on the left side.   Pulmonary:      Breath sounds: Rales present.   Abdominal:      General: Abdomen is flat. Bowel sounds are normal.      Palpations: Abdomen is soft.  Left great toe, amputation, remains wrapped.        Results Review:     CBC    Results from last 7 days   Lab Units 08/13/21  0826 08/13/21  0332 08/12/21  0302 08/11/21  0522 08/10/21  1835 08/10/21  0450 08/10/21  0018   WBC 10*3/mm3 10.00 9.10 8.70 10.30 10.90* 9.10 12.00*   HEMOGLOBIN g/dL 8.0* 8.2* 7.9* 8.7* 8.7* 8.3* 9.4*   PLATELETS 10*3/mm3 265 278 254 267 292 261 302     BMP   Results from last 7 days   Lab Units 08/13/21  0332 08/12/21  0302 08/11/21  0522 08/10/21  1835 08/10/21  0450 08/10/21  0018   SODIUM mmol/L 143 143 142 141 142 141   POTASSIUM mmol/L 3.8 3.9  3.8 4.0 3.9 4.1   CHLORIDE mmol/L 103 106 105 105 106 106   CO2 mmol/L 31.0* 28.0 28.0 26.0 26.0 26.0   BUN mg/dL 26* 28* 30* 32* 36* 35*   CREATININE mg/dL 1.18 1.19 1.28* 1.27 1.20 1.25   GLUCOSE mg/dL 82 89 95 123* 115* 113*   MAGNESIUM mg/dL 1.6* 1.6* 1.5*  --   --   --      Coag   Results from last 7 days   Lab Units 08/13/21  0826 08/13/21  0332 08/12/21  0302 08/11/21  0522 08/10/21  0603 08/10/21  0450 08/10/21  0018   INR  1.66* 1.90* >=8.00* >=8.00* 4.24* 3.92* >=8.00*   APTT seconds 39.9*  --   --   --   --   --   --      HbA1C   Lab Results   Component Value Date    HGBA1C 6.0 (H) 08/11/2021     Infection   Results from last 7 days   Lab Units 08/10/21  0031 08/10/21  0018   BLOODCX  Proteus mirabilis* No growth at 3 days   BCIDPCR  Proteus spp. Identification by BCID PCR.*  --      Radiology(recent) CT Chest Without Contrast Diagnostic    Result Date: 8/11/2021   1. Tiny layering pleural effusions with mild bilateral basilar subsegmental atelectasis. 2. The study is difficult to interpret due to respiratory motion artifact. There are some areas of interstitial thickening and hazy groundglass density. I would favor pulmonary edema over an atypical infection. 3. Cardiomegaly with extensive coronary artery atherosclerotic vascular disease. 4. Dense sludge within the gallbladder.  Electronically Signed By-Hammad Ragland MD On:8/11/2021 7:35 PM This report was finalized on [id number] by  Hammad Ragland MD.    CT Angio Abdominal Aorta Bilateral Iliofem Runoff    Result Date: 8/12/2021   1. Diffuse atherosclerotic disease to approximately the popliteal level bilaterally without any significant stenosis above the popliteal artery. 2. Greater than 70% stenosis of the left popliteal artery. 3. Significant trifurcation level disease bilaterally with single vessel runoff via the peroneal artery. 4. Right renal and bladder stones with bilateral hydronephrosis. There is marked bladder distention with multiple bladder  diverticula  and prostatic enlargement suggesting bladder outlet obstruction. 5. Multiple incidental nonvascular findings as noted above.  Electronically Signed By-Tushar Montes MD On:8/12/2021 1:32 PM This report was finalized on [id number] by  Tushar Montes MD.      Assessment/Plan     Assessment & Plan      Cellulitis of left lower extremity    Chronic coronary artery disease    Depression    Hearing problem    History of mitral valve replacement    Essential hypertension    Long term current use of anticoagulant therapy    Mechanical heart valve present    History of B-cell lymphoma    Elevated INR (international normalized ratio) due to prior anticoagulant medication ingestion    Mixed hyperlipidemia      89 y.o. male s/p left great toe amputation, tolerated procedure well.    Pulmonary following  Patient requiring 3LO2  nasal cannula, not on home oxygen per     Podiatry following, Dr. Woo  Status post great toe amputation at the metatarsophalangeal joint.    Cardiology following, Dr. Montoya  Patient with mechanical mitral valve, Warfarin has been discontinued.  INR 1.9, heparin initiated.  Echocardiogram EF= 56-60%  At this time there are no contraindications to proceeding forward with surgery from cardiovascular standpoint noted on 8/12/21    Nephrology following, Dr. Bemreo  8/13/21 Cr 1.18      Plan  Left lower extremity arteriogram with possible intervention on Tuesday 8/17/2021 by Dr. Elizabeth    CT angiogram shows relatively normal and unobstructed vasculature down to the popliteal arteries.  He has significant infrapopliteal disease on the left leg.  We will plan arteriogram on Tuesday with possible intervention pending angiographic evaluation.      Nancy Paulson PA-C  08/13/21  09:13 EDT    Please call my office with any question: [phone]    Active Hospital Problems    Diagnosis  POA   • **Cellulitis of left lower extremity [L03.116]  Yes   • History of B-cell lymphoma [Z85.72]  Not  Applicable   • Elevated INR (international normalized ratio) due to prior anticoagulant medication ingestion [R79.1]  Yes   • Mixed hyperlipidemia [E78.2]  Yes   • Depression [F32.9]  Yes   • Essential hypertension [I10]  Yes   • Mechanical heart valve present [Z95.2]  Not Applicable   • Hearing problem [H91.90]  Yes   • Chronic coronary artery disease [I25.10]  Yes   • Long term current use of anticoagulant therapy [Z79.01]  Not Applicable   • History of mitral valve replacement [Z95.2]  Not Applicable      Resolved Hospital Problems   No resolved problems to display.

## 2021-08-13 NOTE — CASE MANAGEMENT/SOCIAL WORK
Continued Stay Note  [facility]     Patient Name: Niko Servin  MRN: [id number]  Today's Date: 8/13/2021    Admit Date: 8/9/2021    Discharge Plan     Row Name 08/13/21 1253       Plan    Plan  Referral made to [facility] in [place]. Pending acceptance and will need precert. No pasrr for [facility] , nut may need pasrr if goes subacute    Plan Comments  Spoke to spouse and patient at bedside. Patient with episodes of confusion. Spouse states she would like a referral made to [facility] in [facility]. Referral made to [facility] at [facility], [facility] sent and [facility] forme faxed to them.          OSMANY Hawk RN  Complex Case Manager  [facility]  [phone]  [phone]-office  [phone]  [email or web address]

## 2021-08-13 NOTE — PROGRESS NOTES
"NEPHROLOGY PROGRESS NOTE------[facility]    [facility]  [phone]  Cory Bermeo MD      Patient Care Team:  Casper Saldaña MD as PCP - General  Casper Saldaña MD as PCP - Family Medicine  Elvie, Cory BERMUDEZ MD as Consulting Physician (Nephrology)      Provider:  Cory Bermeo MD  Patient Name: Niko Servin  :  1929    SUBJECTIVE:  F/u CKD  NO chest pain or SOA  S/p amputation left great toe    Medication:  [MAR Hold] Acetylcysteine, 600 mg, Oral, BID  ampicillin-sulbactam, 3 g, Intravenous, Q6H  ipratropium-albuterol, 3 mL, Nebulization, Q6H - RT  rosuvastatin, 10 mg, Oral, Nightly  sertraline, 100 mg, Oral, Daily  sodium chloride, 10 mL, Intravenous, Q12H  vancomycin, 1,000 mg, Intravenous, Q24H      Pharmacy to dose vancomycin,   Pharmacy to dose warfarin,   sodium chloride, 75 mL/hr, Last Rate: 75 mL/hr (21 1030)        OBJECTIVE    Vital Sign Min/Max for last 24 hours  Temp  Min: 97.4 °F (36.3 °C)  Max: 99.8 °F (37.7 °C)   BP  Min: 85/37  Max: 138/63   Pulse  Min: 65  Max: 83   Resp  Min: 18  Max: 30   SpO2  Min: 89 %  Max: 98 %   No data recorded   No data recorded     Flowsheet Rows      First Filed Value   Admission Height  182.9 cm (72\") Documented at 2021   Admission Weight  90.7 kg (200 lb) Documented at 2021          No intake/output data recorded.  I/O last 3 completed shifts:  In: 1090 [P.O.:240; IV Piggyback:850]  Out: 200 [Urine:200]    Physical Exam:  General Appearance: alert, appears stated age and cooperative. NAD. [place]  Head: normocephalic, without obvious abnormality and atraumatic  Eyes: conjunctivae and sclerae normal and no icterus  Neck: supple and no JVD  Lungs: clear to auscultation and respirations regular  Heart: regular rhythm & normal rate and normal S1, S2  Chest: Wall no abnormalities observed  Abdomen: normal bowel sounds and soft non-tender  Extremities:no " edema, no cyanosis and no redness. Left foot wrapped  Skin: no bleeding, Left LE erythema  Neurologic: Alert No focal deficits    Labs:    WBC WBC   Date Value Ref Range Status   08/13/2021 9.10 3.40 - 10.80 10*3/mm3 Final   08/12/2021 8.70 3.40 - 10.80 10*3/mm3 Final   08/11/2021 10.30 3.40 - 10.80 10*3/mm3 Final   08/10/2021 10.90 (H) 3.40 - 10.80 10*3/mm3 Final      HGB Hemoglobin   Date Value Ref Range Status   08/13/2021 8.2 (L) 13.0 - 17.7 g/dL Final   08/12/2021 7.9 (L) 13.0 - 17.7 g/dL Final   08/11/2021 8.7 (L) 13.0 - 17.7 g/dL Final   08/10/2021 8.7 (L) 13.0 - 17.7 g/dL Final      HCT Hematocrit   Date Value Ref Range Status   08/13/2021 25.5 (L) 37.5 - 51.0 % Final   08/12/2021 25.1 (L) 37.5 - 51.0 % Final   08/11/2021 26.3 (L) 37.5 - 51.0 % Final   08/10/2021 27.0 (L) 37.5 - 51.0 % Final      Platlets No results found for: LABPLAT   MCV MCV   Date Value Ref Range Status   08/13/2021 83.2 79.0 - 97.0 fL Final   08/12/2021 83.2 79.0 - 97.0 fL Final   08/11/2021 83.9 79.0 - 97.0 fL Final   08/10/2021 82.7 79.0 - 97.0 fL Final          Sodium Sodium   Date Value Ref Range Status   08/13/2021 143 136 - 145 mmol/L Final   08/12/2021 143 136 - 145 mmol/L Final   08/11/2021 142 136 - 145 mmol/L Final   08/10/2021 141 136 - 145 mmol/L Final      Potassium Potassium   Date Value Ref Range Status   08/13/2021 3.8 3.5 - 5.2 mmol/L Final   08/12/2021 3.9 3.5 - 5.2 mmol/L Final   08/11/2021 3.8 3.5 - 5.2 mmol/L Final   08/10/2021 4.0 3.5 - 5.2 mmol/L Final      Chloride Chloride   Date Value Ref Range Status   08/13/2021 103 98 - 107 mmol/L Final   08/12/2021 106 98 - 107 mmol/L Final   08/11/2021 105 98 - 107 mmol/L Final   08/10/2021 105 98 - 107 mmol/L Final      CO2 CO2   Date Value Ref Range Status   08/13/2021 31.0 (H) 22.0 - 29.0 mmol/L Final   08/12/2021 28.0 22.0 - 29.0 mmol/L Final   08/11/2021 28.0 22.0 - 29.0 mmol/L Final   08/10/2021 26.0 22.0 - 29.0 mmol/L Final      BUN BUN   Date Value Ref Range Status    08/13/2021 26 (H) 8 - 23 mg/dL Final   08/12/2021 28 (H) 8 - 23 mg/dL Final   08/11/2021 30 (H) 8 - 23 mg/dL Final   08/10/2021 32 (H) 8 - 23 mg/dL Final      Creatinine Creatinine   Date Value Ref Range Status   08/13/2021 1.18 0.76 - 1.27 mg/dL Final   08/12/2021 1.19 0.76 - 1.27 mg/dL Final   08/11/2021 1.28 (H) 0.76 - 1.27 mg/dL Final   08/10/2021 1.27 0.76 - 1.27 mg/dL Final      Calcium Calcium   Date Value Ref Range Status   08/13/2021 7.9 (L) 8.2 - 9.6 mg/dL Final   08/12/2021 7.7 (L) 8.2 - 9.6 mg/dL Final   08/11/2021 8.2 8.2 - 9.6 mg/dL Final   08/10/2021 8.2 8.2 - 9.6 mg/dL Final      PO4 No components found for: PO4   Albumin Albumin   Date Value Ref Range Status   08/11/2021 2.60 (L) 3.50 - 5.20 g/dL Final      Magnesium Magnesium   Date Value Ref Range Status   08/13/2021 1.6 (L) 1.7 - 2.3 mg/dL Final   08/12/2021 1.6 (L) 1.7 - 2.3 mg/dL Final   08/11/2021 1.5 (L) 1.7 - 2.3 mg/dL Final      Uric Acid No components found for: URIC ACID     Imaging Results (Last 72 Hours)     Procedure Component Value Units Date/Time    CT Angio Abdominal Aorta Bilateral Iliofem Runoff [[id number]] Collected: 08/12/21 1320     Updated: 08/12/21 1345    Narrative:         DATE OF EXAM:  8/12/2021 10:04 AM     PROCEDURE:  CT ANGIO ABDOMINAL AORTA BILAT ILIOFEM RUNOFF-     INDICATIONS:   Gangrenous, necrotic malodorous left great toe wound; L03.116-Cellulitis  of left lower limb; R79.1-Abnormal coagulation profile, pain and  weakness in both legs     COMPARISON:   CT of the abdomen and pelvis dated 01/01/2016     TECHNIQUE:  Routine transaxial slices were obtained through the abdomen, pelvis, and  both lower extremities after the intravenous administration of 100 mL  Isovue 370. Reconstructed coronal and sagittal images were also  obtained. In addition, a 3 D volume rendered image was obtained after  post processing. Automated exposure control and iterative reconstruction  methods were used.     FINDINGS:  Vascular  findings: There is no significant aortic stenosis. There is  mild aortic plaque present. There is plaque at the origins of the SMA  and celiac axis and extensive plaque in the splenic artery and  throughout the course of the celiac axis. The degree of narrowing  appears less than 50% and celiac axis. There is calcified  atherosclerotic plaque in the right and left renal arteries without  hemodynamically significant stenosis. The JANUSZ is patent. There is  atherosclerotic plaque throughout the common and external iliac arteries  without significant stenosis.     Below the inguinal ligament on the right, the patient has plaque in the  superficial femoral artery without significant stenosis. There is  extensive plaque throughout the popliteal artery with narrowing in  several locations approaching 50%. There is diffuse disease in the  trifurcation vessels. The dominant runoff vessel is the peroneal which  passes to the ankle. The anterior tibial appears to fill segmentally.  The posterior tibial artery is occluded.     In the left lower extremity, there is plaque throughout the superficial  femoral artery without significant stenosis there is plaque in the  popliteal artery with at least one high-grade stenosis of greater than  70% just above the knee joint. This is relatively focal in nature. There  is plaque in the tibioperoneal trunk. The dominant runoff vessel is the  peroneal artery which passes to the ankle. The anterior tibial artery  shows diffuse disease but is patent segmentally to the ankle. The  posterior tibial artery is essentially occluded.     Nonvascular findings: There are chronic interstitial fibrotic changes  and emphysematous changes present in the lung bases. There is cardiac  enlargement with postoperative changes of prior CABG. There is trace  pleural fluid bilaterally with mild by basilar atelectasis. The liver,  spleen, adrenal glands, and pancreas have a normal appearance. There are  multiple  layering gallstones within the gallbladder. Nonobstructing  stones are present within the right kidney. There are calculi  dependently within the urinary bladder. There is bilateral  hydronephrosis. There is marked bladder distention with multiple bladder  diverticula. The prostate appears enlarged and slightly irregular in  appearance. No dilated loops of bowel are seen. There is extensive  sigmoid diverticulosis without diverticulitis. There is a right inguinal  hernia containing fat. There is advanced multilevel degenerative disc  and facet disease. There is diffuse osteopenia. 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 There is soft tissue edema of the  left foot and apparently an open wound over the great toe of the left  foot.          Impression:         1. Diffuse atherosclerotic disease to approximately the popliteal level  bilaterally without any significant stenosis above the popliteal artery.  2. Greater than 70% stenosis of the left popliteal artery.  3. Significant trifurcation level disease bilaterally with single vessel  runoff via the peroneal artery.  4. Right renal and bladder stones with bilateral hydronephrosis. There  is marked bladder distention with multiple bladder diverticula  and  prostatic enlargement suggesting bladder outlet obstruction.  5. Multiple incidental nonvascular findings as noted above.     Electronically Signed By-Tushar Montes MD On:8/12/2021 1:32 PM  This report was finalized on [id number] by  Tushar Montes MD.    CT Chest Without Contrast Diagnostic [[id number]] Collected: 08/11/21 1931     Updated: 08/11/21 1937    Narrative:         DATE OF EXAM:  8/11/2021 5:13 PM     PROCEDURE:  CT CHEST WO CONTRAST DIAGNOSTIC-     INDICATIONS:   Shortness of breath and confusion.     COMPARISON:   12/31/2015.     TECHNIQUE:  Routine transaxial slices were obtained through the chest without the  administration of intravenous contrast. Reconstructed coronal and  sagittal  images were also obtained. Automated exposure control and  iterative construction methods were used.      FINDINGS:  There are tiny layering pleural effusions. There is respiratory motion  artifact. There is mild bilateral basilar subsegmental atelectasis.  There is suggestion of some areas of interstitial thickening and some  hazy areas of groundglass density. This may be secondary to pulmonary  edema. An atypical infection is considered less likely. The heart is  enlarged. There are extensive atherosclerotic vascular calcifications  including involvement of the coronary arteries. The patient has had a  previous CABG procedure. There are no enlarged mediastinal lymph nodes.  The hilar regions are difficult to evaluate without contrast. The  patient has dense sludge within the gallbladder. There are no acute  findings beneath the hemidiaphragms. There are no suspicious osteolytic  or sclerotic lesions within the bony structures. There are old healed  bilateral rib fractures.        Impression:         1. Tiny layering pleural effusions with mild bilateral basilar  subsegmental atelectasis.  2. The study is difficult to interpret due to respiratory motion  artifact. There are some areas of interstitial thickening and hazy  groundglass density. I would favor pulmonary edema over an atypical  infection.  3. Cardiomegaly with extensive coronary artery atherosclerotic vascular  disease.  4. Dense sludge within the gallbladder.     Electronically Signed By-Hammad Ragland MD On:8/11/2021 7:35 PM  This report was finalized on [id number] by  Hammad Ragland MD.    XR Chest 2 View [[id number]] Collected: 08/10/21 1240     Updated: 08/10/21 1245    Narrative:      DATE OF EXAM:  8/10/2021 12:20 PM     PROCEDURE:  XR CHEST 2 VW-     INDICATIONS:  SOA; L03.116-Cellulitis of left lower limb; R79.1-Abnormal coagulation  profile     COMPARISON:  A 10/20/2021 at 12:40 AM, 6/8/2016 at 5:39 PM, 6/7/2016 at 9:47 PM     TECHNIQUE:   Two  radiologic views of the chest , PA and lateral were obtained.     FINDINGS:  Extensive chronic changes are again seen throughout the lungs. These  findings are again stable given differences in technique. No new  consolidations or pleural effusions are observed. The cardiac silhouette  and mediastinum are unchanged. Stable cardiomegaly is noted.  Postoperative changes are noted. No acute osseous abnormalities are  seen.       Impression:      Chronic changes are again noted which are stable. There is no definitive  evidence for acute cardiopulmonary process.     Electronically Signed By-Aneesh Judd MD On:8/10/2021 12:43 PM  This report was finalized on [id number] by  Aneesh Judd MD.          Results for orders placed during the hospital encounter of 08/09/21    XR Chest 2 View    Narrative  DATE OF EXAM:  8/10/2021 12:20 PM    PROCEDURE:  XR CHEST 2 VW-    INDICATIONS:  SOA; L03.116-Cellulitis of left lower limb; R79.1-Abnormal coagulation  profile    COMPARISON:  A 10/20/2021 at 12:40 AM, 6/8/2016 at 5:39 PM, 6/7/2016 at 9:47 PM    TECHNIQUE:  Two radiologic views of the chest , PA and lateral were obtained.    FINDINGS:  Extensive chronic changes are again seen throughout the lungs. These  findings are again stable given differences in technique. No new  consolidations or pleural effusions are observed. The cardiac silhouette  and mediastinum are unchanged. Stable cardiomegaly is noted.  Postoperative changes are noted. No acute osseous abnormalities are  seen.    Impression  Chronic changes are again noted which are stable. There is no definitive  evidence for acute cardiopulmonary process.    Electronically Signed By-Aneesh Judd MD On:8/10/2021 12:43 PM  This report was finalized on [id number] by  Aneesh Judd MD.      XR Foot 3+ View Left    Narrative  DATE OF EXAM:  8/10/2021 12:40 AM    PROCEDURE:  XR FOOT 3+ VW LEFT-    INDICATIONS:  Prior injury to left great toe on furniture several  weeks ago. Recent  discovery of injury with wound and swelling and erythema involving the  great toe.    COMPARISON:  No Comparisons Available    TECHNIQUE:  A minimum of three routine standard radiographic views were obtained of  the left foot.    FINDINGS:  Soft tissue swelling is seen throughout the great toe and extending into  the medial aspect of the forefoot/midfoot. There is suggestion of  possible gas production throughout the soft tissues. Multiple areas of  cortical erosion are identified throughout the proximal and distal  phalanges of the great toe. There is also abnormal lucency throughout  the bone. These findings indicate changes of osteomyelitis.    Impression  1.Evidence for soft tissue swelling throughout the great toe with areas  of gas production. The findings indicate changes of soft tissue  cellulitis.  2.Evidence for osteomyelitis involving the proximal and distal phalanges  of the great toe.    Electronically Signed By-Aneesh Judd MD On:8/10/2021 7:46 AM  This report was finalized on [id number] by  Aneesh Judd MD.      XR Chest 1 View    Narrative  DATE OF EXAM:  8/10/2021 12:40 AM    PROCEDURE:  XR CHEST 1 VW-    INDICATIONS:  Shortness of breath. Prior Covid 19 infection. Unsteady gait.    COMPARISON:  6/8/2016 at 5:39 PM, 6/7/2016 at 9:47 PM, 12/30/2015 at 8:28 PM    TECHNIQUE:  [Portable chest radiograph]    FINDINGS:  Extensive chronic changes are again seen throughout the lungs which are  stable. No new consolidations or pleural effusions are observed. The  cardiac silhouette and mediastinum are stable. Postoperative changes are  noted. Stable cardiomegaly is noted. No acute osseous abnormalities are  identified.    Impression  Chronic changes are noted which are stable. There is no evidence for  definitive acute cardiopulmonary process.    Electronically Signed By-Aneesh Judd MD On:8/10/2021 7:43 AM  This report was finalized on [id number] by  Aneesh Judd  MD.      Results for orders placed during the hospital encounter of 08/09/21    Duplex Vein Mapping Lower Extremity - Bilateral CAR    Interpretation Summary  · The left greater saphenous vein is patent and of adequate size in the thigh.  · The left greater saphenous vein is patent and of adequate size in the calf.        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 Previous UA neg.  · HTN  · Left LE cellulitis with gangrenous great toe  · CHF--on lasix  · Coumadin toxicity.  · Gangrenous left great toe--post amputation    Cr stable  Stop fluids      Cory Bermeo MD  [facility]  [phone]  08/13/21  07:28 EDT

## 2021-08-13 NOTE — PLAN OF CARE
Outcome Summary: Patient complaints of pain. PRN morphine given and effective. Initiated heparin gtt and inserted valerio cath per MD orders. Changed left toe dressing with Dakin's solution per MD order. Patient pleasant and cooperative. Remains on IV ABTs.

## 2021-08-13 NOTE — PROGRESS NOTES
Cardiology    Patient Care Team:  Casper Saldaña MD as PCP - General  Casper Saldaña MD as PCP - Family Medicine  Cory Bermeo MD as Consulting Physician (Nephrology)        CHIEF COMPLAINT: Foot pain    ADMITTING DIAGNOSES: Limb ischemia    SUBJECTIVE: No complaints from CV standpoint, status post amputation left great toe, pending evaluation from vascular surgery on my encounter, NPO presently for any procedures, no chest pain or shortness of breath appears well compensated euvolemic today, gentle nasal cannula oxygen only, wife at bedside    Left foot pain only complaint    Review of Symptoms:  Constitutional: Patient afebrile no chills or unexpected weight changes  Respiratory: No cough, no wheezing or dyspnea  Cardiovascular: No chest pain, palpitations, dyspnea, orthopnea and no edema  Gastrointestinal: No nausea, vomiting, constipation or diarrhea.  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 No pertinent family history.      SOCIAL HISTORY:   Social History     Socioeconomic History   • Marital status:      Spouse name: Not on file   • Number of children: Not on file   • Years of education: Not on file   • Highest education  level: Not on file   Tobacco Use   • Smoking status: Former Smoker   • Smokeless tobacco: Never Used   Vaping Use   • Vaping Use: Never used   Substance and Sexual Activity   • Alcohol use: Yes     Alcohol/week: 5.0 standard drinks     Types: 5 Cans of beer per week   • Drug use: No   • Sexual activity: Defer       CURRENT MEDICATIONS:     Current Facility-Administered Medications:   •  acetaminophen (TYLENOL) tablet 650 mg, 650 mg, Oral, Q4H PRN, 650 mg at 08/12/21 2020 **OR** acetaminophen (TYLENOL) 160 MG/5ML solution 650 mg, 650 mg, Oral, Q4H PRN **OR** acetaminophen (TYLENOL) suppository 650 mg, 650 mg, Rectal, Q4H PRN, CHITO Woo DPM  •  aluminum-magnesium hydroxide-simethicone (MAALOX MAX) 400-400-40 MG/5ML suspension 15 mL, 15 mL, Oral, Q6H PRN, CHITO Woo DPM  •  ampicillin-sulbactam (UNASYN) 3 g in sodium chloride 0.9 % 100 mL IVPB-MBP, 3 g, Intravenous, Q6H, CHITO Woo DPM, 3 g at 08/13/21 0819  •  haloperidol lactate (HALDOL) injection 1 mg, 1 mg, Intravenous, Q6H PRN, CHITO Woo DPM  •  heparin 31212 units/250 mL (100 units/mL) in 0.45 % NaCl infusion, 12 Units/kg/hr, Intravenous, Titrated, Casper Laura MD, Last Rate: 9.68 mL/hr at 08/13/21 1140, 12 Units/kg/hr at 08/13/21 1140  •  heparin bolus from bag 2,400 Units, 30 Units/kg, Intravenous, Q6H PRN, Casper Laura MD  •  heparin bolus from bag 4,800 Units, 60 Units/kg, Intravenous, Q6H PRN, Casper Larua MD  •  ipratropium-albuterol (DUO-NEB) nebulizer solution 3 mL, 3 mL, Nebulization, Q6H - RT, CHITO Woo DPM, 3 mL at 08/13/21 0645  •  Magnesium Sulfate 2 gram Bolus, followed by 8 gram infusion (total Mg dose 10 grams)- Mg less than or equal to 1mg/dL, 2 g, Intravenous, PRN **OR** Magnesium Sulfate 2 gram / 50mL Infusion (GIVE X 3 BAGS TO EQUAL 6GM TOTAL DOSE) - Mg 1.1 - 1.5 mg/dl, 2 g, Intravenous, PRN **OR** Magnesium Sulfate 4 gram infusion- Mg 1.6-1.9 mg/dL, 4 g,  Intravenous, PRN, CHITO Woo DPM, Last Rate: 25 mL/hr at 08/13/21 0818, 4 g at 08/13/21 0818  •  melatonin tablet 5 mg, 5 mg, Oral, Nightly PRN, CHITO Woo DPM  •  Morphine sulfate (PF) injection 2 mg, 2 mg, Intravenous, Q4H PRN, CHITO Woo DPM, 2 mg at 08/13/21 0818  •  nitroglycerin (NITROSTAT) SL tablet 0.4 mg, 0.4 mg, Sublingual, Q5 Min PRN, CHITO Woo DPM  •  ondansetron (ZOFRAN) tablet 4 mg, 4 mg, Oral, Q6H PRN **OR** ondansetron (ZOFRAN) injection 4 mg, 4 mg, Intravenous, Q6H PRN, CHITO Woo DPM  •  Pharmacy to dose vancomycin, , Does not apply, Continuous PRN, CHITO Woo DPM  •  Pharmacy to dose warfarin, , Does not apply, Continuous PRN, CHITO Woo DPM  •  potassium chloride (K-DUR,KLOR-CON) CR tablet 40 mEq, 40 mEq, Oral, PRN, CHITO Woo DPM  •  potassium chloride 10 mEq in 100 mL IVPB, 10 mEq, Intravenous, Q1H PRN, CHITO Woo DPM  •  potassium phosphate 45 mmol in sodium chloride 0.9 % 500 mL infusion, 45 mmol, Intravenous, PRN **OR** potassium phosphate 30 mmol in sodium chloride 0.9 % 250 mL infusion, 30 mmol, Intravenous, PRN **OR** potassium phosphate 15 mmol in 0.9% sodium chloride 100 mL IVPB, 15 mmol, Intravenous, PRN **OR** sodium phosphates 45 mmol in sodium chloride 0.9 % 500 mL IVPB, 45 mmol, Intravenous, PRN **OR** sodium phosphates 30 mmol in sodium chloride 0.9 % 250 mL IVPB, 30 mmol, Intravenous, PRN **OR** sodium phosphates 15 mmol in sodium chloride 0.9 % 250 mL IVPB, 15 mmol, Intravenous, PRN, CHITO Woo DPM  •  rosuvastatin (CRESTOR) tablet 10 mg, 10 mg, Oral, Nightly, CHITO Woo DPM, 10 mg at 08/12/21 2020  •  sertraline (ZOLOFT) tablet 100 mg, 100 mg, Oral, Daily, CHITO Woo DPM, 100 mg at 08/13/21 0819  •  sodium chloride 0.9 % flush 10 mL, 10 mL, Intravenous, PRN, CHITO Woo DPM  •  sodium chloride 0.9 % flush 10 mL, 10 mL, Intravenous, Q12H, CHITO Woo DPM, 10 mL at 08/13/21 0818  •   sodium chloride 0.9 % flush 10 mL, 10 mL, Intravenous, PRN, CHITO Woo, DPM  •  sodium hypochlorite (DAKIN'S 1/4 STRENGTH) 0.125 % topical solution 0.125% solution, , Topical, BID, CHITO Woo, DPM  •  tamsulosin (FLOMAX) 24 hr capsule 0.4 mg, 0.4 mg, Oral, Daily, Donal, Zaire Valle MD, 0.4 mg at 08/13/21 1053  •  vancomycin (VANCOCIN) 1,000 mg in sodium chloride 0.9 % 250 mL IVPB, 1,000 mg, Intravenous, Q24H, CHITO Woo DPM, 1,000 mg at 08/13/21 0404      DIAGNOSTIC DATA:     I reviewed the patient's new clinical results.    Lab Results (last 24 hours)     Procedure Component Value Units Date/Time    Tissue / Bone Culture - Tissue, Toe, Left [[id number]]  (Abnormal) Collected: 08/12/21 1701    Specimen: Tissue from Toe, Left Updated: 08/13/21 1009     Tissue Culture Moderate growth (3+) Gram Positive Cocci     Gram Stain Moderate (3+) WBCs per low power field      Few (2+) Gram positive cocci, intracellular      Few (2+) Gram positive cocci in clusters    Tissue Pathology Exam [[id number]] Collected: 08/12/21 1716    Specimen: Bone from Toe, Left Updated: 08/13/21 0952    aPTT [[id number]]  (Abnormal) Collected: 08/13/21 0826    Specimen: Blood Updated: 08/13/21 0858     PTT 39.9 seconds     Protime-INR [[id number]]  (Abnormal) Collected: 08/13/21 0826    Specimen: Blood Updated: 08/13/21 0858     Protime 17.7 Seconds      INR 1.66    CBC & Differential [[id number]]  (Abnormal) Collected: 08/13/21 0826    Specimen: Blood Updated: 08/13/21 0847    Narrative:      The following orders were created for panel order CBC & Differential.  Procedure                               Abnormality         Status                     ---------                               -----------         ------                     CBC Auto Differential[[id number]]        Abnormal            Final result                 Please view results for these tests on the individual orders.    CBC Auto Differential [[id number]]   (Abnormal) Collected: 08/13/21 0826    Specimen: Blood Updated: 08/13/21 0847     WBC 10.00 10*3/mm3      RBC 2.90 10*6/mm3      Hemoglobin 8.0 g/dL      Hematocrit 24.4 %      MCV 84.2 fL      MCH 27.5 pg      MCHC 32.7 g/dL      RDW 17.6 %      RDW-SD 52.1 fl      MPV 7.9 fL      Platelets 265 10*3/mm3      Neutrophil % 89.6 %      Lymphocyte % 3.0 %      Monocyte % 5.5 %      Eosinophil % 1.5 %      Basophil % 0.4 %      Neutrophils, Absolute 9.00 10*3/mm3      Lymphocytes, Absolute 0.30 10*3/mm3      Monocytes, Absolute 0.50 10*3/mm3      Eosinophils, Absolute 0.20 10*3/mm3      Basophils, Absolute 0.00 10*3/mm3      nRBC 0.0 /100 WBC     Basic Metabolic Panel [[id number]]  (Abnormal) Collected: 08/13/21 0332    Specimen: Blood Updated: 08/13/21 0441     Glucose 82 mg/dL      BUN 26 mg/dL      Creatinine 1.18 mg/dL      Sodium 143 mmol/L      Potassium 3.8 mmol/L      Chloride 103 mmol/L      CO2 31.0 mmol/L      Calcium 7.9 mg/dL      eGFR Non African Amer 58 mL/min/1.73      BUN/Creatinine Ratio 22.0     Anion Gap 9.0 mmol/L     Narrative:      GFR Normal >60  Chronic Kidney Disease <60  Kidney Failure <15      Magnesium [[id number]]  (Abnormal) Collected: 08/13/21 0332    Specimen: Blood Updated: 08/13/21 0441     Magnesium 1.6 mg/dL     Protime-INR [[id number]]  (Abnormal) Collected: 08/13/21 0332    Specimen: Blood Updated: 08/13/21 0437     Protime 20.1 Seconds      INR 1.90    CBC & Differential [[id number]]  (Abnormal) Collected: 08/13/21 0332    Specimen: Blood Updated: 08/13/21 0421    Narrative:      The following orders were created for panel order CBC & Differential.  Procedure                               Abnormality         Status                     ---------                               -----------         ------                     CBC Auto Differential[[id number]]        Abnormal            Final result                 Please view results for these tests on the individual orders.    CBC Auto  Differential [[id number]]  (Abnormal) Collected: 08/13/21 0332    Specimen: Blood Updated: 08/13/21 0421     WBC 9.10 10*3/mm3      RBC 3.06 10*6/mm3      Hemoglobin 8.2 g/dL      Hematocrit 25.5 %      MCV 83.2 fL      MCH 27.0 pg      MCHC 32.4 g/dL      RDW 17.1 %      RDW-SD 50.3 fl      MPV 7.9 fL      Platelets 278 10*3/mm3      Neutrophil % 87.8 %      Lymphocyte % 4.2 %      Monocyte % 5.1 %      Eosinophil % 2.3 %      Basophil % 0.6 %      Neutrophils, Absolute 8.00 10*3/mm3      Lymphocytes, Absolute 0.40 10*3/mm3      Monocytes, Absolute 0.50 10*3/mm3      Eosinophils, Absolute 0.20 10*3/mm3      Basophils, Absolute 0.10 10*3/mm3      nRBC 0.0 /100 WBC     Blood Culture - Blood, Arm, Left [[id number]] Collected: 08/10/21 0018    Specimen: Blood from Arm, Left Updated: 08/13/21 0030     Blood Culture No growth at 3 days    Anaerobic Culture - Body Fluid, Toe, Left [[id number]] Collected: 08/12/21 1701    Specimen: Body Fluid from Toe, Left Updated: 08/12/21 1802          Imaging Results (Last 24 Hours)     ** No results found for the last 24 hours. **          Xray reviewed personally by physician.      ECG reviewed personally by physician  ECG/EMG Results (most recent)     Procedure Component Value Units Date/Time    ECG 12 Lead [[id number]] Collected: 08/09/21 2358     Updated: 08/10/21 1042     QT Interval 383 ms     Narrative:      HEART RATE= 73  bpm  RR Interval= 852  ms  SD Interval= 210  ms  P Horizontal Axis= 30  deg  P Front Axis= 44  deg  QRSD Interval= 103  ms  QT Interval= 383  ms  QRS Axis= 22  deg  T Wave Axis= 30  deg  - ABNORMAL ECG -  Sinus rhythm  Multiple premature complexes, vent & supraven  Borderline ST depression, anterolateral leads  When compared with ECG of 31-Dec-2015 10:48:16,  Significant change in rhythm: previously junctional  Electronically Signed By: Tushar Poon (Luis A) 10-Aug-2021 10:42:04  Date and Time of Study: 2021-08-09 23:58:23    Adult Transthoracic Echo Complete W/  Cont if Necessary Per Protocol [[id number]] Collected: 08/11/21 1155     Updated: 08/11/21 1509     BSA 2.0 m^2      IVSd 1.1 cm      LVIDd 4.8 cm      LVIDs 3.6 cm      LVPWd 1.1 cm      IVS/LVPW 0.94     FS 26.1 %      EDV(Teich) 108.8 ml      ESV(Teich) 53.1 ml      EF(Teich) 51.2 %      EDV(cubed) 112.3 ml      ESV(cubed) 45.3 ml      EF(cubed) 59.7 %      LV mass(C)d 196.7 grams      LV mass(C)dI 96.4 grams/m^2      SV(Teich) 55.7 ml      SI(Teich) 27.3 ml/m^2      SV(cubed) 67.1 ml      SI(cubed) 32.8 ml/m^2      Ao root diam 3.1 cm      Ao root area 7.4 cm^2      LVOT diam 2.3 cm      LVOT area 4.3 cm^2      EDV(MOD-sp4) 86.8 ml      ESV(MOD-sp4) 33.2 ml      EF(MOD-sp4) 61.8 %      EDV(MOD-sp2) 100.8 ml      ESV(MOD-sp2) 48.8 ml      EF(MOD-sp2) 51.6 %      SV(MOD-sp4) 53.6 ml      SI(MOD-sp4) 26.3 ml/m^2      SV(MOD-sp2) 52.0 ml      SI(MOD-sp2) 25.5 ml/m^2      Ao root area (BSA corrected) 1.5     LV Escobar Vol (BSA corrected) 42.5 ml/m^2      LV Sys Vol (BSA corrected) 16.3 ml/m^2      Aortic R-R 0.79 sec      Aortic HR 76.2 BPM      MV E max mike 87.9 cm/sec      MV A max mike 121.6 cm/sec      MV E/A 0.72     MV V2 max 135.6 cm/sec      MV max PG 7.4 mmHg      MV V2 mean 85.8 cm/sec      MV mean PG 3.3 mmHg      MV V2 VTI 50.4 cm      MVA(VTI) 1.8 cm^2      MV dec slope 363.9 cm/sec^2      MV dec time 0.24 sec      Ao pk mike 317.0 cm/sec      Ao max PG 40.3 mmHg      Ao max PG (full) 37.5 mmHg      Ao V2 mean 212.6 cm/sec      Ao mean PG 20.0 mmHg      Ao mean PG (full) 18.2 mmHg      Ao V2 VTI 63.1 cm      DEMETRA(I,A) 1.4 cm^2      DEMETRA(I,D) 1.4 cm^2      DEMETRA(V,A) 1.1 cm^2      DEMETRA(V,D) 1.1 cm^2      LV V1 max PG 2.9 mmHg      LV V1 mean PG 1.8 mmHg      LV V1 max 84.5 cm/sec      LV V1 mean 63.6 cm/sec      LV V1 VTI 21.1 cm      MR max mike 392.5 cm/sec      MR max PG 61.7 mmHg      CO(Ao) 35.4 l/min      CI(Ao) 17.3 l/min/m^2      SV(Ao) 465.1 ml      SI(Ao) 227.8 ml/m^2      CO(LVOT) 6.9 l/min      CI(LVOT)  "3.4 l/min/m^2      SV(LVOT) 90.3 ml      SI(LVOT) 44.2 ml/m^2      PA V2 max 137.8 cm/sec      PA max PG 7.6 mmHg      PA max PG (full) 5.2 mmHg      PA V2 mean 89.7 cm/sec      PA mean PG 3.6 mmHg      PA mean PG (full) 2.4 mmHg      PA V2 VTI 26.6 cm      RV V1 max PG 2.4 mmHg      RV V1 mean PG 1.2 mmHg      RV V1 max 77.2 cm/sec      RV V1 mean 51.5 cm/sec      RV V1 VTI 14.1 cm      TR max gwyn 279.5 cm/sec      Pulm Sys Gwyn 56.3 cm/sec      Pulm Escobar Gwyn 49.6 cm/sec      Pulm S/D 1.1     Pulm A Revs Dur 0.13 sec      Pulm A Revs Gwyn 25.4 cm/sec       CV ECHO KANDY - BZI_BMI 24.5 kilograms/m^2       CV ECHO KANDY - BSA(HAYCOCK) 2.0 m^2       CV ECHO KANDY - BZI_METRIC_WEIGHT 82.1 kg       CV ECHO KANDY - BZI_METRIC_HEIGHT 182.9 cm      EF(MOD-bp) 57.0 %      LA dimension(2D) 4.5 cm     Narrative:      · Left ventricular wall thickness is consistent with mild concentric   hypertrophy.  · Estimated left ventricular EF was in agreement with the calculated left   ventricular EF. Left ventricular ejection fraction appears to be 56 - 60%.   Left ventricular systolic function is normal.  · Estimated right ventricular systolic pressure from tricuspid   regurgitation is mildly elevated (35-45 mmHg).  · Mild pulmonary hypertension is present.  · Left ventricular diastolic function is consistent with (grade Ia w/high   LAP) impaired relaxation.  · Mild to moderate aortic valve stenosis is present.                 VITAL SIGNS: Temp:  [97.4 °F (36.3 °C)-99.8 °F (37.7 °C)] 98.2 °F (36.8 °C)  Heart Rate:  [65-83] 83  Resp:  [18-30] 18  BP: ()/(37-74) 117/54   Flowsheet Rows      First Filed Value   Admission Height  182.9 cm (72\") Documented at 08/09/2021 2156   Admission Weight  90.7 kg (200 lb) Documented at 08/09/2021 2156        Physical exam  Constitutional: well-nourished, and appears stated age in no acute distress, 89 years old  PERRL: Conjunctiva clear, no pallor, anicteric  HENMT: normocephalic, normal " dentition, no cyanosis or pallor  Neck:no bruits, or thrills and bilateral normal carotid upstroke. Normal jugular venous pressure  Cardiovascular: S1-S2, faint 1 out of 6 systolic ejection murmur, no heave no lift, normal S1-S2.  Metallic click  Lungs: unlabored, no wheezing with no rales or rhonchi on auscultation.  Extremities: Warm and dry, left extremity bandaged, pulses okay in the radials, diminished pulses left leg  Abdomen: soft, non-tender, non-distended  Musculoskeletal: Bandage in place, left great toe amputation, moves extremities  Skin: Warm and dry, non-erythematous   Neuro:alert and normal affect. Oriented to time, place and person.        ASSESSMENT AND PLAN:   Cellulitis of left lower extremity    Chronic coronary artery disease    Depression    Hearing problem    History of mitral valve replacement    Essential hypertension    Long term current use of anticoagulant therapy    Mechanical heart valve present    History of B-cell lymphoma    Elevated INR (international normalized ratio) due to prior anticoagulant medication ingestion    Mixed hyperlipidemia     1. Perioperative ischemic evaluation  - 2D ECHO preserved LV and RV size and function EF 55 to 60%  -Defer ischemic evaluation presently 89 years old  -Possibly not surgical candidate, will defer to vascular surgery on endovascular versus surgical approach depending on findings and recommendation, unclear if he is an endovascular candidate  -Will need bridging with heparin preoperatively once INR is less than 2.5 with mechanical mitral valve  -No reversal of INR is recommended with FFP with mechanical mitral valve, high valve thrombosis risk  -Continue perioperative aspirin therapy  -Underlying hemoglobin 8.7     2. Supratherapeutic INR  - INR remains > 8  Start heparin bridge with INR now less than 2.5     3. LE cellulitis  - abx per primary/ vascular      4. CAD remote history of bypass revascularization  -Asymptomatic with no anginal chest  pain  -Likely no benefit to invasive or noninvasive ischemic evaluation revascularization prior to left lower extremity revascularization with threatened limb,  -Optimize medicines perioperatively avoid hypotension  -Continue perioperative aspirin statin therapy     5. VHD with mechanical valve, goal INR 2.5-3.5  Plan:   At this time there is no benefit for an urgent ischemic evaluation preoperatively.  Now status post left great toe amputation  Further recommendations per vascular surgery on any degree of revascularization that is warranted  Start heparin bridging for INR less than 2.5 with mechanical mitral valve  Avoid FFP for any cause other than intracranial bleeding      At this time there are no contraindications to proceeding forward with surgery from cardiovascular standpoint.  This is primarily due to patient's threatened limb outweighing the benefit of ischemic INR still supratherapeutic.     Heparin drip started today CV standpoint     Greater than 35 minutes total of face-to-face/floor  time was spent with the patient and family and nursing coordinating plan and patient management.  Of which counseling of patient with regard specifically to cardiovascular issues comprised 50% of this total time.            Jamal Montoya MD  08/13/21  11:46 EDT

## 2021-08-13 NOTE — PLAN OF CARE
Goal Outcome Evaluation:    S/p toe amputation on 8/12, now on Heparin. Will re assess in AM

## 2021-08-13 NOTE — ANESTHESIA POSTPROCEDURE EVALUATION
Patient: Niko Servin    Procedure Summary     Date: 08/12/21 Room / Location: [facility] OR 07 / [facility] MAIN OR    Anesthesia Start: 1642 Anesthesia Stop: 1712    Procedure: AMPUTATION DIGIT Left great toe (Left Foot) Diagnosis:     Surgeons: CHITO Woo DPM Provider: Deon Durand MD    Anesthesia Type: general ASA Status: 4          Anesthesia Type: general    Vitals  Vitals Value Taken Time   /52 08/12/21 1819   Temp 98.7 °F (37.1 °C) 08/12/21 1812   Pulse 75 08/12/21 1820   Resp 30 08/12/21 1812   SpO2 86 % 08/12/21 1819   Vitals shown include unvalidated device data.        Post Anesthesia Care and Evaluation    Patient location during evaluation: PACU  Patient participation: complete - patient participated  Level of consciousness: awake  Pain scale: See nurse's notes for pain score.  Pain management: adequate  Airway patency: patent  Anesthetic complications: No anesthetic complications  PONV Status: none  Cardiovascular status: acceptable  Respiratory status: acceptable  Hydration status: acceptable    Comments: Patient seen and examined postoperatively; vital signs stable; SpO2 greater than or equal to 90%; cardiopulmonary status stable; nausea/vomiting adequately controlled; pain adequately controlled; no apparent anesthesia complications; patient discharged from anesthesia care when discharge criteria were met

## 2021-08-13 NOTE — PROGRESS NOTES
[facility] Medicine Services Daily Progress Note    Patient Name: Niko Servin  : 1929  MRN: [id number]  Primary Care Physician:  Casper Saldaña MD  Date of admission: 2021      Subjective      Chief Complaint: *Left foot pain      Niko Servin is a 89 y.o. male w/PMH of B-cell lymphoma, CAD, MVR W/mechanical valve long-term anticoagulation, depression, [place], HTN presents to [facility] ED due to left lower extremity cellulitis.  Patient is extremely hard of hearing and is a poor historian, unable to complete review of systems at this time.  Family reports patient injured his left great toe approximately 2 weeks prior but did not tell anyone.  Patient's wife reports he has fallen multiple times over the last week due to gait abnormality.  Patient's wife also reports deterioration of cognition with intermittent hallucinations.  Wife reports patient is vaccinated for Covid.              Upon arrival to the ED vitals temp 98, HR 77, RR 17, /65, O2 sat 100% on room air.  Abs notable for troponin less than 0.015, glucose 113, , K4.1, creatinine 1.25, BUN 35, GFR 54, ALT 8, AST 15, total bili 0.3, CRP 28.7, lactic 1.0, INR greater than 8, WBC 12, Hgb 9.4, platelets 302, neutrophils 87.3.  Sed rate 42.  Blood cultures drawn.  CT of the head shows no acute intracranial abnormality, no hemorrhage.  Moderate chronic age-related intracranial findings.  EKG shows sinus rhythm rate 73 multiple PVCs, ventricular and supraventricular, borderline ST depression anterolateral leads.  Patient treated in the ED with Tdap, IV Zosyn, albuterol nebulizer, IV vancomycin.  UA, chest x-ray, x-ray left foot ordered.          Patient Reports *    Patient reports persistent pain left foot  Going for surgery today  Less tachypneic than yesterday  Very hard of hearing  Dr. Saldaña sent me a message yesterday that he has also underlying dementia that has not been  fully evaluated.    Patient had persistent low hemoglobin 8.7  Has been febrile to 200.6 overnight yesterday  Tachypnea has improved  Saturation 93% on 3 L  Low magnesium 1.5  Supratherapeutic INR more than 8.  I have seen  FFP are being prepared.  We will defer vitamin K to cardiology service given mechanical the mitral valve.      8/12  Patient accompanied by family today  He does not seem to be in pain  He appears comfortable  He is going for surgery today for left great toe amputation  Discussed with Dr. Woo  Unfortunately his INR still high.  Discussed with cardiology service who recommended vitamin K only for now.  Patient was apparently seen by pulmonary and nephrology as well    8/13  Patient had amputation left great toe yesterday 8/12/2021  INR down to 1.6 and hemoglobin 8  Awaiting cultures from tissues.  Gram stain showing gram-positive cocci in clusters  Pathology from left great toe pending  .Echo shows normal ejection fraction and elevated right ventricular pressures with tricuspid regurgitation and mild pulmonary hypertension and diastolic dysfunction with moderate aortic stenosis    Review of Systems   Unable to perform ROS: dementia            Objective      Vitals:   Temp:  [97.4 °F (36.3 °C)-99.8 °F (37.7 °C)] 98.2 °F (36.8 °C)  Heart Rate:  [65-83] 83  Resp:  [18-30] 18  BP: ()/(37-74) 117/54  Flow (L/min):  [3-6] 3    Physical Exam  Vitals and nursing note reviewed.   Constitutional:       General: He is not in acute distress.     Appearance: Normal appearance. He is well-developed. He is not ill-appearing, toxic-appearing or diaphoretic.   HENT:      Head: Normocephalic and atraumatic.      Right Ear: Ear canal and external ear normal.      Left Ear: Ear canal and external ear normal.      Nose: Nose normal. No congestion or rhinorrhea.      Mouth/Throat:      Mouth: Mucous membranes are moist.      Pharynx: No oropharyngeal exudate.   Eyes:      General: No scleral icterus.         Right eye: No discharge.         Left eye: No discharge.      Extraocular Movements: Extraocular movements intact.      Conjunctiva/sclera: Conjunctivae normal.      Pupils: Pupils are equal, round, and reactive to light.   Neck:      Thyroid: No thyromegaly.      Vascular: No carotid bruit or JVD.      Trachea: No tracheal deviation.   Cardiovascular:      Rate and Rhythm: Normal rate and regular rhythm.      Pulses: Normal pulses.      Heart sounds: Normal heart sounds. No murmur heard.   No friction rub. No gallop.    Pulmonary:      Effort: Pulmonary effort is normal. No respiratory distress.      Breath sounds: Normal breath sounds. No stridor. No wheezing, rhonchi or rales.   Chest:      Chest wall: No tenderness.   Abdominal:      General: Bowel sounds are normal. There is no distension.      Palpations: Abdomen is soft. There is no mass.      Tenderness: There is no abdominal tenderness. There is no guarding or rebound.      Hernia: No hernia is present.   Musculoskeletal:         General: No swelling, tenderness, deformity or signs of injury. Normal range of motion.      Cervical back: Normal range of motion and neck supple. No rigidity. No muscular tenderness.      Right lower leg: No edema.      Left lower leg: No edema.      Comments: Gangrenous changes left great toe and swelling and drainage from metacarpophalangeal junction.  Quite swollen and tender in the foot lower part of the leg.  Unable to assess pulsation due to splinting.     Lymphadenopathy:      Cervical: No cervical adenopathy.   Skin:     General: Skin is warm and dry.      Coloration: Skin is not jaundiced or pale.      Findings: No bruising, erythema or rash.   Neurological:      General: No focal deficit present.      Mental Status: He is alert and oriented to person, place, and time. Mental status is at baseline.      Cranial Nerves: No cranial nerve deficit.      Sensory: No sensory deficit.      Motor: No weakness or abnormal muscle  tone.      Coordination: Coordination normal.   Psychiatric:      Comments: Cooperative      *       Result Review    Result Review:  I have personally reviewed the results from the time of this admission to 8/13/2021 09:25 EDT and agree with these findings:  [x]  Laboratory  [x]  Microbiology  [x]  Radiology  [x]  EKG/Telemetry   [x]  Cardiology/Vascular   [x]  Pathology  []  Old records  []  Other:  Most notable findings include: *  As above       Wounds (last 24 hours)      LDA Wound     Row Name 08/13/21 0312 08/12/21 2310 08/12/21 1928       [REMOVED] Wound 08/10/21 0505 Left anterior great toe Soft Tissue Necrosis    Wound - Properties Group Placement Date: 08/10/21  - Placement Time: 0505  -JM Side: Left  -JM Orientation: anterior  -JM Location: great toe  -JM Primary Wound Type: Soft tissue  -JM Removal Date: 08/12/21  -CM Removal Time: 1600  -CM Wound Outcome: Amputation  -CM    Dressing Appearance  --  dry;intact  -CM  dry;intact  -CM    Closure  --  MARLA  -CM  MARLA  -CM    Retired Wound - Properties Group Date first assessed: 08/10/21  - Time first assessed: 0505  -JM Side: Left  -JM Location: great toe  -JM Primary Wound Type: Soft tissue  -JM Resolution Date: 08/12/21  -CM Resolution Time: 1600  -CM Wound Outcome: Amputation  -CM       Wound 08/10/21 0508 Left distal leg Other (comment)    Wound - Properties Group Placement Date: 08/10/21  - Placement Time: 0508  -JM Side: Left  -JM Orientation: distal  -JM Location: leg  -JM Primary Wound Type: Other  -JM, Cellulitis per md     Dressing Appearance  open to air  -CM  open to air  -CM  open to air  -CM    Closure  Open to air  -CM  Open to air  -CM  Open to air  -CM    Base  dry;red  -CM  dry;red  -CM  dry;red  -CM    Retired Wound - Properties Group Date first assessed: 08/10/21  - Time first assessed: 0508  -JM Side: Left  -JM Location: leg  -JM Primary Wound Type: Other  -JM, Cellulitis per md        Wound 08/12/21 Left anterior great toe  Incision    Wound - Properties Group Placement Date: 08/12/21  -TH Side: Left  -TH Orientation: anterior  -TH Location: great toe  -TH Primary Wound Type: Incision  -TH Additional Comments: 4x4 soaked in betadine, 4x4, kerlix, and ace  -TH    Dressing Appearance  dry;intact  -CM  dry;intact  -CM  dry;intact  -CM    Closure  MARLA  -CM  MARLA  -CM  MARLA  -CM    Retired Wound - Properties Group Date first assessed: 08/12/21  -TH Side: Left  -TH Location: great toe  -TH Primary Wound Type: Incision  -TH Additional Comments: 4x4 soaked in betadine, 4x4, kerlix, and ace  -TH    Row Name 08/12/21 1807 08/12/21 1744 08/12/21 1712       [REMOVED] Wound 08/10/21 0505 Left anterior great toe Soft Tissue Necrosis    Wound - Properties Group Placement Date: 08/10/21  -JM Placement Time: 0505  -JM Side: Left  -JM Orientation: anterior  -JM Location: great toe  -JM Primary Wound Type: Soft tissue  -JM Removal Date: 08/12/21  -CM Removal Time: 1600  -CM Wound Outcome: Amputation  -CM    Retired Wound - Properties Group Date first assessed: 08/10/21  -JM Time first assessed: 0505  -JM Side: Left  -JM Location: great toe  -JM Primary Wound Type: Soft tissue  -JM Resolution Date: 08/12/21  -CM Resolution Time: 1600  -CM Wound Outcome: Amputation  -CM       Wound 08/10/21 0508 Left distal leg Other (comment)    Wound - Properties Group Placement Date: 08/10/21  -JM Placement Time: 0508  -JM Side: Left  -JM Orientation: distal  -JM Location: leg  -JM Primary Wound Type: Other  -JM, Cellulitis per md     Retired Wound - Properties Group Date first assessed: 08/10/21  -JM Time first assessed: 0508  -JM Side: Left  -JM Location: leg  -JM Primary Wound Type: Other  -JM, Cellulitis per md        Wound 08/12/21 Left anterior great toe Incision    Wound - Properties Group Placement Date: 08/12/21  -TH Side: Left  -TH Orientation: anterior  -TH Location: great toe  -TH Primary Wound Type: Incision  -TH Additional Comments: 4x4 soaked in betadine,  4x4, kerlix, and ace  -TH    Dressing Appearance  dry;intact;no drainage  -AH  dry;intact;no drainage  -AH  dry;intact;no drainage  -AH    Closure  MARLA  -AH  MARLA  -AH  MARLA  -AH    Retired Wound - Properties Group Date first assessed: 08/12/21  -TH Side: Left  -TH Location: great toe  -TH Primary Wound Type: Incision  -TH Additional Comments: 4x4 soaked in betadine, 4x4, kerlix, and ace  -TH      User Key  (r) = Recorded By, (t) = Taken By, (c) = Cosigned By    Initials Name Provider Type    Summer Rai, RN Registered Nurse     Fatuma Coronel RN Registered Nurse    Rashard Persaud LPN Licensed Nurse    Corbin Esposito LPN Licensed Nurse            Assessment/Plan      Brief Patient Summary:  Niko Servin is a 89 y.o. male who *presenting with gangrenous changes left great toe    ampicillin-sulbactam, 3 g, Intravenous, Q6H  ipratropium-albuterol, 3 mL, Nebulization, Q6H - RT  rosuvastatin, 10 mg, Oral, Nightly  sertraline, 100 mg, Oral, Daily  sodium chloride, 10 mL, Intravenous, Q12H  vancomycin, 1,000 mg, Intravenous, Q24H       heparin, 12 Units/kg/hr  Pharmacy to dose vancomycin,   Pharmacy to dose warfarin,          Active Hospital Problems:  Active Hospital Problems    Diagnosis    • **Cellulitis of left lower extremity    • History of B-cell lymphoma    • Elevated INR (international normalized ratio) due to prior anticoagulant medication ingestion    • Mixed hyperlipidemia    • Depression    • Essential hypertension    • Mechanical heart valve present    • Hearing problem    • Chronic coronary artery disease    • Long term current use of anticoagulant therapy    • History of mitral valve replacement      Plan:      Gangrenous changes of the left lower extremity involving the left great toe wound and foot:  -X-ray shows positive gas production but thought to be related to the wound by Dr. Woo.  Patient on clindamycin Unasyn and vancomycin.  ID following.  No available wound cultures  Patient has  positive blood cultures  Podiatry taken to surgery today 8/11/2021  Vascular surgery consulted as well.  CT angiogram shows:Greater than 70% stenosis of the left popliteal artery.  - Significant trifurcation level disease bilaterally with single vessel  runoff via the peroneal artery.       Right renal and bladder stones with bilateral hydronephrosis. There  is marked bladder distention with multiple bladder diverticula  and  prostatic enlargement suggesting bladder outlet obstruction.  Will place Smith catheter.  Add Flomax    Bacteremia with Proteus species  Present on admission  Pending identification and sensitivity  On Unasyn  Possibly related to the foot infection  ID following    Coumadin toxicity  Elevated INR:  S/p vitamin K  INR decreased to 1.9  Heparin drip startedFor bridging  Defer to cardiology  Holding Coumadin for mechanical mitral valve until patient is cleared for discharge.     Mixed hyperlipidemia:  -On statin    Essential hypertension:  On Lasix for now       Chronic coronary artery disease:  -Cardiology is consulted  On aspirin and statin  Negative initial troponin    Suspect CHF exacerbation  Echo pending  On IV Lasix  Suspect underlying interstitial lung disease based on x-ray  Consider pulmonary evaluation if worsening respiratory distress or hypoxemia      History of MVR/mechanical valve present/long-term use of anticoagulant therapy:    As above    Depression:  -Home medication sertraline 100 mg p.o. daily  Possible underlying dementia per Dr. Saldaña as well  Likely need long-term rehab placement  We will get palliative care involved    Hard of hearing:  -Continue to monitor       Complex case high risk    DVT prophylaxis:  Medical DVT prophylaxis orders are present.    CODE STATUS:    Code Status: CPR  Medical Interventions (Level of Support Prior to Arrest): Full      Disposition:  I expect patient to be discharged ECF  This patient has been examined wearing appropriate Personal  Protective Equipment and discussed with hospital infection control department. 08/13/21      Electronically signed by Zaire Mansfield MD, 08/13/21, 09:25 EDT.  [facility] Hospitalist Team

## 2021-08-14 NOTE — SIGNIFICANT NOTE
08/14/21 1407   OTHER   Discipline physical therapist   Rehab Time/Intention   Session Not Performed unable to evaluate, medical status change  (spoke with RN, pt is very drowsy, getting blood due to low Hgb and also reports hypotensive today.)   Recommendation   PT - Next Appointment 08/15/21

## 2021-08-14 NOTE — PROGRESS NOTES
"NEPHROLOGY PROGRESS NOTE------[facility]/OPT    [facility]  [phone]  Kashif Hutchins MD      Patient Care Team:  Casper Saldaña MD as PCP - General  Casper Saldaña MD as PCP - Family Medicine  ElvieCory bob MD as Consulting Physician (Nephrology)      Provider:  Kashif Hutchins MD  Patient Name: Niko Servin  :  1929    SUBJECTIVE:    F/U CRF/CKD    Fatigued. Some cough. No angina. No dysuria. Malaised.     Medication:  ampicillin-sulbactam, 3 g, Intravenous, Q6H  ipratropium-albuterol, 3 mL, Nebulization, Q6H - RT  rosuvastatin, 10 mg, Oral, Nightly  sertraline, 100 mg, Oral, Daily  sodium chloride, 10 mL, Intravenous, Q12H  sodium hypochlorite, , Topical, BID  tamsulosin, 0.4 mg, Oral, Daily  vancomycin, 1,000 mg, Intravenous, Q24H      heparin, 12 Units/kg/hr, Last Rate: 14 Units/kg/hr (21 3087)  Pharmacy to dose vancomycin,         OBJECTIVE    Vital Sign Min/Max for last 24 hours  Temp  Min: 97.7 °F (36.5 °C)  Max: 98.9 °F (37.2 °C)   BP  Min: 108/65  Max: 123/43   Pulse  Min: 62  Max: 74   Resp  Min: 16  Max: 22   SpO2  Min: 91 %  Max: 98 %   No data recorded   Weight  Min: 84.5 kg (186 lb 3.2 oz)  Max: 84.5 kg (186 lb 3.2 oz)     Flowsheet Rows      First Filed Value   Admission Height  182.9 cm (72\") Documented at 2021   Admission Weight  90.7 kg (200 lb) Documented at 2021          No intake/output data recorded.  I/O last 3 completed shifts:  In: 690 [P.O.:240; IV Piggyback:450]  Out: 1450 [Urine:1450]    Physical Exam:  General Appearance: alert, appears stated age and cooperative. NAD. [place]  Head: normocephalic, without obvious abnormality and atraumatic  Eyes: conjunctivae and sclerae normal and no icterus  Neck: supple and no JVD  Lungs: +SCATTERED RHONCHI  Heart: regular rhythm & normal rate and normal S1, S2 +FRANCOIS  Chest: Wall no abnormalities observed  Abdomen: normal " bowel sounds and soft non-tender  Extremities:no edema, no cyanosis and no redness. +LEFT FOOT WRAPPED +DJD  Skin: no bleeding, +ERYTHEMA IN LEFT LE  Neurologic: Alert No focal deficits    Labs:    WBC WBC   Date Value Ref Range Status   08/14/2021 9.00 3.40 - 10.80 10*3/mm3 Final   08/13/2021 10.00 3.40 - 10.80 10*3/mm3 Final   08/13/2021 9.10 3.40 - 10.80 10*3/mm3 Final   08/12/2021 8.70 3.40 - 10.80 10*3/mm3 Final      HGB Hemoglobin   Date Value Ref Range Status   08/14/2021 7.2 (L) 13.0 - 17.7 g/dL Final   08/13/2021 8.0 (L) 13.0 - 17.7 g/dL Final   08/13/2021 8.2 (L) 13.0 - 17.7 g/dL Final   08/12/2021 7.9 (L) 13.0 - 17.7 g/dL Final      HCT Hematocrit   Date Value Ref Range Status   08/14/2021 22.1 (L) 37.5 - 51.0 % Final   08/13/2021 24.4 (L) 37.5 - 51.0 % Final   08/13/2021 25.5 (L) 37.5 - 51.0 % Final   08/12/2021 25.1 (L) 37.5 - 51.0 % Final      Platlets No results found for: LABPLAT   MCV MCV   Date Value Ref Range Status   08/14/2021 83.3 79.0 - 97.0 fL Final   08/13/2021 84.2 79.0 - 97.0 fL Final   08/13/2021 83.2 79.0 - 97.0 fL Final   08/12/2021 83.2 79.0 - 97.0 fL Final          Sodium Sodium   Date Value Ref Range Status   08/14/2021 144 136 - 145 mmol/L Final   08/13/2021 143 136 - 145 mmol/L Final   08/12/2021 143 136 - 145 mmol/L Final      Potassium Potassium   Date Value Ref Range Status   08/14/2021 4.0 3.5 - 5.2 mmol/L Final   08/13/2021 3.8 3.5 - 5.2 mmol/L Final   08/12/2021 3.9 3.5 - 5.2 mmol/L Final      Chloride Chloride   Date Value Ref Range Status   08/14/2021 106 98 - 107 mmol/L Final   08/13/2021 103 98 - 107 mmol/L Final   08/12/2021 106 98 - 107 mmol/L Final      CO2 CO2   Date Value Ref Range Status   08/14/2021 29.0 22.0 - 29.0 mmol/L Final   08/13/2021 31.0 (H) 22.0 - 29.0 mmol/L Final   08/12/2021 28.0 22.0 - 29.0 mmol/L Final      BUN BUN   Date Value Ref Range Status   08/14/2021 30 (H) 8 - 23 mg/dL Final   08/13/2021 26 (H) 8 - 23 mg/dL Final   08/12/2021 28 (H) 8 - 23  mg/dL Final      Creatinine Creatinine   Date Value Ref Range Status   08/14/2021 1.26 0.76 - 1.27 mg/dL Final   08/13/2021 1.18 0.76 - 1.27 mg/dL Final   08/12/2021 1.19 0.76 - 1.27 mg/dL Final      Calcium Calcium   Date Value Ref Range Status   08/14/2021 7.9 (L) 8.2 - 9.6 mg/dL Final   08/13/2021 7.9 (L) 8.2 - 9.6 mg/dL Final   08/12/2021 7.7 (L) 8.2 - 9.6 mg/dL Final      PO4 No components found for: PO4   Albumin Albumin   Date Value Ref Range Status   08/14/2021 2.20 (L) 3.50 - 5.20 g/dL Final      Magnesium Magnesium   Date Value Ref Range Status   08/14/2021 2.4 (H) 1.7 - 2.3 mg/dL Final     Comment:     Result checked    08/13/2021 1.6 (L) 1.7 - 2.3 mg/dL Final   08/12/2021 1.6 (L) 1.7 - 2.3 mg/dL Final      Uric Acid No components found for: URIC ACID     Imaging Results (Last 72 Hours)     Procedure Component Value Units Date/Time    CT Angio Abdominal Aorta Bilateral Iliofem Runoff [[id number]] Collected: 08/12/21 1320     Updated: 08/12/21 1345    Narrative:         DATE OF EXAM:  8/12/2021 10:04 AM     PROCEDURE:  CT ANGIO ABDOMINAL AORTA BILAT ILIOFEM RUNOFF-     INDICATIONS:   Gangrenous, necrotic malodorous left great toe wound; L03.116-Cellulitis  of left lower limb; R79.1-Abnormal coagulation profile, pain and  weakness in both legs     COMPARISON:   CT of the abdomen and pelvis dated 01/01/2016     TECHNIQUE:  Routine transaxial slices were obtained through the abdomen, pelvis, and  both lower extremities after the intravenous administration of 100 mL  Isovue 370. Reconstructed coronal and sagittal images were also  obtained. In addition, a 3 D volume rendered image was obtained after  post processing. Automated exposure control and iterative reconstruction  methods were used.     FINDINGS:  Vascular findings: There is no significant aortic stenosis. There is  mild aortic plaque present. There is plaque at the origins of the SMA  and celiac axis and extensive plaque in the splenic artery  and  throughout the course of the celiac axis. The degree of narrowing  appears less than 50% and celiac axis. There is calcified  atherosclerotic plaque in the right and left renal arteries without  hemodynamically significant stenosis. The JANUSZ is patent. There is  atherosclerotic plaque throughout the common and external iliac arteries  without significant stenosis.     Below the inguinal ligament on the right, the patient has plaque in the  superficial femoral artery without significant stenosis. There is  extensive plaque throughout the popliteal artery with narrowing in  several locations approaching 50%. There is diffuse disease in the  trifurcation vessels. The dominant runoff vessel is the peroneal which  passes to the ankle. The anterior tibial appears to fill segmentally.  The posterior tibial artery is occluded.     In the left lower extremity, there is plaque throughout the superficial  femoral artery without significant stenosis there is plaque in the  popliteal artery with at least one high-grade stenosis of greater than  70% just above the knee joint. This is relatively focal in nature. There  is plaque in the tibioperoneal trunk. The dominant runoff vessel is the  peroneal artery which passes to the ankle. The anterior tibial artery  shows diffuse disease but is patent segmentally to the ankle. The  posterior tibial artery is essentially occluded.     Nonvascular findings: There are chronic interstitial fibrotic changes  and emphysematous changes present in the lung bases. There is cardiac  enlargement with postoperative changes of prior CABG. There is trace  pleural fluid bilaterally with mild by basilar atelectasis. The liver,  spleen, adrenal glands, and pancreas have a normal appearance. There are  multiple layering gallstones within the gallbladder. Nonobstructing  stones are present within the right kidney. There are calculi  dependently within the urinary bladder. There is  bilateral  hydronephrosis. There is marked bladder distention with multiple bladder  diverticula. The prostate appears enlarged and slightly irregular in  appearance. No dilated loops of bowel are seen. There is extensive  sigmoid diverticulosis without diverticulitis. There is a right inguinal  hernia containing fat. There is advanced multilevel degenerative disc  and facet disease. There is diffuse osteopenia. 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 There is soft tissue edema of the  left foot and apparently an open wound over the great toe of the left  foot.          Impression:         1. Diffuse atherosclerotic disease to approximately the popliteal level  bilaterally without any significant stenosis above the popliteal artery.  2. Greater than 70% stenosis of the left popliteal artery.  3. Significant trifurcation level disease bilaterally with single vessel  runoff via the peroneal artery.  4. Right renal and bladder stones with bilateral hydronephrosis. There  is marked bladder distention with multiple bladder diverticula  and  prostatic enlargement suggesting bladder outlet obstruction.  5. Multiple incidental nonvascular findings as noted above.     Electronically Signed By-Tushar Montes MD On:8/12/2021 1:32 PM  This report was finalized on [id number] by  Tushar Montes MD.    CT Chest Without Contrast Diagnostic [[id number]] Collected: 08/11/21 1931     Updated: 08/11/21 1937    Narrative:         DATE OF EXAM:  8/11/2021 5:13 PM     PROCEDURE:  CT CHEST WO CONTRAST DIAGNOSTIC-     INDICATIONS:   Shortness of breath and confusion.     COMPARISON:   12/31/2015.     TECHNIQUE:  Routine transaxial slices were obtained through the chest without the  administration of intravenous contrast. Reconstructed coronal and  sagittal images were also obtained. Automated exposure control and  iterative construction methods were used.      FINDINGS:  There are tiny layering pleural effusions. There is  respiratory motion  artifact. There is mild bilateral basilar subsegmental atelectasis.  There is suggestion of some areas of interstitial thickening and some  hazy areas of groundglass density. This may be secondary to pulmonary  edema. An atypical infection is considered less likely. The heart is  enlarged. There are extensive atherosclerotic vascular calcifications  including involvement of the coronary arteries. The patient has had a  previous CABG procedure. There are no enlarged mediastinal lymph nodes.  The hilar regions are difficult to evaluate without contrast. The  patient has dense sludge within the gallbladder. There are no acute  findings beneath the hemidiaphragms. There are no suspicious osteolytic  or sclerotic lesions within the bony structures. There are old healed  bilateral rib fractures.        Impression:         1. Tiny layering pleural effusions with mild bilateral basilar  subsegmental atelectasis.  2. The study is difficult to interpret due to respiratory motion  artifact. There are some areas of interstitial thickening and hazy  groundglass density. I would favor pulmonary edema over an atypical  infection.  3. Cardiomegaly with extensive coronary artery atherosclerotic vascular  disease.  4. Dense sludge within the gallbladder.     Electronically Signed By-Hammad Ragland MD On:8/11/2021 7:35 PM  This report was finalized on [id number] by  Hammad Ragland MD.          Results for orders placed during the hospital encounter of 08/09/21    XR Chest 2 View    Narrative  DATE OF EXAM:  8/10/2021 12:20 PM    PROCEDURE:  XR CHEST 2 VW-    INDICATIONS:  SOA; L03.116-Cellulitis of left lower limb; R79.1-Abnormal coagulation  profile    COMPARISON:  A 10/20/2021 at 12:40 AM, 6/8/2016 at 5:39 PM, 6/7/2016 at 9:47 PM    TECHNIQUE:  Two radiologic views of the chest , PA and lateral were obtained.    FINDINGS:  Extensive chronic changes are again seen throughout the lungs. These  findings are again  stable given differences in technique. No new  consolidations or pleural effusions are observed. The cardiac silhouette  and mediastinum are unchanged. Stable cardiomegaly is noted.  Postoperative changes are noted. No acute osseous abnormalities are  seen.    Impression  Chronic changes are again noted which are stable. There is no definitive  evidence for acute cardiopulmonary process.    Electronically Signed By-Aneesh Judd MD On:8/10/2021 12:43 PM  This report was finalized on [id number] by  Aneesh Judd MD.      XR Foot 3+ View Left    Narrative  DATE OF EXAM:  8/10/2021 12:40 AM    PROCEDURE:  XR FOOT 3+ VW LEFT-    INDICATIONS:  Prior injury to left great toe on furniture several weeks ago. Recent  discovery of injury with wound and swelling and erythema involving the  great toe.    COMPARISON:  No Comparisons Available    TECHNIQUE:  A minimum of three routine standard radiographic views were obtained of  the left foot.    FINDINGS:  Soft tissue swelling is seen throughout the great toe and extending into  the medial aspect of the forefoot/midfoot. There is suggestion of  possible gas production throughout the soft tissues. Multiple areas of  cortical erosion are identified throughout the proximal and distal  phalanges of the great toe. There is also abnormal lucency throughout  the bone. These findings indicate changes of osteomyelitis.    Impression  1.Evidence for soft tissue swelling throughout the great toe with areas  of gas production. The findings indicate changes of soft tissue  cellulitis.  2.Evidence for osteomyelitis involving the proximal and distal phalanges  of the great toe.    Electronically Signed By-Aneesh Judd MD On:8/10/2021 7:46 AM  This report was finalized on [id number] by  Aneesh Judd MD.      XR Chest 1 View    Narrative  DATE OF EXAM:  8/10/2021 12:40 AM    PROCEDURE:  XR CHEST 1 VW-    INDICATIONS:  Shortness of breath. Prior Covid 19 infection. Unsteady  gait.    COMPARISON:  6/8/2016 at 5:39 PM, 6/7/2016 at 9:47 PM, 12/30/2015 at 8:28 PM    TECHNIQUE:  [Portable chest radiograph]    FINDINGS:  Extensive chronic changes are again seen throughout the lungs which are  stable. No new consolidations or pleural effusions are observed. The  cardiac silhouette and mediastinum are stable. Postoperative changes are  noted. Stable cardiomegaly is noted. No acute osseous abnormalities are  identified.    Impression  Chronic changes are noted which are stable. There is no evidence for  definitive acute cardiopulmonary process.    Electronically Signed By-Aneesh Judd MD On:8/10/2021 7:43 AM  This report was finalized on [id number] by  Aneesh Judd MD.      Results for orders placed during the hospital encounter of 08/09/21    Duplex Vein Mapping Lower Extremity - Bilateral CAR    Interpretation Summary  · The left greater saphenous vein is patent and of adequate size in the thigh.  · The left greater saphenous vein is patent and of adequate size in the calf.        ASSESSMENT / PLAN      Cellulitis of left lower extremity    Chronic coronary artery disease    Depression    Hearing problem    History of mitral valve replacement    Essential hypertension    Long term current use of anticoagulant therapy    Mechanical heart valve present    History of B-cell lymphoma    Elevated INR (international normalized ratio) due to prior anticoagulant medication ingestion    Mixed hyperlipidemia    Peripheral vascular disease of lower extremity with ulceration (CMS/HCC)    1. CRF/CKD STG 3A-------Nonoliguric. BUN/Cr stable. No NSAIDs or IV dye. Dose meds for CrCl 30-60 cc/min    2. HTN WITH CKD------BP okay. Avoid hypotension. No ACE-I/ARB/DRI for now    3. BPH------On Flomax    4. HYPERLIPIDEMIA------On Statin    5. OA/DJD-----No NSAIDs    6. PVD/PAD WITH LEFT GREAT TOE GANGRENE-----S/P Amputatyion. On Abx and getting wound care    7. CAD WITH H/O MECHANICAL VALVE REPLACEMENT    8.  ANEMIA WITH H/O B-CELL LYMPHOMA------PRBCs    9. DEPRESSION/DEMENTIA-------SSRI    10. HYPOALBUMINEMIA----IV Albumin to temporize    11. HYPOCALCEMIA-----Replace IV and follow ionized levels      Kashif Hutchins MD  [facility]  [phone]  08/14/21  07:35 EDT

## 2021-08-14 NOTE — PROGRESS NOTES
Name: Niko Servin ADMIT: 2021   : 1929  PCP: Casper Saldaña MD    MRN: [id number] LOS: 3 days   AGE/SEX: 89 y.o. male  ROOM: [address] [place] [address]    [place], Subsequent Hospital Care    Chief Complaint   Patient presents with   • Toe Injury     CC: left great toe wound  Subjective     89 y.o. male resting in bed. No new complaints.  Patient is s/p left great toe amputation.  Patient finally resting.  Family at the bedside and said that he has not slept much lately.    Review of Systems   Unable to perform ROS: Other   Hearing deficit    Objective     Scheduled Medications:   albumin human, 25 g, Intravenous, Q8H  ampicillin-sulbactam, 3 g, Intravenous, Q6H  calcium gluconate, 1 g, Intravenous, Once  furosemide, 20 mg, Intravenous, Once  ipratropium-albuterol, 3 mL, Nebulization, Q6H - RT  rosuvastatin, 10 mg, Oral, Nightly  sertraline, 100 mg, Oral, Daily  sodium chloride, 10 mL, Intravenous, Q12H  sodium hypochlorite, , Topical, BID  tamsulosin, 0.4 mg, Oral, Daily  vancomycin, 1,000 mg, Intravenous, Q24H        Active Infusions:  heparin, 12 Units/kg/hr, Last Rate: 14 Units/kg/hr (21 3297)  Pharmacy to dose vancomycin,         As Needed Medications:  •  acetaminophen **OR** acetaminophen **OR** acetaminophen  •  aluminum-magnesium hydroxide-simethicone  •  haloperidol lactate  •  heparin  •  heparin  •  magnesium sulfate **OR** magnesium sulfate **OR** magnesium sulfate  •  melatonin  •  Morphine  •  nitroglycerin  •  ondansetron **OR** ondansetron  •  Pharmacy to dose vancomycin  •  potassium chloride  •  potassium chloride  •  potassium phosphate infusion greater than 15 mMoles **OR** potassium phosphate infusion greater than 15 mMoles **OR** potassium phosphate **OR** sodium phosphate IVPB **OR** sodium phosphate IVPB **OR** sodium phosphate IVPB  •  sodium chloride  •  sodium chloride      Results Review:     CBC    Results from last 7 days   Lab Units 21  5026  08/13/21  0826 08/13/21  0332 08/12/21  0302 08/11/21  0522 08/10/21  1835 08/10/21  0450   WBC 10*3/mm3 9.00 10.00 9.10 8.70 10.30 10.90* 9.10   HEMOGLOBIN g/dL 7.2* 8.0* 8.2* 7.9* 8.7* 8.7* 8.3*   PLATELETS 10*3/mm3 245 265 278 254 267 292 261     BMP   Results from last 7 days   Lab Units 08/14/21 0343 08/13/21 0332 08/12/21  0302 08/11/21  0522 08/10/21  1835 08/10/21  0450 08/10/21  0018   SODIUM mmol/L 144 143 143 142 141 142 141   POTASSIUM mmol/L 4.0 3.8 3.9 3.8 4.0 3.9 4.1   CHLORIDE mmol/L 106 103 106 105 105 106 106   CO2 mmol/L 29.0 31.0* 28.0 28.0 26.0 26.0 26.0   BUN mg/dL 30* 26* 28* 30* 32* 36* 35*   CREATININE mg/dL 1.26 1.18 1.19 1.28* 1.27 1.20 1.25   GLUCOSE mg/dL 103* 82 89 95 123* 115* 113*   MAGNESIUM mg/dL 2.4* 1.6* 1.6* 1.5*  --   --   --      Coag   Results from last 7 days   Lab Units 08/14/21 0343 08/13/21 1915 08/13/21 0826 08/13/21  0332 08/12/21  0302 08/11/21  0522 08/10/21  0603 08/10/21  0450   INR  1.43*  --  1.66* 1.90* >=8.00* >=8.00* 4.24* 3.92*   APTT seconds 49.5* 51.0* 39.9*  --   --   --   --   --      HbA1C   Lab Results   Component Value Date    HGBA1C 6.0 (H) 08/11/2021     Infection   Results from last 7 days   Lab Units 08/10/21  0031 08/10/21  0018   BLOODCX  Proteus mirabilis* No growth at 4 days   BCIDPCR  Proteus spp. Identification by BCID PCR.*  --      Radiology(recent) No radiology results for the last day    Active Hospital Problems    Diagnosis  POA   • **Cellulitis of left lower extremity [L03.116]  Yes   • History of B-cell lymphoma [Z85.72]  Not Applicable   • Elevated INR (international normalized ratio) due to prior anticoagulant medication ingestion [R79.1]  Yes   • Mixed hyperlipidemia [E78.2]  Yes   • Peripheral vascular disease of lower extremity with ulceration (CMS/HCC) [I73.9, L97.909]  Unknown   • Depression [F32.9]  Yes   • Essential hypertension [I10]  Yes   • Mechanical heart valve present [Z95.2]  Not Applicable   • Hearing problem  "[H91.90]  Yes   • Chronic coronary artery disease [I25.10]  Yes   • Long term current use of anticoagulant therapy [Z79.01]  Not Applicable   • History of mitral valve replacement [Z95.2]  Not Applicable      Resolved Hospital Problems   No resolved problems to display.       Assessment/Plan     Assessment & Plan      89 y.o. male with PAD and tissue loss    PAD: CT angiogram shows relatively normal and unobstructed vasculature down to the popliteal arteries.  He has significant infrapopliteal disease on the left leg.  We will plan arteriogram on Tuesday with possible intervention pending angiographic evaluation. Left lower extremity arteriogram with possible intervention on Tuesday 8/17/2021 by Dr. Elizabeth    Left Great toe amp: Podiatry following, Dr. Woo  Status post great toe amputation at the metatarsophalangeal joint.    Anticoagulation: Cardiology following, Dr. Montoya. Patient with mechanical mitral valve, Warfarin has been discontinued.  INR 1 4 down from greater than 8.  On heparin drip.  PTT 49.5    CKD: Nephrology following, Dr. Bermeo. 8/13/21 Cr 1.26.  Dr. Hutchins's note mentions \"no IV dye\" but plans for angiogram on Tuesday for revascularization for limb salvage.    Christopher Padron MD  08/14/21  12:09 EDT          "

## 2021-08-14 NOTE — PROGRESS NOTES
Infectious Diseases Progress Note      LOS: 3 days   Patient Care Team:  Casper Saldaña MD as PCP - General  Casper Saldaña MD as PCP - Family Medicine  Cory Bermeo MD as Consulting Physician (Nephrology)    Chief Complaint: Left great toe infection, left foot redness and swelling    Subjective       The patient has been afebrile for the last 24 hours.  The patient is on 3 L of oxygen by nasal cannula, hemodynamically stable, and is tolerating antimicrobial therapy.        Review of Systems:   Review of Systems   Constitutional: Negative.    HENT: Negative.    Eyes: Negative.    Respiratory: Negative.    Cardiovascular: Negative.    Gastrointestinal: Negative.    Endocrine: Negative.    Genitourinary: Negative.    Musculoskeletal: Negative.    Skin: Positive for color change and wound.   Neurological: Negative.    Psychiatric/Behavioral: Negative.    All other systems reviewed and are negative.       Objective     Vital Signs  Temp:  [97.6 °F (36.4 °C)-98.7 °F (37.1 °C)] 98.5 °F (36.9 °C)  Heart Rate:  [62-73] 67  Resp:  [18-22] 20  BP: (104-123)/(43-68) 120/62    Physical Exam:  Physical Exam  Vitals and nursing note reviewed.   Constitutional:       General: He is not in acute distress.     Appearance: Normal appearance. He is well-developed. He is not diaphoretic.      Comments: Appears thin   HENT:      Head: Normocephalic and atraumatic.   Eyes:      Extraocular Movements: Extraocular movements intact.      Conjunctiva/sclera: Conjunctivae normal.      Pupils: Pupils are equal, round, and reactive to light.   Cardiovascular:      Rate and Rhythm: Normal rate and regular rhythm.      Heart sounds: Normal heart sounds, S1 normal and S2 normal.   Pulmonary:      Effort: Pulmonary effort is normal. No respiratory distress.      Breath sounds: Normal breath sounds. No stridor. No wheezing or rales.   Abdominal:      General: Bowel sounds are normal. There is no distension.      Palpations:  Abdomen is soft. There is no mass.      Tenderness: There is no abdominal tenderness. There is no guarding.   Musculoskeletal:         General: No deformity. Normal range of motion.      Cervical back: Neck supple.      Comments: Bulky surgical dressing on the left foot   Skin:     General: Skin is warm and dry.      Coloration: Skin is not pale.      Findings: Erythema present. No rash.   Neurological:      Mental Status: He is alert and oriented to person, place, and time.      Cranial Nerves: No cranial nerve deficit.   Psychiatric:         Mood and Affect: Mood normal.          Results Review:    I have reviewed all clinical data, test, lab, and imaging results.     Radiology  No Radiology Exams Resulted Within Past 24 Hours    Cardiology    Laboratory    Results from last 7 days   Lab Units 08/14/21  0343 08/13/21  0826 08/13/21  0332 08/12/21  0302 08/11/21  0522 08/10/21  1835 08/10/21  0450   WBC 10*3/mm3 9.00 10.00 9.10 8.70 10.30 10.90* 9.10   HEMOGLOBIN g/dL 7.2* 8.0* 8.2* 7.9* 8.7* 8.7* 8.3*   HEMATOCRIT % 22.1* 24.4* 25.5* 25.1* 26.3* 27.0* 25.2*   PLATELETS 10*3/mm3 245 265 278 254 267 292 261     Results from last 7 days   Lab Units 08/14/21  0343 08/13/21  0332 08/12/21  0302 08/11/21  0522 08/10/21  1835 08/10/21  0450 08/10/21  0018   SODIUM mmol/L 144 143 143 142 141 142 141   POTASSIUM mmol/L 4.0 3.8 3.9 3.8 4.0 3.9 4.1   CHLORIDE mmol/L 106 103 106 105 105 106 106   CO2 mmol/L 29.0 31.0* 28.0 28.0 26.0 26.0 26.0   BUN mg/dL 30* 26* 28* 30* 32* 36* 35*   CREATININE mg/dL 1.26 1.18 1.19 1.28* 1.27 1.20 1.25   GLUCOSE mg/dL 103* 82 89 95 123* 115* 113*   ALBUMIN g/dL 2.20*  --   --  2.60*  --   --  2.70*   BILIRUBIN mg/dL 0.2  --   --  0.2  --   --  0.3   ALK PHOS U/L 64  --   --  72  --   --  74   AST (SGOT) U/L 16  --   --  22  --   --  15   ALT (SGPT) U/L 9  --   --  10  --   --  8   CALCIUM mg/dL 7.9* 7.9* 7.7* 8.2 8.2 8.4 8.5         Results from last 7 days   Lab Units 08/10/21  0018   SED  RATE mm/hr 42*         Microbiology   Microbiology Results (last 10 days)     Procedure Component Value - Date/Time    Tissue / Bone Culture - Tissue, Toe, Left [[id number]]  (Abnormal) Collected: 08/12/21 1701    Lab Status: Preliminary result Specimen: Tissue from Toe, Left Updated: 08/14/21 1122     Tissue Culture Moderate growth (3+) Staphylococcus aureus      Scant growth (1+) Gram Negative Bacilli     Gram Stain Moderate (3+) WBCs per low power field      Few (2+) Gram positive cocci, intracellular      Few (2+) Gram positive cocci in clusters    Respiratory Panel, PCR (WITHOUT COVID) - Swab, Nasopharynx [[id number]]  (Normal) Collected: 08/10/21 1346    Lab Status: Final result Specimen: Swab from Nasopharynx Updated: 08/10/21 1437     ADENOVIRUS, PCR Not Detected     Coronavirus 229E Not Detected     Coronavirus HKU1 Not Detected     Coronavirus NL63 Not Detected     Coronavirus OC43 Not Detected     Human Metapneumovirus Not Detected     Human Rhinovirus/Enterovirus Not Detected     Influenza B PCR Not Detected     Parainfluenza Virus 1 Not Detected     Parainfluenza Virus 2 Not Detected     Parainfluenza Virus 3 Not Detected     Parainfluenza Virus 4 Not Detected     Bordetella pertussis pcr Not Detected     Chlamydophila pneumoniae PCR Not Detected     Mycoplasma pneumo by PCR Not Detected     Influenza A PCR Not Detected     RSV, PCR Not Detected     Bordetella parapertussis PCR Not Detected    Narrative:      The coronavirus on the RVP is NOT COVID-19 and is NOT indicative of infection with COVID-19.    In the setting of a positive respiratory panel with a viral infection PLUS a negative procalcitonin without other underlying concern for bacterial infection, consider observing off antibiotics or discontinuation of antibiotics and continue supportive care. If the respiratory panel is positive for atypical bacterial infection (Bordetella pertussis, Chlamydophila pneumoniae, or Mycoplasma pneumoniae),  consider antibiotic de-escalation to target atypical bacterial infection.    COVID PRE-OP / PRE-PROCEDURE SCREENING ORDER (NO ISOLATION) - Swab, Nasopharynx [[id number]]  (Normal) Collected: 08/10/21 1114    Lab Status: Final result Specimen: Swab from Nasopharynx Updated: 08/10/21 1241    Narrative:      The following orders were created for panel order COVID PRE-OP / PRE-PROCEDURE SCREENING ORDER (NO ISOLATION) - Swab, Nasopharynx.  Procedure                               Abnormality         Status                     ---------                               -----------         ------                     COVID-19,CEPHEID/XUAN/BD...[[id number]]  Normal              Final result                 Please view results for these tests on the individual orders.    COVID-19,CEPHEID/XUAN/BDMAX,COR/LOREN/PAD/FEI IN-HOUSE(OR EMERGENT/ADD-ON),NP SWAB IN TRANSPORT MEDIA 3-4 HR TAT, RT-PCR - Swab, Nasopharynx [[id number]]  (Normal) Collected: 08/10/21 1114    Lab Status: Final result Specimen: Swab from Nasopharynx Updated: 08/10/21 1241     COVID19 Not Detected    Narrative:      Fact sheet for providers: https://www.fda.gov/media/063437/download     Fact sheet for patients: https://www.fda.gov/media/972123/download  Fact sheet for providers: https://www.fda.gov/media/249718/download    Fact sheet for patients: https://www.fda.gov/media/571451/download    Test performed by PCR.    COVID PRE-OP / PRE-PROCEDURE SCREENING ORDER (NO ISOLATION) - Swab, Nasopharynx [[id number]]  (Normal) Collected: 08/10/21 0403    Lab Status: Final result Specimen: Swab from Nasopharynx Updated: 08/10/21 1445    Narrative:      The following orders were created for panel order COVID PRE-OP / PRE-PROCEDURE SCREENING ORDER (NO ISOLATION) - Swab, Nasopharynx.  Procedure                               Abnormality         Status                     ---------                               -----------         ------                     COVID-19,APTIMA  PANTHER(...[[id number]]  Normal              Final result                 Please view results for these tests on the individual orders.    COVID-19,APTIMA PANTHER(ISAAK),[facility], NP/OP SWAB IN UTM/VTM/SALINE TRANSPORT MEDIA,24 HR TAT - Swab, Nasopharynx [[id number]]  (Normal) Collected: 08/10/21 0403    Lab Status: Final result Specimen: Swab from Nasopharynx Updated: 08/10/21 1445     COVID19 Not Detected    Narrative:      Fact sheet for providers: https://www.fda.gov/media/639138/download     Fact sheet for patients: https://www.fda.gov/media/436221/download    Test performed by RT PCR.    Blood Culture - Blood, Arm, Right [[id number]]  (Abnormal)  (Susceptibility) Collected: 08/10/21 0031    Lab Status: Final result Specimen: Blood from Arm, Right Updated: 08/12/21 1229     Blood Culture Proteus mirabilis     Gram Stain Anaerobic Bottle Gram negative bacilli    Susceptibility      Proteus mirabilis      SULMA      Ampicillin Susceptible      Ampicillin + Sulbactam Susceptible      Cefepime Susceptible      Ceftazidime Susceptible      Ceftriaxone Susceptible      Gentamicin Susceptible      Levofloxacin Susceptible      Piperacillin + Tazobactam Susceptible      Trimethoprim + Sulfamethoxazole Susceptible               Linear View               Susceptibility Comments     Proteus mirabilis    Cefazolin sensitivity will not be reported for Enterobacteriaceae in non-urine isolates. If cefazolin is preferred, please call the microbiology lab to request an E-test.  With the exception of urinary-sourced infections, aminoglycosides should not be used as monotherapy.             Blood Culture ID, PCR - Blood, Arm, Right [[id number]]  (Abnormal) Collected: 08/10/21 0031    Lab Status: Final result Specimen: Blood from Arm, Right Updated: 08/11/21 0602     BCID, PCR Proteus spp. Identification by BCID PCR.    Blood Culture - Blood, Arm, Left [[id number]] Collected: 08/10/21 0018    Lab Status: Preliminary result Specimen: Blood  from Arm, Left Updated: 08/14/21 0030     Blood Culture No growth at 4 days          Medication Review:       Schedule Meds  albumin human, 25 g, Intravenous, Q8H  ampicillin-sulbactam, 3 g, Intravenous, Q6H  ipratropium-albuterol, 3 mL, Nebulization, Q6H - RT  rosuvastatin, 10 mg, Oral, Nightly  sertraline, 100 mg, Oral, Daily  sodium chloride, 10 mL, Intravenous, Q12H  sodium hypochlorite, , Topical, BID  tamsulosin, 0.4 mg, Oral, Daily  vancomycin, 1,000 mg, Intravenous, Q24H        Infusion Meds  heparin, 12 Units/kg/hr, Last Rate: 14 Units/kg/hr (08/14/21 3604)  Pharmacy to dose vancomycin,         PRN Meds  •  acetaminophen **OR** acetaminophen **OR** acetaminophen  •  aluminum-magnesium hydroxide-simethicone  •  haloperidol lactate  •  heparin  •  heparin  •  magnesium sulfate **OR** magnesium sulfate **OR** magnesium sulfate  •  melatonin  •  Morphine  •  nitroglycerin  •  ondansetron **OR** ondansetron  •  Pharmacy to dose vancomycin  •  potassium chloride  •  potassium chloride  •  potassium phosphate infusion greater than 15 mMoles **OR** potassium phosphate infusion greater than 15 mMoles **OR** potassium phosphate **OR** sodium phosphate IVPB **OR** sodium phosphate IVPB **OR** sodium phosphate IVPB  •  sodium chloride  •  sodium chloride        Assessment/Plan       Antimicrobial Therapy   1.  Vancomycin      day  2.  Clindamycin      day  3.  Unasyn      day  4.      Day  5.      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 labs  The case was discussed with the patient's wife at the bedside    Alisia Hernandez MD  08/14/21  16:34 EDT     Note is dictated utilizing voice recognition software/Dragon

## 2021-08-14 NOTE — PROGRESS NOTES
Cardiology    Patient Care Team:  Casper Saldaña MD as PCP - General  Casper Saldaña MD as PCP - Family Medicine  Cory Bermeo MD as Consulting Physician (Nephrology)        CHIEF COMPLAINT: Foot pain    ADMITTING DIAGNOSES: Limb ischemia    SUBJECTIVE: No complaints from CV standpoint, status post amputation left great toe, pending evaluation from vascular surgery on my encounter, NPO presently for any procedures, no chest pain or shortness of breath appears well compensated euvolemic today, gentle nasal cannula oxygen only, wife at bedside  Essentially unchanged on my exam today from CV standpoint  No acute events overnight  Remains deconditioned  Appears stated age 89 years old  Status post amputation  Left foot pain only complaint    Review of Symptoms:  Constitutional: Patient afebrile no chills or unexpected weight changes  Respiratory: No cough, no wheezing or dyspnea  Cardiovascular: No chest pain, palpitations, dyspnea, orthopnea and no edema  Gastrointestinal: No nausea, vomiting, constipation or diarrhea.  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 No pertinent family history.      SOCIAL HISTORY:   Social History     Socioeconomic History   • Marital status:      Spouse name: Not on file   • Number of children: Not on file   • Years of education: Not on file   • Highest education level: Not on file   Tobacco Use   • Smoking status: Former Smoker   • Smokeless tobacco: Never Used   Vaping Use   • Vaping Use: Never used   Substance and Sexual Activity   • Alcohol use: Yes     Alcohol/week: 5.0 standard drinks     Types: 5 Cans of beer per week   • Drug use: No   • Sexual activity: Defer       CURRENT MEDICATIONS:     Current Facility-Administered Medications:   •  acetaminophen (TYLENOL) tablet 650 mg, 650 mg, Oral, Q4H PRN, 650 mg at 08/12/21 2020 **OR** acetaminophen (TYLENOL) 160 MG/5ML solution 650 mg, 650 mg, Oral, Q4H PRN **OR** acetaminophen (TYLENOL) suppository 650 mg, 650 mg, Rectal, Q4H PRN, CHITO Woo DPM  •  albumin human 25 % IV SOLN 25 g, 25 g, Intravenous, Q8H, Princess Hutchins MD  •  aluminum-magnesium hydroxide-simethicone (MAALOX MAX) 400-400-40 MG/5ML suspension 15 mL, 15 mL, Oral, Q6H PRN, CHITO Woo DPM  •  ampicillin-sulbactam (UNASYN) 3 g in sodium chloride 0.9 % 100 mL IVPB-MBP, 3 g, Intravenous, Q6H, CHITO Woo DPM, 3 g at 08/14/21 0937  •  calcium gluconate 1g/50ml 0.675% NaCl IV SOLN, 1 g, Intravenous, Once, Princess Hutchins MD  •  furosemide (LASIX) injection 20 mg, 20 mg, Intravenous, Once, Princess Hutchins MD  •  haloperidol lactate (HALDOL) injection 1 mg, 1 mg, Intravenous, Q6H PRN, CHITO Woo DPM, 1 mg at 08/14/21 0037  •  heparin 80891 units/250 mL (100 units/mL) in 0.45 % NaCl infusion, 12 Units/kg/hr, Intravenous, Titrated, Casper Laura MD, Last Rate: 11.29 mL/hr at 08/14/21 0754, 14 Units/kg/hr at 08/14/21 0754  •  heparin bolus from bag 2,400  Units, 30 Units/kg, Intravenous, Q6H PRN, Casper Laura MD, 2,400 Units at 08/14/21 0456  •  heparin bolus from bag 4,800 Units, 60 Units/kg, Intravenous, Q6H PRN, Casper Laura MD  •  ipratropium-albuterol (DUO-NEB) nebulizer solution 3 mL, 3 mL, Nebulization, Q6H - RT, CHITO Woo DPM, 3 mL at 08/14/21 1141  •  Magnesium Sulfate 2 gram Bolus, followed by 8 gram infusion (total Mg dose 10 grams)- Mg less than or equal to 1mg/dL, 2 g, Intravenous, PRN **OR** Magnesium Sulfate 2 gram / 50mL Infusion (GIVE X 3 BAGS TO EQUAL 6GM TOTAL DOSE) - Mg 1.1 - 1.5 mg/dl, 2 g, Intravenous, PRN **OR** Magnesium Sulfate 4 gram infusion- Mg 1.6-1.9 mg/dL, 4 g, Intravenous, PRN, CHITO Woo DPM, Last Rate: 25 mL/hr at 08/13/21 0818, 4 g at 08/13/21 0818  •  melatonin tablet 5 mg, 5 mg, Oral, Nightly PRN, CHITO Woo DPM  •  Morphine sulfate (PF) injection 2 mg, 2 mg, Intravenous, Q4H PRN, CHITO Woo DPM, 2 mg at 08/14/21 0037  •  nitroglycerin (NITROSTAT) SL tablet 0.4 mg, 0.4 mg, Sublingual, Q5 Min PRN, CHITO Woo DPM  •  ondansetron (ZOFRAN) tablet 4 mg, 4 mg, Oral, Q6H PRN **OR** ondansetron (ZOFRAN) injection 4 mg, 4 mg, Intravenous, Q6H PRN, CHITO Woo DPM  •  Pharmacy to dose vancomycin, , Does not apply, Continuous PRN, CHITO Woo DPM  •  potassium chloride (K-DUR,KLOR-CON) CR tablet 40 mEq, 40 mEq, Oral, PRN, CHITO Woo DPM  •  potassium chloride 10 mEq in 100 mL IVPB, 10 mEq, Intravenous, Q1H PRN, CHITO Woo DPM  •  potassium phosphate 45 mmol in sodium chloride 0.9 % 500 mL infusion, 45 mmol, Intravenous, PRN **OR** potassium phosphate 30 mmol in sodium chloride 0.9 % 250 mL infusion, 30 mmol, Intravenous, PRN **OR** potassium phosphate 15 mmol in 0.9% sodium chloride 100 mL IVPB, 15 mmol, Intravenous, PRN **OR** sodium phosphates 45 mmol in sodium chloride 0.9 % 500 mL IVPB, 45 mmol, Intravenous, PRN **OR** sodium phosphates 30 mmol in  sodium chloride 0.9 % 250 mL IVPB, 30 mmol, Intravenous, PRN **OR** sodium phosphates 15 mmol in sodium chloride 0.9 % 250 mL IVPB, 15 mmol, Intravenous, PRN, CHIOT Woo, DPM  •  rosuvastatin (CRESTOR) tablet 10 mg, 10 mg, Oral, Nightly, CHITO Woo, DPM, 10 mg at 08/13/21 2112  •  sertraline (ZOLOFT) tablet 100 mg, 100 mg, Oral, Daily, CHITO Woo, DPM, 100 mg at 08/14/21 0935  •  sodium chloride 0.9 % flush 10 mL, 10 mL, Intravenous, PRN, CHITO Woo, DPM  •  sodium chloride 0.9 % flush 10 mL, 10 mL, Intravenous, Q12H, CHITO Woo, DPM, 10 mL at 08/14/21 0938  •  sodium chloride 0.9 % flush 10 mL, 10 mL, Intravenous, PRN, CHITO Woo, DPM  •  sodium hypochlorite (DAKIN'S 1/4 STRENGTH) 0.125 % topical solution 0.125% solution, , Topical, BID, CHITO Woo, DPM, Given at 08/14/21 1016  •  tamsulosin (FLOMAX) 24 hr capsule 0.4 mg, 0.4 mg, Oral, Daily, Donal, Zaire Valle MD, 0.4 mg at 08/14/21 0935  •  vancomycin (VANCOCIN) 1,000 mg in sodium chloride 0.9 % 250 mL IVPB, 1,000 mg, Intravenous, Q24H, CHITO Woo DPM, 1,000 mg at 08/14/21 0450      DIAGNOSTIC DATA:     I reviewed the patient's new clinical results.    Lab Results (last 24 hours)     Procedure Component Value Units Date/Time    Blood Gas, Arterial - [[id number]]  (Abnormal) Collected: 08/14/21 1332    Specimen: Arterial Blood Updated: 08/14/21 1335     Site Right Radial     Maurice's Test Positive     pH, Arterial 7.310 pH units      pCO2, Arterial 60.8 mm Hg      pO2, Arterial 80.4 mm Hg      HCO3, Arterial 30.6 mmol/L      Base Excess, Arterial 3.5 mmol/L      Comment: Serial Number: [id number]:  [id number]        O2 Saturation, Arterial 94.2 %      CO2 Content 32.5 mmol/L      Barometric Pressure for Blood Gas --     Comment: N/A        Modality Cannula     FIO2 33 %      Hemodilution No    Tissue / Bone Culture - Tissue, Toe, Left [[id number]]  (Abnormal) Collected: 08/12/21 1701    Specimen: Tissue  from Toe, Left Updated: 08/14/21 1122     Tissue Culture Moderate growth (3+) Staphylococcus aureus      Scant growth (1+) Gram Negative Bacilli     Gram Stain Moderate (3+) WBCs per low power field      Few (2+) Gram positive cocci, intracellular      Few (2+) Gram positive cocci in clusters    aPTT [[id number]]  (Abnormal) Collected: 08/14/21 0343    Specimen: Blood Updated: 08/14/21 0429     PTT 49.5 seconds     Protime-INR [[id number]]  (Abnormal) Collected: 08/14/21 0343    Specimen: Blood Updated: 08/14/21 0429     Protime 15.5 Seconds      INR 1.43    Magnesium [[id number]]  (Abnormal) Collected: 08/14/21 0343    Specimen: Blood Updated: 08/14/21 0425     Magnesium 2.4 mg/dL      Comment: Result checked        Comprehensive Metabolic Panel [[id number]]  (Abnormal) Collected: 08/14/21 0343    Specimen: Blood Updated: 08/14/21 0423     Glucose 103 mg/dL      BUN 30 mg/dL      Creatinine 1.26 mg/dL      Sodium 144 mmol/L      Potassium 4.0 mmol/L      Chloride 106 mmol/L      CO2 29.0 mmol/L      Calcium 7.9 mg/dL      Total Protein 5.6 g/dL      Albumin 2.20 g/dL      ALT (SGPT) 9 U/L      AST (SGOT) 16 U/L      Alkaline Phosphatase 64 U/L      Total Bilirubin 0.2 mg/dL      eGFR Non African Amer 54 mL/min/1.73      Globulin 3.4 gm/dL      A/G Ratio 0.6 g/dL      BUN/Creatinine Ratio 23.8     Anion Gap 9.0 mmol/L     Narrative:      GFR Normal >60  Chronic Kidney Disease <60  Kidney Failure <15      CBC & Differential [[id number]]  (Abnormal) Collected: 08/14/21 0343    Specimen: Blood Updated: 08/14/21 0402    Narrative:      The following orders were created for panel order CBC & Differential.  Procedure                               Abnormality         Status                     ---------                               -----------         ------                     CBC Auto Differential[[id number]]        Abnormal            Final result                 Please view results for these tests on the individual orders.     CBC Auto Differential [[id number]]  (Abnormal) Collected: 08/14/21 0343    Specimen: Blood Updated: 08/14/21 0402     WBC 9.00 10*3/mm3      RBC 2.65 10*6/mm3      Hemoglobin 7.2 g/dL      Hematocrit 22.1 %      MCV 83.3 fL      MCH 27.1 pg      MCHC 32.5 g/dL      RDW 17.5 %      RDW-SD 52.1 fl      MPV 7.8 fL      Platelets 245 10*3/mm3      Neutrophil % 89.1 %      Lymphocyte % 4.4 %      Monocyte % 5.3 %      Eosinophil % 1.0 %      Basophil % 0.2 %      Neutrophils, Absolute 8.00 10*3/mm3      Lymphocytes, Absolute 0.40 10*3/mm3      Monocytes, Absolute 0.50 10*3/mm3      Eosinophils, Absolute 0.10 10*3/mm3      Basophils, Absolute 0.00 10*3/mm3      nRBC 0.0 /100 WBC     Blood Culture - Blood, Arm, Left [[id number]] Collected: 08/10/21 0018    Specimen: Blood from Arm, Left Updated: 08/14/21 0030     Blood Culture No growth at 4 days    aPTT [[id number]]  (Abnormal) Collected: 08/13/21 1915    Specimen: Blood Updated: 08/13/21 2029     PTT 51.0 seconds           Imaging Results (Last 24 Hours)     ** No results found for the last 24 hours. **          Xray reviewed personally by physician.      ECG reviewed personally by physician  ECG/EMG Results (most recent)     Procedure Component Value Units Date/Time    ECG 12 Lead [[id number]] Collected: 08/09/21 2358     Updated: 08/10/21 1042     QT Interval 383 ms     Narrative:      HEART RATE= 73  bpm  RR Interval= 852  ms  [place] Interval= 210  ms  P Horizontal Axis= 30  deg  P Front Axis= 44  deg  QRSD Interval= 103  ms  QT Interval= 383  ms  QRS Axis= 22  deg  T Wave Axis= 30  deg  - ABNORMAL ECG -  Sinus rhythm  Multiple premature complexes, vent & supraven  Borderline ST depression, anterolateral leads  When compared with ECG of 31-Dec-2015 10:48:16,  Significant change in rhythm: previously junctional  Electronically Signed By: Tushar Poon (Luis A) 10-Aug-2021 10:42:04  Date and Time of Study: 2021-08-09 23:58:23    Adult Transthoracic Echo Complete W/ Cont if  Necessary Per Protocol [[id number]] Collected: 08/11/21 1155     Updated: 08/11/21 1509     BSA 2.0 m^2      IVSd 1.1 cm      LVIDd 4.8 cm      LVIDs 3.6 cm      LVPWd 1.1 cm      IVS/LVPW 0.94     FS 26.1 %      EDV(Teich) 108.8 ml      ESV(Teich) 53.1 ml      EF(Teich) 51.2 %      EDV(cubed) 112.3 ml      ESV(cubed) 45.3 ml      EF(cubed) 59.7 %      LV mass(C)d 196.7 grams      LV mass(C)dI 96.4 grams/m^2      SV(Teich) 55.7 ml      SI(Teich) 27.3 ml/m^2      SV(cubed) 67.1 ml      SI(cubed) 32.8 ml/m^2      Ao root diam 3.1 cm      Ao root area 7.4 cm^2      LVOT diam 2.3 cm      LVOT area 4.3 cm^2      EDV(MOD-sp4) 86.8 ml      ESV(MOD-sp4) 33.2 ml      EF(MOD-sp4) 61.8 %      EDV(MOD-sp2) 100.8 ml      ESV(MOD-sp2) 48.8 ml      EF(MOD-sp2) 51.6 %      SV(MOD-sp4) 53.6 ml      SI(MOD-sp4) 26.3 ml/m^2      SV(MOD-sp2) 52.0 ml      SI(MOD-sp2) 25.5 ml/m^2      Ao root area (BSA corrected) 1.5     LV Escobar Vol (BSA corrected) 42.5 ml/m^2      LV Sys Vol (BSA corrected) 16.3 ml/m^2      Aortic R-R 0.79 sec      Aortic HR 76.2 BPM      MV E max mike 87.9 cm/sec      MV A max mike 121.6 cm/sec      MV E/A 0.72     MV V2 max 135.6 cm/sec      MV max PG 7.4 mmHg      MV V2 mean 85.8 cm/sec      MV mean PG 3.3 mmHg      MV V2 VTI 50.4 cm      MVA(VTI) 1.8 cm^2      MV dec slope 363.9 cm/sec^2      MV dec time 0.24 sec      Ao pk mike 317.0 cm/sec      Ao max PG 40.3 mmHg      Ao max PG (full) 37.5 mmHg      Ao V2 mean 212.6 cm/sec      Ao mean PG 20.0 mmHg      Ao mean PG (full) 18.2 mmHg      Ao V2 VTI 63.1 cm      DEMETRA(I,A) 1.4 cm^2      DEMETRA(I,D) 1.4 cm^2      DEMETRA(V,A) 1.1 cm^2      DEMETRA(V,D) 1.1 cm^2      LV V1 max PG 2.9 mmHg      LV V1 mean PG 1.8 mmHg      LV V1 max 84.5 cm/sec      LV V1 mean 63.6 cm/sec      LV V1 VTI 21.1 cm      MR max mike 392.5 cm/sec      MR max PG 61.7 mmHg      CO(Ao) 35.4 l/min      CI(Ao) 17.3 l/min/m^2      SV(Ao) 465.1 ml      SI(Ao) 227.8 ml/m^2      CO(LVOT) 6.9 l/min      CI(LVOT) 3.4  "l/min/m^2      SV(LVOT) 90.3 ml      SI(LVOT) 44.2 ml/m^2      PA V2 max 137.8 cm/sec      PA max PG 7.6 mmHg      PA max PG (full) 5.2 mmHg      PA V2 mean 89.7 cm/sec      PA mean PG 3.6 mmHg      PA mean PG (full) 2.4 mmHg      PA V2 VTI 26.6 cm      RV V1 max PG 2.4 mmHg      RV V1 mean PG 1.2 mmHg      RV V1 max 77.2 cm/sec      RV V1 mean 51.5 cm/sec      RV V1 VTI 14.1 cm      TR max gwyn 279.5 cm/sec      Pulm Sys Gwyn 56.3 cm/sec      Pulm Escobar Gwyn 49.6 cm/sec      Pulm S/D 1.1     Pulm A Revs Dur 0.13 sec      Pulm A Revs Gwyn 25.4 cm/sec       CV ECHO KANDY - BZI_BMI 24.5 kilograms/m^2       CV ECHO KANDY - BSA(HAYCOCK) 2.0 m^2       CV ECHO KANDY - BZI_METRIC_WEIGHT 82.1 kg       CV ECHO KANDY - BZI_METRIC_HEIGHT 182.9 cm      EF(MOD-bp) 57.0 %      LA dimension(2D) 4.5 cm     Narrative:      · Left ventricular wall thickness is consistent with mild concentric   hypertrophy.  · Estimated left ventricular EF was in agreement with the calculated left   ventricular EF. Left ventricular ejection fraction appears to be 56 - 60%.   Left ventricular systolic function is normal.  · Estimated right ventricular systolic pressure from tricuspid   regurgitation is mildly elevated (35-45 mmHg).  · Mild pulmonary hypertension is present.  · Left ventricular diastolic function is consistent with (grade Ia w/high   LAP) impaired relaxation.  · Mild to moderate aortic valve stenosis is present.                 VITAL SIGNS: Temp:  [97.6 °F (36.4 °C)-98.7 °F (37.1 °C)] 98.5 °F (36.9 °C)  Heart Rate:  [62-73] 73  Resp:  [18-22] 20  BP: (104-123)/(43-68) 113/47   Flowsheet Rows      First Filed Value   Admission Height  182.9 cm (72\") Documented at 08/09/2021 2156   Admission Weight  90.7 kg (200 lb) Documented at 08/09/2021 2156        Physical exam  Constitutional: well-nourished, and appears stated age in no acute distress, 89 years old  PERRL: Conjunctiva clear, no pallor, anicteric  HENMT: normocephalic, normal " dentition, no cyanosis or pallor  Neck:no bruits, or thrills and bilateral normal carotid upstroke. Normal jugular venous pressure  Cardiovascular: S1-S2, faint 1 out of 6 systolic ejection murmur, no heave no lift, normal S1-S2.  Metallic click  Lungs: unlabored, no wheezing with no rales or rhonchi on auscultation.  Extremities: Warm and dry, left extremity bandaged, pulses okay in the radials, diminished pulses left leg  Abdomen: soft, non-tender, non-distended  Musculoskeletal: Bandage in place, left great toe amputation, moves extremities  Skin: Warm and dry, non-erythematous   Neuro:alert and normal affect. Oriented to time, place and person.    Unchanged from prior encounter based on my face-to-face encounter today    ASSESSMENT AND PLAN:   Cellulitis of left lower extremity    Chronic coronary artery disease    Depression    Hearing problem    History of mitral valve replacement    Essential hypertension    Long term current use of anticoagulant therapy    Mechanical heart valve present    History of B-cell lymphoma    Elevated INR (international normalized ratio) due to prior anticoagulant medication ingestion    Mixed hyperlipidemia     1. Perioperative ischemic evaluation  - 2D ECHO preserved LV and RV size and function EF 55 to 60%  -Defer ischemic evaluation presently 89 years old  -Possibly not surgical candidate, will defer to vascular surgery on endovascular versus surgical approach depending on findings and recommendation, unclear if he is an endovascular candidate  -Will need bridging with heparin preoperatively once INR is less than 2.5 with mechanical mitral valve  -No reversal of INR is recommended with FFP with mechanical mitral valve, high valve thrombosis risk  -Continue perioperative aspirin therapy  -Underlying hemoglobin 8.7     2. Supratherapeutic INR  - INR remains > 8  Start heparin bridge with INR now less than 2.5     3. LE cellulitis  - abx per primary/ vascular      4. CAD remote  history of bypass revascularization  -Asymptomatic with no anginal chest pain  -Likely no benefit to invasive or noninvasive ischemic evaluation revascularization prior to left lower extremity revascularization with threatened limb,  -Optimize medicines perioperatively avoid hypotension  -Continue perioperative aspirin statin therapy     5. VHD with mechanical valve, goal INR 2.5-3.5  Plan:    no benefit for an urgent ischemic evaluation preoperatively.  Now status post left great toe amputation  Further recommendations per vascular surgery on any degree of revascularization that is warranted  Start heparin bridging for INR less than 2.5 with mechanical mitral valve  Avoid FFP for any cause other than intracranial bleeding    Stable hemodynamically  No acute CV concerns today  Continue present therapies from CV standpoint  CAD clinically silent no chest pain  Continue heparin drip until Coumadin restarted with INR greater than 2.5  Postoperative anemia, transfuse for hemoglobin less than 7      At this time there are no contraindications to proceeding forward with surgery from cardiovascular standpoint.  This is primarily due to patient's threatened limb outweighing the benefit of ischemic INR still supratherapeutic.     Plans for angiogram on Tuesday  Defer to renal for hydration and renal protection  No CV contraindication to lower extremity arteriogram       Greater than 35 minutes total of face-to-face/floor  time was spent with the patient and family and nursing coordinating plan and patient management.  Of which counseling of patient with regard specifically to cardiovascular issues comprised 50% of this total time.            Jamal Montoya MD  08/14/21  14:15 EDT

## 2021-08-14 NOTE — PLAN OF CARE
Goal Outcome Evaluation:      Patient on bipap at this time and tolerating well.  1 unit prbc received today.

## 2021-08-14 NOTE — PROGRESS NOTES
"PULMONARY CRITICAL CARE Progress  NOTE      PATIENT IDENTIFICATION:  Name: Niko Servin  MRN: [id number]  :  [id number]     Age: 89 y.o.  Sex: male    DATE OF Note:  2021   Referring Physician: Zaire Mansfield MD                  Subjective:   More sleepy hard to arouse  Per nurses on 2 L oxygen  No SOB no chest pain, no nausea or vomiting, no change in bowel habit, no dysuria,  no new  skin rash or itching.      Objective:  tMax 24 hrs: Temp (24hrs), Av.1 °F (36.7 °C), Min:97.6 °F (36.4 °C), Max:98.7 °F (37.1 °C)      Vitals Ranges:   Temp:  [97.6 °F (36.4 °C)-98.7 °F (37.1 °C)] 98.5 °F (36.9 °C)  Heart Rate:  [62-73] 73  Resp:  [18-22] 20  BP: (104-123)/(43-68) 113/47    Intake and Output Last 3 Shifts:   I/O last 3 completed shifts:  In: 690 [P.O.:240; IV Piggyback:450]  Out: 1450 [Urine:1450]    Exam:  /47 (BP Location: Left arm)   Pulse 73   Temp 98.5 °F (36.9 °C) (Oral)   Resp 20   Ht 182.9 cm (72.01\")   Wt 84.5 kg (186 lb 3.2 oz)   SpO2 97%   BMI 25.25 kg/m²     General Appearance:   Sleepy responds to pain stimuli  HEENT:  Normocephalic, without obvious abnormality, Conjunctiva/corneas clear,.  Normal external ear canals, Nares normal, no drainage     Neck:  Supple, symmetrical, trachea midline. No JVD.  Lungs /Chest wall:   Bilateral basal rhonchi, respirations unlabored symmetrical wall movement.     Heart:  Regular rate and rhythm, systolic murmur PMI left sternal border  Abdomen: Soft, non-tender, no masses, no organomegaly.    Extremities: Trace edema no clubbing or Cyanosis        Medications:    Current Facility-Administered Medications:   •  acetaminophen (TYLENOL) tablet 650 mg, 650 mg, Oral, Q4H PRN, 650 mg at 21 **OR** acetaminophen (TYLENOL) 160 MG/5ML solution 650 mg, 650 mg, Oral, Q4H PRN **OR** acetaminophen (TYLENOL) suppository 650 mg, 650 mg, Rectal, Q4H PRN, CHITO Woo, JELANI  •  albumin human 25 % IV SOLN 25 g, 25 g, " Intravenous, Q8H, Princess Hutchins MD  •  aluminum-magnesium hydroxide-simethicone (MAALOX MAX) 400-400-40 MG/5ML suspension 15 mL, 15 mL, Oral, Q6H PRN, CHITO Woo DPM  •  ampicillin-sulbactam (UNASYN) 3 g in sodium chloride 0.9 % 100 mL IVPB-MBP, 3 g, Intravenous, Q6H, CHITO Woo DPM, 3 g at 08/14/21 0937  •  haloperidol lactate (HALDOL) injection 1 mg, 1 mg, Intravenous, Q6H PRN, CHITO Woo DPM, 1 mg at 08/14/21 0037  •  heparin 49235 units/250 mL (100 units/mL) in 0.45 % NaCl infusion, 12 Units/kg/hr, Intravenous, Titrated, Casper Laura MD, Last Rate: 11.29 mL/hr at 08/14/21 0754, 14 Units/kg/hr at 08/14/21 0754  •  heparin bolus from bag 2,400 Units, 30 Units/kg, Intravenous, Q6H PRN, Casper Laura MD, 2,400 Units at 08/14/21 0456  •  heparin bolus from bag 4,800 Units, 60 Units/kg, Intravenous, Q6H PRN, Casper Laura MD  •  ipratropium-albuterol (DUO-NEB) nebulizer solution 3 mL, 3 mL, Nebulization, Q6H - RT, CHITO Woo DPM, 3 mL at 08/14/21 1141  •  Magnesium Sulfate 2 gram Bolus, followed by 8 gram infusion (total Mg dose 10 grams)- Mg less than or equal to 1mg/dL, 2 g, Intravenous, PRN **OR** Magnesium Sulfate 2 gram / 50mL Infusion (GIVE X 3 BAGS TO EQUAL 6GM TOTAL DOSE) - Mg 1.1 - 1.5 mg/dl, 2 g, Intravenous, PRN **OR** Magnesium Sulfate 4 gram infusion- Mg 1.6-1.9 mg/dL, 4 g, Intravenous, PRN, CHITO Woo DPM, Last Rate: 25 mL/hr at 08/13/21 0818, 4 g at 08/13/21 0818  •  melatonin tablet 5 mg, 5 mg, Oral, Nightly PRN, CHITO Woo DPM  •  Morphine sulfate (PF) injection 2 mg, 2 mg, Intravenous, Q4H PRN, CHITO Woo DPM, 2 mg at 08/14/21 0037  •  nitroglycerin (NITROSTAT) SL tablet 0.4 mg, 0.4 mg, Sublingual, Q5 Min PRN, CHITO Woo DPM  •  ondansetron (ZOFRAN) tablet 4 mg, 4 mg, Oral, Q6H PRN **OR** ondansetron (ZOFRAN) injection 4 mg, 4 mg, Intravenous, Q6H PRN, CHITO Woo DPM  •  Pharmacy to  dose vancomycin, , Does not apply, Continuous PRN, CHITO Woo DPM  •  potassium chloride (K-DUR,KLOR-CON) CR tablet 40 mEq, 40 mEq, Oral, PRN, CHITO Woo DPM  •  potassium chloride 10 mEq in 100 mL IVPB, 10 mEq, Intravenous, Q1H PRN, CHITO Woo DPM  •  potassium phosphate 45 mmol in sodium chloride 0.9 % 500 mL infusion, 45 mmol, Intravenous, PRN **OR** potassium phosphate 30 mmol in sodium chloride 0.9 % 250 mL infusion, 30 mmol, Intravenous, PRN **OR** potassium phosphate 15 mmol in 0.9% sodium chloride 100 mL IVPB, 15 mmol, Intravenous, PRN **OR** sodium phosphates 45 mmol in sodium chloride 0.9 % 500 mL IVPB, 45 mmol, Intravenous, PRN **OR** sodium phosphates 30 mmol in sodium chloride 0.9 % 250 mL IVPB, 30 mmol, Intravenous, PRN **OR** sodium phosphates 15 mmol in sodium chloride 0.9 % 250 mL IVPB, 15 mmol, Intravenous, PRN, CHITO Woo DPM  •  rosuvastatin (CRESTOR) tablet 10 mg, 10 mg, Oral, Nightly, CHITO Woo DPM, 10 mg at 08/13/21 2112  •  sertraline (ZOLOFT) tablet 100 mg, 100 mg, Oral, Daily, CHITO Woo DPM, 100 mg at 08/14/21 0935  •  sodium chloride 0.9 % flush 10 mL, 10 mL, Intravenous, PRN, CHITO Woo DPM  •  sodium chloride 0.9 % flush 10 mL, 10 mL, Intravenous, Q12H, CHITO Woo DPM, 10 mL at 08/14/21 0938  •  sodium chloride 0.9 % flush 10 mL, 10 mL, Intravenous, PRN, CHITO Woo DPM  •  sodium hypochlorite (DAKIN'S 1/4 STRENGTH) 0.125 % topical solution 0.125% solution, , Topical, BID, CHITO Woo DPM, Given at 08/14/21 1016  •  tamsulosin (FLOMAX) 24 hr capsule 0.4 mg, 0.4 mg, Oral, Daily, Zaire Mansfield MD, 0.4 mg at 08/14/21 0912  •  vancomycin (VANCOCIN) 1,000 mg in sodium chloride 0.9 % 250 mL IVPB, 1,000 mg, Intravenous, Q24H, CHITO Woo DPM, 1,000 mg at 08/14/21 0450    Data Review:  All labs (24hrs):   Recent Results (from the past 24 hour(s))   aPTT    Collection Time: 08/13/21  7:15 PM     Specimen: Blood   Result Value Ref Range    PTT 51.0 (L) 61.0 - 76.5 seconds   Protime-INR    Collection Time: 08/14/21  3:43 AM    Specimen: Blood   Result Value Ref Range    Protime 15.5 (L) 19.4 - 28.5 Seconds    INR 1.43 (L) 2.00 - 3.00   Magnesium    Collection Time: 08/14/21  3:43 AM    Specimen: Blood   Result Value Ref Range    Magnesium 2.4 (H) 1.7 - 2.3 mg/dL   Comprehensive Metabolic Panel    Collection Time: 08/14/21  3:43 AM    Specimen: Blood   Result Value Ref Range    Glucose 103 (H) 65 - 99 mg/dL    BUN 30 (H) 8 - 23 mg/dL    Creatinine 1.26 0.76 - 1.27 mg/dL    Sodium 144 136 - 145 mmol/L    Potassium 4.0 3.5 - 5.2 mmol/L    Chloride 106 98 - 107 mmol/L    CO2 29.0 22.0 - 29.0 mmol/L    Calcium 7.9 (L) 8.2 - 9.6 mg/dL    Total Protein 5.6 (L) 6.0 - 8.5 g/dL    Albumin 2.20 (L) 3.50 - 5.20 g/dL    ALT (SGPT) 9 1 - 41 U/L    AST (SGOT) 16 1 - 40 U/L    Alkaline Phosphatase 64 39 - 117 U/L    Total Bilirubin 0.2 0.0 - 1.2 mg/dL    eGFR Non African Amer 54 (L) >60 mL/min/1.73    Globulin 3.4 gm/dL    A/G Ratio 0.6 g/dL    BUN/Creatinine Ratio 23.8 7.0 - 25.0    Anion Gap 9.0 5.0 - 15.0 mmol/L   CBC Auto Differential    Collection Time: 08/14/21  3:43 AM    Specimen: Blood   Result Value Ref Range    WBC 9.00 3.40 - 10.80 10*3/mm3    RBC 2.65 (L) 4.14 - 5.80 10*6/mm3    Hemoglobin 7.2 (L) 13.0 - 17.7 g/dL    Hematocrit 22.1 (L) 37.5 - 51.0 %    MCV 83.3 79.0 - 97.0 fL    MCH 27.1 26.6 - 33.0 pg    MCHC 32.5 31.5 - 35.7 g/dL    RDW 17.5 (H) 12.3 - 15.4 %    RDW-SD 52.1 37.0 - 54.0 fl    MPV 7.8 6.0 - 12.0 fL    Platelets 245 140 - 450 10*3/mm3    Neutrophil % 89.1 (H) 42.7 - 76.0 %    Lymphocyte % 4.4 (L) 19.6 - 45.3 %    Monocyte % 5.3 5.0 - 12.0 %    Eosinophil % 1.0 0.3 - 6.2 %    Basophil % 0.2 0.0 - 1.5 %    Neutrophils, Absolute 8.00 (H) 1.70 - 7.00 10*3/mm3    Lymphocytes, Absolute 0.40 (L) 0.70 - 3.10 10*3/mm3    Monocytes, Absolute 0.50 0.10 - 0.90 10*3/mm3    Eosinophils, Absolute 0.10 0.00 -  0.40 10*3/mm3    Basophils, Absolute 0.00 0.00 - 0.20 10*3/mm3    nRBC 0.0 0.0 - 0.2 /100 WBC   aPTT    Collection Time: 08/14/21  3:43 AM    Specimen: Blood   Result Value Ref Range    PTT 49.5 (L) 61.0 - 76.5 seconds   Type & Screen    Collection Time: 08/14/21  7:46 AM    Specimen: Blood   Result Value Ref Range    ABO Type A     RH type Positive     Antibody Screen Negative     T&S Expiration Date 8/17/2021 11:59:59 PM    Prepare RBC, 1 Units    Collection Time: 08/14/21 10:59 AM   Result Value Ref Range    Product Code [id number]     Unit Number [id number]     UNIT  ABO A     UNIT  RH POS     Crossmatch Interpretation Compatible     Dispense Status IS     Blood Expiration Date 202109092359     Blood Type Barcode 6200    Blood Gas, Arterial -    Collection Time: 08/14/21  1:32 PM    Specimen: Arterial Blood   Result Value Ref Range    Site Right Radial     Maurice's Test Positive     pH, Arterial 7.310 (L) 7.350 - 7.450 pH units    pCO2, Arterial 60.8 (H) 35.0 - 48.0 mm Hg    pO2, Arterial 80.4 (L) 83.0 - 108.0 mm Hg    HCO3, Arterial 30.6 (H) 21.0 - 28.0 mmol/L    Base Excess, Arterial 3.5 (H) 0.0 - 3.0 mmol/L    O2 Saturation, Arterial 94.2 94.0 - 98.0 %    CO2 Content 32.5 (H) 22 - 29 mmol/L    Barometric Pressure for Blood Gas      Modality Cannula     FIO2 33 %    Hemodilution No         Imaging:  CT Angio Abdominal Aorta Bilateral Iliofem Runoff  Narrative:    DATE OF EXAM:  8/12/2021 10:04 AM     PROCEDURE:  CT ANGIO ABDOMINAL AORTA BILAT ILIOFEM RUNOFF-     INDICATIONS:   Gangrenous, necrotic malodorous left great toe wound; L03.116-Cellulitis  of left lower limb; R79.1-Abnormal coagulation profile, pain and  weakness in both legs     COMPARISON:   CT of the abdomen and pelvis dated 01/01/2016     TECHNIQUE:  Routine transaxial slices were obtained through the abdomen, pelvis, and  both lower extremities after the intravenous administration of 100 mL  Isovue 370. Reconstructed coronal and sagittal  images were also  obtained. In addition, a 3 D volume rendered image was obtained after  post processing. Automated exposure control and iterative reconstruction  methods were used.     FINDINGS:  Vascular findings: There is no significant aortic stenosis. There is  mild aortic plaque present. There is plaque at the origins of the SMA  and celiac axis and extensive plaque in the splenic artery and  throughout the course of the celiac axis. The degree of narrowing  appears less than 50% and celiac axis. There is calcified  atherosclerotic plaque in the right and left renal arteries without  hemodynamically significant stenosis. The JANUSZ is patent. There is  atherosclerotic plaque throughout the common and external iliac arteries  without significant stenosis.     Below the inguinal ligament on the right, the patient has plaque in the  superficial femoral artery without significant stenosis. There is  extensive plaque throughout the popliteal artery with narrowing in  several locations approaching 50%. There is diffuse disease in the  trifurcation vessels. The dominant runoff vessel is the peroneal which  passes to the ankle. The anterior tibial appears to fill segmentally.  The posterior tibial artery is occluded.     In the left lower extremity, there is plaque throughout the superficial  femoral artery without significant stenosis there is plaque in the  popliteal artery with at least one high-grade stenosis of greater than  70% just above the knee joint. This is relatively focal in nature. There  is plaque in the tibioperoneal trunk. The dominant runoff vessel is the  peroneal artery which passes to the ankle. The anterior tibial artery  shows diffuse disease but is patent segmentally to the ankle. The  posterior tibial artery is essentially occluded.     Nonvascular findings: There are chronic interstitial fibrotic changes  and emphysematous changes present in the lung bases. There is cardiac  enlargement with  postoperative changes of prior CABG. There is trace  pleural fluid bilaterally with mild by basilar atelectasis. The liver,  spleen, adrenal glands, and pancreas have a normal appearance. There are  multiple layering gallstones within the gallbladder. Nonobstructing  stones are present within the right kidney. There are calculi  dependently within the urinary bladder. There is bilateral  hydronephrosis. There is marked bladder distention with multiple bladder  diverticula. The prostate appears enlarged and slightly irregular in  appearance. No dilated loops of bowel are seen. There is extensive  sigmoid diverticulosis without diverticulitis. There is a right inguinal  hernia containing fat. There is advanced multilevel degenerative disc  and facet disease. There is diffuse osteopenia. 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 There is soft tissue edema of the  left foot and apparently an open wound over the great toe of the left  foot.        Impression:    1. Diffuse atherosclerotic disease to approximately the popliteal level  bilaterally without any significant stenosis above the popliteal artery.  2. Greater than 70% stenosis of the left popliteal artery.  3. Significant trifurcation level disease bilaterally with single vessel  runoff via the peroneal artery.  4. Right renal and bladder stones with bilateral hydronephrosis. There  is marked bladder distention with multiple bladder diverticula  and  prostatic enlargement suggesting bladder outlet obstruction.  5. Multiple incidental nonvascular findings as noted above.     Electronically Signed By-Tushar Montes MD On:8/12/2021 1:32 PM  This report was finalized on [id number] by  Tushar Montes MD.       ASSESSMENT:   Cellulitis of left lower extremity    Chronic coronary artery disease    Depression    Hearing problem    History of mitral valve replacement    Essential hypertension    Long term current use of anticoagulant therapy    Mechanical  heart valve present    History of B-cell lymphoma    Elevated INR (international normalized ratio) due to prior anticoagulant medication ingestion    Mixed hyperlipidemia    Peripheral vascular disease of lower extremity with ulceration (CMS/HCC)       PLAN:  O2 support ABG was reviewed  The effusion is very small no need for further work-up  Chest toileting  Encouraged to use flutter valve  Bronchodilator  Inhaled corticosteroids  Electrolytes/ glycemic control  DVT and GI prophylaxis.    Total Critical care time in direct medical management (   ) minutes  Randi Gayle MD. D, ABSM.     8/14/2021  15:16 EDT

## 2021-08-14 NOTE — PROGRESS NOTES
[facility] Medicine Services Daily Progress Note    Patient Name: Niko Servin  : 1929  MRN: [id number]  Primary Care Physician:  Casper Saldaña MD  Date of admission: 2021      Subjective      Chief Complaint: *Left foot pain      Niko Servin is a 89 y.o. male w/PMH of B-cell lymphoma, CAD, MVR W/mechanical valve long-term anticoagulation, depression, [place], HTN presents to [facility] ED due to left lower extremity cellulitis.  Patient is extremely hard of hearing and is a poor historian, unable to complete review of systems at this time.  Family reports patient injured his left great toe approximately 2 weeks prior but did not tell anyone.  Patient's wife reports he has fallen multiple times over the last week due to gait abnormality.  Patient's wife also reports deterioration of cognition with intermittent hallucinations.  Wife reports patient is vaccinated for Covid.              Upon arrival to the ED vitals temp 98, HR 77, RR 17, /65, O2 sat 100% on room air.  Abs notable for troponin less than 0.015, glucose 113, , K4.1, creatinine 1.25, BUN 35, GFR 54, ALT 8, AST 15, total bili 0.3, CRP 28.7, lactic 1.0, INR greater than 8, WBC 12, Hgb 9.4, platelets 302, neutrophils 87.3.  Sed rate 42.  Blood cultures drawn.  CT of the head shows no acute intracranial abnormality, no hemorrhage.  Moderate chronic age-related intracranial findings.  EKG shows sinus rhythm rate 73 multiple PVCs, ventricular and supraventricular, borderline ST depression anterolateral leads.  Patient treated in the ED with Tdap, IV Zosyn, albuterol nebulizer, IV vancomycin.  UA, chest x-ray, x-ray left foot ordered.          Patient Reports *    Patient reports persistent pain left foot  Going for surgery today  Less tachypneic than yesterday  Very hard of hearing  Dr. Saldaña sent me a message yesterday that he has also underlying dementia that has not been  fully evaluated.    Patient had persistent low hemoglobin 8.7  Has been febrile to 200.6 overnight yesterday  Tachypnea has improved  Saturation 93% on 3 L  Low magnesium 1.5  Supratherapeutic INR more than 8.  I have seen  FFP are being prepared.  We will defer vitamin K to cardiology service given mechanical the mitral valve.      8/12  Patient accompanied by family today  He does not seem to be in pain  He appears comfortable  He is going for surgery today for left great toe amputation  Discussed with Dr. Woo  Unfortunately his INR still high.  Discussed with cardiology service who recommended vitamin K only for now.  Patient was apparently seen by pulmonary and nephrology as well    8/13  Patient had amputation left great toe yesterday 8/12/2021  INR down to 1.6 and hemoglobin 8  Awaiting cultures from tissues.  Gram stain showing gram-positive cocci in clusters  Pathology from left great toe pending  .Echo shows normal ejection fraction and elevated right ventricular pressures with tricuspid regurgitation and mild pulmonary hypertension and diastolic dysfunction with moderate aortic stenosis        8/14  Patient has been afebrile  Blood pressure low normal side.  Saturation 95% on 3 L.  Renal function slightly fluctuating but not significantly changed  INR is 1.4 today  Hemoglobin down to 7.2  He is on heparin drip  He is receiving albumin by nephrologist  Tissue culture growing Staph aureus and scant growth of gram-negative bacilli pending identification  ABG shows respiratory acidosis with hypercarbia  Problems of sleep.  We will monitor.  Will need BiPAP at night.    Review of Systems   Unable to perform ROS: dementia            Objective      Vitals:   Temp:  [97.6 °F (36.4 °C)-98.9 °F (37.2 °C)] 97.6 °F (36.4 °C)  Heart Rate:  [62-74] 66  Resp:  [16-22] 20  BP: (104-123)/(43-68) 107/58  Flow (L/min):  [2-3] 3    Physical Exam  Vitals and nursing note reviewed.   Constitutional:       General: He is not  in acute distress.     Appearance: Normal appearance. He is well-developed. He is not ill-appearing, toxic-appearing or diaphoretic.   HENT:      Head: Normocephalic and atraumatic.      Right Ear: Ear canal and external ear normal.      Left Ear: Ear canal and external ear normal.      Nose: Nose normal. No congestion or rhinorrhea.      Mouth/Throat:      Mouth: Mucous membranes are moist.      Pharynx: No oropharyngeal exudate.   Eyes:      General: No scleral icterus.        Right eye: No discharge.         Left eye: No discharge.      Extraocular Movements: Extraocular movements intact.      Conjunctiva/sclera: Conjunctivae normal.      Pupils: Pupils are equal, round, and reactive to light.   Neck:      Thyroid: No thyromegaly.      Vascular: No carotid bruit or JVD.      Trachea: No tracheal deviation.   Cardiovascular:      Rate and Rhythm: Normal rate and regular rhythm.      Pulses: Normal pulses.      Heart sounds: Normal heart sounds. No murmur heard.   No friction rub. No gallop.    Pulmonary:      Effort: Pulmonary effort is normal. No respiratory distress.      Breath sounds: Normal breath sounds. No stridor. No wheezing, rhonchi or rales.   Chest:      Chest wall: No tenderness.   Abdominal:      General: Bowel sounds are normal. There is no distension.      Palpations: Abdomen is soft. There is no mass.      Tenderness: There is no abdominal tenderness. There is no guarding or rebound.      Hernia: No hernia is present.   Musculoskeletal:         General: No swelling, tenderness, deformity or signs of injury. Normal range of motion.      Cervical back: Normal range of motion and neck supple. No rigidity. No muscular tenderness.      Right lower leg: No edema.      Left lower leg: No edema.      Comments: Gangrenous changes left great toe and swelling and drainage from metacarpophalangeal junction.  Quite swollen and tender in the foot lower part of the leg.  Unable to assess pulsation due to  splinting.     Lymphadenopathy:      Cervical: No cervical adenopathy.   Skin:     General: Skin is warm and dry.      Coloration: Skin is not jaundiced or pale.      Findings: No bruising, erythema or rash.   Neurological:      General: No focal deficit present.      Mental Status: He is alert and oriented to person, place, and time. Mental status is at baseline.      Cranial Nerves: No cranial nerve deficit.      Sensory: No sensory deficit.      Motor: No weakness or abnormal muscle tone.      Coordination: Coordination normal.   Psychiatric:      Comments: Cooperative      *       Result Review    Result Review:  I have personally reviewed the results from the time of this admission to 8/14/2021 11:47 EDT and agree with these findings:  [x]  Laboratory  [x]  Microbiology  [x]  Radiology  [x]  EKG/Telemetry   [x]  Cardiology/Vascular   [x]  Pathology  []  Old records  []  Other:  Most notable findings include: *  As above       Wounds (last 24 hours)      LDA Wound     Row Name 08/14/21 0743 08/13/21 1925 08/13/21 1221       Wound 08/10/21 0508 Left distal leg Other (comment)    Wound - Properties Group Placement Date: 08/10/21  -JM Placement Time: 0508 -JM Side: Left  - Orientation: distal  - Location: leg  -JM Primary Wound Type: Other  -JM, Cellulitis per md     Dressing Appearance  --  open to air  -DD  --    Closure  Surface sutures  -AG  Surface sutures  -DD  --    Base  dry;red  -AG  dry;red  -DD  --    Periwound  dry;warm;intact  -AG  dry;warm;intact  -DD  --    Periwound Temperature  warm  -AG  warm  -DD  --    Drainage Amount  none  -AG  none  -DD  --    Retired Wound - Properties Group Date first assessed: 08/10/21  -JM Time first assessed: 0508 -JM Side: Left  - Location: leg  -JM Primary Wound Type: Other  -JM, Cellulitis per md        Wound 08/12/21 Left anterior great toe Incision    Wound - Properties Group Placement Date: 08/12/21  -TH Side: Left  -TH Orientation: anterior  -TH Location:  great toe  -TH Primary Wound Type: Incision  -TH Additional Comments: 4x4 soaked in betadine, 4x4, kerlix, and ace  -TH    Dressing Appearance  --  dry;intact  -DD  --    Closure  MARLA  -AG  MARLA  -DD  --    Drainage Amount  moderate  -AG  moderate  -DD  --    Care, Wound  --  cleansed with;sterile water;wound cleanser  -DD  --    Dressing Care  --  dressing applied;packed with;gauze betadine and dakins, wrapped in kerlex and ace wrapped  -DD  --    Retired Wound - Properties Group Date first assessed: 08/12/21  -TH Side: Left  -TH Location: great toe  -TH Primary Wound Type: Incision  -TH Additional Comments: 4x4 soaked in betadine, 4x4, kerlix, and ace  -TH       Wound 08/13/21 1113 upper thoracic spine    Wound - Properties Group Placement Date: 08/13/21  -TP Placement Time: 1113  -TP Orientation: upper  -TP Location: thoracic spine  -TP    Retired Wound - Properties Group Date first assessed: 08/13/21  -TP Time first assessed: 1113  -TP Location: thoracic spine  -TP       Wound 08/13/21 1114 thoracic spine    Wound - Properties Group Placement Date: 08/13/21  -TP Placement Time: 1114  -TP Location: thoracic spine  -TP    Retired Wound - Properties Group Date first assessed: 08/13/21  -TP Time first assessed: 1114  -TP Location: thoracic spine  -TP       [REMOVED] Wound 08/13/21 1220 thoracic spine    Wound - Properties Group Placement Date: 08/13/21  -TP Placement Time: 1220  -TP Location: thoracic spine  -TP Additional Comments: only a bruise. deleted.  -TP Removal Date: 08/13/21  -TP Removal Time: 1225  -TP    Wound Image  --  --  Images linked: 1  -TP    Retired Wound - Properties Group Date first assessed: 08/13/21  -TP Time first assessed: 1220  -TP Location: thoracic spine  -TP Additional Comments: only a bruise. deleted.  -TP Resolution Date: 08/13/21  -TP Resolution Time: 1225  -TP       [REMOVED] Wound 08/13/21 1221 perineum    Wound - Properties Group Placement Date: 08/13/21  -TP Placement Time: 1221   -TP Location: perineum  -TP Additional Comments: redness. nothing open. no wound  -TP Removal Date: 08/13/21  -TP Removal Time: 1227  -TP    Retired Wound - Properties Group Date first assessed: 08/13/21  -TP Time first assessed: 1221  -TP Location: perineum  -TP Additional Comments: redness. nothing open. no wound  -TP Resolution Date: 08/13/21  -TP Resolution Time: 1227  -TP    Row Name 08/13/21 1219 08/13/21 1215          Wound 08/10/21 0508 Left distal leg Other (comment)    Wound - Properties Group Placement Date: 08/10/21  -JM Placement Time: 0508  -JM Side: Left  -JM Orientation: distal  -JM Location: leg  -JM Primary Wound Type: Other  -JM, Cellulitis per md     Retired Wound - Properties Group Date first assessed: 08/10/21  -JM Time first assessed: 0508  -JM Side: Left  -JM Location: leg  -JM Primary Wound Type: Other  -JM, Cellulitis per md        Wound 08/12/21 Left anterior great toe Incision    Wound - Properties Group Placement Date: 08/12/21  -TH Side: Left  -TH Orientation: anterior  -TH Location: great toe  -TH Primary Wound Type: Incision  -TH Additional Comments: 4x4 soaked in betadine, 4x4, kerlix, and ace  -TH    Retired Wound - Properties Group Date first assessed: 08/12/21  -TH Side: Left  -TH Location: great toe  -TH Primary Wound Type: Incision  -TH Additional Comments: 4x4 soaked in betadine, 4x4, kerlix, and ace  -TH       Wound 08/13/21 1113 upper thoracic spine    Wound - Properties Group Placement Date: 08/13/21  -TP Placement Time: 1113  -TP Orientation: upper  -TP Location: thoracic spine  -TP    Wound Image  Images linked: 1  -TP  --     Retired Wound - Properties Group Date first assessed: 08/13/21  -TP Time first assessed: 1113  -TP Location: thoracic spine  -TP       Wound 08/13/21 1114 thoracic spine    Wound - Properties Group Placement Date: 08/13/21  -TP Placement Time: 1114  -TP Location: thoracic spine  -TP    Wound Image  --  Images linked: 1  -TP     Retired Wound -  Properties Group Date first assessed: 08/13/21  -TP Time first assessed: 1114  -TP Location: thoracic spine  -TP       [REMOVED] Wound 08/13/21 1220 thoracic spine    Wound - Properties Group Placement Date: 08/13/21  -TP Placement Time: 1220  -TP Location: thoracic spine  -TP Additional Comments: only a bruise. deleted.  -TP Removal Date: 08/13/21  -TP Removal Time: 1225  -TP    Retired Wound - Properties Group Date first assessed: 08/13/21  -TP Time first assessed: 1220  -TP Location: thoracic spine  -TP Additional Comments: only a bruise. deleted.  -TP Resolution Date: 08/13/21  -TP Resolution Time: 1225  -TP       [REMOVED] Wound 08/13/21 1221 perineum    Wound - Properties Group Placement Date: 08/13/21  -TP Placement Time: 1221  -TP Location: perineum  -TP Additional Comments: redness. nothing open. no wound  -TP Removal Date: 08/13/21  -TP Removal Time: 1227  -TP    Retired Wound - Properties Group Date first assessed: 08/13/21  -TP Time first assessed: 1221  -TP Location: perineum  -TP Additional Comments: redness. nothing open. no wound  -TP Resolution Date: 08/13/21  -TP Resolution Time: 1227  -TP      User Key  (r) = Recorded By, (t) = Taken By, (c) = Cosigned By    Initials Name Provider Type     Fatuma Coronel, RN Registered Nurse    Maria Ines Perez LPN Licensed Nurse    Loida Wesbtrook RN Registered Nurse    Brittani Gilbert LPN Licensed Nurse    Rashard Persaud LPN Licensed Nurse            Assessment/Plan      Brief Patient Summary:  Niko Servin is a 89 y.o. male who *presenting with gangrenous changes left great toe    albumin human, 25 g, Intravenous, Q8H  ampicillin-sulbactam, 3 g, Intravenous, Q6H  calcium gluconate, 1 g, Intravenous, Once  furosemide, 20 mg, Intravenous, Once  ipratropium-albuterol, 3 mL, Nebulization, Q6H - RT  rosuvastatin, 10 mg, Oral, Nightly  sertraline, 100 mg, Oral, Daily  sodium chloride, 10 mL, Intravenous, Q12H  sodium hypochlorite, , Topical,  BID  tamsulosin, 0.4 mg, Oral, Daily  vancomycin, 1,000 mg, Intravenous, Q24H       heparin, 12 Units/kg/hr, Last Rate: 14 Units/kg/hr (08/14/21 9144)  Pharmacy to dose vancomycin,          Active Hospital Problems:  Active Hospital Problems    Diagnosis    • **Cellulitis of left lower extremity    • History of B-cell lymphoma    • Elevated INR (international normalized ratio) due to prior anticoagulant medication ingestion    • Mixed hyperlipidemia    • Peripheral vascular disease of lower extremity with ulceration (CMS/HCC)      Added automatically from request for surgery [id number]     • Depression    • Essential hypertension    • Mechanical heart valve present    • Hearing problem    • Chronic coronary artery disease    • Long term current use of anticoagulant therapy    • History of mitral valve replacement      Plan:      Gangrenous changes of the left lower extremity involving the left great toe wound and foot:  -X-ray shows positive gas production but thought to be related to the wound by Dr. Woo.  Patient on clindamycin Unasyn and vancomycin.  ID following.   Podiatry taken to surgery 8/11/2021 s/p left great toe amputation.  Follow-up culture  Vascular surgery consulted as well.  CT angiogram shows:Greater than 70% stenosis of the left popliteal artery.  - Significant trifurcation level disease bilaterally with single vessel  runoff via the peroneal artery  -Per vascular:.Left lower extremity arteriogram with possible intervention on Tuesday 8/17/2021 by Dr. Elizabeth.       Right renal and bladder stones with bilateral hydronephrosis. There  is marked bladder distention with multiple bladder diverticula  and  prostatic enlargement suggesting bladder outlet obstruction.  - Smith catheter.  - Flomax    Bacteremia with Proteus species  Present on admission  Pending identification and sensitivity  On Unasyn  Possibly related to the foot infection  ID following    Coumadin toxicity  Elevated INR:  S/p vitamin  K  INR decreased.  Heparin drip startedFor bridging  Defer to cardiology  Holding Coumadin for mechanical mitral valve until patient is cleared for discharge.    Acute blood loss anemia  Will need transfusion if below 7.5 or symptomatic    Mixed hyperlipidemia:  -On statin    Essential hypertension:  On Lasix for now       Chronic coronary artery disease:  -Cardiology is consulted  On aspirin and statin  Negative initial troponin    Suspect CHF exacerbation  Echo pending  On IV Lasix  Suspect underlying interstitial lung disease based on x-ray  Consider pulmonary evaluation if worsening respiratory distress or hypoxemia      History of MVR/mechanical valve present/long-term use of anticoagulant therapy:    As above    Depression:  -Home medication sertraline 100 mg p.o. daily  Possible underlying dementia per Dr. Saldaña as well  Likely need long-term rehab placement  We will get palliative care involved    Hard of hearing:  -Continue to monitor     disposition          Plan  Referral made to [facility] in [place]. Pending acceptance and will need precert. No pasrr for [facility] , nut may need pasrr if goes subacute         Complex case high risk    DVT prophylaxis:  Medical DVT prophylaxis orders are present.    CODE STATUS:    Code Status: CPR  Medical Interventions (Level of Support Prior to Arrest): Full      Disposition:  I expect patient to be discharged ECF  This patient has been examined wearing appropriate Personal Protective Equipment and discussed with hospital infection control department. 08/14/21      Electronically signed by Zaire Mansfield MD, 08/14/21, 11:47 EDT.  [facility] Hospitalist Team

## 2021-08-15 NOTE — PROGRESS NOTES
"PULMONARY CRITICAL CARE Progress  NOTE      PATIENT IDENTIFICATION:  Name: Niko Servin  MRN: [id number]  :  [id number]     Age: 89 y.o.  Sex: male    DATE OF Note:  8/15/2021   Referring Physician: Zaire Mansfield MD                  Subjective:   More awake,  on 2 L oxygen, No SOB, no chest or abd pain, no bowel or bladder issues reported, no new complaints        Objective:  tMax 24 hrs: Temp (24hrs), Av °F (36.7 °C), Min:97.5 °F (36.4 °C), Max:98.9 °F (37.2 °C)      Vitals Ranges:   Temp:  [97.5 °F (36.4 °C)-98.9 °F (37.2 °C)] 97.5 °F (36.4 °C)  Heart Rate:  [61-73] 63  Resp:  [20-29] 28  BP: (107-152)/(47-66) 140/59    Intake and Output Last 3 Shifts:   I/O last 3 completed shifts:  In: 7.9 [P.O.:1260; Blood:347.9; IV Piggyback:450]  Out: 1750 [Urine:1750]    Exam:  /59 (BP Location: Left arm, Patient Position: Lying)   Pulse 63   Temp 97.5 °F (36.4 °C) (Axillary)   Resp 28   Ht 180.3 cm (71\")   Wt 86.4 kg (190 lb 7.6 oz)   SpO2 99%   BMI 26.57 kg/m²     General Appearance:   Alert  HEENT:  Normocephalic, without obvious abnormality, Conjunctiva/corneas clear,.  Normal external ear canals, Nares normal, no drainage     Neck:  Supple, symmetrical, trachea midline. No JVD.  Lungs /Chest wall:   Bilateral basal rhonchi, respirations unlabored symmetrical wall movement.     Heart:  Regular rate and rhythm, systolic murmur PMI left sternal border  Abdomen: Soft, non-tender, no masses, no organomegaly.    Extremities: Trace edema no clubbing or Cyanosis        Medications:    Current Facility-Administered Medications:   •  acetaminophen (TYLENOL) tablet 650 mg, 650 mg, Oral, Q4H PRN, 650 mg at 21 **OR** acetaminophen (TYLENOL) 160 MG/5ML solution 650 mg, 650 mg, Oral, Q4H PRN **OR** acetaminophen (TYLENOL) suppository 650 mg, 650 mg, Rectal, Q4H PRN, CHITO Woo DPM  •  aluminum-magnesium hydroxide-simethicone (MAALOX MAX) 400-400-40 MG/5ML suspension 15 " mL, 15 mL, Oral, Q6H PRN, CHITO Woo DPM  •  ampicillin-sulbactam (UNASYN) 3 g in sodium chloride 0.9 % 100 mL IVPB-MBP, 3 g, Intravenous, Q6H, CHITO Woo DPM, 3 g at 08/15/21 0919  •  ferric gluconate (FERRLECIT)125 MG in sodium chloride 0.9 % 100 mL IVPB, 125 mg, Intravenous, Daily, Princess Hutchins MD  •  haloperidol lactate (HALDOL) injection 1 mg, 1 mg, Intravenous, Q6H PRN, CHITO Woo DPM, 1 mg at 08/14/21 0037  •  heparin 08608 units/250 mL (100 units/mL) in 0.45 % NaCl infusion, 12 Units/kg/hr, Intravenous, Titrated, Casper Laura MD, Last Rate: 12.1 mL/hr at 08/15/21 0458, 15 Units/kg/hr at 08/15/21 0458  •  heparin bolus from bag 2,400 Units, 30 Units/kg, Intravenous, Q6H PRN, Casper Laura MD, 2,400 Units at 08/14/21 1705  •  heparin bolus from bag 4,800 Units, 60 Units/kg, Intravenous, Q6H PRN, Casper Laura MD  •  ipratropium-albuterol (DUO-NEB) nebulizer solution 3 mL, 3 mL, Nebulization, Q6H - RT, CHITO Woo DPM, 3 mL at 08/15/21 0704  •  Magnesium Sulfate 2 gram Bolus, followed by 8 gram infusion (total Mg dose 10 grams)- Mg less than or equal to 1mg/dL, 2 g, Intravenous, PRN **OR** Magnesium Sulfate 2 gram / 50mL Infusion (GIVE X 3 BAGS TO EQUAL 6GM TOTAL DOSE) - Mg 1.1 - 1.5 mg/dl, 2 g, Intravenous, PRN **OR** Magnesium Sulfate 4 gram infusion- Mg 1.6-1.9 mg/dL, 4 g, Intravenous, PRN, CHITO Woo DPM, Last Rate: 25 mL/hr at 08/13/21 0818, 4 g at 08/13/21 0818  •  melatonin tablet 5 mg, 5 mg, Oral, Nightly PRN, CHITO Woo DPM  •  Morphine sulfate (PF) injection 2 mg, 2 mg, Intravenous, Q4H PRN, CHITO Woo DPM, 2 mg at 08/14/21 1958  •  nitroglycerin (NITROSTAT) SL tablet 0.4 mg, 0.4 mg, Sublingual, Q5 Min PRN, CHITO Woo DPM  •  ondansetron (ZOFRAN) tablet 4 mg, 4 mg, Oral, Q6H PRN **OR** ondansetron (ZOFRAN) injection 4 mg, 4 mg, Intravenous, Q6H PRN, CHITO Woo DPM  •  Pharmacy to dose  vancomycin, , Does not apply, Continuous PRN, CHITO Woo DPM  •  potassium chloride (K-DUR,KLOR-CON) CR tablet 40 mEq, 40 mEq, Oral, PRN, CHITO Woo DPM  •  potassium chloride 10 mEq in 100 mL IVPB, 10 mEq, Intravenous, Q1H PRN, CHITO Woo DPM  •  potassium phosphate 45 mmol in sodium chloride 0.9 % 500 mL infusion, 45 mmol, Intravenous, PRN **OR** potassium phosphate 30 mmol in sodium chloride 0.9 % 250 mL infusion, 30 mmol, Intravenous, PRN **OR** potassium phosphate 15 mmol in 0.9% sodium chloride 100 mL IVPB, 15 mmol, Intravenous, PRN **OR** sodium phosphates 45 mmol in sodium chloride 0.9 % 500 mL IVPB, 45 mmol, Intravenous, PRN **OR** sodium phosphates 30 mmol in sodium chloride 0.9 % 250 mL IVPB, 30 mmol, Intravenous, PRN **OR** sodium phosphates 15 mmol in sodium chloride 0.9 % 250 mL IVPB, 15 mmol, Intravenous, PRN, CHITO Woo DPM  •  rosuvastatin (CRESTOR) tablet 10 mg, 10 mg, Oral, Nightly, CHITO Woo DPM, 10 mg at 08/14/21 1951  •  sertraline (ZOLOFT) tablet 100 mg, 100 mg, Oral, Daily, CHITO Woo DPM, 100 mg at 08/15/21 0919  •  sodium chloride 0.9 % flush 10 mL, 10 mL, Intravenous, PRN, CHITO Woo DPM  •  sodium chloride 0.9 % flush 10 mL, 10 mL, Intravenous, Q12H, CHITO Woo DPM, 10 mL at 08/15/21 0923  •  sodium chloride 0.9 % flush 10 mL, 10 mL, Intravenous, PRN, CHITO Woo DPM, 10 mL at 08/15/21 0409  •  sodium hypochlorite (DAKIN'S 1/4 STRENGTH) 0.125 % topical solution 0.125% solution, , Topical, BID, CHITO Woo DPM, Given at 08/14/21 1953  •  tamsulosin (FLOMAX) 24 hr capsule 0.4 mg, 0.4 mg, Oral, Daily, Zaire Mansfield MD, 0.4 mg at 08/15/21 0919  •  vancomycin (VANCOCIN) 1,000 mg in sodium chloride 0.9 % 250 mL IVPB, 1,000 mg, Intravenous, Q24H, CHITO Woo DPM, 1,000 mg at 08/15/21 0408    Data Review:  All labs (24hrs):   Recent Results (from the past 24 hour(s))   Blood Gas, Arterial -     Collection Time: 08/14/21  1:32 PM    Specimen: Arterial Blood   Result Value Ref Range    Site Right Radial     Maurice's Test Positive     pH, Arterial 7.310 (L) 7.350 - 7.450 pH units    pCO2, Arterial 60.8 (H) 35.0 - 48.0 mm Hg    pO2, Arterial 80.4 (L) 83.0 - 108.0 mm Hg    HCO3, Arterial 30.6 (H) 21.0 - 28.0 mmol/L    Base Excess, Arterial 3.5 (H) 0.0 - 3.0 mmol/L    O2 Saturation, Arterial 94.2 94.0 - 98.0 %    CO2 Content 32.5 (H) 22 - 29 mmol/L    Barometric Pressure for Blood Gas      Modality Cannula     FIO2 33 %    Hemodilution No    aPTT    Collection Time: 08/14/21  4:27 PM    Specimen: Blood   Result Value Ref Range    PTT 56.2 (L) 61.0 - 76.5 seconds   aPTT    Collection Time: 08/14/21 10:18 PM    Specimen: Blood   Result Value Ref Range    PTT 73.2 61.0 - 76.5 seconds   Prepare RBC, 1 Units    Collection Time: 08/15/21  2:00 AM   Result Value Ref Range    Product Code W8345H19     Unit Number [id number]     UNIT  ABO A     UNIT  RH POS     Crossmatch Interpretation Compatible     Dispense Status PT     Blood Expiration Date [id number]     Blood Type Barcode 6200    Vancomycin, Trough Please draw prior to 0400 dose    Collection Time: 08/15/21  3:00 AM    Specimen: Blood   Result Value Ref Range    Vancomycin Trough 16.70 5.00 - 20.00 mcg/mL   CBC Auto Differential    Collection Time: 08/15/21  3:00 AM    Specimen: Blood   Result Value Ref Range    WBC 8.10 3.40 - 10.80 10*3/mm3    RBC 3.04 (L) 4.14 - 5.80 10*6/mm3    Hemoglobin 8.0 (L) 13.0 - 17.7 g/dL    Hematocrit 25.0 (L) 37.5 - 51.0 %    MCV 82.2 79.0 - 97.0 fL    MCH 26.2 (L) 26.6 - 33.0 pg    MCHC 31.9 31.5 - 35.7 g/dL    RDW 18.1 (H) 12.3 - 15.4 %    RDW-SD 52.9 37.0 - 54.0 fl    MPV 7.9 6.0 - 12.0 fL    Platelets 243 140 - 450 10*3/mm3    Neutrophil % 89.2 (H) 42.7 - 76.0 %    Lymphocyte % 3.3 (L) 19.6 - 45.3 %    Monocyte % 4.7 (L) 5.0 - 12.0 %    Eosinophil % 1.9 0.3 - 6.2 %    Basophil % 0.9 0.0 - 1.5 %    Neutrophils, Absolute 7.30  (H) 1.70 - 7.00 10*3/mm3    Lymphocytes, Absolute 0.30 (L) 0.70 - 3.10 10*3/mm3    Monocytes, Absolute 0.40 0.10 - 0.90 10*3/mm3    Eosinophils, Absolute 0.20 0.00 - 0.40 10*3/mm3    Basophils, Absolute 0.10 0.00 - 0.20 10*3/mm3    nRBC 0.0 0.0 - 0.2 /100 WBC   Protime-INR    Collection Time: 08/15/21  3:00 AM    Specimen: Blood   Result Value Ref Range    Protime 17.7 (L) 19.4 - 28.5 Seconds    INR 1.66 (L) 2.00 - 3.00   Magnesium    Collection Time: 08/15/21  3:00 AM    Specimen: Blood   Result Value Ref Range    Magnesium 2.1 1.7 - 2.3 mg/dL   Phosphorus    Collection Time: 08/15/21  3:00 AM    Specimen: Blood   Result Value Ref Range    Phosphorus 3.5 2.5 - 4.5 mg/dL   Iron    Collection Time: 08/15/21  3:00 AM    Specimen: Blood   Result Value Ref Range    Iron 15 (L) 59 - 158 mcg/dL   Comprehensive Metabolic Panel    Collection Time: 08/15/21  3:00 AM    Specimen: Blood   Result Value Ref Range    Glucose 98 65 - 99 mg/dL    BUN 25 (H) 8 - 23 mg/dL    Creatinine 1.12 0.76 - 1.27 mg/dL    Sodium 146 (H) 136 - 145 mmol/L    Potassium 3.8 3.5 - 5.2 mmol/L    Chloride 106 98 - 107 mmol/L    CO2 29.0 22.0 - 29.0 mmol/L    Calcium 8.1 (L) 8.2 - 9.6 mg/dL    Total Protein 5.9 (L) 6.0 - 8.5 g/dL    Albumin 2.70 (L) 3.50 - 5.20 g/dL    ALT (SGPT) 7 1 - 41 U/L    AST (SGOT) 17 1 - 40 U/L    Alkaline Phosphatase 57 39 - 117 U/L    Total Bilirubin 0.4 0.0 - 1.2 mg/dL    eGFR Non African Amer 61 >60 mL/min/1.73    Globulin 3.2 gm/dL    A/G Ratio 0.8 g/dL    BUN/Creatinine Ratio 22.3 7.0 - 25.0    Anion Gap 11.0 5.0 - 15.0 mmol/L   aPTT    Collection Time: 08/15/21  3:00 AM    Specimen: Blood   Result Value Ref Range    PTT 68.3 61.0 - 76.5 seconds        Imaging:  XR Chest 1 View  Narrative: XR CHEST 1 VW-     Date of Exam: 8/15/2021 6:05 AM     Indication: Shortness of breath; L03.116-Cellulitis of left lower limb;  R79.1-Abnormal coagulation profile; A97-Uyawnitr, not elsewhere  classified; I73.9-Peripheral vascular  disease, unspecified;  L97.909-Non-pressure chronic ulcer of unspecified part of unspecified  lower leg with unspecified severity.     Comparison Exams: CT chest from 08/11/2021     Technique: Single AP chest radiograph     FINDINGS:  Median sternotomy wires appear intact. There are coarse bilateral  interstitial markings. There are small bilateral pleural effusions. The  heart is enlarged.     Impression: 1.Coarse bilateral interstitial markings, which could reflect  interstitial pulmonary edema versus atypical pneumonia.  2.Cardiomegaly with small bilateral pleural effusions.     Electronically Signed By-Aneesh Cook MD On:8/15/2021 10:23 AM  This report was finalized on [id number] by  Aneesh Cook MD.       ASSESSMENT:   Cellulitis of left lower extremity    Chronic coronary artery disease    Depression    Hearing problem    History of mitral valve replacement    Essential hypertension    Long term current use of anticoagulant therapy    Mechanical heart valve present    History of B-cell lymphoma    Elevated INR (international normalized ratio) due to prior anticoagulant medication ingestion    Mixed hyperlipidemia    Peripheral vascular disease of lower extremity with ulceration (CMS/HCC)       PLAN:  O2 support , decrease O2 as tolerated   The effusion is very small no need for further work-up  Chest toileting  Encouraged to use flutter valve  Bronchodilator  Inhaled corticosteroids  Electrolytes/ glycemic control  DVT and GI prophylaxis    Discussed with Dr Irwin Page, APRN   8/15/2021  11:01 EDT     I personally have examined  and interviewed the patient. I have reviewed the history, data, problems, assessment and plan with our NP.  Critical care time in direct medical management (   ) minutes  Electronically signed by Randi Gayle MD, D,ABS, 08/15/21, 8:47 PM EDT.

## 2021-08-15 NOTE — PROGRESS NOTES
[facility]        LOS:  LOS: 4 days   Patient Name: Niko Servin  Age/Sex: 89 y.o. male  : 1929  MRN: [id number]    Day of Service: 08/15/21   Length of Stay: 4  Encounter Provider: MARIZA Tellez  Place of Service: [facility] CARDIOLOGY  Patient Care Team:  Casper Saldaña MD as PCP - General  Casper Saldaña MD as PCP - Family Medicine  Cory Bermeo MD as Consulting Physician (Nephrology)    Subjective:     Chief Complaint: f/u mech valve, on anticoagulation    Subjective: Patient sitting up in bed, he remains on supplemental o2, his wife is at bedside. No acute events overnight. He is on heparin gtt. INR 1.6. has mech valves. No chest pain or SOA.    Continue Coumadin  Continue heparin bridge until INR is 2.5 or greater  Remains on antibiotics          Current Medications:   Scheduled Meds:ampicillin-sulbactam, 3 g, Intravenous, Q6H  ferric gluconate, 125 mg, Intravenous, Daily  ipratropium-albuterol, 3 mL, Nebulization, Q6H - RT  rosuvastatin, 10 mg, Oral, Nightly  sertraline, 100 mg, Oral, Daily  sodium chloride, 10 mL, Intravenous, Q12H  sodium hypochlorite, , Topical, BID  tamsulosin, 0.4 mg, Oral, Daily  vancomycin, 1,000 mg, Intravenous, Q24H      Continuous Infusions:heparin, 12 Units/kg/hr, Last Rate: 15 Units/kg/hr (08/15/21 0458)  Pharmacy to dose vancomycin,         Allergies:  No Known Allergies    Review of Systems   Constitutional: Negative.   Cardiovascular: Negative.    Respiratory: Negative.    Neurological: Negative.        Objective:     Temp:  [97.5 °F (36.4 °C)-98.9 °F (37.2 °C)] 97.8 °F (36.6 °C)  Heart Rate:  [61-73] 73  Resp:  [20-29] 20  BP: (113-154)/(47-76) 154/76     Intake/Output Summary (Last 24 hours) at 8/15/2021 1251  Last data filed at 8/15/2021 0512  Gross per 24 hour   Intake 1697.92 ml   Output 1300 ml   Net 397.92 ml     Body mass index is 26.57 kg/m².      21  0425 21  7424  08/15/21  0500   Weight: 84.5 kg (186 lb 3.2 oz) 86 kg (189 lb 9.5 oz) 86.4 kg (190 lb 7.6 oz)         Physical Exam:  General Appearance:    Alert, cooperative, in no acute distress               Neck:   supple,  no JVD   Lungs:     Supplemental O2, dim bases, some scattered rhonchi    Heart:    Regular rhythm and normal rate, normal S1 and S2,1/6 murmur noted   Abdomen:     Normal bowel sounds, soft   Extremities:   Moves all extremities well, no edema, left foot wrapped   Pulses:   Pulses palpable and equal bilaterally   Skin:   reddened LE   Neurologic:   Awake, alert, oriented x3   Unchanged from prior encounter based on face-to-face assessment      Lab Review:   Results from last 7 days   Lab Units 08/15/21  0300 08/14/21  0343   SODIUM mmol/L 146* 144   POTASSIUM mmol/L 3.8 4.0   CHLORIDE mmol/L 106 106   CO2 mmol/L 29.0 29.0   BUN mg/dL 25* 30*   CREATININE mg/dL 1.12 1.26   GLUCOSE mg/dL 98 103*   CALCIUM mg/dL 8.1* 7.9*   AST (SGOT) U/L 17 16   ALT (SGPT) U/L 7 9     Results from last 7 days   Lab Units 08/10/21  0018   TROPONIN T ng/mL 0.015     Results from last 7 days   Lab Units 08/15/21  0300 08/14/21  0343   WBC 10*3/mm3 8.10 9.00   HEMOGLOBIN g/dL 8.0* 7.2*   HEMATOCRIT % 25.0* 22.1*   PLATELETS 10*3/mm3 243 245     Results from last 7 days   Lab Units 08/15/21  0300 08/14/21  2218 08/14/21  1627 08/14/21  0343   INR  1.66*  --   --  1.43*   APTT seconds 68.3 73.2   < > 49.5*    < > = values in this interval not displayed.     Results from last 7 days   Lab Units 08/15/21  0300 08/14/21  0343   MAGNESIUM mg/dL 2.1 2.4*           Invalid input(s): LDLCALC  Results from last 7 days   Lab Units 08/10/21  0450   PROBNP pg/mL 5,874.0*     Results from last 7 days   Lab Units 08/10/21  0450   TSH uIU/mL 1.790       Recent Radiology:  Imaging Results (Most Recent)     Procedure Component Value Units Date/Time    XR Chest 1 View [[id number]] Collected: 08/15/21 1021     Updated: 08/15/21 1039    Narrative:       XR CHEST 1 VW-     Date of Exam: 8/15/2021 6:05 AM     Indication: Shortness of breath; L03.116-Cellulitis of left lower limb;  R79.1-Abnormal coagulation profile; K53-Lukjxkjk, not elsewhere  classified; I73.9-Peripheral vascular disease, unspecified;  L97.909-Non-pressure chronic ulcer of unspecified part of unspecified  lower leg with unspecified severity.     Comparison Exams: CT chest from 08/11/2021     Technique: Single AP chest radiograph     FINDINGS:  Median sternotomy wires appear intact. There are coarse bilateral  interstitial markings. There are small bilateral pleural effusions. The  heart is enlarged.       Impression:      1.Coarse bilateral interstitial markings, which could reflect  interstitial pulmonary edema versus atypical pneumonia.  2.Cardiomegaly with small bilateral pleural effusions.     Electronically Signed By-Aneesh Cook MD On:8/15/2021 10:23 AM  This report was finalized on [id number] by  Aneesh Cook MD.    CT Angio Abdominal Aorta Bilateral Iliofem Runoff [[id number]] Collected: 08/12/21 1320     Updated: 08/12/21 1345    Narrative:         DATE OF EXAM:  8/12/2021 10:04 AM     PROCEDURE:  CT ANGIO ABDOMINAL AORTA BILAT ILIOFEM RUNOFF-     INDICATIONS:   Gangrenous, necrotic malodorous left great toe wound; L03.116-Cellulitis  of left lower limb; R79.1-Abnormal coagulation profile, pain and  weakness in both legs     COMPARISON:   CT of the abdomen and pelvis dated 01/01/2016     TECHNIQUE:  Routine transaxial slices were obtained through the abdomen, pelvis, and  both lower extremities after the intravenous administration of 100 mL  Isovue 370. Reconstructed coronal and sagittal images were also  obtained. In addition, a 3 D volume rendered image was obtained after  post processing. Automated exposure control and iterative reconstruction  methods were used.     FINDINGS:  Vascular findings: There is no significant aortic stenosis. There is  mild aortic plaque  present. There is plaque at the origins of the SMA  and celiac axis and extensive plaque in the splenic artery and  throughout the course of the celiac axis. The degree of narrowing  appears less than 50% and celiac axis. There is calcified  atherosclerotic plaque in the right and left renal arteries without  hemodynamically significant stenosis. The JANUSZ is patent. There is  atherosclerotic plaque throughout the common and external iliac arteries  without significant stenosis.     Below the inguinal ligament on the right, the patient has plaque in the  superficial femoral artery without significant stenosis. There is  extensive plaque throughout the popliteal artery with narrowing in  several locations approaching 50%. There is diffuse disease in the  trifurcation vessels. The dominant runoff vessel is the peroneal which  passes to the ankle. The anterior tibial appears to fill segmentally.  The posterior tibial artery is occluded.     In the left lower extremity, there is plaque throughout the superficial  femoral artery without significant stenosis there is plaque in the  popliteal artery with at least one high-grade stenosis of greater than  70% just above the knee joint. This is relatively focal in nature. There  is plaque in the tibioperoneal trunk. The dominant runoff vessel is the  peroneal artery which passes to the ankle. The anterior tibial artery  shows diffuse disease but is patent segmentally to the ankle. The  posterior tibial artery is essentially occluded.     Nonvascular findings: There are chronic interstitial fibrotic changes  and emphysematous changes present in the lung bases. There is cardiac  enlargement with postoperative changes of prior CABG. There is trace  pleural fluid bilaterally with mild by basilar atelectasis. The liver,  spleen, adrenal glands, and pancreas have a normal appearance. There are  multiple layering gallstones within the gallbladder. Nonobstructing  stones are present  within the right kidney. There are calculi  dependently within the urinary bladder. There is bilateral  hydronephrosis. There is marked bladder distention with multiple bladder  diverticula. The prostate appears enlarged and slightly irregular in  appearance. No dilated loops of bowel are seen. There is extensive  sigmoid diverticulosis without diverticulitis. There is a right inguinal  hernia containing fat. There is advanced multilevel degenerative disc  and facet disease. There is diffuse osteopenia. 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 There is soft tissue edema of the  left foot and apparently an open wound over the great toe of the left  foot.          Impression:         1. Diffuse atherosclerotic disease to approximately the popliteal level  bilaterally without any significant stenosis above the popliteal artery.  2. Greater than 70% stenosis of the left popliteal artery.  3. Significant trifurcation level disease bilaterally with single vessel  runoff via the peroneal artery.  4. Right renal and bladder stones with bilateral hydronephrosis. There  is marked bladder distention with multiple bladder diverticula  and  prostatic enlargement suggesting bladder outlet obstruction.  5. Multiple incidental nonvascular findings as noted above.     Electronically Signed By-Tushar Montes MD On:8/12/2021 1:32 PM  This report was finalized on [id number] by  Tushar Montes MD.    CT Chest Without Contrast Diagnostic [[id number]] Collected: 08/11/21 1931     Updated: 08/11/21 1937    Narrative:         DATE OF EXAM:  8/11/2021 5:13 PM     PROCEDURE:  CT CHEST WO CONTRAST DIAGNOSTIC-     INDICATIONS:   Shortness of breath and confusion.     COMPARISON:   12/31/2015.     TECHNIQUE:  Routine transaxial slices were obtained through the chest without the  administration of intravenous contrast. Reconstructed coronal and  sagittal images were also obtained. Automated exposure control and  iterative  construction methods were used.      FINDINGS:  There are tiny layering pleural effusions. There is respiratory motion  artifact. There is mild bilateral basilar subsegmental atelectasis.  There is suggestion of some areas of interstitial thickening and some  hazy areas of groundglass density. This may be secondary to pulmonary  edema. An atypical infection is considered less likely. The heart is  enlarged. There are extensive atherosclerotic vascular calcifications  including involvement of the coronary arteries. The patient has had a  previous CABG procedure. There are no enlarged mediastinal lymph nodes.  The hilar regions are difficult to evaluate without contrast. The  patient has dense sludge within the gallbladder. There are no acute  findings beneath the hemidiaphragms. There are no suspicious osteolytic  or sclerotic lesions within the bony structures. There are old healed  bilateral rib fractures.        Impression:         1. Tiny layering pleural effusions with mild bilateral basilar  subsegmental atelectasis.  2. The study is difficult to interpret due to respiratory motion  artifact. There are some areas of interstitial thickening and hazy  groundglass density. I would favor pulmonary edema over an atypical  infection.  3. Cardiomegaly with extensive coronary artery atherosclerotic vascular  disease.  4. Dense sludge within the gallbladder.     Electronically Signed By-Hammad Ragland MD On:8/11/2021 7:35 PM  This report was finalized on [id number] by  Hammad Ragland MD.    XR Chest 2 View [[id number]] Collected: 08/10/21 1240     Updated: 08/10/21 1245    Narrative:      DATE OF EXAM:  8/10/2021 12:20 PM     PROCEDURE:  XR CHEST 2 VW-     INDICATIONS:  SOA; L03.116-Cellulitis of left lower limb; R79.1-Abnormal coagulation  profile     COMPARISON:  A 10/20/2021 at 12:40 AM, 6/8/2016 at 5:39 PM, 6/7/2016 at 9:47 PM     TECHNIQUE:   Two radiologic views of the chest , PA and lateral were obtained.      FINDINGS:  Extensive chronic changes are again seen throughout the lungs. These  findings are again stable given differences in technique. No new  consolidations or pleural effusions are observed. The cardiac silhouette  and mediastinum are unchanged. Stable cardiomegaly is noted.  Postoperative changes are noted. No acute osseous abnormalities are  seen.       Impression:      Chronic changes are again noted which are stable. There is no definitive  evidence for acute cardiopulmonary process.     Electronically Signed By-Aneesh Judd MD On:8/10/2021 12:43 PM  This report was finalized on [id number] by  Aneesh Judd MD.    XR Foot 3+ View Left [[id number]] Collected: 08/10/21 0743     Updated: 08/10/21 0748    Narrative:      DATE OF EXAM:  8/10/2021 12:40 AM     PROCEDURE:  XR FOOT 3+ VW LEFT-     INDICATIONS:  Prior injury to left great toe on furniture several weeks ago. Recent  discovery of injury with wound and swelling and erythema involving the  great toe.     COMPARISON:  No Comparisons Available     TECHNIQUE:   A minimum of three routine standard radiographic views were obtained of  the left foot.     FINDINGS:  Soft tissue swelling is seen throughout the great toe and extending into  the medial aspect of the forefoot/midfoot. There is suggestion of  possible gas production throughout the soft tissues. Multiple areas of  cortical erosion are identified throughout the proximal and distal  phalanges of the great toe. There is also abnormal lucency throughout  the bone. These findings indicate changes of osteomyelitis.        Impression:      1.Evidence for soft tissue swelling throughout the great toe with areas  of gas production. The findings indicate changes of soft tissue  cellulitis.  2.Evidence for osteomyelitis involving the proximal and distal phalanges  of the great toe.     Electronically Signed By-Aneesh Judd MD On:8/10/2021 7:46 AM  This report was finalized on [id number] by   Aneesh Judd MD.    XR Chest 1 View [[id number]] Collected: 08/10/21 0741     Updated: 08/10/21 0746    Narrative:         DATE OF EXAM:   8/10/2021 12:40 AM     PROCEDURE:   XR CHEST 1 VW-     INDICATIONS:   Shortness of breath. Prior Covid 19 infection. Unsteady gait.     COMPARISON:  6/8/2016 at 5:39 PM, 6/7/2016 at 9:47 PM, 12/30/2015 at 8:28 PM     TECHNIQUE:   [Portable chest radiograph]     FINDINGS:  Extensive chronic changes are again seen throughout the lungs which are  stable. No new consolidations or pleural effusions are observed. The  cardiac silhouette and mediastinum are stable. Postoperative changes are  noted. Stable cardiomegaly is noted. No acute osseous abnormalities are  identified.       Impression:      Chronic changes are noted which are stable. There is no evidence for  definitive acute cardiopulmonary process.     Electronically Signed By-Aneesh uJdd MD On:8/10/2021 7:43 AM  This report was finalized on [id number] by  Aneesh Judd MD.    CT Head Without Contrast [[id number]] Collected: 08/10/21 0148     Updated: 08/10/21 0154    Narrative:      EXAMINATION: CT HEAD WO CONTRAST    DATE: 8/10/2021 1:29 AM     INDICATION: Trauma, recent falls, anticoagulated     COMPARISON: CT head from 8/8/2016     TECHNIQUE: Thin section noncontrast axial images were obtained through the head. Coronal reformats were created.  CT dose lowering techniques were used, to include: automated exposure control, adjustment for patient size, and or use of iterative   reconstruction.     FINDINGS:     Intracranial contents:    No acute intracranial hemorrhage, evidence of acute territorial infarct, mass, mass effect or hydrocephalus. Moderate intracranial atherosclerosis and moderate chronic small vessel ischemic changes in the white matter. Moderate global cerebral volume   loss.    Bones and extracranial soft tissues:     No fracture or focal osseous lesion in the calvarium or skull base. Paranasal sinuses  are clear where visualized. Mastoid air cells are clear. Orbits are unremarkable.         Impression:        1. No acute intracranial abnormality. No hemorrhage.  2. No calvarial fracture.  3. Moderate chronic age-related intracranial findings as above.         This examination was interpreted by Aneesh Ordonez M.D.    Electronically signed by:  Aneesh Ordonez M.D.    8/9/2021 11:53 PM          ECHOCARDIOGRAM:    Results for orders placed during the hospital encounter of 08/09/21    Adult Transthoracic Echo Complete W/ Cont if Necessary Per Protocol    Interpretation Summary  · Left ventricular wall thickness is consistent with mild concentric hypertrophy.  · Estimated left ventricular EF was in agreement with the calculated left ventricular EF. Left ventricular ejection fraction appears to be 56 - 60%. Left ventricular systolic function is normal.  · Estimated right ventricular systolic pressure from tricuspid regurgitation is mildly elevated (35-45 mmHg).  · Mild pulmonary hypertension is present.  · Left ventricular diastolic function is consistent with (grade Ia w/high LAP) impaired relaxation.  · Mild to moderate aortic valve stenosis is present.        I reviewed the patient's new clinical results.    EKG:      Assessment:       Cellulitis of left lower extremity    Chronic coronary artery disease    Depression    Hearing problem    History of mitral valve replacement    Essential hypertension    Long term current use of anticoagulant therapy    Mechanical heart valve present    History of B-cell lymphoma    Elevated INR (international normalized ratio) due to prior anticoagulant medication ingestion    Mixed hyperlipidemia    Peripheral vascular disease of lower extremity with ulceration (CMS/HCC)      1. Perioperative ischemic evaluation  - 2D ECHO preserved LV and RV size and function EF 55 to 60%  -Defer ischemic evaluation presently 89 years old    2. Supratherapeutic INR  - INR remains > 8-  received vitamin K  - INR 1.6, heparin bridge until INR 2.5    3. LE cellulitis  - abx per primary/ vascular     4. CAD remote history of bypass revascularization  -Asymptomatic with no anginal chest pain  -Likely no benefit to invasive or noninvasive ischemic evaluation revascularization prior to left lower extremity revascularization with threatened limb,  -Optimize medicines perioperatively avoid hypotension  -Continue perioperative aspirin statin therapy    5. 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  Further recommendations per vascular surgery on any degree of revascularization that is warranted  Start heparin bridging for INR less than 2.5 with mechanical mitral valve  Avoid FFP for any cause other than intracranial bleeding     Stable hemodynamically  No acute CV concerns today  Continue present therapies from CV standpoint  CAD clinically silent no chest pain  Continue heparin drip until Coumadin restarted with INR greater than 2.5  Postoperative anemia, transfuse for hemoglobin less than 7      Plans for angiogram on Tuesday  Defer to renal for hydration and renal protection  No CV contraindication to lower extremity arteriogram    Continue heparin bridge until INR is 2.5 or greater with mechanical valves, can hold anticoagulation for procedure  We will continue to follow perioperatively    Jamal Montoya MD, PhD  Marybeth Roy, APRN  08/15/21  12:51 EDT

## 2021-08-15 NOTE — PROGRESS NOTES
[facility] Medicine Services Daily Progress Note    Patient Name: Niko Servin  : 1929  MRN: [id number]  Primary Care Physician:  Casper Saldaña MD  Date of admission: 2021      Subjective      Chief Complaint: *Left foot pain      Niko Servin is a 89 y.o. male w/PMH of B-cell lymphoma, CAD, MVR W/mechanical valve long-term anticoagulation, depression, [place], HTN presents to [facility] ED due to left lower extremity cellulitis.  Patient is extremely hard of hearing and is a poor historian, unable to complete review of systems at this time.  Family reports patient injured his left great toe approximately 2 weeks prior but did not tell anyone.  Patient's wife reports he has fallen multiple times over the last week due to gait abnormality.  Patient's wife also reports deterioration of cognition with intermittent hallucinations.  Wife reports patient is vaccinated for Covid.              Upon arrival to the ED vitals temp 98, HR 77, RR 17, /65, O2 sat 100% on room air.  Abs notable for troponin less than 0.015, glucose 113, , K4.1, creatinine 1.25, BUN 35, GFR 54, ALT 8, AST 15, total bili 0.3, CRP 28.7, lactic 1.0, INR greater than 8, WBC 12, Hgb 9.4, platelets 302, neutrophils 87.3.  Sed rate 42.  Blood cultures drawn.  CT of the head shows no acute intracranial abnormality, no hemorrhage.  Moderate chronic age-related intracranial findings.  EKG shows sinus rhythm rate 73 multiple PVCs, ventricular and supraventricular, borderline ST depression anterolateral leads.  Patient treated in the ED with Tdap, IV Zosyn, albuterol nebulizer, IV vancomycin.  UA, chest x-ray, x-ray left foot ordered.          Patient Reports *    Patient reports persistent pain left foot  Going for surgery today  Less tachypneic than yesterday  Very hard of hearing  Dr. Saldaña sent me a message yesterday that he has also underlying dementia that has not been  fully evaluated.    Patient had persistent low hemoglobin 8.7  Has been febrile to 200.6 overnight yesterday  Tachypnea has improved  Saturation 93% on 3 L  Low magnesium 1.5  Supratherapeutic INR more than 8.  I have seen  FFP are being prepared.  We will defer vitamin K to cardiology service given mechanical the mitral valve.      8/12  Patient accompanied by family today  He does not seem to be in pain  He appears comfortable  He is going for surgery today for left great toe amputation  Discussed with Dr. Woo  Unfortunately his INR still high.  Discussed with cardiology service who recommended vitamin K only for now.  Patient was apparently seen by pulmonary and nephrology as well    8/13  Patient had amputation left great toe yesterday 8/12/2021  INR down to 1.6 and hemoglobin 8  Awaiting cultures from tissues.  Gram stain showing gram-positive cocci in clusters  Pathology from left great toe pending  .Echo shows normal ejection fraction and elevated right ventricular pressures with tricuspid regurgitation and mild pulmonary hypertension and diastolic dysfunction with moderate aortic stenosis        8/14  Patient has been afebrile  Blood pressure low normal side.  Saturation 95% on 3 L.  Renal function slightly fluctuating but not significantly changed  INR is 1.4 today  Hemoglobin down to 7.2  He is on heparin drip  He is receiving albumin by nephrologist  Tissue culture growing Staph aureus and scant growth of gram-negative bacilli pending identification  ABG shows respiratory acidosis with hypercarbia  Problems of sleep.  We will monitor.  Will need BiPAP at night.    8/15  Patient transferred to PCU due to need for BiPAP  Patient more alert today  Appears more comfortable  Hemoglobin is 8  Patient received 1 unit of RBCs yesterday  His INR 1.6    Review of Systems   Unable to perform ROS: dementia            Objective      Vitals:   Temp:  [97.5 °F (36.4 °C)-98.9 °F (37.2 °C)] 97.8 °F (36.6 °C)  Heart  Rate:  [61-73] 73  Resp:  [20-29] 20  BP: (113-154)/(47-76) 154/76  Flow (L/min):  [3-5] 5    Physical Exam  Vitals and nursing note reviewed.   Constitutional:       General: He is not in acute distress.     Appearance: Normal appearance. He is well-developed. He is not ill-appearing, toxic-appearing or diaphoretic.   HENT:      Head: Normocephalic and atraumatic.     Ears without abnormality.  Extremely hard of hearing.     Nose: Nose normal. No congestion or rhinorrhea.      Mouth/Throat:      Mouth: Mucous membranes are moist.      Pharynx: No oropharyngeal exudate.   Eyes:      General: No scleral icterus.        Right eye: No discharge.         Left eye: No discharge.      Extraocular Movements: Extraocular movements intact.      Conjunctiva/sclera: Conjunctivae normal.      Pupils: Pupils are equal, round, and reactive to light.   Neck:      Thyroid: No thyromegaly.      Vascular: No carotid bruit or JVD.      Trachea: No tracheal deviation.   Cardiovascular:      Rate and Rhythm: Normal rate and regular rhythm.      Pulses: Normal pulses.      Heart sounds: Normal heart sounds. No murmur heard.   No friction rub. No gallop.    Pulmonary:      Effort: Pulmonary effort is normal. No respiratory distress.      Breath sounds: Normal breath sounds. No stridor. No wheezing, rhonchi or rales.   Chest:      Chest wall: No tenderness.   Abdominal:      General: Bowel sounds are normal. There is no distension.      Palpations: Abdomen is soft. There is no mass.      Tenderness: There is no abdominal tenderness. There is no guarding or rebound.      Hernia: No hernia is present.   Musculoskeletal:         General: No swelling, tenderness, deformity or signs of injury. Normal range of motion.      Cervical back: Normal range of motion and neck supple. No rigidity. No muscular tenderness.      Right lower leg: No edema.      Left lower leg: No edema.      Comments: S/p left great toe amputation.  Covered with compression  dressing.  Leg edema has improved.  Lymphadenopathy:      Cervical: No cervical adenopathy.   Skin:     General: Skin is warm and dry.      Coloration: Skin is not jaundiced or pale.      Findings: No bruising, erythema or rash.   Neurological:      General: No focal deficit present.      Mental Status: He is alert and oriented to person, place, and time. Mental status is at baseline.      Cranial Nerves: No cranial nerve deficit.      Sensory: No sensory deficit.      Motor: No weakness or abnormal muscle tone.      Coordination: Coordination normal.   Psychiatric:      Comments: Cooperative      *       Result Review    Result Review:  I have personally reviewed the results from the time of this admission to 8/15/2021 13:58 EDT and agree with these findings:  [x]  Laboratory  [x]  Microbiology  [x]  Radiology  [x]  EKG/Telemetry   [x]  Cardiology/Vascular   [x]  Pathology  []  Old records  []  Other:  Most notable findings include: *  As above       Wounds (last 24 hours)      LDA Wound     Row Name 08/15/21 0400 08/15/21 0000 08/14/21 1945       Wound 08/10/21 0508 Left distal leg Other (comment)    Wound - Properties Group Placement Date: 08/10/21  - Placement Time: 0508 -JM Side: Left  - Orientation: distal  - Location: leg  - Primary Wound Type: Other  -JM, Cellulitis per md     Dressing Appearance  open to air  -LG  open to air  -LG  --    Closure  --  Surface sutures  -LG  Surface sutures  -DD    Base  --  dry;red  -LG  dry;red  -DD    Periwound  --  warm;intact  -LG  dry;warm;intact  -DD    Periwound Temperature  --  warm  -LG  warm  -DD    Drainage Amount  --  none  -LG  none  -DD    Retired Wound - Properties Group Date first assessed: 08/10/21  [facility] Time first assessed: 0508 -JM Side: Left  - Location: leg  - Primary Wound Type: Other  -JM, Cellulitis per md        Wound 08/12/21 Left anterior great toe Incision    Wound - Properties Group Placement Date: 08/12/21  - Side: Left  -  Orientation: anterior  -TH Location: great toe  -TH Primary Wound Type: Incision  -TH Additional Comments: 4x4 soaked in betadine, 4x4, kerlix, and ace  -TH    Dressing Appearance  dry;intact  -LG  dry;intact  -LG  dry;intact  -DD    Closure  MARLA  -LG  MARLA  -LG  MARLA  -DD    Drainage Amount  --  small  -LG  large  -DD    Care, Wound  --  --  cleansed with;sterile water  -DD    Dressing Care  -- drg maintained  -LG  --  dressing applied betadine and dankins 4x4, abd pad, kerlex and ace wrap  -DD    Retired Wound - Properties Group Date first assessed: 08/12/21  -TH Side: Left  -TH Location: great toe  -TH Primary Wound Type: Incision  -TH Additional Comments: 4x4 soaked in betadine, 4x4, kerlix, and ace  -TH       Wound 08/13/21 1113 upper thoracic spine    Wound - Properties Group Placement Date: 08/13/21  -TP Placement Time: 1113  -TP Orientation: upper  -TP Location: thoracic spine  -TP    Dressing Appearance  dry;intact  -LG  dry;intact  -LG  dry;intact  -DD    Closure  --  Adhesive bandage  -LG  Adhesive bandage  -DD    Retired Wound - Properties Group Date first assessed: 08/13/21  -TP Time first assessed: 1113  -TP Location: thoracic spine  -TP       Wound 08/13/21 1114 thoracic spine    Wound - Properties Group Placement Date: 08/13/21  -TP Placement Time: 1114  -TP Location: thoracic spine  -TP    Dressing Appearance  dry;intact  -LG  intact;dry  -LG  dry;intact  -DD    Closure  --  Adhesive bandage  -LG  Adhesive bandage  -DD    Retired Wound - Properties Group Date first assessed: 08/13/21  -TP Time first assessed: 1114  -TP Location: thoracic spine  -TP      User Key  (r) = Recorded By, (t) = Taken By, (c) = Cosigned By    Initials Name Provider Type     Fatuma Coronel, RN Registered Nurse    Doris Barney, RN Registered Nurse    Maria Ines Perez LPN Licensed Nurse    Brittani Gilbert LPN Licensed Nurse    Rashard Persaud LPN Licensed Nurse            Assessment/Plan      Brief Patient  Summary:  Niko Servin is a 89 y.o. male who *presenting with gangrenous changes left great toe    ampicillin-sulbactam, 3 g, Intravenous, Q6H  ferric gluconate, 125 mg, Intravenous, Daily  ipratropium-albuterol, 3 mL, Nebulization, Q6H - RT  rosuvastatin, 10 mg, Oral, Nightly  sertraline, 100 mg, Oral, Daily  sodium chloride, 10 mL, Intravenous, Q12H  sodium hypochlorite, , Topical, BID  tamsulosin, 0.4 mg, Oral, Daily       heparin, 12 Units/kg/hr, Last Rate: 15 Units/kg/hr (08/15/21 2560)         Active Hospital Problems:  Active Hospital Problems    Diagnosis    • **Cellulitis of left lower extremity    • History of B-cell lymphoma    • Elevated INR (international normalized ratio) due to prior anticoagulant medication ingestion    • Mixed hyperlipidemia    • Peripheral vascular disease of lower extremity with ulceration (CMS/HCC)      Added automatically from request for surgery [id number]     • Depression    • Essential hypertension    • Mechanical heart valve present    • Hearing problem    • Chronic coronary artery disease    • Long term current use of anticoagulant therapy    • History of mitral valve replacement      Plan:      Gangrenous changes of the left lower extremity involving the left great toe wound and foot:  -X-ray shows positive gas production but thought to be related to the wound by Dr. Woo.  Patient received clindamycin and vancomycin  Currently on Unasyn .  ID following.  Likely will need 6 weeks therapy.  taken to surgery 8/11/2021 s/p left great toe amputation.  Follow-up culture.  Culture growing MSSA and Proteus.  Vascular surgery consulted as well.  CT angiogram shows:Greater than 70% stenosis of the left popliteal artery.  - Significant trifurcation level disease bilaterally with single vessel  runoff via the peroneal artery  -Per vascular:.Left lower extremity arteriogram with possible intervention on Tuesday 8/17/2021 by Dr. Elizabeth.       Right renal and bladder stones with  bilateral hydronephrosis. There  is marked bladder distention with multiple bladder diverticula  and  prostatic enlargement suggesting bladder outlet obstruction.  - Smith catheter.  - Flomax  -Follow-up with urology    Bacteremia with Proteus species  Present on admission  Pending identification and sensitivity  On Unasyn  Possibly related to the foot infection  ID following    Coumadin toxicity  Elevated INR:  S/p vitamin K  INR decreased.  Heparin drip startedFor bridging  Defer to cardiology  Holding Coumadin for mechanical mitral valve until patient is cleared for discharge.    Acute blood loss anemia  Will need transfusion if below 7.5 or symptomatic  Parenteral iron    Mixed hyperlipidemia:  -On statin    Essential hypertension:  On Lasix for now    Acute hypoxic/hypercarbic respiratory failure requiring BiPAP  Has not been on BiPAP or CPAP at home    Chronic coronary artery disease:  -Cardiology is consulted  On aspirin and statin  Negative initial troponin    Suspect CHF exacerbation  Echo shows that diastolic dysfunction mild to moderate aortic stenosis  · Left ventricular wall thickness is consistent with mild concentric hypertrophy.  · Estimated left ventricular EF was in agreement with the calculated left ventricular EF. Left ventricular ejection fraction appears to be 56 - 60%. Left ventricular systolic function is normal.  · Estimated right ventricular systolic pressure from tricuspid regurgitation is mildly elevated (35-45 mmHg).  · Mild pulmonary hypertension is present.  · Left ventricular diastolic function is consistent with (grade Ia w/high LAP) impaired relaxation.  · Mild to moderate aortic valve stenosis is present.  IV Lasix as needed  Suspect underlying interstitial lung disease based on x-ray  Consider pulmonary evaluation if worsening respiratory distress or hypoxemia      History of MVR/mechanical valve present/long-term use of anticoagulant therapy:  As above    Depression:  -Home  medication sertraline 100 mg p.o. daily  Possible underlying dementia per Dr. Saldaña as well  Likely need long-term rehab placement  We will get palliative care involved    Hard of hearing:  -Continue to monitor     disposition          Plan  Referral made to [facility] in May. Pending acceptance and will need precert. No pasrr for [facility] , nut may need pasrr if goes subacute         Complex case high risk    DVT prophylaxis:  Medical DVT prophylaxis orders are present.    CODE STATUS:    Code Status: CPR  Medical Interventions (Level of Support Prior to Arrest): Full      Disposition:  I expect patient to be discharged ECF  This patient has been examined wearing appropriate Personal Protective Equipment and discussed with hospital infection control department. 08/15/21      Electronically signed by Zaire Mansfield MD, 08/15/21, 13:58 EDT.  [facility]ist Team

## 2021-08-15 NOTE — PROGRESS NOTES
Infectious Diseases Progress Note      LOS: 4 days   Patient Care Team:  Casper Saldaña MD as PCP - General  Casper Saldaña MD as PCP - Family Medicine  Cory Bermeo MD as Consulting Physician (Nephrology)    Chief Complaint: Left great toe infection, left foot redness and swelling at admission    Subjective       The patient has been afebrile for the last 24 hours.  The patient is on 5 L of oxygen by nasal cannula, hemodynamically stable, and is tolerating antimicrobial therapy.        Review of Systems:   Review of Systems   Constitutional: Negative.    HENT: Negative.    Eyes: Negative.    Respiratory: Negative.    Cardiovascular: Negative.    Gastrointestinal: Negative.    Endocrine: Negative.    Genitourinary: Negative.    Musculoskeletal: Negative.    Skin: Positive for color change and wound.   Neurological: Negative.    Psychiatric/Behavioral: Negative.    All other systems reviewed and are negative.       Objective     Vital Signs  Temp:  [97.5 °F (36.4 °C)-98.9 °F (37.2 °C)] 98 °F (36.7 °C)  Heart Rate:  [61-74] 72  Resp:  [20-29] 20  BP: (116-173)/(52-76) 173/70    Physical Exam:  Physical Exam  Vitals and nursing note reviewed.   Constitutional:       General: He is not in acute distress.     Appearance: Normal appearance. He is well-developed. He is not diaphoretic.      Comments: Appears thin   HENT:      Head: Normocephalic and atraumatic.   Eyes:      Extraocular Movements: Extraocular movements intact.      Conjunctiva/sclera: Conjunctivae normal.      Pupils: Pupils are equal, round, and reactive to light.   Cardiovascular:      Rate and Rhythm: Normal rate and regular rhythm.      Heart sounds: Normal heart sounds, S1 normal and S2 normal.   Pulmonary:      Effort: Pulmonary effort is normal. No respiratory distress.      Breath sounds: Normal breath sounds. No stridor. No wheezing or rales.   Abdominal:      General: Bowel sounds are normal. There is no distension.       Palpations: Abdomen is soft. There is no mass.      Tenderness: There is no abdominal tenderness. There is no guarding.   Musculoskeletal:         General: No deformity. Normal range of motion.      Cervical back: Neck supple.      Comments: Bulky surgical dressing on the left foot   Skin:     General: Skin is warm and dry.      Coloration: Skin is not pale.      Findings: No rash.   Neurological:      Mental Status: He is alert and oriented to person, place, and time.      Cranial Nerves: No cranial nerve deficit.   Psychiatric:         Mood and Affect: Mood normal.          Results Review:    I have reviewed all clinical data, test, lab, and imaging results.     Radiology  XR Chest 1 View    Result Date: 8/15/2021  XR CHEST 1 VW-  Date of Exam: 8/15/2021 6:05 AM  Indication: Shortness of breath; L03.116-Cellulitis of left lower limb; R79.1-Abnormal coagulation profile; J05-Ltbdcwav, not elsewhere classified; I73.9-Peripheral vascular disease, unspecified; L97.909-Non-pressure chronic ulcer of unspecified part of unspecified lower leg with unspecified severity.  Comparison Exams: CT chest from 08/11/2021  Technique: Single AP chest radiograph  FINDINGS: Median sternotomy wires appear intact. There are coarse bilateral interstitial markings. There are small bilateral pleural effusions. The heart is enlarged.      1.Coarse bilateral interstitial markings, which could reflect interstitial pulmonary edema versus atypical pneumonia. 2.Cardiomegaly with small bilateral pleural effusions.  Electronically Signed By-Aneesh Cook MD On:8/15/2021 10:23 AM This report was finalized on [id number] by  Aneesh Cook MD.      Cardiology    Laboratory    Results from last 7 days   Lab Units 08/15/21  0300 08/14/21  0343 08/13/21  0826 08/13/21  0332 08/12/21  0302 08/11/21  0522 08/10/21  1835   WBC 10*3/mm3 8.10 9.00 10.00 9.10 8.70 10.30 10.90*   HEMOGLOBIN g/dL 8.0* 7.2* 8.0* 8.2* 7.9* 8.7* 8.7*   HEMATOCRIT % 25.0*  22.1* 24.4* 25.5* 25.1* 26.3* 27.0*   PLATELETS 10*3/mm3 243 245 265 278 254 267 292     Results from last 7 days   Lab Units 08/15/21  0300 08/14/21  0343 08/13/21  0332 08/12/21  0302 08/11/21  0522 08/10/21  1835 08/10/21  0450 08/10/21  0018 08/10/21  0018   SODIUM mmol/L 146* 144 143 143 142 141 142   < > 141   POTASSIUM mmol/L 3.8 4.0 3.8 3.9 3.8 4.0 3.9   < > 4.1   CHLORIDE mmol/L 106 106 103 106 105 105 106   < > 106   CO2 mmol/L 29.0 29.0 31.0* 28.0 28.0 26.0 26.0   < > 26.0   BUN mg/dL 25* 30* 26* 28* 30* 32* 36*   < > 35*   CREATININE mg/dL 1.12 1.26 1.18 1.19 1.28* 1.27 1.20   < > 1.25   GLUCOSE mg/dL 98 103* 82 89 95 123* 115*   < > 113*   ALBUMIN g/dL 2.70* 2.20*  --   --  2.60*  --   --   --  2.70*   BILIRUBIN mg/dL 0.4 0.2  --   --  0.2  --   --   --  0.3   ALK PHOS U/L 57 64  --   --  72  --   --   --  74   AST (SGOT) U/L 17 16  --   --  22  --   --   --  15   ALT (SGPT) U/L 7 9  --   --  10  --   --   --  8   CALCIUM mg/dL 8.1* 7.9* 7.9* 7.7* 8.2 8.2 8.4   < > 8.5    < > = values in this interval not displayed.         Results from last 7 days   Lab Units 08/10/21  0018   SED RATE mm/hr 42*         Microbiology   Microbiology Results (last 10 days)     Procedure Component Value - Date/Time    Anaerobic Culture - Body Fluid, Toe, Left [[id number]] Collected: 08/12/21 1703    Lab Status: Preliminary result Specimen: Body Fluid from Toe, Left Updated: 08/15/21 1051     Anaerobic Culture No anaerobes isolated at 3 days    Tissue / Bone Culture - Tissue, Toe, Left [[id number]]  (Abnormal)  (Susceptibility) Collected: 08/12/21 1701    Lab Status: Final result Specimen: Tissue from Toe, Left Updated: 08/15/21 0838     Tissue Culture Moderate growth (3+) Staphylococcus aureus      Scant growth (1+) Proteus mirabilis     Gram Stain Moderate (3+) WBCs per low power field      Few (2+) Gram positive cocci, intracellular      Few (2+) Gram positive cocci in clusters    Susceptibility      Staphylococcus aureus  Proteus mirabilis      SUMLA SULMA      Ampicillin  Susceptible      Ampicillin + Sulbactam  Susceptible      Cefepime  Susceptible      Ceftazidime  Susceptible      Ceftriaxone  Susceptible      Clindamycin Susceptible       Erythromycin Susceptible       Gentamicin  Susceptible      Inducible Clindamycin Resistance Negative       Levofloxacin  Susceptible      Oxacillin Susceptible       Penicillin G Resistant       Piperacillin + Tazobactam  Susceptible      Rifampin Susceptible       Tetracycline Susceptible Resistant      Trimethoprim + Sulfamethoxazole Susceptible Susceptible      Vancomycin Susceptible                  Linear View               Susceptibility Comments     Staphylococcus aureus    This isolate does not demonstrate inducible clindamycin resistance in vitro.      Proteus mirabilis    Cefazolin sensitivity will not be reported for Enterobacteriaceae in non-urine isolates. If cefazolin is preferred, please call the microbiology lab to request an E-test.  With the exception of urinary-sourced infections, aminoglycosides should not be used as monotherapy.             Respiratory Panel, PCR (WITHOUT COVID) - Swab, Nasopharynx [[id number]]  (Normal) Collected: 08/10/21 1346    Lab Status: Final result Specimen: Swab from Nasopharynx Updated: 08/10/21 1437     ADENOVIRUS, PCR Not Detected     Coronavirus 229E Not Detected     Coronavirus HKU1 Not Detected     Coronavirus NL63 Not Detected     Coronavirus OC43 Not Detected     Human Metapneumovirus Not Detected     Human Rhinovirus/Enterovirus Not Detected     Influenza B PCR Not Detected     Parainfluenza Virus 1 Not Detected     Parainfluenza Virus 2 Not Detected     Parainfluenza Virus 3 Not Detected     Parainfluenza Virus 4 Not Detected     Bordetella pertussis pcr Not Detected     Chlamydophila pneumoniae PCR Not Detected     Mycoplasma pneumo by PCR Not Detected     Influenza A PCR Not Detected     RSV, PCR Not Detected     Bordetella parapertussis  PCR Not Detected    Narrative:      The coronavirus on the RVP is NOT COVID-19 and is NOT indicative of infection with COVID-19.    In the setting of a positive respiratory panel with a viral infection PLUS a negative procalcitonin without other underlying concern for bacterial infection, consider observing off antibiotics or discontinuation of antibiotics and continue supportive care. If the respiratory panel is positive for atypical bacterial infection (Bordetella pertussis, Chlamydophila pneumoniae, or Mycoplasma pneumoniae), consider antibiotic de-escalation to target atypical bacterial infection.    COVID PRE-OP / PRE-PROCEDURE SCREENING ORDER (NO ISOLATION) - Swab, Nasopharynx [[id number]]  (Normal) Collected: 08/10/21 1114    Lab Status: Final result Specimen: Swab from Nasopharynx Updated: 08/10/21 1241    Narrative:      The following orders were created for panel order COVID PRE-OP / PRE-PROCEDURE SCREENING ORDER (NO ISOLATION) - Swab, Nasopharynx.  Procedure                               Abnormality         Status                     ---------                               -----------         ------                     COVID-19,CEPHEID/XUAN/BD...[[id number]]  Normal              Final result                 Please view results for these tests on the individual orders.    COVID-19,CEPHEID/XUAN/BDMAX,COR/LOREN/PAD/FEI IN-HOUSE(OR EMERGENT/ADD-ON),NP SWAB IN TRANSPORT MEDIA 3-4 HR TAT, RT-PCR - Swab, Nasopharynx [[id number]]  (Normal) Collected: 08/10/21 1114    Lab Status: Final result Specimen: Swab from Nasopharynx Updated: 08/10/21 1241     COVID19 Not Detected    Narrative:      Fact sheet for providers: https://www.fda.gov/media/953064/download     Fact sheet for patients: https://www.fda.gov/media/316485/download  Fact sheet for providers: https://www.fda.gov/media/507228/download    Fact sheet for patients: https://www.fda.gov/media/778213/download    Test performed by PCR.    COVID PRE-OP / PRE-PROCEDURE  SCREENING ORDER (NO ISOLATION) - Swab, Nasopharynx [[id number]]  (Normal) Collected: 08/10/21 0403    Lab Status: Final result Specimen: Swab from Nasopharynx Updated: 08/10/21 1445    Narrative:      The following orders were created for panel order COVID PRE-OP / PRE-PROCEDURE SCREENING ORDER (NO ISOLATION) - Swab, Nasopharynx.  Procedure                               Abnormality         Status                     ---------                               -----------         ------                     COVID-19,APTIMA PANTHER(...[[id number]]  Normal              Final result                 Please view results for these tests on the individual orders.    COVID-19,APTIMA PANTHER(ISAAK),[facility], NP/OP SWAB IN UTM/VTM/SALINE TRANSPORT MEDIA,24 HR TAT - Swab, Nasopharynx [[id number]]  (Normal) Collected: 08/10/21 0403    Lab Status: Final result Specimen: Swab from Nasopharynx Updated: 08/10/21 1445     COVID19 Not Detected    Narrative:      Fact sheet for providers: https://www.fda.gov/media/367937/download     Fact sheet for patients: https://www.fda.gov/media/574989/download    Test performed by RT PCR.    Blood Culture - Blood, Arm, Right [[id number]]  (Abnormal)  (Susceptibility) Collected: 08/10/21 0031    Lab Status: Final result Specimen: Blood from Arm, Right Updated: 08/12/21 1229     Blood Culture Proteus mirabilis     Gram Stain Anaerobic Bottle Gram negative bacilli    Susceptibility      Proteus mirabilis      SULMA      Ampicillin Susceptible      Ampicillin + Sulbactam Susceptible      Cefepime Susceptible      Ceftazidime Susceptible      Ceftriaxone Susceptible      Gentamicin Susceptible      Levofloxacin Susceptible      Piperacillin + Tazobactam Susceptible      Trimethoprim + Sulfamethoxazole Susceptible               Linear View               Susceptibility Comments     Proteus mirabilis    Cefazolin sensitivity will not be reported for Enterobacteriaceae in non-urine isolates. If cefazolin is preferred,  please call the microbiology lab to request an E-test.  With the exception of urinary-sourced infections, aminoglycosides should not be used as monotherapy.             Blood Culture ID, PCR - Blood, Arm, Right [[id number]]  (Abnormal) Collected: 08/10/21 0031    Lab Status: Final result Specimen: Blood from Arm, Right Updated: 08/11/21 0602     BCID, PCR Proteus spp. Identification by BCID PCR.    Blood Culture - Blood, Arm, Left [[id number]] Collected: 08/10/21 0018    Lab Status: Final result Specimen: Blood from Arm, Left Updated: 08/15/21 0030     Blood Culture No growth at 5 days          Medication Review:       Schedule Meds  ampicillin-sulbactam, 3 g, Intravenous, Q6H  ferric gluconate, 125 mg, Intravenous, Daily  ipratropium-albuterol, 3 mL, Nebulization, Q6H - RT  rosuvastatin, 10 mg, Oral, Nightly  sertraline, 100 mg, Oral, Daily  sodium chloride, 10 mL, Intravenous, Q12H  sodium hypochlorite, , Topical, BID  tamsulosin, 0.4 mg, Oral, Daily        Infusion Meds  heparin, 12 Units/kg/hr, Last Rate: 15 Units/kg/hr (08/15/21 0458)        PRN Meds  •  acetaminophen **OR** acetaminophen **OR** acetaminophen  •  aluminum-magnesium hydroxide-simethicone  •  haloperidol lactate  •  heparin  •  heparin  •  magnesium sulfate **OR** magnesium sulfate **OR** magnesium sulfate  •  melatonin  •  Morphine  •  nitroglycerin  •  ondansetron **OR** ondansetron  •  potassium chloride  •  potassium chloride  •  potassium phosphate infusion greater than 15 mMoles **OR** potassium phosphate infusion greater than 15 mMoles **OR** potassium phosphate **OR** sodium phosphate IVPB **OR** sodium phosphate IVPB **OR** sodium phosphate IVPB  •  sodium chloride  •  sodium chloride        Assessment/Plan       Antimicrobial Therapy   1.  Vancomycin      day  2.        day  3.  Unasyn      day  4.      Day  5.      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

## 2021-08-15 NOTE — PROGRESS NOTES
"NEPHROLOGY PROGRESS NOTE------[facility]    [facility]  [phone]  Kashif Hutchins MD      Patient Care Team:  Casper Saldaña MD as PCP - General  Casper Saldaña MD as PCP - [facility]  ElvieCory bob MD as Consulting Physician (Nephrology)      Provider:  Kashif Hutchins MD  Patient Name: Niko Servin  :  1929    SUBJECTIVE:    F/U CRF/CKD    Comfortable. Transferred to PCU overnight. No real SOB, CP, dysuria     Medication:  ampicillin-sulbactam, 3 g, Intravenous, Q6H  ipratropium-albuterol, 3 mL, Nebulization, Q6H - RT  rosuvastatin, 10 mg, Oral, Nightly  sertraline, 100 mg, Oral, Daily  sodium chloride, 10 mL, Intravenous, Q12H  sodium hypochlorite, , Topical, BID  tamsulosin, 0.4 mg, Oral, Daily  vancomycin, 1,000 mg, Intravenous, Q24H      heparin, 12 Units/kg/hr, Last Rate: 15 Units/kg/hr (08/15/21 0458)  Pharmacy to dose vancomycin,         OBJECTIVE    Vital Sign Min/Max for last 24 hours  Temp  Min: 97.5 °F (36.4 °C)  Max: 98.9 °F (37.2 °C)   BP  Min: 104/48  Max: 152/66   Pulse  Min: 61  Max: 73   Resp  Min: 20  Max: 29   SpO2  Min: 94 %  Max: 100 %   No data recorded   Weight  Min: 86 kg (189 lb 9.5 oz)  Max: 86.4 kg (190 lb 7.6 oz)     Flowsheet Rows      First Filed Value   Admission Height  182.9 cm (72\") Documented at 2021   Admission Weight  90.7 kg (200 lb) Documented at 2021          No intake/output data recorded.  I/O last 3 completed shifts:  In: .9 [P.O.:1260; Blood:347.9; IV Piggyback:450]  Out: 1750 [Urine:1750]    Physical Exam:  General Appearance: alert, appears stated age and cooperative. NAD. [place]  Head: normocephalic, without obvious abnormality and atraumatic  Eyes: conjunctivae and sclerae normal and no icterus  Neck: supple and no JVD  Lungs: +SCATTERED RHONCHI  Heart: regular rhythm & normal rate and normal S1, S2 +FRANCOIS  Chest: Wall no " abnormalities observed  Abdomen: normal bowel sounds and soft non-tender +BELTRE  Extremities:no edema, no cyanosis and no redness. +LEFT FOOT WRAPPED +DJD  Skin: no bleeding, +ERYTHEMA IN LEFT LE  Neurologic: Alert No focal deficits    Labs:    WBC WBC   Date Value Ref Range Status   08/15/2021 8.10 3.40 - 10.80 10*3/mm3 Final   08/14/2021 9.00 3.40 - 10.80 10*3/mm3 Final   08/13/2021 10.00 3.40 - 10.80 10*3/mm3 Final   08/13/2021 9.10 3.40 - 10.80 10*3/mm3 Final      HGB Hemoglobin   Date Value Ref Range Status   08/15/2021 8.0 (L) 13.0 - 17.7 g/dL Final   08/14/2021 7.2 (L) 13.0 - 17.7 g/dL Final   08/13/2021 8.0 (L) 13.0 - 17.7 g/dL Final   08/13/2021 8.2 (L) 13.0 - 17.7 g/dL Final      HCT Hematocrit   Date Value Ref Range Status   08/15/2021 25.0 (L) 37.5 - 51.0 % Final   08/14/2021 22.1 (L) 37.5 - 51.0 % Final   08/13/2021 24.4 (L) 37.5 - 51.0 % Final   08/13/2021 25.5 (L) 37.5 - 51.0 % Final      Platlets No results found for: LABPLAT   MCV MCV   Date Value Ref Range Status   08/15/2021 82.2 79.0 - 97.0 fL Final   08/14/2021 83.3 79.0 - 97.0 fL Final   08/13/2021 84.2 79.0 - 97.0 fL Final   08/13/2021 83.2 79.0 - 97.0 fL Final          Sodium Sodium   Date Value Ref Range Status   08/15/2021 146 (H) 136 - 145 mmol/L Final   08/14/2021 144 136 - 145 mmol/L Final   08/13/2021 143 136 - 145 mmol/L Final      Potassium Potassium   Date Value Ref Range Status   08/15/2021 3.8 3.5 - 5.2 mmol/L Final   08/14/2021 4.0 3.5 - 5.2 mmol/L Final   08/13/2021 3.8 3.5 - 5.2 mmol/L Final      Chloride Chloride   Date Value Ref Range Status   08/15/2021 106 98 - 107 mmol/L Final   08/14/2021 106 98 - 107 mmol/L Final   08/13/2021 103 98 - 107 mmol/L Final      CO2 CO2   Date Value Ref Range Status   08/15/2021 29.0 22.0 - 29.0 mmol/L Final   08/14/2021 29.0 22.0 - 29.0 mmol/L Final   08/13/2021 31.0 (H) 22.0 - 29.0 mmol/L Final      BUN BUN   Date Value Ref Range Status   08/15/2021 25 (H) 8 - 23 mg/dL Final   08/14/2021 30  (H) 8 - 23 mg/dL Final   08/13/2021 26 (H) 8 - 23 mg/dL Final      Creatinine Creatinine   Date Value Ref Range Status   08/15/2021 1.12 0.76 - 1.27 mg/dL Final   08/14/2021 1.26 0.76 - 1.27 mg/dL Final   08/13/2021 1.18 0.76 - 1.27 mg/dL Final      Calcium Calcium   Date Value Ref Range Status   08/15/2021 8.1 (L) 8.2 - 9.6 mg/dL Final   08/14/2021 7.9 (L) 8.2 - 9.6 mg/dL Final   08/13/2021 7.9 (L) 8.2 - 9.6 mg/dL Final      PO4 No components found for: PO4   Albumin Albumin   Date Value Ref Range Status   08/15/2021 2.70 (L) 3.50 - 5.20 g/dL Final   08/14/2021 2.20 (L) 3.50 - 5.20 g/dL Final      Magnesium Magnesium   Date Value Ref Range Status   08/15/2021 2.1 1.7 - 2.3 mg/dL Final   08/14/2021 2.4 (H) 1.7 - 2.3 mg/dL Final     Comment:     Result checked    08/13/2021 1.6 (L) 1.7 - 2.3 mg/dL Final      Uric Acid No components found for: URIC ACID     Imaging Results (Last 72 Hours)     Procedure Component Value Units Date/Time    XR Chest 1 View [[id number]] Resulted: 08/15/21 0538     Updated: 08/15/21 0615    CT Angio Abdominal Aorta Bilateral Iliofem Runoff [[id number]] Collected: 08/12/21 1320     Updated: 08/12/21 1345    Narrative:         DATE OF EXAM:  8/12/2021 10:04 AM     PROCEDURE:  CT ANGIO ABDOMINAL AORTA BILAT ILIOFEM RUNOFF-     INDICATIONS:   Gangrenous, necrotic malodorous left great toe wound; L03.116-Cellulitis  of left lower limb; R79.1-Abnormal coagulation profile, pain and  weakness in both legs     COMPARISON:   CT of the abdomen and pelvis dated 01/01/2016     TECHNIQUE:  Routine transaxial slices were obtained through the abdomen, pelvis, and  both lower extremities after the intravenous administration of 100 mL  Isovue 370. Reconstructed coronal and sagittal images were also  obtained. In addition, a 3 D volume rendered image was obtained after  post processing. Automated exposure control and iterative reconstruction  methods were used.     FINDINGS:  Vascular findings: There is no  significant aortic stenosis. There is  mild aortic plaque present. There is plaque at the origins of the SMA  and celiac axis and extensive plaque in the splenic artery and  throughout the course of the celiac axis. The degree of narrowing  appears less than 50% and celiac axis. There is calcified  atherosclerotic plaque in the right and left renal arteries without  hemodynamically significant stenosis. The JANUSZ is patent. There is  atherosclerotic plaque throughout the common and external iliac arteries  without significant stenosis.     Below the inguinal ligament on the right, the patient has plaque in the  superficial femoral artery without significant stenosis. There is  extensive plaque throughout the popliteal artery with narrowing in  several locations approaching 50%. There is diffuse disease in the  trifurcation vessels. The dominant runoff vessel is the peroneal which  passes to the ankle. The anterior tibial appears to fill segmentally.  The posterior tibial artery is occluded.     In the left lower extremity, there is plaque throughout the superficial  femoral artery without significant stenosis there is plaque in the  popliteal artery with at least one high-grade stenosis of greater than  70% just above the knee joint. This is relatively focal in nature. There  is plaque in the tibioperoneal trunk. The dominant runoff vessel is the  peroneal artery which passes to the ankle. The anterior tibial artery  shows diffuse disease but is patent segmentally to the ankle. The  posterior tibial artery is essentially occluded.     Nonvascular findings: There are chronic interstitial fibrotic changes  and emphysematous changes present in the lung bases. There is cardiac  enlargement with postoperative changes of prior CABG. There is trace  pleural fluid bilaterally with mild by basilar atelectasis. The liver,  spleen, adrenal glands, and pancreas have a normal appearance. There are  multiple layering gallstones  within the gallbladder. Nonobstructing  stones are present within the right kidney. There are calculi  dependently within the urinary bladder. There is bilateral  hydronephrosis. There is marked bladder distention with multiple bladder  diverticula. The prostate appears enlarged and slightly irregular in  appearance. No dilated loops of bowel are seen. There is extensive  sigmoid diverticulosis without diverticulitis. There is a right inguinal  hernia containing fat. There is advanced multilevel degenerative disc  and facet disease. There is diffuse osteopenia. 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 There is soft tissue edema of the  left foot and apparently an open wound over the great toe of the left  foot.          Impression:         1. Diffuse atherosclerotic disease to approximately the popliteal level  bilaterally without any significant stenosis above the popliteal artery.  2. Greater than 70% stenosis of the left popliteal artery.  3. Significant trifurcation level disease bilaterally with single vessel  runoff via the peroneal artery.  4. Right renal and bladder stones with bilateral hydronephrosis. There  is marked bladder distention with multiple bladder diverticula  and  prostatic enlargement suggesting bladder outlet obstruction.  5. Multiple incidental nonvascular findings as noted above.     Electronically Signed By-Tushar Montes MD On:8/12/2021 1:32 PM  This report was finalized on [id number] by  Tushar Montes MD.          Results for orders placed during the hospital encounter of 08/09/21    XR Chest 2 View    Narrative  DATE OF EXAM:  8/10/2021 12:20 PM    PROCEDURE:  XR CHEST 2 VW-    INDICATIONS:  SOA; L03.116-Cellulitis of left lower limb; R79.1-Abnormal coagulation  profile    COMPARISON:  A 10/20/2021 at 12:40 AM, 6/8/2016 at 5:39 PM, 6/7/2016 at 9:47 PM    TECHNIQUE:  Two radiologic views of the chest , PA and lateral were obtained.    FINDINGS:  Extensive chronic changes  are again seen throughout the lungs. These  findings are again stable given differences in technique. No new  consolidations or pleural effusions are observed. The cardiac silhouette  and mediastinum are unchanged. Stable cardiomegaly is noted.  Postoperative changes are noted. No acute osseous abnormalities are  seen.    Impression  Chronic changes are again noted which are stable. There is no definitive  evidence for acute cardiopulmonary process.    Electronically Signed By-Aneesh Judd MD On:8/10/2021 12:43 PM  This report was finalized on [id number] by  Aneesh Judd MD.      XR Foot 3+ View Left    Narrative  DATE OF EXAM:  8/10/2021 12:40 AM    PROCEDURE:  XR FOOT 3+ VW LEFT-    INDICATIONS:  Prior injury to left great toe on furniture several weeks ago. Recent  discovery of injury with wound and swelling and erythema involving the  great toe.    COMPARISON:  No Comparisons Available    TECHNIQUE:  A minimum of three routine standard radiographic views were obtained of  the left foot.    FINDINGS:  Soft tissue swelling is seen throughout the great toe and extending into  the medial aspect of the forefoot/midfoot. There is suggestion of  possible gas production throughout the soft tissues. Multiple areas of  cortical erosion are identified throughout the proximal and distal  phalanges of the great toe. There is also abnormal lucency throughout  the bone. These findings indicate changes of osteomyelitis.    Impression  1.Evidence for soft tissue swelling throughout the great toe with areas  of gas production. The findings indicate changes of soft tissue  cellulitis.  2.Evidence for osteomyelitis involving the proximal and distal phalanges  of the great toe.    Electronically Signed By-Aneesh Judd MD On:8/10/2021 7:46 AM  This report was finalized on [id number] by  Aneesh Judd MD.      XR Chest 1 View    Narrative  DATE OF EXAM:  8/10/2021 12:40 AM    PROCEDURE:  XR CHEST 1  VW-    INDICATIONS:  Shortness of breath. Prior Covid 19 infection. Unsteady gait.    COMPARISON:  6/8/2016 at 5:39 PM, 6/7/2016 at 9:47 PM, 12/30/2015 at 8:28 PM    TECHNIQUE:  [Portable chest radiograph]    FINDINGS:  Extensive chronic changes are again seen throughout the lungs which are  stable. No new consolidations or pleural effusions are observed. The  cardiac silhouette and mediastinum are stable. Postoperative changes are  noted. Stable cardiomegaly is noted. No acute osseous abnormalities are  identified.    Impression  Chronic changes are noted which are stable. There is no evidence for  definitive acute cardiopulmonary process.    Electronically Signed By-Aneesh Judd MD On:8/10/2021 7:43 AM  This report was finalized on [id number] by  Aneesh Judd MD.      Results for orders placed during the hospital encounter of 08/09/21    Duplex Vein Mapping Lower Extremity - Bilateral CAR    Interpretation Summary  · The left greater saphenous vein is patent and of adequate size in the thigh.  · The left greater saphenous vein is patent and of adequate size in the calf.        ASSESSMENT / PLAN      Cellulitis of left lower extremity    Chronic coronary artery disease    Depression    Hearing problem    History of mitral valve replacement    Essential hypertension    Long term current use of anticoagulant therapy    Mechanical heart valve present    History of B-cell lymphoma    Elevated INR (international normalized ratio) due to prior anticoagulant medication ingestion    Mixed hyperlipidemia    Peripheral vascular disease of lower extremity with ulceration (CMS/HCC)    1. CRF/CKD STG 3A-------Nonoliguric. BUN/Cr down a little. No NSAIDs or IV dye. Dose meds for CrCl 30-60 cc/min    2. HTN WITH CKD------BP okay. Avoid hypotension. No ACE-I/ARB/DRI for now    3. BPH------On Flomax    4. HYPERLIPIDEMIA------On Statin    5. OA/DJD-----No NSAIDs    6. PVD/PAD WITH LEFT GREAT TOE GANGRENE-----S/P Amputatyion.  On Abx and getting wound care    7. CAD WITH H/O MECHANICAL VALVE REPLACEMENT    8. ANEMIA WITH H/O B-CELL LYMPHOMA------PRBCs given yesterday. IV iron for JOHAN and follow    9. DEPRESSION/DEMENTIA-------SSRI    10. HYPOALBUMINEMIA----S/P IV Albumin to temporize    11. HYPOCALCEMIA-----Replaced    12. HYPERNATREMIA-----Encourage increased po water intake    Kashif Hutchins MD  [facility]  [phone]  08/15/21  08:59 EDT

## 2021-08-16 NOTE — PROGRESS NOTES
Name: Niko Servin ADMIT: 2021   : 1929  PCP: Casper Saldaña MD    MRN: [id number] LOS: 5 days   AGE/SEX: 89 y.o. male  ROOM:     [facility]    [place], Subsequent Hospital Care    Chief Complaint   Patient presents with   • Toe Injury     CC: left great toe wound  Subjective     89 y.o. male resting in bed comfortably this AM.  Patient is extremely hard of hearing but does not seem to be in any acute distress.    Review of Systems   Unable to perform ROS: Other   Hearing deficit    Objective     Scheduled Medications:   Acetylcysteine, 1,200 mg, Oral, BID  ampicillin-sulbactam, 3 g, Intravenous, Q6H  ferric gluconate, 125 mg, Intravenous, Daily  ipratropium-albuterol, 3 mL, Nebulization, Q6H - RT  rosuvastatin, 10 mg, Oral, Nightly  sertraline, 100 mg, Oral, Daily  sodium chloride, 10 mL, Intravenous, Q12H  sodium hypochlorite, , Topical, BID  tamsulosin, 0.4 mg, Oral, Daily        Active Infusions:  heparin, 12 Units/kg/hr, Last Rate: 12 Units/kg/hr (21 0307)        As Needed Medications:  •  acetaminophen **OR** acetaminophen **OR** acetaminophen  •  aluminum-magnesium hydroxide-simethicone  •  haloperidol lactate  •  heparin  •  heparin  •  magnesium sulfate **OR** magnesium sulfate **OR** magnesium sulfate  •  melatonin  •  Morphine  •  nitroglycerin  •  ondansetron **OR** ondansetron  •  potassium chloride  •  potassium chloride  •  potassium phosphate infusion greater than 15 mMoles **OR** potassium phosphate infusion greater than 15 mMoles **OR** potassium phosphate **OR** sodium phosphate IVPB **OR** sodium phosphate IVPB **OR** sodium phosphate IVPB  •  sodium chloride  •  sodium chloride    Vital Signs  Vital Signs Patient Vitals for the past 24 hrs:   BP Temp Temp src Pulse Resp SpO2 Weight   21 0641 -- -- -- -- 18 -- --   21 0638 -- -- -- 74 18 98 % --   21 0538 133/69 98 °F (36.7 °C) Oral 78 16 92 % --   21 5570 -- -- -- -- -- -- 86.4 kg  (190 lb 7.6 oz)   08/16/21 0226 161/63 98.5 °F (36.9 °C) Oral 75 23 99 % --   08/16/21 0100 -- 98.5 °F (36.9 °C) Oral -- 20 -- --   08/15/21 2238 145/68 98.1 °F (36.7 °C) Oral 78 23 94 % --   08/15/21 2106 -- -- -- 73 20 100 % --   08/15/21 2059 -- -- -- 75 20 97 % --   08/15/21 1700 173/70 98 °F (36.7 °C) Oral 72 20 99 % --   08/15/21 1500 141/61 98 °F (36.7 °C) Oral 74 20 99 % --   08/15/21 1144 -- -- -- 73 20 100 % --   08/15/21 1100 154/76 97.8 °F (36.6 °C) Oral 72 23 98 % --     I/O:   Intake/Output Summary (Last 24 hours) at 8/16/2021 0821  Last data filed at 8/16/2021 0538  Gross per 24 hour   Intake 520 ml   Output 1150 ml   Net -630 ml     BMI:  Body mass index is 26.57 kg/m².    Physical Exam:  Physical Exam  Constitutional:       General: He is not in acute distress.  Pulmonary:      Comments: 3 L O2 nasal cannula  Abdominal:      General: Abdomen is flat.   Skin:     General: Skin is warm.      Comments: Left foot remains wrapped.   Neurological:      Mental Status: He is alert.          Left foot remains wrapped  Results Review:     CBC    Results from last 7 days   Lab Units 08/16/21  0627 08/15/21  0300 08/14/21  0343 08/13/21  0826 08/13/21  0332 08/12/21  0302 08/11/21  0522   WBC 10*3/mm3 8.80 8.10 9.00 10.00 9.10 8.70 10.30   HEMOGLOBIN g/dL 8.6* 8.0* 7.2* 8.0* 8.2* 7.9* 8.7*   PLATELETS 10*3/mm3 290 243 245 265 278 254 267     BMP   Results from last 7 days   Lab Units 08/16/21  0627 08/15/21  0300 08/14/21  0343 08/13/21  0332 08/12/21  0302 08/11/21  0522 08/10/21  1835   SODIUM mmol/L 143 146* 144 143 143 142 141   POTASSIUM mmol/L 3.4* 3.8 4.0 3.8 3.9 3.8 4.0   CHLORIDE mmol/L 103 106 106 103 106 105 105   CO2 mmol/L 29.0 29.0 29.0 31.0* 28.0 28.0 26.0   BUN mg/dL 20 25* 30* 26* 28* 30* 32*   CREATININE mg/dL 1.05 1.12 1.26 1.18 1.19 1.28* 1.27   GLUCOSE mg/dL 98 98 103* 82 89 95 123*   MAGNESIUM mg/dL 1.9 2.1 2.4* 1.6* 1.6* 1.5*  --    PHOSPHORUS mg/dL 2.3* 3.5  --   --   --   --   --       Coag   Results from last 7 days   Lab Units 08/16/21  0627 08/15/21  0300 08/14/21  2218 08/14/21  1627 08/14/21  0343 08/13/21  1915 08/13/21  0826 08/13/21  0332 08/12/21  0302 08/11/21  0522   INR  1.66* 1.66*  --   --  1.43*  --  1.66* 1.90* >=8.00* >=8.00*   APTT seconds 53.8* 68.3 73.2 56.2* 49.5* 51.0* 39.9*  --   --   --      HbA1C   Lab Results   Component Value Date    HGBA1C 6.0 (H) 08/11/2021     Infection   Results from last 7 days   Lab Units 08/10/21  0031 08/10/21  0018   BLOODCX  Proteus mirabilis* No growth at 5 days   BCIDPCR  Proteus spp. Identification by BCID PCR.*  --      Radiology(recent) XR Chest 1 View    Result Date: 8/16/2021  No interval change yesterday's study with abnormalities as described above.  Electronically Signed By-Hammad Ragland MD On:8/16/2021 8:18 AM This report was finalized on [id number] by  Hammad Ragland MD.    XR Chest 1 View    Result Date: 8/15/2021  1.Coarse bilateral interstitial markings, which could reflect interstitial pulmonary edema versus atypical pneumonia. 2.Cardiomegaly with small bilateral pleural effusions.  Electronically Signed By-Aneesh Cook MD On:8/15/2021 10:23 AM This report was finalized on [id number] by  Aneesh Cook MD.      Assessment/Plan     Assessment & Plan      Cellulitis of left lower extremity    Peripheral vascular disease of lower extremity with ulceration (CMS/HCC)    Chronic coronary artery disease    Depression    Hearing problem    History of mitral valve replacement    Essential hypertension    Long term current use of anticoagulant therapy    Mechanical heart valve present    History of B-cell lymphoma    Elevated INR (international normalized ratio) due to prior anticoagulant medication ingestion    Mixed hyperlipidemia      89 y.o. male with history of left great toe wound now status post left great toe amputation.  Patient with significant infrapopliteal disease on the left lower extremity.  He is on the  schedule for a left lower extremity arteriogram with possible intervention on Tuesday, 8/17/2021. VSS. hgb 8.6. Cr 1.05    Nephrology following, Dr. Hutchins  Discussed patient with Dr. Hutchins this morning, he is planning premedication today in preparation for IV contrast tomorrow.    Cardiology following, Dr Montoya  Patient has a history of mechanical mitral valve, warfarin is on hold. Patient is currently on heparin, INR 1.66    Infectious disease following, Dr Hernandez  IV vancomycin and Unasyn    Podiatry following, Dr. Woo  S/p left great toe amputation  Dakin's dressing changes.He will likely require closure vs amputation revision pending vascular outcome    Pulmonology following, Dr. Gayle  Patient on 3 L O2    Plan  Left lower extremity arteriogram with possible intervention on 8/17/2021  N.p.o. at midnight  Covid negative 8/10/21    Nancy Paulson PA-C  08/16/21  08:20 EDT    Please call my office with any question: [phone]    Active Hospital Problems    Diagnosis  POA   • **Cellulitis of left lower extremity [L03.116]  Yes   • Peripheral vascular disease of lower extremity with ulceration (CMS/HCC) [I73.9, L97.909]  Unknown     Priority: High   • History of B-cell lymphoma [Z85.72]  Not Applicable   • Elevated INR (international normalized ratio) due to prior anticoagulant medication ingestion [R79.1]  Yes   • Mixed hyperlipidemia [E78.2]  Yes   • Depression [F32.9]  Yes   • Essential hypertension [I10]  Yes   • Mechanical heart valve present [Z95.2]  Not Applicable   • Hearing problem [H91.90]  Yes   • Chronic coronary artery disease [I25.10]  Yes   • Long term current use of anticoagulant therapy [Z79.01]  Not Applicable   • History of mitral valve replacement [Z95.2]  Not Applicable      Resolved Hospital Problems   No resolved problems to display.

## 2021-08-16 NOTE — PROGRESS NOTES
"08/16/21   Foot and Ankle Surgery - Inpatient Follow-up  Provider: Dr. Willy Woo DPM  Location: [facility]    Chief Complaint: s/p great toe amp POD#4    Subjective:  Niko Servin is a 89 y.o. male.     Patient continues to do well.  Wife is at bedside.  Patient is responsive and alert    No Known Allergies    No current facility-administered medications on file prior to encounter.     Current Outpatient Medications on File Prior to Encounter   Medication Sig Dispense Refill   • aspirin 81 MG chewable tablet Chew 81 mg Daily.     • rosuvastatin (CRESTOR) 10 MG tablet TAKE ONE TABLET BY MOUTH EVERY NIGHT AT BEDTIME 30 tablet 5   • sertraline (ZOLOFT) 100 MG tablet TAKE ONE TABLET BY MOUTH DAILY 30 tablet 2   • warfarin (COUMADIN) 7.5 MG tablet TAKE ONE TABLET BY MOUTH DAILY 30 tablet 2       Objective   /69 (BP Location: Right arm, Patient Position: Lying)   Pulse 74   Temp 98 °F (36.7 °C) (Oral)   Resp 18   Ht 180.3 cm (71\")   Wt 86.4 kg (190 lb 7.6 oz)   SpO2 98%   BMI 26.57 kg/m²     General: Mild confusion.  Alert.  No signs of distress  Vascular: DP and PT pulses remain nonpalpable.  CFT are sluggish to the digits.  Less edema to the left lower extremity  Neuro: Sensation appears to be diminished.  Motor function is intact  MSK: No significant tenderness with palpation.  No gross deformity or instability  Derm: Decreased erythema.  No obvious findings of active infection.    Results from last 7 days   Lab Units 08/15/21  0300   WBC 10*3/mm3 8.10   HEMOGLOBIN g/dL 8.0*   HEMATOCRIT % 25.0*   PLATELETS 10*3/mm3 243       Assessment/Plan     Patient Active Problem List   Diagnosis   • Ataxia   • B-cell lymphoma (CMS/HCC)   • Chronic coronary artery disease   • Depression   • Diffuse large B cell lymphoma (CMS/HCC)   • Hearing problem   • History of mitral valve replacement   • Essential hypertension   • Long term current use of anticoagulant therapy   • Mechanical heart valve " present   • Postural hypotension   • Protein calorie malnutrition (CMS/HCC)   • Soft tissue mass   • ST elevation (STEMI) myocardial infarction (CMS/HCC)   • Cellulitis of left lower extremity   • History of B-cell lymphoma   • Elevated INR (international normalized ratio) due to prior anticoagulant medication ingestion   • Mixed hyperlipidemia   • Peripheral vascular disease of lower extremity with ulceration (CMS/HCC)       Patient appears to be stable at this time and has responded nicely to the toe amputation.  Vascular is proceeding with arteriogram and possible intervention tomorrow.  Further foot surgery pending vascular findings and treatment.    Note is dictated utilizing voice recognition software. Unfortunately this leads to occasional typographical errors. I apologize in advance if the situation occurs. If questions occur please do not hesitate to call our office.

## 2021-08-16 NOTE — NURSING NOTE
WOCN note:  89 yr old male admitted 8/9/2021 for cellulitis LLE. WOC consult for wounds to back.    Patient in bed with caregiver at bedside. Caregiver reported that back lesions have been present for a long time and she treats at home as needed. Caregiver requested that patient not be disturbed at this time as it would increase his agitation. Chart reviewed. Continue pressure injury prevention strategies and will follow as needed.

## 2021-08-16 NOTE — PLAN OF CARE
Problem: Adult Inpatient Plan of Care  Goal: Plan of Care Review  Outcome: Ongoing, Not Progressing  Goal: Patient-Specific Goal (Individualized)  Outcome: Ongoing, Not Progressing  Goal: Absence of Hospital-Acquired Illness or Injury  Outcome: Ongoing, Not Progressing  Intervention: Identify and Manage Fall Risk  Recent Flowsheet Documentation  Taken 8/16/2021 0412 by Ca Vicente RN  Safety Promotion/Fall Prevention: safety round/check completed  Taken 8/16/2021 0200 by Ca Vicente RN  Safety Promotion/Fall Prevention: safety round/check completed  Taken 8/16/2021 0049 by Ca Vicente RN  Safety Promotion/Fall Prevention: safety round/check completed  Taken 8/15/2021 2200 by Ca Vicente RN  Safety Promotion/Fall Prevention: safety round/check completed  Taken 8/15/2021 2030 by Ca Vicente RN  Safety Promotion/Fall Prevention: safety round/check completed  Intervention: Prevent Skin Injury  Recent Flowsheet Documentation  Taken 8/16/2021 0412 by Ca Vicente RN  Body Position: side-lying, right  Skin Protection: tubing/devices free from skin contact  Taken 8/16/2021 0049 by Ca Vicente RN  Body Position:   turned   side-lying, right  Skin Protection: tubing/devices free from skin contact  Taken 8/15/2021 2030 by Ca Vicente RN  Body Position: tilted, left  Skin Protection: tubing/devices free from skin contact  Intervention: Prevent and Manage VTE (venous thromboembolism) Risk  Recent Flowsheet Documentation  Taken 8/15/2021 2030 by Ca Vicente RN  VTE Prevention/Management: dorsiflexion/plantar flexion performed  Intervention: Prevent Infection  Recent Flowsheet Documentation  Taken 8/16/2021 0412 by Ca Vicente RN  Infection Prevention: hand hygiene promoted  Taken 8/16/2021 0049 by Ca Vicente RN  Infection Prevention: hand hygiene promoted  Taken 8/15/2021 2030 by Ca Vicente RN  Infection Prevention: hand  hygiene promoted  Goal: Optimal Comfort and Wellbeing  Outcome: Ongoing, Not Progressing  Intervention: Provide Person-Centered Care  Recent Flowsheet Documentation  Taken 8/16/2021 0049 by Ca Vicente, RN  Trust Relationship/Rapport: care explained  Taken 8/15/2021 2030 by Ca Vicente, RN  Trust Relationship/Rapport: care explained  Goal: Readiness for Transition of Care  Outcome: Ongoing, Not Progressing   Goal Outcome Evaluation:

## 2021-08-16 NOTE — CASE MANAGEMENT/SOCIAL WORK
Continued Stay Note   Richard     Patient Name: Niko Servin  MRN: [id number]  Today's Date: 8/16/2021    Admit Date: 8/9/2021    Discharge Plan     Row Name 08/16/21 1507       Plan    Plan  anticipate DC to in rehab- Referral [facility] to [facility] in [place]- PENDING. Will need precert. No PASRR needed.    Plan Comments  DC barriers: LLE arteriogram with poss intervention tomorrow, cards following, id following        Expected Discharge Date and Time     Expected Discharge Date Expected Discharge Time    Aug 18, 2021         Phone communication or documentation only - no physical contact with patient or family.      Andra Urias, RN

## 2021-08-16 NOTE — PROGRESS NOTES
Infectious Diseases Progress Note      LOS: 5 days   Patient Care Team:  Casper Saldaña MD as PCP - General  Casper Saldaña MD as PCP - Family Medicine  Cory Bermeo MD as Consulting Physician (Nephrology)    Chief Complaint: Left great toe infection, left foot redness and swelling at admission    Subjective       The patient has been afebrile for the last 24 hours.  The patient is on 4 L of oxygen by nasal cannula, hemodynamically stable, and is tolerating antimicrobial therapy.  Wife feels that patient may be getting slightly confused      Review of Systems:   Review of Systems   Constitutional: Negative.    HENT: Negative.    Eyes: Negative.    Respiratory: Negative.    Cardiovascular: Negative.    Gastrointestinal: Negative.    Endocrine: Negative.    Genitourinary: Negative.    Musculoskeletal: Negative.    Skin: Positive for color change and wound.   Neurological: Negative.    Psychiatric/Behavioral: Negative.    All other systems reviewed and are negative.       Objective     Vital Signs  Temp:  [98 °F (36.7 °C)-98.5 °F (36.9 °C)] 98.1 °F (36.7 °C)  Heart Rate:  [72-78] 75  Resp:  [16-23] 18  BP: (133-173)/(58-70) 159/58    Physical Exam:  Physical Exam  Vitals and nursing note reviewed.   Constitutional:       General: He is not in acute distress.     Appearance: Normal appearance. He is well-developed. He is not diaphoretic.      Comments: Appears thin   HENT:      Head: Normocephalic and atraumatic.   Eyes:      Extraocular Movements: Extraocular movements intact.      Conjunctiva/sclera: Conjunctivae normal.      Pupils: Pupils are equal, round, and reactive to light.   Cardiovascular:      Rate and Rhythm: Normal rate and regular rhythm.      Heart sounds: Normal heart sounds, S1 normal and S2 normal.   Pulmonary:      Effort: Pulmonary effort is normal. No respiratory distress.      Breath sounds: Normal breath sounds. No stridor. No wheezing or rales.   Abdominal:      General:  Bowel sounds are normal. There is no distension.      Palpations: Abdomen is soft. There is no mass.      Tenderness: There is no abdominal tenderness. There is no guarding.   Musculoskeletal:         General: No deformity. Normal range of motion.      Cervical back: Neck supple.      Comments: Bulky surgical dressing on the left foot   Skin:     General: Skin is warm and dry.      Coloration: Skin is not pale.      Findings: No rash.   Neurological:      Mental Status: He is alert.      Cranial Nerves: No cranial nerve deficit.      Comments: Alert and oriented x2-patient said a few things that do not make complete sense   Psychiatric:         Mood and Affect: Mood normal.          Results Review:    I have reviewed all clinical data, test, lab, and imaging results.     Radiology  XR Chest 1 View    Result Date: 8/16/2021  DATE OF EXAM: 8/16/2021 4:56 AM  PROCEDURE: XR CHEST 1 VW-  INDICATIONS: Shortness of breath, hypertension, heart disease, history of B-cell lymphoma.  COMPARISON: 8/15/2021.  TECHNIQUE: Single radiographic view of the chest was obtained.  FINDINGS: The heart is enlarged with postsurgical changes apparent. There is bilateral mixed interstitial/airspace disease which has not significantly changed. The patient has a small right and a small to moderate left pleural effusion with compressive atelectasis.  There is no interval change from yesterday's exam.      No interval change yesterday's study with abnormalities as described above.  Electronically Signed By-Hammad Raglnad MD On:8/16/2021 8:18 AM This report was finalized on [id number] by  Hammad Ragland MD.      Cardiology    Laboratory    Results from last 7 days   Lab Units 08/16/21  0627 08/15/21  0300 08/14/21  0343 08/13/21  0826 08/13/21  0332 08/12/21  0302 08/11/21  0522   WBC 10*3/mm3 8.80 8.10 9.00 10.00 9.10 8.70 10.30   HEMOGLOBIN g/dL 8.6* 8.0* 7.2* 8.0* 8.2* 7.9* 8.7*   HEMATOCRIT % 26.3* 25.0* 22.1* 24.4* 25.5* 25.1* 26.3*   PLATELETS  10*3/mm3 290 243 245 265 278 254 267     Results from last 7 days   Lab Units 08/16/21  0627 08/15/21  0300 08/14/21  0343 08/13/21  0332 08/12/21  0302 08/11/21  0522 08/10/21  1835 08/10/21  0450 08/10/21  0018   SODIUM mmol/L 143 146* 144 143 143 142 141   < > 141   POTASSIUM mmol/L 3.4* 3.8 4.0 3.8 3.9 3.8 4.0   < > 4.1   CHLORIDE mmol/L 103 106 106 103 106 105 105   < > 106   CO2 mmol/L 29.0 29.0 29.0 31.0* 28.0 28.0 26.0   < > 26.0   BUN mg/dL 20 25* 30* 26* 28* 30* 32*   < > 35*   CREATININE mg/dL 1.05 1.12 1.26 1.18 1.19 1.28* 1.27   < > 1.25   GLUCOSE mg/dL 98 98 103* 82 89 95 123*   < > 113*   ALBUMIN g/dL  --  2.70* 2.20*  --   --  2.60*  --   --  2.70*   BILIRUBIN mg/dL  --  0.4 0.2  --   --  0.2  --   --  0.3   ALK PHOS U/L  --  57 64  --   --  72  --   --  74   AST (SGOT) U/L  --  17 16  --   --  22  --   --  15   ALT (SGPT) U/L  --  7 9  --   --  10  --   --  8   CALCIUM mg/dL 8.3 8.1* 7.9* 7.9* 7.7* 8.2 8.2   < > 8.5    < > = values in this interval not displayed.         Results from last 7 days   Lab Units 08/10/21  0018   SED RATE mm/hr 42*         Microbiology   Microbiology Results (last 10 days)       Procedure Component Value - Date/Time    Anaerobic Culture - Body Fluid, Toe, Left [[id number]] Collected: 08/12/21 1701    Lab Status: Preliminary result Specimen: Body Fluid from Toe, Left Updated: 08/15/21 1051     Anaerobic Culture No anaerobes isolated at 3 days    Tissue / Bone Culture - Tissue, Toe, Left [[id number]]  (Abnormal)  (Susceptibility) Collected: 08/12/21 1701    Lab Status: Final result Specimen: Tissue from Toe, Left Updated: 08/15/21 0858     Tissue Culture Moderate growth (3+) Staphylococcus aureus      Scant growth (1+) Proteus mirabilis     Gram Stain Moderate (3+) WBCs per low power field      Few (2+) Gram positive cocci, intracellular      Few (2+) Gram positive cocci in clusters    Susceptibility        Staphylococcus aureus Proteus mirabilis      SULMA SULMA      Ampicillin   Susceptible      Ampicillin + Sulbactam  Susceptible      Cefepime  Susceptible      Ceftazidime  Susceptible      Ceftriaxone  Susceptible      Clindamycin Susceptible       Erythromycin Susceptible       Gentamicin  Susceptible      Inducible Clindamycin Resistance Negative       Levofloxacin  Susceptible      Oxacillin Susceptible       Penicillin G Resistant       Piperacillin + Tazobactam  Susceptible      Rifampin Susceptible       Tetracycline Susceptible Resistant      Trimethoprim + Sulfamethoxazole Susceptible Susceptible      Vancomycin Susceptible                    Linear View                   Susceptibility Comments       Staphylococcus aureus    This isolate does not demonstrate inducible clindamycin resistance in vitro.        Proteus mirabilis    Cefazolin sensitivity will not be reported for Enterobacteriaceae in non-urine isolates. If cefazolin is preferred, please call the microbiology lab to request an E-test.  With the exception of urinary-sourced infections, aminoglycosides should not be used as monotherapy.               Respiratory Panel, PCR (WITHOUT COVID) - Swab, Nasopharynx [[id number]]  (Normal) Collected: 08/10/21 1346    Lab Status: Final result Specimen: Swab from Nasopharynx Updated: 08/10/21 1437     ADENOVIRUS, PCR Not Detected     Coronavirus 229E Not Detected     Coronavirus HKU1 Not Detected     Coronavirus NL63 Not Detected     Coronavirus OC43 Not Detected     Human Metapneumovirus Not Detected     Human Rhinovirus/Enterovirus Not Detected     Influenza B PCR Not Detected     Parainfluenza Virus 1 Not Detected     Parainfluenza Virus 2 Not Detected     Parainfluenza Virus 3 Not Detected     Parainfluenza Virus 4 Not Detected     Bordetella pertussis pcr Not Detected     Chlamydophila pneumoniae PCR Not Detected     Mycoplasma pneumo by PCR Not Detected     Influenza A PCR Not Detected     RSV, PCR Not Detected     Bordetella parapertussis PCR Not Detected    Narrative:       The coronavirus on the RVP is NOT COVID-19 and is NOT indicative of infection with COVID-19.    In the setting of a positive respiratory panel with a viral infection PLUS a negative procalcitonin without other underlying concern for bacterial infection, consider observing off antibiotics or discontinuation of antibiotics and continue supportive care. If the respiratory panel is positive for atypical bacterial infection (Bordetella pertussis, Chlamydophila pneumoniae, or Mycoplasma pneumoniae), consider antibiotic de-escalation to target atypical bacterial infection.    COVID PRE-OP / PRE-PROCEDURE SCREENING ORDER (NO ISOLATION) - Swab, Nasopharynx [[id number]]  (Normal) Collected: 08/10/21 1114    Lab Status: Final result Specimen: Swab from Nasopharynx Updated: 08/10/21 1241    Narrative:      The following orders were created for panel order COVID PRE-OP / PRE-PROCEDURE SCREENING ORDER (NO ISOLATION) - Swab, Nasopharynx.  Procedure                               Abnormality         Status                     ---------                               -----------         ------                     COVID-19,CEPHEID/XUAN/BD...[[id number]]  Normal              Final result                 Please view results for these tests on the individual orders.    COVID-19,CEPHEID/XUAN/BDMAX,COR/LOREN/PAD/FEI IN-HOUSE(OR EMERGENT/ADD-ON),NP SWAB IN TRANSPORT MEDIA 3-4 HR TAT, RT-PCR - Swab, Nasopharynx [[id number]]  (Normal) Collected: 08/10/21 1114    Lab Status: Final result Specimen: Swab from Nasopharynx Updated: 08/10/21 1241     COVID19 Not Detected    Narrative:      Fact sheet for providers: https://www.fda.gov/media/916837/download     Fact sheet for patients: https://www.fda.gov/media/606739/download  Fact sheet for providers: https://www.fda.gov/media/227139/download    Fact sheet for patients: https://www.fda.gov/media/315227/download    Test performed by PCR.    COVID PRE-OP / PRE-PROCEDURE SCREENING ORDER (NO ISOLATION) -  Swab, Nasopharynx [[id number]]  (Normal) Collected: 08/10/21 0403    Lab Status: Final result Specimen: Swab from Nasopharynx Updated: 08/10/21 1445    Narrative:      The following orders were created for panel order COVID PRE-OP / PRE-PROCEDURE SCREENING ORDER (NO ISOLATION) - Swab, Nasopharynx.  Procedure                               Abnormality         Status                     ---------                               -----------         ------                     COVID-19,APTIMA PANTHER(...[[id number]]  Normal              Final result                 Please view results for these tests on the individual orders.    COVID-19,APTIMA PANTHER(ISAAK), FELICITA, NP/OP SWAB IN UTM/VTM/SALINE TRANSPORT MEDIA,24 HR TAT - Swab, Nasopharynx [[id number]]  (Normal) Collected: 08/10/21 0403    Lab Status: Final result Specimen: Swab from Nasopharynx Updated: 08/10/21 1445     COVID19 Not Detected    Narrative:      Fact sheet for providers: https://www.fda.gov/media/004296/download     Fact sheet for patients: https://www.fda.gov/media/406937/download    Test performed by RT PCR.    Blood Culture - Blood, Arm, Right [[id number]]  (Abnormal)  (Susceptibility) Collected: 08/10/21 0031    Lab Status: Final result Specimen: Blood from Arm, Right Updated: 08/12/21 1229     Blood Culture Proteus mirabilis     Gram Stain Anaerobic Bottle Gram negative bacilli    Susceptibility        Proteus mirabilis      SULMA      Ampicillin Susceptible      Ampicillin + Sulbactam Susceptible      Cefepime Susceptible      Ceftazidime Susceptible      Ceftriaxone Susceptible      Gentamicin Susceptible      Levofloxacin Susceptible      Piperacillin + Tazobactam Susceptible      Trimethoprim + Sulfamethoxazole Susceptible                 Linear View                   Susceptibility Comments       Proteus mirabilis    Cefazolin sensitivity will not be reported for Enterobacteriaceae in non-urine isolates. If cefazolin is preferred, please call the  microbiology lab to request an E-test.  With the exception of urinary-sourced infections, aminoglycosides should not be used as monotherapy.               Blood Culture ID, PCR - Blood, Arm, Right [[id number]]  (Abnormal) Collected: 08/10/21 0031    Lab Status: Final result Specimen: Blood from Arm, Right Updated: 08/11/21 0602     BCID, PCR Proteus spp. Identification by BCID PCR.    Blood Culture - Blood, Arm, Left [[id number]] Collected: 08/10/21 0018    Lab Status: Final result Specimen: Blood from Arm, Left Updated: 08/15/21 0030     Blood Culture No growth at 5 days            Medication Review:       Schedule Meds  Acetylcysteine, 1,200 mg, Oral, BID  ampicillin-sulbactam, 3 g, Intravenous, Q6H  budesonide, 0.5 mg, Nebulization, BID - RT  ipratropium-albuterol, 3 mL, Nebulization, Q6H - RT  rosuvastatin, 10 mg, Oral, Nightly  sertraline, 100 mg, Oral, Daily  sodium chloride, 10 mL, Intravenous, Q12H  sodium hypochlorite, , Topical, BID  tamsulosin, 0.4 mg, Oral, Daily        Infusion Meds  heparin, 12 Units/kg/hr, Last Rate: 12 Units/kg/hr (08/16/21 0307)  sodium chloride, 60 mL/hr        PRN Meds    acetaminophen **OR** acetaminophen **OR** acetaminophen    aluminum-magnesium hydroxide-simethicone    haloperidol lactate    heparin    heparin    magnesium sulfate **OR** magnesium sulfate **OR** magnesium sulfate    melatonin    Morphine    nitroglycerin    ondansetron **OR** ondansetron    potassium chloride    potassium chloride    potassium phosphate infusion greater than 15 mMoles **OR** potassium phosphate infusion greater than 15 mMoles **OR** potassium phosphate **OR** sodium phosphate IVPB **OR** sodium phosphate IVPB **OR** sodium phosphate IVPB    sodium chloride    sodium chloride        Assessment/Plan       Antimicrobial Therapy   1.        day  2.        day  3.  Unasyn      day  4.      Day  5.      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  The patient is s/p amputation of the left big toe to the MTP joint on August 12, 2021  Intraoperative cultures growing Proteus mirabilis and methicillin susceptible Staph aureus    Bacteremia with 1 out of 2 cultures growing Proteus species-likely source is left foot wound  -2D echo was negative for vegetation     History of mitral valve replacement     B cell lymphoma-wife denies any current treatment     Peripheral vascular disease-CT angiogram shows significant infrapopliteal disease on the left leg        Plan    Continue IV Unasyn  The patient will probably need 6 weeks of IV antibiotics  Plans for arteriogram on Tuesday with possible vascular intervention  Continue supportive care  A.m. labs  Case discussed with patient and wife at bedside      Lissette Osuna, APRN  08/16/21  14:51 EDT     Note is dictated utilizing voice recognition software/Dragon

## 2021-08-16 NOTE — SIGNIFICANT NOTE
08/16/21 1605   Rehab Time/Intention   Session Not Performed patient/family declined treatment  (Pt had been returned to bed by nursing.  He is very confused and attempting to climb out of bed.  Wife was very anxious and asked OT to hold on therapy this date as she feels it will cause him to continue to try to get himself out of bed.)   Recommendation   OT - Next Appointment 08/17/21

## 2021-08-16 NOTE — PLAN OF CARE
Goal Outcome Evaluation:     Pt is alert and oriented tp self and place. Wife states that he looks more alert today but still confused and not yet to his baseline. Pt wore BIPAP in the morning but took it off when his wife arrived and during meals, used 4L nasal canula. Vitals have been stable.

## 2021-08-16 NOTE — PROGRESS NOTES
"PULMONARY CRITICAL CARE Progress  NOTE      PATIENT IDENTIFICATION:  Name: Niko Servin  MRN: [id number]  :  [id number]     Age: 89 y.o.  Sex: male    DATE OF Note:  2021   Referring Physician: Alec Husain DO                  Subjective:   Alert,  on  oxygen, No SOB, no chest or abd pain, no bowel or bladder issues reported, no new complaints        Objective:  tMax 24 hrs: Temp (24hrs), Av.2 °F (36.8 °C), Min:98 °F (36.7 °C), Max:98.5 °F (36.9 °C)      Vitals Ranges:   Temp:  [98 °F (36.7 °C)-98.5 °F (36.9 °C)] 98.1 °F (36.7 °C)  Heart Rate:  [72-78] 75  Resp:  [16-23] 18  BP: (133-173)/(58-70) 159/58    Intake and Output Last 3 Shifts:   I/O last 3 completed shifts:  In: 970 [P.O.:120; IV Piggyback:850]  Out: 2450 [Urine:2450]    Exam:  /58   Pulse 75   Temp 98.1 °F (36.7 °C) (Oral)   Resp 18   Ht 180.3 cm (71\")   Wt 86.4 kg (190 lb 7.6 oz)   SpO2 97%   BMI 26.57 kg/m²     General Appearance:   Alert  HEENT:  Normocephalic, without obvious abnormality, Conjunctiva/corneas clear,.  Normal external ear canals, Nares normal, no drainage     Neck:  Supple, symmetrical, trachea midline. No JVD.  Lungs /Chest wall:   Bilateral basal rhonchi, respirations unlabored symmetrical wall movement.     Heart:  Regular rate and rhythm, systolic murmur PMI left sternal border  Abdomen: Soft, non-tender, no masses, no organomegaly.    Extremities: Trace edema no clubbing or Cyanosis        Medications:    Current Facility-Administered Medications:   •  acetaminophen (TYLENOL) tablet 650 mg, 650 mg, Oral, Q4H PRN, 650 mg at 21 **OR** acetaminophen (TYLENOL) 160 MG/5ML solution 650 mg, 650 mg, Oral, Q4H PRN **OR** acetaminophen (TYLENOL) suppository 650 mg, 650 mg, Rectal, Q4H PRN, CIHTO Woo DPM  •  Acetylcysteine capsule 1,200 mg, 1,200 mg, Oral, BID, Princess Hutchins MD, 1,200 mg at 21 0921  •  aluminum-magnesium hydroxide-simethicone (MAALOX MAX) 400-400-40 " MG/5ML suspension 15 mL, 15 mL, Oral, Q6H PRN, CHITO Woo DPM  •  ampicillin-sulbactam (UNASYN) 3 g in sodium chloride 0.9 % 100 mL IVPB-MBP, 3 g, Intravenous, Q6H, CHITO Woo DPM, 3 g at 08/16/21 0922  •  haloperidol lactate (HALDOL) injection 1 mg, 1 mg, Intravenous, Q6H PRN, CHITO Woo DPM, 1 mg at 08/14/21 0037  •  heparin 67403 units/250 mL (100 units/mL) in 0.45 % NaCl infusion, 12 Units/kg/hr, Intravenous, Titrated, Casper Laura MD, Last Rate: 9.68 mL/hr at 08/16/21 0307, 12 Units/kg/hr at 08/16/21 0307  •  heparin bolus from bag 2,400 Units, 30 Units/kg, Intravenous, Q6H PRN, Casper Laura MD, 2,400 Units at 08/14/21 1705  •  heparin bolus from bag 4,800 Units, 60 Units/kg, Intravenous, Q6H PRN, Casper Laura MD  •  ipratropium-albuterol (DUO-NEB) nebulizer solution 3 mL, 3 mL, Nebulization, Q6H - RT, CHITO Woo DPM, 3 mL at 08/16/21 1113  •  Magnesium Sulfate 2 gram Bolus, followed by 8 gram infusion (total Mg dose 10 grams)- Mg less than or equal to 1mg/dL, 2 g, Intravenous, PRN **OR** Magnesium Sulfate 2 gram / 50mL Infusion (GIVE X 3 BAGS TO EQUAL 6GM TOTAL DOSE) - Mg 1.1 - 1.5 mg/dl, 2 g, Intravenous, PRN **OR** Magnesium Sulfate 4 gram infusion- Mg 1.6-1.9 mg/dL, 4 g, Intravenous, PRN, CHITO Woo DPM, Last Rate: 25 mL/hr at 08/13/21 0818, 4 g at 08/13/21 0818  •  melatonin tablet 5 mg, 5 mg, Oral, Nightly PRN, Amna, T Willy, DPM  •  Morphine sulfate (PF) injection 2 mg, 2 mg, Intravenous, Q4H PRN, CHITO Woo DPM, 2 mg at 08/16/21 1223  •  nitroglycerin (NITROSTAT) SL tablet 0.4 mg, 0.4 mg, Sublingual, Q5 Min PRN, CHITO Woo DPM  •  ondansetron (ZOFRAN) tablet 4 mg, 4 mg, Oral, Q6H PRN **OR** ondansetron (ZOFRAN) injection 4 mg, 4 mg, Intravenous, Q6H PRN, CHITO oWo DPM  •  potassium chloride (K-DUR,KLOR-CON) CR tablet 40 mEq, 40 mEq, Oral, PRN, CHITO Woo DPM  •  potassium chloride 10 mEq in 100  mL IVPB, 10 mEq, Intravenous, Q1H PRN, CHITO Woo DPM  •  potassium phosphate 45 mmol in sodium chloride 0.9 % 500 mL infusion, 45 mmol, Intravenous, PRN **OR** potassium phosphate 30 mmol in sodium chloride 0.9 % 250 mL infusion, 30 mmol, Intravenous, PRN **OR** potassium phosphate 15 mmol in 0.9% sodium chloride 100 mL IVPB, 15 mmol, Intravenous, PRN **OR** sodium phosphates 45 mmol in sodium chloride 0.9 % 500 mL IVPB, 45 mmol, Intravenous, PRN **OR** sodium phosphates 30 mmol in sodium chloride 0.9 % 250 mL IVPB, 30 mmol, Intravenous, PRN **OR** sodium phosphates 15 mmol in sodium chloride 0.9 % 250 mL IVPB, 15 mmol, Intravenous, PRN, CHITO Woo DPM  •  rosuvastatin (CRESTOR) tablet 10 mg, 10 mg, Oral, Nightly, CHITO Woo DPM, 10 mg at 08/15/21 2218  •  sertraline (ZOLOFT) tablet 100 mg, 100 mg, Oral, Daily, CHITO Woo DPM, 100 mg at 08/16/21 0921  •  sodium chloride 0.9 % flush 10 mL, 10 mL, Intravenous, PRN, CHITO Woo DPM  •  sodium chloride 0.9 % flush 10 mL, 10 mL, Intravenous, Q12H, CHITO Woo DPM, 10 mL at 08/16/21 0921  •  sodium chloride 0.9 % flush 10 mL, 10 mL, Intravenous, PRN, CHITO Woo DPM, 10 mL at 08/15/21 0409  •  sodium chloride 0.9 % infusion, 60 mL/hr, Intravenous, Continuous, Princess Hutchins MD  •  sodium hypochlorite (DAKIN'S 1/4 STRENGTH) 0.125 % topical solution 0.125% solution, , Topical, BID, CHITO Woo DPM, 1 mL at 08/16/21 0922  •  tamsulosin (FLOMAX) 24 hr capsule 0.4 mg, 0.4 mg, Oral, Daily, Zaire Mansfield MD, 0.4 mg at 08/16/21 0921    Data Review:  All labs (24hrs):   Recent Results (from the past 24 hour(s))   aPTT    Collection Time: 08/16/21  6:27 AM    Specimen: Blood   Result Value Ref Range    PTT 53.8 (L) 61.0 - 76.5 seconds   Protime-INR    Collection Time: 08/16/21  6:27 AM    Specimen: Blood   Result Value Ref Range    Protime 17.7 (L) 19.4 - 28.5 Seconds    INR 1.66 (L) 2.00 - 3.00    Magnesium    Collection Time: 08/16/21  6:27 AM    Specimen: Blood   Result Value Ref Range    Magnesium 1.9 1.7 - 2.3 mg/dL   CBC (No Diff)    Collection Time: 08/16/21  6:27 AM    Specimen: Blood   Result Value Ref Range    WBC 8.80 3.40 - 10.80 10*3/mm3    RBC 3.20 (L) 4.14 - 5.80 10*6/mm3    Hemoglobin 8.6 (L) 13.0 - 17.7 g/dL    Hematocrit 26.3 (L) 37.5 - 51.0 %    MCV 82.2 79.0 - 97.0 fL    MCH 26.9 26.6 - 33.0 pg    MCHC 32.7 31.5 - 35.7 g/dL    RDW 17.6 (H) 12.3 - 15.4 %    RDW-SD 51.6 37.0 - 54.0 fl    MPV 8.1 6.0 - 12.0 fL    Platelets 290 140 - 450 10*3/mm3   Basic Metabolic Panel    Collection Time: 08/16/21  6:27 AM    Specimen: Blood   Result Value Ref Range    Glucose 98 65 - 99 mg/dL    BUN 20 8 - 23 mg/dL    Creatinine 1.05 0.76 - 1.27 mg/dL    Sodium 143 136 - 145 mmol/L    Potassium 3.4 (L) 3.5 - 5.2 mmol/L    Chloride 103 98 - 107 mmol/L    CO2 29.0 22.0 - 29.0 mmol/L    Calcium 8.3 8.2 - 9.6 mg/dL    eGFR Non African Amer 66 >60 mL/min/1.73    BUN/Creatinine Ratio 19.0 7.0 - 25.0    Anion Gap 11.0 5.0 - 15.0 mmol/L   Phosphorus    Collection Time: 08/16/21  6:27 AM    Specimen: Blood   Result Value Ref Range    Phosphorus 2.3 (L) 2.5 - 4.5 mg/dL   Iron Profile    Collection Time: 08/16/21  6:27 AM    Specimen: Blood   Result Value Ref Range    Iron 25 (L) 59 - 158 mcg/dL    Iron Saturation 19 (L) 20 - 50 %    Transferrin 89 (L) 200 - 360 mg/dL    TIBC 133 (L) 298 - 536 mcg/dL   Ferritin    Collection Time: 08/16/21  6:27 AM    Specimen: Blood   Result Value Ref Range    Ferritin 554.40 (H) 30.00 - 400.00 ng/mL        Imaging:  XR Chest 1 View  Narrative: DATE OF EXAM:  8/16/2021 4:56 AM     PROCEDURE:  XR CHEST 1 VW-     INDICATIONS:  Shortness of breath, hypertension, heart disease, history of B-cell  lymphoma.      COMPARISON:  8/15/2021.     TECHNIQUE:   Single radiographic view of the chest was obtained.     FINDINGS:  The heart is enlarged with postsurgical changes apparent. There  is  bilateral mixed interstitial/airspace disease which has not  significantly changed. The patient has a small right and a small to  moderate left pleural effusion with compressive atelectasis.  There is  no interval change from yesterday's exam.     Impression: No interval change yesterday's study with abnormalities as described  above.     Electronically Signed By-Hammad Ragland MD On:8/16/2021 8:18 AM  This report was finalized on [id number] by  Hammad Ragland MD.       ASSESSMENT:   Cellulitis of left lower extremity    Chronic coronary artery disease    Depression    Hearing problem    History of mitral valve replacement    Essential hypertension    Long term current use of anticoagulant therapy    Mechanical heart valve present    History of B-cell lymphoma    Elevated INR (international normalized ratio) due to prior anticoagulant medication ingestion    Mixed hyperlipidemia    Peripheral vascular disease of lower extremity with ulceration (CMS/HCC)       PLAN:  Encourage OOB daily and use of IS throughout the day   O2 support , decrease O2 as tolerated   The effusion is very small no need for further work-up  Chest toileting  Encouraged to use flutter valve  Bronchodilator  Inhaled corticosteroids  Electrolytes/ glycemic control  DVT and GI prophylaxis    Discussed with MARIZA Landers   8/16/2021  14:20 EDT     I personally have examined  and interviewed the patient. I have reviewed the history, data, problems, assessment and plan with our NP.  Critical care time in direct medical management (   ) minutes  Electronically signed by Randi Gayle MD, D,ABS, 08/16/21, 6:13 PM EDT.

## 2021-08-16 NOTE — PLAN OF CARE
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  Pt reporting very small amount of pain in L foot. Has had pain meds. Pt is confused. Able to answer all orientation questions well, though he is extremely [place]. However, has difficulty following multistep commands. Motor planning is poor, and pt needs multiple cues for mobility. He comes to sit at eob w/ min assist. In trasnferring to chair, he requires a second person to hold his LLE off of the floor to maintain nwb status, as he does not appear to understand restrictions. He required mod-max assist to transfer from bed to chair, w/ PT blocking R knee and providing multiple cues for pivot transfer. Pt has hx of multiple falls and is completely unsafe for home. Will require IP rehab at d/c.

## 2021-08-16 NOTE — PROGRESS NOTES
[facility] Daily Progress Note    Patient Name: Niko Servin  : 1929  MRN: [id number]  Primary Care Physician:  Casper Saldaña MD  Date of admission: 2021      Subjective      Chief Complaint: *Left foot pain      Niko Servin is a 89 y.o. male w/PMH of B-cell lymphoma, CAD, MVR W/mechanical valve long-term anticoagulation, depression, [place], HTN presents to [facility] ED due to left lower extremity cellulitis.  Patient is extremely hard of hearing and is a poor historian, unable to complete review of systems at this time.  Family reports patient injured his left great toe approximately 2 weeks prior but did not tell anyone.  Patient's wife reports he has fallen multiple times over the last week due to gait abnormality.  Patient's wife also reports deterioration of cognition with intermittent hallucinations.  Wife reports patient is vaccinated for Covid.              Upon arrival to the ED vitals temp 98, HR 77, RR 17, /65, O2 sat 100% on room air.  Abs notable for troponin less than 0.015, glucose 113, , K4.1, creatinine 1.25, BUN 35, GFR 54, ALT 8, AST 15, total bili 0.3, CRP 28.7, lactic 1.0, INR greater than 8, WBC 12, Hgb 9.4, platelets 302, neutrophils 87.3.  Sed rate 42.  Blood cultures drawn.  CT of the head shows no acute intracranial abnormality, no hemorrhage.  Moderate chronic age-related intracranial findings.  EKG shows sinus rhythm rate 73 multiple PVCs, ventricular and supraventricular, borderline ST depression anterolateral leads.  Patient treated in the ED with Tdap, IV Zosyn, albuterol nebulizer, IV vancomycin.  UA, chest x-ray, x-ray left foot ordered.          Patient Reports *    Patient reports persistent pain left foot  Going for surgery today  Less tachypneic than yesterday  Very hard of hearing  Dr. Saldaña sent me a message yesterday that he has also underlying dementia that has not been  fully evaluated.    Patient had persistent low hemoglobin 8.7  Has been febrile to 200.6 overnight yesterday  Tachypnea has improved  Saturation 93% on 3 L  Low magnesium 1.5  Supratherapeutic INR more than 8.  I have seen  FFP are being prepared.  We will defer vitamin K to cardiology service given mechanical the mitral valve.      8/12  Patient accompanied by family today  He does not seem to be in pain  He appears comfortable  He is going for surgery today for left great toe amputation  Discussed with Dr. Woo  Unfortunately his INR still high.  Discussed with cardiology service who recommended vitamin K only for now.  Patient was apparently seen by pulmonary and nephrology as well    8/13  Patient had amputation left great toe yesterday 8/12/2021  INR down to 1.6 and hemoglobin 8  Awaiting cultures from tissues.  Gram stain showing gram-positive cocci in clusters  Pathology from left great toe pending  .Echo shows normal ejection fraction and elevated right ventricular pressures with tricuspid regurgitation and mild pulmonary hypertension and diastolic dysfunction with moderate aortic stenosis        8/14  Patient has been afebrile  Blood pressure low normal side.  Saturation 95% on 3 L.  Renal function slightly fluctuating but not significantly changed  INR is 1.4 today  Hemoglobin down to 7.2  He is on heparin drip  He is receiving albumin by nephrologist  Tissue culture growing Staph aureus and scant growth of gram-negative bacilli pending identification  ABG shows respiratory acidosis with hypercarbia  Problems of sleep.  We will monitor.  Will need BiPAP at night.    8/15  Patient transferred to PCU due to need for BiPAP  Patient more alert today  Appears more comfortable  Hemoglobin is 8  Patient received 1 unit of RBCs yesterday  His INR 1.6    08/16/2021  No overnight events.  Patient appears confused, difficulty answering questions, wife states he has been like this since ill, suspect 2/2 long term  dementia.  Plan for arteriogram with possible intervention tomorrow.    Review of Systems   Unable to perform ROS: dementia            Objective      Vitals:   Temp:  [98 °F (36.7 °C)-98.5 °F (36.9 °C)] 98 °F (36.7 °C)  Heart Rate:  [72-78] 75  Resp:  [16-23] 18  BP: (133-173)/(61-70) 133/69  Flow (L/min):  [4-6] 4    Physical Exam  Vitals and nursing note reviewed.   Constitutional:       General: He is not in acute distress.  AAOx1-2 with poor insight     Appearance: Normal appearance. He is well-developed. He is not ill-appearing, toxic-appearing or diaphoretic.   HENT:      Head: Normocephalic and atraumatic.     Ears without abnormality.  Extremely hard of hearing.     Nose: Nose normal. No congestion or rhinorrhea.      Mouth/Throat:      Mouth: Mucous membranes are moist.      Pharynx: No oropharyngeal exudate.   Eyes:      General: No scleral icterus.        Right eye: No discharge.         Left eye: No discharge.      Extraocular Movements: Extraocular movements intact.      Conjunctiva/sclera: Conjunctivae normal.      Pupils: Pupils are equal, round, and reactive to light.   Neck:      Thyroid: No thyromegaly.      Vascular: No carotid bruit or JVD.      Trachea: No tracheal deviation.   Cardiovascular:      Rate and Rhythm: Normal rate and regular rhythm.      Pulses: Normal pulses.      Heart sounds: Normal heart sounds. No murmur heard.   No friction rub. No gallop.    Pulmonary:      Effort: Pulmonary effort is normal. No respiratory distress.      Breath sounds: Normal breath sounds. No stridor. No wheezing, rhonchi or rales.   Chest:      Chest wall: No tenderness.   Abdominal:      General: Bowel sounds are normal. There is no distension.      Palpations: Abdomen is soft. There is no mass.      Tenderness: There is no abdominal tenderness. There is no guarding or rebound.      Hernia: No hernia is present.   Musculoskeletal:         General: No swelling, tenderness, deformity or signs of injury.  Normal range of motion.      Cervical back: Normal range of motion and neck supple. No rigidity. No muscular tenderness.      Right lower leg: No edema.      Left lower leg: No edema. Pulses palpable but poor     Comments: S/p left great toe amputation.  Covered with compression dressing.  Leg edema has improved.  Lymphadenopathy:      Cervical: No cervical adenopathy.   Skin:     General: Skin is warm and dry.      Coloration: Skin is not jaundiced or pale.      Findings: No bruising, erythema or rash.   Neurological:      General: No focal deficit present.      Mental Status: AAOx1-2 with poor insight, dementia and baseline per wife   Cranial Nerves: No cranial nerve deficit.      Sensory: No sensory deficit.      Motor: No weakness or abnormal muscle tone.      Coordination: Coordination normal.   Psychiatric:      Comments: Cooperative      *       Result Review    Result Review:  I have personally reviewed the results from the time of this admission to 8/16/2021 12:09 EDT and agree with these findings:  [x]  Laboratory  [x]  Microbiology  [x]  Radiology  [x]  EKG/Telemetry   [x]  Cardiology/Vascular   [x]  Pathology  []  Old records  []  Other:  Most notable findings include: *  As above       Wounds (last 24 hours)      LDA Wound     Row Name 08/16/21 1100 08/16/21 0900 08/16/21 0412       Wound 08/10/21 0508 Left distal leg Other (comment)    Wound - Properties Group Placement Date: 08/10/21  - Placement Time: 0508 -JM Side: Left  - Orientation: distal  - Location: leg  - Primary Wound Type: Other  -JM, Cellulitis per md     Dressing Appearance  --  open to air  -AD  --    Closure  --  Surface sutures  -AD  Surface sutures  -DH    Base  --  dry;red  -AD  dry;red  -DH    Periwound  --  dry;warm;intact  -AD  dry;warm;intact  -DH    Periwound Temperature  --  warm  -AD  warm  -DH    Drainage Amount  --  none  -AD  none  -DH    Retired Wound - Properties Group Date first assessed: 08/10/21  - Time  first assessed: 0508  -JM Side: Left  -JM Location: leg  -JM Primary Wound Type: Other  -JM, Cellulitis per md        Wound 08/12/21 Left anterior great toe Incision    Wound - Properties Group Placement Date: 08/12/21  -TH Side: Left  -TH Orientation: anterior  -TH Location: great toe  -TH Primary Wound Type: Incision  -TH Additional Comments: 4x4 soaked in betadine, 4x4, kerlix, and ace  -TH    Dressing Appearance  intact;moist drainage  -AD  intact;moist drainage  -AD  --    Closure  None  -AD  None  -AD  MARLA  -DH    Drainage Amount  moderate  -AD  large;moderate  -AD  large;moderate  -DH    Care, Wound  cleansed with;wound cleanser  -AD  sterile water  -AD  --    Dressing Care  dressing applied  -AD  --  --    Retired Wound - Properties Group Date first assessed: 08/12/21  -TH Side: Left  -TH Location: great toe  -TH Primary Wound Type: Incision  -TH Additional Comments: 4x4 soaked in betadine, 4x4, kerlix, and ace  -TH       Wound 08/13/21 1113 upper thoracic spine    Wound - Properties Group Placement Date: 08/13/21  -TP Placement Time: 1113  -TP Orientation: upper  -TP Location: thoracic spine  -TP    Dressing Appearance  --  dry;intact  -AD  --    Closure  --  Adhesive bandage  -AD  Adhesive bandage  -DH    Retired Wound - Properties Group Date first assessed: 08/13/21  -TP Time first assessed: 1113  -TP Location: thoracic spine  -TP       Wound 08/13/21 1114 thoracic spine    Wound - Properties Group Placement Date: 08/13/21  -TP Placement Time: 1114  -TP Location: thoracic spine  -TP    Dressing Appearance  --  dry;intact  -AD  --    Closure  --  --  Adhesive bandage  -DH    Retired Wound - Properties Group Date first assessed: 08/13/21  -TP Time first assessed: 1114  -TP Location: thoracic spine  -TP    Row Name 08/16/21 0049 08/15/21 2030 08/15/21 1600       Wound 08/10/21 0508 Left distal leg Other (comment)    Wound - Properties Group Placement Date: 08/10/21  -JM Placement Time: 0508  -JM Side: Left   -JM Orientation: distal  -JM Location: leg  -JM Primary Wound Type: Other  -JM, Cellulitis per md     Dressing Appearance  --  open to air  -DH  --    Closure  Surface sutures  -DH  Surface sutures  -DH  Surface sutures  -NE    Base  dry;red  -DH  dry;red  -DH  dry;red  -NE    Periwound  dry;warm;intact  -DH  dry;warm;intact  -DH  dry;warm;intact  -NE    Periwound Temperature  warm  -DH  warm  -DH  warm  -NE    Drainage Amount  none  -DH  none  -DH  none  -NE    Retired Wound - Properties Group Date first assessed: 08/10/21  - Time first assessed: 0508 -JM Side: Left  - Location: leg  -JM Primary Wound Type: Other  -JM, Cellulitis per md        Wound 08/12/21 Left anterior great toe Incision    Wound - Properties Group Placement Date: 08/12/21  -TH Side: Left  -TH Orientation: anterior  -TH Location: great toe  -TH Primary Wound Type: Incision  -TH Additional Comments: 4x4 soaked in betadine, 4x4, kerlix, and ace  -TH    Dressing Appearance  --  intact;moist drainage;open to air  -DH  --    Closure  MARLA  -DH  MARLA  -DH  MARLA  -NE    Drainage Amount  large;moderate  -DH  large;moderate  -DH  large  -NE    Care, Wound  --  sterile water  -DH  --    Retired Wound - Properties Group Date first assessed: 08/12/21  -TH Side: Left  -TH Location: great toe  -TH Primary Wound Type: Incision  -TH Additional Comments: 4x4 soaked in betadine, 4x4, kerlix, and ace  -TH       Wound 08/13/21 1113 upper thoracic spine    Wound - Properties Group Placement Date: 08/13/21  -TP Placement Time: 1113 -TP Orientation: upper  -TP Location: thoracic spine  -TP    Closure  Adhesive bandage  -DH  Adhesive bandage  -DH  Adhesive bandage  -NE    Retired Wound - Properties Group Date first assessed: 08/13/21  -TP Time first assessed: 1113  -TP Location: thoracic spine  -TP       Wound 08/13/21 1114 thoracic spine    Wound - Properties Group Placement Date: 08/13/21  -TP Placement Time: 1114  -TP Location: thoracic spine  -TP    Closure   Adhesive bandage  -DH  Adhesive bandage  -DH  Adhesive bandage  -NE    Retired Wound - Properties Group Date first assessed: 08/13/21  -TP Time first assessed: 1114  -TP Location: thoracic spine  -TP      User Key  (r) = Recorded By, (t) = Taken By, (c) = Cosigned By    Initials Name Provider Type    Fatuma Antonio, RN Registered Nurse    Ca Friedman RN Registered Nurse    Rick Talley, RN Registered Nurse    Brittani Gilbert LPN Licensed Nurse    Rashard Persaud LPN Licensed Nurse    Yaa Alarcon RN Registered Nurse            Assessment/Plan      Brief Patient Summary:  Niko Servin is a 89 y.o. male who *presenting with gangrenous changes left great toe    Acetylcysteine, 1,200 mg, Oral, BID  ampicillin-sulbactam, 3 g, Intravenous, Q6H  ipratropium-albuterol, 3 mL, Nebulization, Q6H - RT  rosuvastatin, 10 mg, Oral, Nightly  sertraline, 100 mg, Oral, Daily  sodium chloride, 10 mL, Intravenous, Q12H  sodium hypochlorite, , Topical, BID  tamsulosin, 0.4 mg, Oral, Daily       heparin, 12 Units/kg/hr, Last Rate: 12 Units/kg/hr (08/16/21 0307)  sodium chloride, 60 mL/hr         Active Hospital Problems:  Active Hospital Problems    Diagnosis    • **Cellulitis of left lower extremity    • History of B-cell lymphoma    • Elevated INR (international normalized ratio) due to prior anticoagulant medication ingestion    • Mixed hyperlipidemia    • Peripheral vascular disease of lower extremity with ulceration (CMS/HCC)      Added automatically from request for surgery [id number]     • Depression    • Essential hypertension    • Mechanical heart valve present    • Hearing problem    • Chronic coronary artery disease    • Long term current use of anticoagulant therapy    • History of mitral valve replacement      Plan:      #Gangrenous changes of the left lower extremity involving the left great toe wound and foot:    -X-ray shows positive gas production but thought to be related to the wound  by Dr. Woo.    -Patient received clindamycin and vancomycin    -Currently on Unasyn .  ID following.  Likely will need 6 weeks therapy.    -8/11/2021 s/p left great toe amputation.    -Follow-up culture.  Culture growing MSSA and Proteus.    -Vascular surgery consulted as well.    -CT angiogram shows:Greater than 70% stenosis of the left popliteal artery.    - Significant trifurcation level disease bilaterally with single vessel  runoff via the peroneal artery    -Per vascular:.Left lower extremity arteriogram with possible intervention on Tuesday 8/17/2021 by Dr. Elizabeth.       #Right renal and bladder stones with bilateral hydronephrosis. There  is marked bladder distention with multiple bladder diverticula  and  prostatic enlargement suggesting bladder outlet obstruction.    - Smith catheter.    - Flomax    -Follow-up with urology    #Bacteremia with Proteus species    -Present on admission     -On Unasyn    -Possibly related to the foot infection    -ID following    #Coumadin toxicity    -Elevated INR:    -S/p vitamin K    -INR decreased.    -Heparin drip startedFor bridging    -Defer to cardiology    -Holding Coumadin for mechanical mitral valve until patient is cleared for discharge.    #Acute blood loss anemia    - 2/2 post surgical blood loss and anemia of chronic disease    -Will need transfusion if below 7.5 or symptomatic    -Parenteral iron    -s/p 1u prbc on 8/14/2021    #Mixed hyperlipidemia:    -On statin    #Essential hypertension:     -On Lasix for now    #Acute hypoxic/hypercarbic respiratory failure requiring BiPAP    -Has not been on BiPAP or CPAP at home    - use PRN    Chronic coronary artery disease:  -Cardiology is consulted  On aspirin and statin  Negative initial troponin    #Suspect CHF exacerbation    -Echo shows that diastolic dysfunction mild to moderate aortic stenosis  · Left ventricular wall thickness is consistent with mild concentric hypertrophy.  · Estimated left ventricular  EF was in agreement with the calculated left ventricular EF. Left ventricular ejection fraction appears to be 56 - 60%. Left ventricular systolic function is normal.  · Estimated right ventricular systolic pressure from tricuspid regurgitation is mildly elevated (35-45 mmHg).  · Mild pulmonary hypertension is present.  · Left ventricular diastolic function is consistent with (grade Ia w/high LAP) impaired relaxation.  · Mild to moderate aortic valve stenosis is present.    -IV Lasix as needed    -Suspect underlying interstitial lung disease based on x-ray    -Consider pulmonary evaluation if worsening respiratory distress or hypoxemia      #History of MVR/mechanical valve present/long-term use of anticoagulant therapy:    -As above    #Depression:  #Dementia    -Home medication sertraline 100 mg p.o. daily    -Possible underlying dementia per Dr. Saldaña as well    -Likely need long-term rehab placement    - suspect AAOx1-2, pleasant, with poor insight is baseline    #Hard of hearing:    -Continue to monitor     disposition          Plan  Referral made to [facility] in [place]. Pending acceptance and will need precert. No pasrr for shante Clifford may need pasrr if goes subacute         Complex case high risk    DVT prophylaxis:  Medical DVT prophylaxis orders are present.    CODE STATUS:    Code Status: CPR  Medical Interventions (Level of Support Prior to Arrest): Full      Disposition:  I expect patient to be discharged ECF in 2-3 days  This patient has been examined wearing appropriate Personal Protective Equipment and discussed with hospital infection control department. 08/16/21      Electronically signed by Alec Husain DO, 08/16/21, 12:09 EDT.  [facility] Hospitalist Team

## 2021-08-16 NOTE — THERAPY RE-EVALUATION
Patient Name: Niko Servin  : 1929    MRN: [id number]                              Today's Date: 2021       Admit Date: 2021    Visit Dx:     ICD-10-CM ICD-9-CM   1. Cellulitis of left lower extremity  L03.116 682.6   2. Supratherapeutic INR  R79.1 790.92   3. Toe gangrene (CMS/HCC)  I96 785.4   4. Peripheral vascular disease of lower extremity with ulceration (CMS/HCC)  I73.9 443.9    L97.909 707.10     Patient Active Problem List   Diagnosis   • Ataxia   • B-cell lymphoma (CMS/HCC)   • Chronic coronary artery disease   • Depression   • Diffuse large B cell lymphoma (CMS/HCC)   • Hearing problem   • History of mitral valve replacement   • Essential hypertension   • Long term current use of anticoagulant therapy   • Mechanical heart valve present   • Postural hypotension   • Protein calorie malnutrition (CMS/HCC)   • Soft tissue mass   • ST elevation (STEMI) myocardial infarction (CMS/HCC)   • Cellulitis of left lower extremity   • History of B-cell lymphoma   • Elevated INR (international normalized ratio) due to prior anticoagulant medication ingestion   • Mixed hyperlipidemia   • Peripheral vascular disease of lower extremity with ulceration (CMS/HCC)     Past Medical History:   Diagnosis Date   • Ataxia    • CAD (coronary artery disease)    • CHF (congestive heart failure) (CMS/HCC)    • Depression    • Hx of mitral valve replacement    • Long term (current) use of anticoagulants    • 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)    • Mass of soft tissue     Of left arm   • Myocardial infarction (CMS/HCC)    • Postural hypotension    • Problems with hearing    • Protein calorie malnutrition (CMS/HCC)    • Unspecified injury of head, initial encounter      Past Surgical History:   Procedure Laterality Date   • AMPUTATION DIGIT Left 2021    Procedure: AMPUTATION DIGIT Left great toe;  Surgeon: CHITO Woo DPM;  Location: [facility];  Service: Podiatry;  Laterality:  Left;   • VEIN BYPASS SURGERY       General Information     Row Name 08/16/21 1034          Physical Therapy Time and Intention    Document Type  re-evaluation  -CM     Mode of Treatment  individual therapy;physical therapy  -CM     Row Name 08/16/21 1034          General Information    Patient Profile Reviewed  yes  -CM     Prior Level of Function  independent:;all household mobility;gait;ADL's;driving has used rw for past several weeks due to foot pain on LLE  -CM     Existing Precautions/Restrictions  fall;left;non-weight bearing  -CM     Barriers to Rehab  medically complex;hearing deficit extremely [place]  -CM     Row Name 08/16/21 1034          Living Environment    Lives With  spouse  -CM     Row Name 08/16/21 1034          Home Main Entrance    Number of Stairs, Main Entrance  two  -CM     Stair Railings, Main Entrance  none  -CM     Row Name 08/16/21 1034          Stairs Within Home, Primary    Number of Stairs, Within Home, Primary  none  -CM     Row Name 08/16/21 1034          Cognition    Orientation Status (Cognition)  (S) oriented x 4;other (see comments) not able to problem solve; poor motor planning; unable to understand certain easy instructions, like using his RLE to hold his LLE off ground when chair is being moved.  -CM     Row Name 08/16/21 1034          Safety Issues, Functional Mobility    Safety Issues Affecting Function (Mobility)  ability to follow commands;at risk behavior observed;impulsivity;awareness of need for assistance;insight into deficits/self-awareness;judgment;problem-solving;safety precaution awareness;safety precautions follow-through/compliance;sequencing abilities;unable to maintain weight-bearing restrictions  -CM     Impairments Affecting Function (Mobility)  balance;cognition;endurance/activity tolerance;pain;range of motion (ROM);shortness of breath;coordination;motor planning;strength  -CM       User Key  (r) = Recorded By, (t) = Taken By, (c) = Cosigned By    Initials  Name Provider Type    Christina Hobbs, PT Physical Therapist        Mobility     Row Name 08/16/21 1037          Bed Mobility    Bed Mobility  supine-sit  -CM     Supine-Sit [place] (Bed Mobility)  minimum assist (75% patient effort);verbal cues;nonverbal cues (demo/gesture)  -CM     Assistive Device (Bed Mobility)  bed rails  -CM     Row Name 08/16/21 1037          Transfers    Comment (Transfers)  needs one person to hold LLE off of floor, as pt does not understand how to follow wb precautions (nwb); needs R knee blocked to assist w/ transfer, but pt able to bear wt well on RLE and partially extends R knee in effort to come to stand  -CM     Row Name 08/16/21 1037          Bed-Chair Transfer    Bed-Chair [place] (Transfers)  moderate assist (50% patient effort);maximum assist (25% patient effort);2 person assist  -CM     Assistive Device (Bed-Chair Transfers)  other (see comments) gt belt, non skid sock on RLE; nwb LLE  -CM     Row Name 08/16/21 1037          Sit-Stand Transfer    Sit-Stand [place] (Transfers)  maximum assist (25% patient effort);2 person assist  -CM     Assistive Device (Sit-Stand Transfers)  other (see comments) gait belt, non skid sock on RLE; LLE nwb  -CM     Row Name 08/16/21 1037          Gait/Stairs (Locomotion)    Distance in Feet (Gait)  not attempted, as pt is nwb for LLE  -CM       User Key  (r) = Recorded By, (t) = Taken By, (c) = Cosigned By    Initials Name Provider Type    Christina Hobbs, PT Physical Therapist        Obj/Interventions     Row Name 08/16/21 1048          Range of Motion Comprehensive    General Range of Motion  bilateral lower extremity ROM WFL  -CM     Row Name 08/16/21 1048          Strength Comprehensive (MMT)    General Manual Muscle Testing (MMT) Assessment  lower extremity strength deficits identified  -CM     Comment, General Manual Muscle Testing (MMT) Assessment  unable to follow directions for hip flexion in sitting; B quads 3+/5,  R DF wfl; L DF n/a due to recent sx on L foot  -CM     [facility] Name 08/16/21 1048          Motor Skills    Motor Skills  coordination  -CM     Coordination  gross motor deficit;moderate impairment;bilateral;lower extremity  -CM     Row Name 08/16/21 1048          Balance    Balance Assessment  sitting static balance;sitting dynamic balance;standing static balance  -CM     Static Sitting Balance  mild impairment;unsupported;sitting, edge of bed  -CM     Dynamic Sitting Balance  mild impairment;moderate impairment;unsupported;sitting, edge of bed  -CM     Static Standing Balance  unable to perform activity  -CM     Dynamic Standing Balance  unable to perform activity  -CM     Row Name 08/16/21 1048          Sensory Assessment (Somatosensory)    Sensory Assessment (Somatosensory)  unable/difficult to assess pt reports normal sensation but this is questionable given extent of his injuries and only reports mild pain in L foot  -CM       User Key  (r) = Recorded By, (t) = Taken By, (c) = Cosigned By    Initials Name Provider Type    Christina Hobbs, PT Physical Therapist        Goals/Plan     Row Name 08/16/21 1059          Bed Mobility Goal 1 (PT)    Progress/Outcomes (Bed Mobility Goal 1, PT)  continuing progress toward goal  -CM     Row Name 08/16/21 1059          Transfer Goal 1 (PT)    Progress/Outcome (Transfer Goal 1, PT)  medical status inhibiting progress;goal ongoing  -CM     Row Name 08/16/21 1059          Gait Training Goal 1 (PT)    Progress/Outcome (Gait Training Goal 1, PT)  medical status inhibiting progress;goal ongoing  -CM       User Key  (r) = Recorded By, (t) = Taken By, (c) = Cosigned By    Initials Name Provider Type    Christina Hobbs, PT Physical Therapist        Clinical Impression     Row Name 08/16/21 1052          Pain    Additional Documentation  Pain Scale: FACES Pre/Post-Treatment (Group)  -CM     Row Name 08/16/21 1052          Pain Scale: Numbers Pre/Post-Treatment    Pain  Intervention(s)  Repositioned;Emotional support;Ambulation/increased activity;Elevated  -CM     Row Name 08/16/21 1052          Pain Scale: FACES Pre/Post-Treatment    Pain: FACES Scale, Pretreatment  2-->hurts little bit  -CM     Posttreatment Pain Rating  2-->hurts little bit  -CM     Pain Location - Side  Left  -CM     Pain Location  foot  -CM     Row Name 08/16/21 1052          Plan of Care Review    Plan of Care Reviewed With  patient;spouse  -     Outcome Summary  89 yo male being seen for re-eval s/p L great toe amputation on 8/12/21.  Pt reporting very small amount of pain in L foot. Has had pain meds. Pt is confused. Able to answer all orientation questions well, though he is extremely [place]. However, has difficulty following multistep commands. Motor planning is poor, and pt needs multiple cues for mobility. He comes to sit at eob w/ min assist. In trasnferring to chair, he requires a second person to hold his LLE off of the floor to maintain nwb status, as he does not appear to understand restrictions. He required mod-max assist to transfer from bed to chair, w/ PT blocking R knee and providing multiple cues for pivot transfer. Pt has hx of multiple falls and is completely unsafe for home. Will require IP rehab at d/c.  -CM     Row Name 08/16/21 1052          Therapy Assessment/Plan (PT)    Patient/Family Therapy Goals Statement (PT)  wife agreeable to IP rehab  -CM     Rehab Potential (PT)  good, to achieve stated therapy goals  -CM     Criteria for Skilled Interventions Met (PT)  yes;meets criteria;skilled treatment is necessary  -CM     Predicted Duration of Therapy Intervention (PT)  until d/c  -CM     Row Name 08/16/21 1052          Vital Signs    O2 Delivery Pre Treatment  supplemental O2 4L  -CM     O2 Delivery Intra Treatment  supplemental O2  -CM     Post SpO2 (%)  95  -CM     O2 Delivery Post Treatment  supplemental O2  -CM     Recovery Time  VSS w/ rx  -CM     Row Name 08/16/21 1052           Positioning and Restraints    Pre-Treatment Position  in bed  -CM     Post Treatment Position  chair  -CM     In Chair  notified nsg;sitting;call light within reach;encouraged to call for assist;exit alarm on;with family/caregiver;legs elevated  -CM       User Key  (r) = Recorded By, (t) = Taken By, (c) = Cosigned By    Initials Name Provider Type    Christina Hobbs, PT Physical Therapist        Outcome Measures     Row Name 08/16/21 1100 08/16/21 0412       How much help from another person do you currently need...    Turning from your back to your side while in flat bed without using bedrails?  4  -CM  3  -DH    Moving from lying on back to sitting on the side of a flat bed without bedrails?  3  -CM  2  -DH    Moving to and from a bed to a chair (including a wheelchair)?  2  -CM  2  -DH    Standing up from a chair using your arms (e.g., wheelchair, bedside chair)?  2  -CM  2  -DH    Climbing 3-5 steps with a railing?  1  -CM  1  -DH    To walk in hospital room?  1  -CM  1  -DH    AM-PAC 6 Clicks Score (PT)  13  -CM  11  -DH      User Key  (r) = Recorded By, (t) = Taken By, (c) = Cosigned By    Initials Name Provider Type    Christina Hobbs, PT Physical Therapist    Ca Friedman RN Registered Nurse                       Physical Therapy Education                 Title: PT OT SLP Therapies (In Progress)     Topic: Physical Therapy (Done)     Point: Mobility training (Done)     Learning Progress Summary           Patient Acceptance, E,TB, VU by CM at 8/16/2021 1100    Acceptance, E, NR by CM1 at 8/12/2021 0210    Acceptance, E,TB, VU by EL at 8/10/2021 1636   Significant Other Acceptance, E,TB, VU by CM at 8/16/2021 1100                   Point: Precautions (Done)     Learning Progress Summary           Patient Acceptance, E,TB, VU by CM at 8/16/2021 1100    Acceptance, E, NR by CM1 at 8/12/2021 0210    Acceptance, E,TB, VU by EL at 8/10/2021 1636   Significant Other Acceptance, E,TB, VU by CM at  8/16/2021 1100                               User Key     Initials Effective Dates Name Provider Type Discipline     06/16/21 -  Christina Akbar, PT Physical Therapist PT     06/23/20 -  Shola Nunez PT Physical Therapist PT    CM 03/23/21 -  Corbin Christianson LPN Licensed Nurse Nurse              PT Recommendation and Plan     Plan of Care Reviewed With: patient, spouse  Outcome Summary: 89 yo male being seen for re-eval s/p L great toe amputation on 8/12/21.  Pt reporting very small amount of pain in L foot. Has had pain meds. Pt is confused. Able to answer all orientation questions well, though he is extremely [place]. However, has difficulty following multistep commands. Motor planning is poor, and pt needs multiple cues for mobility. He comes to sit at eob w/ min assist. In trasnferring to chair, he requires a second person to hold his LLE off of the floor to maintain nwb status, as he does not appear to understand restrictions. He required mod-max assist to transfer from bed to chair, w/ PT blocking R knee and providing multiple cues for pivot transfer. Pt has hx of multiple falls and is completely unsafe for home. Will require IP rehab at d/c.     Time Calculation:   PT Charges     Row Name 08/16/21 1101             Time Calculation    Start Time  0946  -CM      Stop Time  1022  -CM      Time Calculation (min)  36 min  -CM      PT Received On  08/16/21  -CM      PT - Next Appointment  08/17/21  -CM      PT Goal Re-Cert Due Date  08/30/21  -CM         Time Calculation- PT    Total Timed Code Minutes- PT  23 minute(s)  -CM        User Key  (r) = Recorded By, (t) = Taken By, (c) = Cosigned By    Initials Name Provider Type     Christina Akbar, PT Physical Therapist        Therapy Charges for Today     Code Description Service Date Service Provider Modifiers Qty    32789834395 HC PT RE-EVAL ESTABLISHED PLAN 2 8/16/2021 Christina Akbar, PT GP 1    [id number]  PT THERAPEUTIC ACT EA 15 MIN 8/16/2021 Naomie  Christina BANG, PT GP 2          PT G-Codes  Outcome Measure Options: AM-PAC 6 Clicks Basic Mobility (PT)  AM-PAC 6 Clicks Score (PT): 13    Christina Akbar, PT  8/16/2021

## 2021-08-16 NOTE — PROGRESS NOTES
LOS:  LOS: 5 days   Patient Name: Niko Servin  Age/Sex: 89 y.o. male  : 1929  MRN: [id number]    Day of Service: 21   Length of Stay: 5  Encounter Provider: Joni Soria MD  Patient Care Team:  Casper Saldaña MD as PCP - General  Casper Saldaña MD as PCP - Family Medicine  Cory Bermeo MD as Consulting Physician (Nephrology)    Subjective:     Chief Complaint: f/u mech valve, on anticoagulation    Subjective: Patient sitting up in bed, he remains on supplemental o2, his wife is at bedside. No acute events overnight. He is on heparin gtt. INR 1.6. has mech valves. No chest pain or SOA.    Continue Coumadin  Continue heparin bridge until INR is 2.5 or greater  Remains on antibiotics          Current Medications:   Scheduled Meds:Acetylcysteine, 1,200 mg, Oral, BID  ampicillin-sulbactam, 3 g, Intravenous, Q6H  ipratropium-albuterol, 3 mL, Nebulization, Q6H - RT  rosuvastatin, 10 mg, Oral, Nightly  sertraline, 100 mg, Oral, Daily  sodium chloride, 10 mL, Intravenous, Q12H  sodium hypochlorite, , Topical, BID  tamsulosin, 0.4 mg, Oral, Daily      Continuous Infusions:heparin, 12 Units/kg/hr, Last Rate: 12 Units/kg/hr (21 0307)  sodium chloride, 60 mL/hr        Allergies:  No Known Allergies    Review of Systems   Constitutional: Negative. Negative for fever and malaise/fatigue.   HENT: Negative for ear pain and nosebleeds.    Eyes: Negative for blurred vision and double vision.   Cardiovascular: Negative.  Negative for chest pain, dyspnea on exertion and palpitations.   Respiratory: Negative.  Negative for cough and shortness of breath.    Skin: Negative for rash.   Musculoskeletal: Negative for joint pain.   Gastrointestinal: Negative for abdominal pain, nausea and vomiting.   Neurological: Negative.  Negative for focal weakness and headaches.   Psychiatric/Behavioral: Negative for depression. The patient is not nervous/anxious.    All other systems reviewed and  are negative.      Objective:     Temp:  [98 °F (36.7 °C)-98.5 °F (36.9 °C)] 98 °F (36.7 °C)  Heart Rate:  [72-78] 75  Resp:  [16-23] 18  BP: (133-173)/(61-70) 133/69     Intake/Output Summary (Last 24 hours) at 8/16/2021 1256  Last data filed at 8/16/2021 0538  Gross per 24 hour   Intake 520 ml   Output 1150 ml   Net -630 ml     Body mass index is 26.57 kg/m².      08/14/21  2248 08/15/21  0500 08/16/21  0500   Weight: 86 kg (189 lb 9.5 oz) 86.4 kg (190 lb 7.6 oz) 86.4 kg (190 lb 7.6 oz)         Physical Exam:  General Appearance:    Alert, cooperative, in no acute distress               Neck:   supple,  no JVD   Lungs:     Supplemental O2, dim bases, some scattered rhonchi    Heart:    Regular rhythm and normal rate, normal S1 and S2,1/6 murmur noted   Abdomen:     Normal bowel sounds, soft   Extremities:   Moves all extremities well, no edema, left foot wrapped   Pulses:   Pulses palpable and equal bilaterally   Skin:   reddened LE   Neurologic:   Awake, alert, oriented x3   Unchanged from prior encounter based on face-to-face assessment      Lab Review:   Results from last 7 days   Lab Units 08/16/21  0627 08/15/21  0300 08/14/21  0343 08/14/21  0343   SODIUM mmol/L 143 146*   < > 144   POTASSIUM mmol/L 3.4* 3.8   < > 4.0   CHLORIDE mmol/L 103 106   < > 106   CO2 mmol/L 29.0 29.0   < > 29.0   BUN mg/dL 20 25*   < > 30*   CREATININE mg/dL 1.05 1.12   < > 1.26   GLUCOSE mg/dL 98 98   < > 103*   CALCIUM mg/dL 8.3 8.1*   < > 7.9*   AST (SGOT) U/L  --  17  --  16   ALT (SGPT) U/L  --  7  --  9    < > = values in this interval not displayed.     Results from last 7 days   Lab Units 08/10/21  0018   TROPONIN T ng/mL 0.015     Results from last 7 days   Lab Units 08/16/21  0627 08/15/21  0300   WBC 10*3/mm3 8.80 8.10   HEMOGLOBIN g/dL 8.6* 8.0*   HEMATOCRIT % 26.3* 25.0*   PLATELETS 10*3/mm3 290 243     Results from last 7 days   Lab Units 08/16/21  0627 08/15/21  0300   INR  1.66* 1.66*   APTT seconds 53.8* 68.3      Results from last 7 days   Lab Units 08/16/21  0627 08/15/21  0300   MAGNESIUM mg/dL 1.9 2.1           Invalid input(s): LDLCALC  Results from last 7 days   Lab Units 08/10/21  0450   PROBNP pg/mL 5,874.0*     Results from last 7 days   Lab Units 08/10/21  0450   TSH uIU/mL 1.790       Recent Radiology:  Imaging Results (Most Recent)     Procedure Component Value Units Date/Time    XR Chest 1 View [[id number]] Collected: 08/16/21 0817     Updated: 08/16/21 0820    Narrative:      DATE OF EXAM:  8/16/2021 4:56 AM     PROCEDURE:  XR CHEST 1 VW-     INDICATIONS:  Shortness of breath, hypertension, heart disease, history of B-cell  lymphoma.      COMPARISON:  8/15/2021.     TECHNIQUE:   Single radiographic view of the chest was obtained.     FINDINGS:  The heart is enlarged with postsurgical changes apparent. There is  bilateral mixed interstitial/airspace disease which has not  significantly changed. The patient has a small right and a small to  moderate left pleural effusion with compressive atelectasis.  There is  no interval change from yesterday's exam.       Impression:      No interval change yesterday's study with abnormalities as described  above.     Electronically Signed By-Hammad Ragland MD On:8/16/2021 8:18 AM  This report was finalized on [id number] by  Hammad Ragland MD.    XR Chest 1 View [[id number]] Collected: 08/15/21 1021     Updated: 08/15/21 1039    Narrative:      XR CHEST 1 VW-     Date of Exam: 8/15/2021 6:05 AM     Indication: Shortness of breath; L03.116-Cellulitis of left lower limb;  R79.1-Abnormal coagulation profile; D82-[place], not elsewhere  classified; I73.9-Peripheral vascular disease, unspecified;  L97.909-Non-pressure chronic ulcer of unspecified part of unspecified  lower leg with unspecified severity.     Comparison Exams: CT chest from 08/11/2021     Technique: Single AP chest radiograph     FINDINGS:  Median sternotomy wires appear intact. There are coarse  bilateral  interstitial markings. There are small bilateral pleural effusions. The  heart is enlarged.       Impression:      1.Coarse bilateral interstitial markings, which could reflect  interstitial pulmonary edema versus atypical pneumonia.  2.Cardiomegaly with small bilateral pleural effusions.     Electronically Signed By-Aneesh Cook MD On:8/15/2021 10:23 AM  This report was finalized on [id number] by  Aneesh Cook MD.    CT Angio Abdominal Aorta Bilateral Iliofem Runoff [[id number]] Collected: 08/12/21 1320     Updated: 08/12/21 1345    Narrative:         DATE OF EXAM:  8/12/2021 10:04 AM     PROCEDURE:  CT ANGIO ABDOMINAL AORTA BILAT ILIOFEM RUNOFF-     INDICATIONS:   Gangrenous, necrotic malodorous left great toe wound; L03.116-Cellulitis  of left lower limb; R79.1-Abnormal coagulation profile, pain and  weakness in both legs     COMPARISON:   CT of the abdomen and pelvis dated 01/01/2016     TECHNIQUE:  Routine transaxial slices were obtained through the abdomen, pelvis, and  both lower extremities after the intravenous administration of 100 mL  Isovue 370. Reconstructed coronal and sagittal images were also  obtained. In addition, a 3 D volume rendered image was obtained after  post processing. Automated exposure control and iterative reconstruction  methods were used.     FINDINGS:  Vascular findings: There is no significant aortic stenosis. There is  mild aortic plaque present. There is plaque at the origins of the SMA  and celiac axis and extensive plaque in the splenic artery and  throughout the course of the celiac axis. The degree of narrowing  appears less than 50% and celiac axis. There is calcified  atherosclerotic plaque in the right and left renal arteries without  hemodynamically significant stenosis. The JANUSZ is patent. There is  atherosclerotic plaque throughout the common and external iliac arteries  without significant stenosis.     Below the inguinal ligament on the right, the  patient has plaque in the  superficial femoral artery without significant stenosis. There is  extensive plaque throughout the popliteal artery with narrowing in  several locations approaching 50%. There is diffuse disease in the  trifurcation vessels. The dominant runoff vessel is the peroneal which  passes to the ankle. The anterior tibial appears to fill segmentally.  The posterior tibial artery is occluded.     In the left lower extremity, there is plaque throughout the superficial  femoral artery without significant stenosis there is plaque in the  popliteal artery with at least one high-grade stenosis of greater than  70% just above the knee joint. This is relatively focal in nature. There  is plaque in the tibioperoneal trunk. The dominant runoff vessel is the  peroneal artery which passes to the ankle. The anterior tibial artery  shows diffuse disease but is patent segmentally to the ankle. The  posterior tibial artery is essentially occluded.     Nonvascular findings: There are chronic interstitial fibrotic changes  and emphysematous changes present in the lung bases. There is cardiac  enlargement with postoperative changes of prior CABG. There is trace  pleural fluid bilaterally with mild by basilar atelectasis. The liver,  spleen, adrenal glands, and pancreas have a normal appearance. There are  multiple layering gallstones within the gallbladder. Nonobstructing  stones are present within the right kidney. There are calculi  dependently within the urinary bladder. There is bilateral  hydronephrosis. There is marked bladder distention with multiple bladder  diverticula. The prostate appears enlarged and slightly irregular in  appearance. No dilated loops of bowel are seen. There is extensive  sigmoid diverticulosis without diverticulitis. There is a right inguinal  hernia containing fat. There is advanced multilevel degenerative disc  and facet disease. There is diffuse osteopenia. There is  advanced  osteoarthritis of the hips and knees. There is soft tissue edema of the  left foot and apparently an open wound over the great toe of the left  foot.          Impression:         1. Diffuse atherosclerotic disease to approximately the popliteal level  bilaterally without any significant stenosis above the popliteal artery.  2. Greater than 70% stenosis of the left popliteal artery.  3. Significant trifurcation level disease bilaterally with single vessel  runoff via the peroneal artery.  4. Right renal and bladder stones with bilateral hydronephrosis. There  is marked bladder distention with multiple bladder diverticula  and  prostatic enlargement suggesting bladder outlet obstruction.  5. Multiple incidental nonvascular findings as noted above.     Electronically Signed By-Tushar Montes MD On:8/12/2021 1:32 PM  This report was finalized on [id number] by  Tushar Montes MD.    CT Chest Without Contrast Diagnostic [[id number]] Collected: 08/11/21 1931     Updated: 08/11/21 1937    Narrative:         DATE OF EXAM:  8/11/2021 5:13 PM     PROCEDURE:  CT CHEST WO CONTRAST DIAGNOSTIC-     INDICATIONS:   Shortness of breath and confusion.     COMPARISON:   12/31/2015.     TECHNIQUE:  Routine transaxial slices were obtained through the chest without the  administration of intravenous contrast. Reconstructed coronal and  sagittal images were also obtained. Automated exposure control and  iterative construction methods were used.      FINDINGS:  There are tiny layering pleural effusions. There is respiratory motion  artifact. There is mild bilateral basilar subsegmental atelectasis.  There is suggestion of some areas of interstitial thickening and some  hazy areas of groundglass density. This may be secondary to pulmonary  edema. An atypical infection is considered less likely. The heart is  enlarged. There are extensive atherosclerotic vascular calcifications  including involvement of the coronary arteries. The  patient has had a  previous CABG procedure. There are no enlarged mediastinal lymph nodes.  The hilar regions are difficult to evaluate without contrast. The  patient has dense sludge within the gallbladder. There are no acute  findings beneath the hemidiaphragms. There are no suspicious osteolytic  or sclerotic lesions within the bony structures. There are old healed  bilateral rib fractures.        Impression:         1. Tiny layering pleural effusions with mild bilateral basilar  subsegmental atelectasis.  2. The study is difficult to interpret due to respiratory motion  artifact. There are some areas of interstitial thickening and hazy  groundglass density. I would favor pulmonary edema over an atypical  infection.  3. Cardiomegaly with extensive coronary artery atherosclerotic vascular  disease.  4. Dense sludge within the gallbladder.     Electronically Signed By-Hammad Ragland MD On:8/11/2021 7:35 PM  This report was finalized on [id number] by  Hammad Ragland MD.    XR Chest 2 View [[id number]] Collected: 08/10/21 1240     Updated: 08/10/21 1245    Narrative:      DATE OF EXAM:  8/10/2021 12:20 PM     PROCEDURE:  XR CHEST 2 VW-     INDICATIONS:  SOA; L03.116-Cellulitis of left lower limb; R79.1-Abnormal coagulation  profile     COMPARISON:  A 10/20/2021 at 12:40 AM, 6/8/2016 at 5:39 PM, 6/7/2016 at 9:47 PM     TECHNIQUE:   Two radiologic views of the chest , PA and lateral were obtained.     FINDINGS:  Extensive chronic changes are again seen throughout the lungs. These  findings are again stable given differences in technique. No new  consolidations or pleural effusions are observed. The cardiac silhouette  and mediastinum are unchanged. Stable cardiomegaly is noted.  Postoperative changes are noted. No acute osseous abnormalities are  seen.       Impression:      Chronic changes are again noted which are stable. There is no definitive  evidence for acute cardiopulmonary process.     Electronically Signed  By-Aneesh Judd MD On:8/10/2021 12:43 PM  This report was finalized on [id number] by  Aneesh Judd MD.    XR Foot 3+ View Left [[id number]] Collected: 08/10/21 0743     Updated: 08/10/21 0748    Narrative:      DATE OF EXAM:  8/10/2021 12:40 AM     PROCEDURE:  XR FOOT 3+ VW LEFT-     INDICATIONS:  Prior injury to left great toe on furniture several weeks ago. Recent  discovery of injury with wound and swelling and erythema involving the  great toe.     COMPARISON:  No Comparisons Available     TECHNIQUE:   A minimum of three routine standard radiographic views were obtained of  the left foot.     FINDINGS:  Soft tissue swelling is seen throughout the great toe and extending into  the medial aspect of the forefoot/midfoot. There is suggestion of  possible gas production throughout the soft tissues. Multiple areas of  cortical erosion are identified throughout the proximal and distal  phalanges of the great toe. There is also abnormal lucency throughout  the bone. These findings indicate changes of osteomyelitis.        Impression:      1.Evidence for soft tissue swelling throughout the great toe with areas  of gas production. The findings indicate changes of soft tissue  cellulitis.  2.Evidence for osteomyelitis involving the proximal and distal phalanges  of the great toe.     Electronically Signed By-Aneesh Judd MD On:8/10/2021 7:46 AM  This report was finalized on [id number] by  Aneesh Judd MD.    XR Chest 1 View [[id number]] Collected: 08/10/21 0741     Updated: 08/10/21 0746    Narrative:         DATE OF EXAM:   8/10/2021 12:40 AM     PROCEDURE:   XR CHEST 1 VW-     INDICATIONS:   Shortness of breath. Prior Covid 19 infection. Unsteady gait.     COMPARISON:  6/8/2016 at 5:39 PM, 6/7/2016 at 9:47 PM, 12/30/2015 at 8:28 PM     TECHNIQUE:   [Portable chest radiograph]     FINDINGS:  Extensive chronic changes are again seen throughout the lungs which are  stable. No new consolidations or pleural  effusions are observed. The  cardiac silhouette and mediastinum are stable. Postoperative changes are  noted. Stable cardiomegaly is noted. No acute osseous abnormalities are  identified.       Impression:      Chronic changes are noted which are stable. There is no evidence for  definitive acute cardiopulmonary process.     Electronically Signed By-Aneesh Judd MD On:8/10/2021 7:43 AM  This report was finalized on [id number] by  Aneesh Judd MD.    CT Head Without Contrast [[id number]] Collected: 08/10/21 0148     Updated: 08/10/21 0154    Narrative:      EXAMINATION: CT HEAD WO CONTRAST    DATE: 8/10/2021 1:29 AM     INDICATION: Trauma, recent falls, anticoagulated     COMPARISON: CT head from 8/8/2016     TECHNIQUE: Thin section noncontrast axial images were obtained through the head. Coronal reformats were created.  CT dose lowering techniques were used, to include: automated exposure control, adjustment for patient size, and or use of iterative   reconstruction.     FINDINGS:     Intracranial contents:    No acute intracranial hemorrhage, evidence of acute territorial infarct, mass, mass effect or hydrocephalus. Moderate intracranial atherosclerosis and moderate chronic small vessel ischemic changes in the white matter. Moderate global cerebral volume   loss.    Bones and extracranial soft tissues:     No fracture or focal osseous lesion in the calvarium or skull base. Paranasal sinuses are clear where visualized. Mastoid air cells are clear. Orbits are unremarkable.         Impression:        1. No acute intracranial abnormality. No hemorrhage.  2. No calvarial fracture.  3. Moderate chronic age-related intracranial findings as above.         This examination was interpreted by Aneesh Ordonez M.D.    Electronically signed by:  Aneesh Ordonez M.D.    8/9/2021 11:53 PM          ECHOCARDIOGRAM:    Results for orders placed during the hospital encounter of 08/09/21    Adult Transthoracic Echo Complete  W/ Cont if Necessary Per Protocol    Interpretation Summary  · Left ventricular wall thickness is consistent with mild concentric hypertrophy.  · Estimated left ventricular EF was in agreement with the calculated left ventricular EF. Left ventricular ejection fraction appears to be 56 - 60%. Left ventricular systolic function is normal.  · Estimated right ventricular systolic pressure from tricuspid regurgitation is mildly elevated (35-45 mmHg).  · Mild pulmonary hypertension is present.  · Left ventricular diastolic function is consistent with (grade Ia w/high LAP) impaired relaxation.  · Mild to moderate aortic valve stenosis is present.        I reviewed the patient's new clinical results.    EKG:      Assessment:       Cellulitis of left lower extremity    Chronic coronary artery disease    Depression    Hearing problem    History of mitral valve replacement    Essential hypertension    Long term current use of anticoagulant therapy    Mechanical heart valve present    History of B-cell lymphoma    Elevated INR (international normalized ratio) due to prior anticoagulant medication ingestion    Mixed hyperlipidemia    Peripheral vascular disease of lower extremity with ulceration (CMS/HCC)      1. Perioperative ischemic evaluation  - 2D ECHO preserved LV and RV size and function EF 55 to 60%  -Defer ischemic evaluation presently 89 years old    2. Supratherapeutic INR  - INR remains > 8- received vitamin K  - INR 1.6, heparin bridge until INR 2.5    3. LE cellulitis  - abx per primary/ vascular     4. CAD remote history of bypass revascularization  -Asymptomatic with no anginal chest pain  -Likely no benefit to invasive or noninvasive ischemic evaluation revascularization prior to left lower extremity revascularization with threatened limb,  -Optimize medicines perioperatively avoid hypotension  -Continue perioperative aspirin statin therapy    5. 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  Further recommendations per vascular surgery on any degree of revascularization that is warranted  Start heparin bridging for INR less than 2.5 with mechanical mitral valve  Avoid FFP for any cause other than intracranial bleeding     Stable hemodynamically  No acute CV concerns today  Continue present therapies from CV standpoint  CAD clinically silent no chest pain  Continue heparin drip until Coumadin restarted with INR greater than 2.5  Postoperative anemia, transfuse for hemoglobin less than 7      Plans for angiogram on Tuesday  Defer to renal for hydration and renal protection  No CV contraindication to lower extremity arteriogram  Patient is cleared for any kind of surgery or vascularization.    Continue heparin bridge until INR is 2.5 or greater with mechanical valves, can hold anticoagulation for procedure  We will continue to follow perioperatively        Joni Soria MD  08/16/21  12:56 EDT

## 2021-08-16 NOTE — PROGRESS NOTES
"NEPHROLOGY PROGRESS NOTE------[facility]/OPT    [facility]  [phone]  Kashif Hutchins MD      Patient Care Team:  Casper Saldaña MD as PCP - General  Casper Saldaña MD as PCP - Family Medicine  ElvieCory bob MD as Consulting Physician (Nephrology)      Provider:  Kashif Hutchins MD  Patient Name: Niko Servin  :  1929    SUBJECTIVE:    F/U CRF/CKD    No major complaints. No SOB, CP, dysuria, palpitations.    Medication:  ampicillin-sulbactam, 3 g, Intravenous, Q6H  ferric gluconate, 125 mg, Intravenous, Daily  ipratropium-albuterol, 3 mL, Nebulization, Q6H - RT  rosuvastatin, 10 mg, Oral, Nightly  sertraline, 100 mg, Oral, Daily  sodium chloride, 10 mL, Intravenous, Q12H  sodium hypochlorite, , Topical, BID  tamsulosin, 0.4 mg, Oral, Daily      heparin, 12 Units/kg/hr, Last Rate: 12 Units/kg/hr (21 0307)        OBJECTIVE    Vital Sign Min/Max for last 24 hours  Temp  Min: 97.8 °F (36.6 °C)  Max: 98.5 °F (36.9 °C)   BP  Min: 133/69  Max: 173/70   Pulse  Min: 72  Max: 78   Resp  Min: 16  Max: 23   SpO2  Min: 92 %  Max: 100 %   No data recorded   Weight  Min: 86.4 kg (190 lb 7.6 oz)  Max: 86.4 kg (190 lb 7.6 oz)     Flowsheet Rows      First Filed Value   Admission Height  182.9 cm (72\") Documented at 2021   Admission Weight  90.7 kg (200 lb) Documented at 2021          No intake/output data recorded.  I/O last 3 completed shifts:  In: 970 [P.O.:120; IV Piggyback:850]  Out: 2450 [Urine:2450]    Physical Exam:  General Appearance: alert, appears stated age and cooperative. NAD. Council  Head: normocephalic, without obvious abnormality and atraumatic  Eyes: conjunctivae and sclerae normal and no icterus  Neck: supple and no JVD  Lungs: +SCATTERED RHONCHI  Heart: regular rhythm & normal rate and normal S1, S2 +FRANCOIS  Chest: Wall no abnormalities observed  Abdomen: normal bowel sounds and soft " non-tender +BELTRE  Extremities:no edema, no cyanosis and no redness. +LEFT FOOT WRAPPED +DJD  Skin: no bleeding, +ERYTHEMA IN LEFT LE  Neurologic: Alert No focal deficits    Labs:    WBC WBC   Date Value Ref Range Status   08/15/2021 8.10 3.40 - 10.80 10*3/mm3 Final   08/14/2021 9.00 3.40 - 10.80 10*3/mm3 Final   08/13/2021 10.00 3.40 - 10.80 10*3/mm3 Final      HGB Hemoglobin   Date Value Ref Range Status   08/15/2021 8.0 (L) 13.0 - 17.7 g/dL Final   08/14/2021 7.2 (L) 13.0 - 17.7 g/dL Final   08/13/2021 8.0 (L) 13.0 - 17.7 g/dL Final      HCT Hematocrit   Date Value Ref Range Status   08/15/2021 25.0 (L) 37.5 - 51.0 % Final   08/14/2021 22.1 (L) 37.5 - 51.0 % Final   08/13/2021 24.4 (L) 37.5 - 51.0 % Final      Platlets No results found for: LABPLAT   MCV MCV   Date Value Ref Range Status   08/15/2021 82.2 79.0 - 97.0 fL Final   08/14/2021 83.3 79.0 - 97.0 fL Final   08/13/2021 84.2 79.0 - 97.0 fL Final          Sodium Sodium   Date Value Ref Range Status   08/15/2021 146 (H) 136 - 145 mmol/L Final   08/14/2021 144 136 - 145 mmol/L Final      Potassium Potassium   Date Value Ref Range Status   08/15/2021 3.8 3.5 - 5.2 mmol/L Final   08/14/2021 4.0 3.5 - 5.2 mmol/L Final      Chloride Chloride   Date Value Ref Range Status   08/15/2021 106 98 - 107 mmol/L Final   08/14/2021 106 98 - 107 mmol/L Final      CO2 CO2   Date Value Ref Range Status   08/15/2021 29.0 22.0 - 29.0 mmol/L Final   08/14/2021 29.0 22.0 - 29.0 mmol/L Final      BUN BUN   Date Value Ref Range Status   08/15/2021 25 (H) 8 - 23 mg/dL Final   08/14/2021 30 (H) 8 - 23 mg/dL Final      Creatinine Creatinine   Date Value Ref Range Status   08/15/2021 1.12 0.76 - 1.27 mg/dL Final   08/14/2021 1.26 0.76 - 1.27 mg/dL Final      Calcium Calcium   Date Value Ref Range Status   08/15/2021 8.1 (L) 8.2 - 9.6 mg/dL Final   08/14/2021 7.9 (L) 8.2 - 9.6 mg/dL Final      PO4 No components found for: PO4   Albumin Albumin   Date Value Ref Range Status   08/15/2021  2.70 (L) 3.50 - 5.20 g/dL Final   08/14/2021 2.20 (L) 3.50 - 5.20 g/dL Final      Magnesium Magnesium   Date Value Ref Range Status   08/15/2021 2.1 1.7 - 2.3 mg/dL Final   08/14/2021 2.4 (H) 1.7 - 2.3 mg/dL Final     Comment:     Result checked       Uric Acid No components found for: URIC ACID     Imaging Results (Last 72 Hours)     Procedure Component Value Units Date/Time    XR Chest 1 View [[id number]] Resulted: 08/16/21 0608     Updated: 08/16/21 0610    XR Chest 1 View [[id number]] Collected: 08/15/21 1021     Updated: 08/15/21 1039    Narrative:      XR CHEST 1 VW-     Date of Exam: 8/15/2021 6:05 AM     Indication: Shortness of breath; L03.116-Cellulitis of left lower limb;  R79.1-Abnormal coagulation profile; Q12-Krugmmus, not elsewhere  classified; I73.9-Peripheral vascular disease, unspecified;  L97.909-Non-pressure chronic ulcer of unspecified part of unspecified  lower leg with unspecified severity.     Comparison Exams: CT chest from 08/11/2021     Technique: Single AP chest radiograph     FINDINGS:  Median sternotomy wires appear intact. There are coarse bilateral  interstitial markings. There are small bilateral pleural effusions. The  heart is enlarged.       Impression:      1.Coarse bilateral interstitial markings, which could reflect  interstitial pulmonary edema versus atypical pneumonia.  2.Cardiomegaly with small bilateral pleural effusions.     Electronically Signed By-Aneesh Cook MD On:8/15/2021 10:23 AM  This report was finalized on [id number] by  Aneesh Cook MD.          Results for orders placed during the hospital encounter of 08/09/21    XR Chest 1 View    Narrative  XR CHEST 1 VW-    Date of Exam: 8/15/2021 6:05 AM    Indication: Shortness of breath; L03.116-Cellulitis of left lower limb;  R79.1-Abnormal coagulation profile; G24-Rrkoneti, not elsewhere  classified; I73.9-Peripheral vascular disease, unspecified;  L97.909-Non-pressure chronic ulcer of unspecified part of  unspecified  lower leg with unspecified severity.    Comparison Exams: CT chest from 08/11/2021    Technique: Single AP chest radiograph    FINDINGS:  Median sternotomy wires appear intact. There are coarse bilateral  interstitial markings. There are small bilateral pleural effusions. The  heart is enlarged.    Impression  1.Coarse bilateral interstitial markings, which could reflect  interstitial pulmonary edema versus atypical pneumonia.  2.Cardiomegaly with small bilateral pleural effusions.    Electronically Signed By-Aneesh Cook MD On:8/15/2021 10:23 AM  This report was finalized on [id number] by  Aneesh Cook MD.      XR Chest 2 View    Narrative  DATE OF EXAM:  8/10/2021 12:20 PM    PROCEDURE:  XR CHEST 2 VW-    INDICATIONS:  SOA; L03.116-Cellulitis of left lower limb; R79.1-Abnormal coagulation  profile    COMPARISON:  A 10/20/2021 at 12:40 AM, 6/8/2016 at 5:39 PM, 6/7/2016 at 9:47 PM    TECHNIQUE:  Two radiologic views of the chest , PA and lateral were obtained.    FINDINGS:  Extensive chronic changes are again seen throughout the lungs. These  findings are again stable given differences in technique. No new  consolidations or pleural effusions are observed. The cardiac silhouette  and mediastinum are unchanged. Stable cardiomegaly is noted.  Postoperative changes are noted. No acute osseous abnormalities are  seen.    Impression  Chronic changes are again noted which are stable. There is no definitive  evidence for acute cardiopulmonary process.    Electronically Signed By-Aneesh Judd MD On:8/10/2021 12:43 PM  This report was finalized on [id number] by  Aneesh Judd MD.      XR Foot 3+ View Left    Narrative  DATE OF EXAM:  8/10/2021 12:40 AM    PROCEDURE:  XR FOOT 3+ VW LEFT-    INDICATIONS:  Prior injury to left great toe on furniture several weeks ago. Recent  discovery of injury with wound and swelling and erythema involving the  great toe.    COMPARISON:  No Comparisons  Available    TECHNIQUE:  A minimum of three routine standard radiographic views were obtained of  the left foot.    FINDINGS:  Soft tissue swelling is seen throughout the great toe and extending into  the medial aspect of the forefoot/midfoot. There is suggestion of  possible gas production throughout the soft tissues. Multiple areas of  cortical erosion are identified throughout the proximal and distal  phalanges of the great toe. There is also abnormal lucency throughout  the bone. These findings indicate changes of osteomyelitis.    Impression  1.Evidence for soft tissue swelling throughout the great toe with areas  of gas production. The findings indicate changes of soft tissue  cellulitis.  2.Evidence for osteomyelitis involving the proximal and distal phalanges  of the great toe.    Electronically Signed By-Aneesh Judd MD On:8/10/2021 7:46 AM  This report was finalized on [id number] by  Aneesh Judd MD.      Results for orders placed during the hospital encounter of 08/09/21    Duplex Vein Mapping Lower Extremity - Bilateral CAR    Interpretation Summary  · The left greater saphenous vein is patent and of adequate size in the thigh.  · The left greater saphenous vein is patent and of adequate size in the calf.        ASSESSMENT / PLAN      Cellulitis of left lower extremity    Chronic coronary artery disease    Depression    Hearing problem    History of mitral valve replacement    Essential hypertension    Long term current use of anticoagulant therapy    Mechanical heart valve present    History of B-cell lymphoma    Elevated INR (international normalized ratio) due to prior anticoagulant medication ingestion    Mixed hyperlipidemia    Peripheral vascular disease of lower extremity with ulceration (CMS/HCC)    1. CRF/CKD STG 3A-------Nonoliguric. BUN/Cr stable. Will premedicate for arteriogram tomorrow with gentle IVFs to start tonight and po Mucomyst. No NSAIDs. Dose meds for CrCl 30-60  cc/min    2. HTN WITH CKD------BP okay. Avoid hypotension. No ACE-I/ARB/DRI for now    3. BPH------On Flomax    4. HYPERLIPIDEMIA------On Statin    5. OA/DJD-----No NSAIDs    6. PVD/PAD WITH LEFT GREAT TOE GANGRENE-----S/P Amputatyion. On Abx and getting wound care. Arteriogram tomorrow    7. CAD WITH H/O MECHANICAL VALVE REPLACEMENT    8. ANEMIA WITH H/O B-CELL LYMPHOMA-----Follow for further PRBC need    9. DEPRESSION/DEMENTIA-------SSRI    10. HYPOALBUMINEMIA----S/P IV Albumin to temporize    11. HYPOCALCEMIA-----Replaced    12. HYPERNATREMIA-----Encourage increased po water intake    13. HYPOKALEMIA/HYPOPHOSPHATEMIA-------Replace po    Kashif Hutchins MD  [facility]  [phone]  08/16/21  07:30 EDT

## 2021-08-17 PROBLEM — Z51.5 HOSPICE CARE: Status: ACTIVE | Noted: 2021-01-01

## 2021-08-17 NOTE — DISCHARGE PLACEMENT REQUEST
"**Please evaluate for hospice services. Point of contact: [place] - [phone].    Erica Son[place] [facility] [place]    Office: [phone]  Cell: [phone]  Fax: [phone]  E-mail: [email or web address]**    Niko Servin (89 y.o. Male)     Date of Birth Social Security Number Address Home Phone MRN    [id number]  [id number] [place] [facility] [address] [phone] [id number]    Restoration Marital Status          Unknown        Admission Date Admission Type Admitting Provider Attending Provider Department, Room/Bed    21 Emergency Alec Husain DO Taylor, Waitman, DO [facility] CARE,     Discharge Date Discharge Disposition Discharge Destination                       Attending Provider: Alec Husain DO    Allergies: No Known Allergies    Isolation: None   Infection: None   Code Status: No CPR    Ht: 180.3 cm (71\")   Wt: 84.6 kg (186 lb 8.2 oz)    Admission Cmt: None   Principal Problem: Cellulitis of left lower extremity [L03.116]                 Active Insurance as of 2021     Primary Coverage     Payor Plan Insurance Group Employer/Plan Group    [facility] MEDICARE REPLACEMENT AARP MEDICARE COMPLETE [id number]     Payor Plan Address Payor Plan Phone Number Payor Plan Fax Number Effective Dates    [facility] [phone]  2020 - None Entered    [address]       [facility] [address]       Subscriber Name Subscriber Birth Date Member ID       NIKO SERVIN 1929 [id number]                 Emergency Contacts      (Rel.) Home Phone Work Phone Mobile Phone    DEV SERVIN (Spouse) [phone] -- --    HOMA GARAY (Daughter) -- -- [phone]    ROBERTA KENNY (Daughter) -- -- --               History & Physical      Donal, Zaire Valle MD at 08/10/21 0254          Forced surgery.      [facility] Medicine Services      Patient Name: Niko Servin  : " 5/6/1929  MRN: [id number]  Primary Care Physician:  Casper Saldaña MD  Date of admission: 8/9/2021      Subjective      Chief Complaint: left foot edema    History of Present Illness:  Niko Servin is a 89 y.o. male w/PMH of B-cell lymphoma, CAD, MVR W/mechanical valve long-term anticoagulation, depression, [place], HTN presents to [facility] ED due to left lower extremity cellulitis.  Patient is extremely hard of hearing and is a poor historian, unable to complete review of systems at this time.  Family reports patient injured his left great toe approximately 2 weeks prior but did not tell anyone.  Patient's wife reports he has fallen multiple times over the last week due to gait abnormality.  Patient's wife also reports deterioration of cognition with intermittent hallucinations.  Wife reports patient is vaccinated for Covid.          Upon arrival to the ED vitals temp 98, HR 77, RR 17, /65, O2 sat 100% on room air.  Abs notable for troponin less than 0.015, glucose 113, , K4.1, creatinine 1.25, BUN 35, GFR 54, ALT 8, AST 15, total bili 0.3, CRP 28.7, lactic 1.0, INR greater than 8, WBC 12, Hgb 9.4, platelets 302, neutrophils 87.3.  Sed rate 42.  Blood cultures drawn.  CT of the head shows no acute intracranial abnormality, no hemorrhage.  Moderate chronic age-related intracranial findings.  EKG shows sinus rhythm rate 73 multiple PVCs, ventricular and supraventricular, borderline ST depression anterolateral leads.  Patient treated in the ED with Tdap, IV Zosyn, albuterol nebulizer, IV vancomycin.  UA, chest x-ray, x-ray left foot ordered.      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    Social History:  reports that he has quit smoking. He has never used smokeless tobacco. He reports current alcohol use of about 5.0 standard drinks of alcohol per week. He reports that he does not use drugs.    Home Medications:  Prior to Admission Medications     Prescriptions Last Dose Informant Patient Reported? Taking?    aspirin 325 MG tablet   Yes No    Take 81 mg by mouth Daily.    lisinopril-hydrochlorothiazide (PRINZIDE,ZESTORETIC) 20-12.5 MG per tablet   No No    Take 1 tablet by mouth Daily.    risperiDONE (RisperDAL) 1 MG tablet   No No    Take 1 tablet by mouth Daily With Dinner.    rosuvastatin (CRESTOR) 10 MG tablet   No No    TAKE ONE TABLET BY MOUTH EVERY NIGHT AT BEDTIME    sertraline (ZOLOFT) 100 MG tablet   No No    TAKE ONE TABLET BY MOUTH DAILY    warfarin (COUMADIN) 5 MG tablet   No No    TAKE TWO TABLETS BY MOUTH DAILY AT 5PM    warfarin (COUMADIN) 7.5 MG tablet   No No    TAKE ONE TABLET BY MOUTH DAILY            Allergies:  No Known Allergies    Objective      Vitals:   Temp:  [98 °F (36.7 °C)-99.4 °F (37.4 °C)] 98.9 °F (37.2 °C)  Heart Rate:  [76-89] 89  Resp:  [17-30] 26  BP: (113-169)/(45-75) 169/75    Physical Exam  Vitals and nursing note reviewed.   Constitutional:       Appearance: He is ill-appearing.   HENT:      Head: Atraumatic.      Nose: Congestion present.      Mouth/Throat:      Mouth: Mucous membranes are dry.   Eyes:      General: No scleral icterus.  Cardiovascular:      Rate and Rhythm: Normal  rate and regular rhythm.      Comments: Mechanical valve click  Pulmonary:      Effort: Pulmonary effort is normal.      Breath sounds: Normal air entry.   Abdominal:      General: Bowel sounds are normal.      Palpations: Abdomen is soft.      Tenderness: There is no abdominal tenderness. There is no guarding.   Musculoskeletal:      Right lower le+ Edema present.      Left lower leg: 3+ Edema present.   Skin:     Coloration: Skin is sallow.      Findings: Bruising, ecchymosis and signs of injury present.             Comments: Bilateral PVD   Neurological:      Mental Status: He is disoriented.      Motor: Weakness present.      Coordination: Coordination abnormal.      Gait: Gait abnormal.   Psychiatric:         Cognition and Memory: Cognition is impaired. Memory is impaired.      Comments: Family reports recent  hallucinations          Result Review    Result Review:  I have personally reviewed the results from the time of this admission to 8/10/2021 06:57 EDT and agree with these findings:  [x]  Laboratory  [x]  Microbiology  [x]  Radiology  [x]  EKG/Telemetry   [x]  Cardiology/Vascular   []  Pathology  []  Old records  []  Other:        Assessment/Plan        Active Hospital Problems:  Active Hospital Problems    Diagnosis    • **Cellulitis of left lower extremity    • Elevated INR (international normalized ratio) due to prior anticoagulant medication ingestion    • Mixed hyperlipidemia    • Essential hypertension    • Chronic coronary artery disease    • History of mitral valve replacement    • History of B-cell lymphoma    • Depression    • Mechanical heart valve present    • Hearing problem    • Long term current use of anticoagulant therapy        Cellulitis of left lower extremity:  -Wife reports multiple falls over the last week due to gait abnormality  -Left great toe with foul sweet smelling odor, potential gangrene  -Stat x-ray left foot 3 view ordered  -Lactic now and every 6 hours x 2 to monitor  for potential sepsis  -In the ED patient given IV Zosyn, IV vancomycin, Tdap  -2 g IV Rocephin, pharmacy consulted to dose IV vancomycin  -Consult podiatry for management  - consult for discharge planning  -Wound care consult  -Fall precautions  -Electrolyte protocol  -O2 as needed to keep sats greater than 90%    Elevated INR:  -INR greater than 8 upon arrival to the ED  -1 unit FFP given in the ED  -Monitor INR    Mixed hyperlipidemia:  -Encourage lifestyle modifications  -Encourage dietary modifications  -Continue home medication rosuvastatin 10 mg p.o. nightly    Essential hypertension:  -No available home medications at this time      Chronic coronary artery disease:  -Home medication ASA 81 mg p.o. daily, hold due to elevated INR  -EKG shows sinus rhythm rate 73 multiple PVCs, ventricular and supraventricular, borderline ST depression anterolateral leads    History of MVR/mechanical valve present/long-term use of anticoagulant therapy:  -Pharmacy to dose Coumadin during admission.  Target are 2.5-3.5.  Hold anticoagulants for surgery    Depression:  -Home medication sertraline 100 mg p.o. daily    Hard of hearing:  -Continue to monitor          DVT prophylaxis:  Medical DVT prophylaxis orders are present.    CODE STATUS:    Code Status: CPR  Medical Interventions (Level of Support Prior to Arrest): Full    Admission Status:  I believe this patient meets observation status.      I discussed the patient's findings and my recommendations with the patient.        This patient has been interviewed wearing appropriate personal protective equipment and findings discussed with the ED physician.    Signature: Electronically signed by MARIZA Peter, 08/10/21, 7:04 AM EDT.      Hospitalist Physician Assessment/Plan    History:*  Co of pain lt foot  drenies cp  soa    Exam:**  Gangrenous foul smelling lt fooot  tachpnic  bilat basal rales      Medical Decision Making:*  Add clinda  See orders  cxr  Iv  lasix  Surgery per dr Amna dunne      Attending Physician Attestation    For this patient encounter, I have reviewed the mid-level provider documentation, medical decision making and treatment plan, and I have personally spent time with the patient.  All procedures were done by me, and/or all procedures were performe*d by the mid-level under my supervision.    Electronically signed by Zaire Mansfield MD at 08/10/21 2437       {Outbreak/Travel/Exposure Documentation......;  Question Available Choices Patient Response   COVID-19 Outbreak Screen:  Do you currently have a new onset of the following symptoms?        Fever/Chills, Cough, Shortness of air, Loss of taste or smell, No, Unknown  No (08/09/21 2155)   COVID-19 Outbreak Screen: In the last 14 days, have you had contact with anyone who is ill, has show any of the symptoms listed above and/or has been diagnosis with the 2019 Novel Coronavirus? This includes any immediate household members but excludes any patients with whom you have been in contact within your normal work duties wearing proper PPE, if you are a healthcare worker.  Yes, No, Unknown              No (08/09/21 2155)   COVID-19 Outbreak Screen: Who was notified? Free text (not recorded)   Ebola Screening Outbreak Screen: Have you traveled to the [place] or [place] within the past 21 days?  Yes, No, Unknown (not recorded)   Ebola Screening Outbreak Screen: Do you have ANY of the following symptoms: Fever/Chills, Vomiting, Diarrhea, Fatigue, Headache, Muscle pain, Unexplained bleeding, Abdominal (stomach) pain, No, Unknown (not recorded)   Ebola Screening Outbreak Screen: Name of Person notified Free text (not recorded)   Travel Screen: Have you traveled in the last month? If so, to what country have you traveled? If [place] what state? Yes, No, Unknown  List of all countries  List of all States No (08/09/21 2155)  (not recorded)  (not recorded)   Infection  Risk: Do you currently have the following symptoms?  (If cough is selected, the Tuberculosis Screen is performed.) Cough, Fever, Rash, No No (08/09/21 2155)   Tuberculosis Screen: Do you have any of the following Tuberculosis Risks?  · Have you lived or spent time with anyone who had or may have TB?  · Have you lived in or visited any of the following areas for more than one month: [place], [place], [place], [place] or [place], [place] or [place]?  · Do you have HIV/AIDS?  · Have you lived in or worked in a nursing home, homeless shelter, correctional facility, or substance abuse treatment facility?   · No    If Yes do you have any of the following symptoms? Yes responses display to the right    If Yes, symptoms listed are:  Cough greater than or equal to 3 weeks, Loss of appetite, Unexplained weight loss, Night sweats, Bloody sputum or hemoptysis, Hoarseness, Fever, Fatigue, Chest pain, No (not recorded)  (not recorded)   Exposure Screen: Have you been exposed to any of these contagious diseases in the last month? Measles, Chickenpox, Meningitis, Pertussis, Whooping Cough, No No (08/09/21 2155)         Current Facility-Administered Medications   Medication Dose Route Frequency Provider Last Rate Last Admin   • acetaminophen (TYLENOL) tablet 650 mg  650 mg Oral Q4H PRN CHITO Woo DPM   650 mg at 08/12/21 2020    Or   • acetaminophen (TYLENOL) 160 MG/5ML solution 650 mg  650 mg Oral Q4H PRN CHITO Woo DPM        Or   • acetaminophen (TYLENOL) suppository 650 mg  650 mg Rectal Q4H PRN CHITO Woo DPM       • Acetylcysteine capsule 1,200 mg  1,200 mg Oral BID Felicitas Page APRN   1,200 mg at 08/17/21 0914   • aluminum-magnesium hydroxide-simethicone (MAALOX MAX) 400-400-40 MG/5ML suspension 15 mL  15 mL Oral Q6H PRN CHITO Woo DPM       • ampicillin-sulbactam (UNASYN) 3 g in sodium chloride 0.9 % 100 mL IVPB-MBP  3 g Intravenous Q6H CHITO Woo DPM   3 g at 08/17/21  0914   • budesonide (PULMICORT) nebulizer solution 0.5 mg  0.5 mg Nebulization BID - RT Felicitas Page APRN   0.5 mg at 08/17/21 0739   • haloperidol lactate (HALDOL) injection 1 mg  1 mg Intravenous Q6H PRN CHITO Woo DPM   1 mg at 08/17/21 1128   • heparin 01662 units/250 mL (100 units/mL) in 0.45 % NaCl infusion  12 Units/kg/hr Intravenous Titrated Casper Laura MD 9.68 mL/hr at 08/17/21 0908 12 Units/kg/hr at 08/17/21 0908   • heparin bolus from bag 2,400 Units  30 Units/kg Intravenous Q6H PRN Casper Laura MD   2,400 Units at 08/17/21 0906   • heparin bolus from bag 4,800 Units  60 Units/kg Intravenous Q6H PRN Casper Laura MD       • ipratropium-albuterol (DUO-NEB) nebulizer solution 3 mL  3 mL Nebulization Q6H - RT CHITO Woo DPM   3 mL at 08/17/21 1204   • Magnesium Sulfate 2 gram Bolus, followed by 8 gram infusion (total Mg dose 10 grams)- Mg less than or equal to 1mg/dL  2 g Intravenous PRN CHITO Woo DPM        Or   • Magnesium Sulfate 2 gram / 50mL Infusion (GIVE X 3 BAGS TO EQUAL 6GM TOTAL DOSE) - Mg 1.1 - 1.5 mg/dl  2 g Intravenous PRN CHITO Woo DPM        Or   • Magnesium Sulfate 4 gram infusion- Mg 1.6-1.9 mg/dL  4 g Intravenous PRN CHITO Woo DPM 25 mL/hr at 08/13/21 0818 4 g at 08/13/21 0818   • melatonin tablet 5 mg  5 mg Oral Nightly PRN CHITO Woo DPM       • nitroglycerin (NITROSTAT) SL tablet 0.4 mg  0.4 mg Sublingual Q5 Min PRN CHITO Woo DPM       • ondansetron (ZOFRAN) tablet 4 mg  4 mg Oral Q6H PRN CHITO Woo DPM        Or   • ondansetron (ZOFRAN) injection 4 mg  4 mg Intravenous Q6H PRN CHITO Woo DPM       • potassium chloride (K-DUR,KLOR-CON) CR tablet 40 mEq  40 mEq Oral PRN CHITO Woo DPM       • potassium chloride 10 mEq in 100 mL IVPB  10 mEq Intravenous Q1H PRN CHITO Woo DPM       • potassium phosphate 45 mmol in sodium chloride 0.9 % 500 mL infusion  45 mmol  Intravenous PRN CHITO Woo DPM        Or   • potassium phosphate 30 mmol in sodium chloride 0.9 % 250 mL infusion  30 mmol Intravenous PRN CHITO Woo DPM        Or   • potassium phosphate 15 mmol in 0.9% sodium chloride 100 mL IVPB  15 mmol Intravenous PRN CHITO Woo DPM        Or   • sodium phosphates 45 mmol in sodium chloride 0.9 % 500 mL IVPB  45 mmol Intravenous PRN CHITO Woo DPM        Or   • sodium phosphates 30 mmol in sodium chloride 0.9 % 250 mL IVPB  30 mmol Intravenous PRN CHITO Woo DPM        Or   • sodium phosphates 15 mmol in sodium chloride 0.9 % 250 mL IVPB  15 mmol Intravenous PRN CHITO Woo DPM       • QUEtiapine (SEROquel) tablet 25 mg  25 mg Oral Q12H Alec Husain DO   25 mg at 21 1019   • rosuvastatin (CRESTOR) tablet 10 mg  10 mg Oral Nightly CHITO Woo DPM   10 mg at 219   • sertraline (ZOLOFT) tablet 100 mg  100 mg Oral Daily CHITO Woo DPM   100 mg at 21 0914   • sodium chloride 0.9 % flush 10 mL  10 mL Intravenous PRN CHITO Woo DPM       • sodium chloride 0.9 % flush 10 mL  10 mL Intravenous Q12H CHITO Woo DPM   10 mL at 21 0914   • sodium chloride 0.9 % flush 10 mL  10 mL Intravenous PRN CHITO Woo DPM   10 mL at 08/15/21 0409   • sodium chloride 0.9 % infusion  60 mL/hr Intravenous Continuous Princess Hutchins MD 60 mL/hr at 21 2357 60 mL/hr at 21 2357   • sodium hypochlorite (DAKIN'S 1/4 STRENGTH) 0.125 % topical solution 0.125% solution   Topical BID CHITO Woo DPM   Given at 21 1020   • tamsulosin (FLOMAX) 24 hr capsule 0.4 mg  0.4 mg Oral Daily Zaire Mansfield MD   0.4 mg at 21 0914        Physician Progress Notes (most recent note)      Felicitas Page, APRN at 21 1405          PULMONARY CRITICAL CARE Progress  NOTE      PATIENT IDENTIFICATION:  Name: Niko Servin  MRN: [id number]  :  1929    "  Age: 89 y.o.  Sex: male    DATE OF Note:  2021   Referring Physician: Alec Husain DO                  Subjective:   Alert with confusion, incotninenet,  No SOB, no chest or abd pain, no bowel or bladder issues reported, patient yelling out at times and reaching out in air, pulling at oxygen mask       Objective:  tMax 24 hrs: Temp (24hrs), Av °F (36.7 °C), Min:97.7 °F (36.5 °C), Max:98.1 °F (36.7 °C)      Vitals Ranges:   Temp:  [97.7 °F (36.5 °C)-98.1 °F (36.7 °C)] 98 °F (36.7 °C)  Heart Rate:  [73-83] 78  Resp:  [18-20] 18  BP: (135-156)/(50-66) 135/55    Intake and Output Last 3 Shifts:   I/O last 3 completed shifts:  In: 1596 [P.O.:600; I.V.:596; IV Piggyback:400]  Out: 1650 [Urine:1650]    Exam:  /55   Pulse 78   Temp 98 °F (36.7 °C) (Oral)   Resp 18   Ht 180.3 cm (71\")   Wt 84.6 kg (186 lb 8.2 oz) Comment: no extra linen on the bed  SpO2 99%   BMI 26.01 kg/m²     General Appearance:   Alert  HEENT:  Normocephalic, without obvious abnormality, Conjunctiva/corneas clear,.  Normal external ear canals, Nares normal, no drainage     Neck:  Supple, symmetrical, trachea midline. No JVD.  Lungs /Chest wall:   Bilateral basal rhonchi, respirations unlabored symmetrical wall movement.     Heart:  Regular rate and rhythm, systolic murmur PMI left sternal border  Abdomen: Soft, non-tender, no masses, no organomegaly.    Extremities: Trace edema no clubbing or Cyanosis        Medications:    Current Facility-Administered Medications:   •  acetaminophen (TYLENOL) tablet 650 mg, 650 mg, Oral, Q4H PRN, 650 mg at 21 **OR** acetaminophen (TYLENOL) 160 MG/5ML solution 650 mg, 650 mg, Oral, Q4H PRN **OR** acetaminophen (TYLENOL) suppository 650 mg, 650 mg, Rectal, Q4H PRN, CHITO Woo, JELANI  •  Acetylcysteine capsule 1,200 mg, 1,200 mg, Oral, BID, Felicitas Page, MARIZA, 1,200 mg at 21 0914  •  aluminum-magnesium hydroxide-simethicone (MAALOX MAX) 400-400-40 MG/5ML suspension 15 mL, " 15 mL, Oral, Q6H PRN, CHITO Woo DPM  •  ampicillin-sulbactam (UNASYN) 3 g in sodium chloride 0.9 % 100 mL IVPB-MBP, 3 g, Intravenous, Q6H, CHITO Woo DPM, 3 g at 08/17/21 0914  •  budesonide (PULMICORT) nebulizer solution 0.5 mg, 0.5 mg, Nebulization, BID - RT, Felicitas Page APRN, 0.5 mg at 08/17/21 0739  •  haloperidol lactate (HALDOL) injection 1 mg, 1 mg, Intravenous, Q6H PRN, CHITO Woo DPM, 1 mg at 08/17/21 1128  •  heparin 97731 units/250 mL (100 units/mL) in 0.45 % NaCl infusion, 12 Units/kg/hr, Intravenous, Titrated, Casper Laura MD, Last Rate: 9.68 mL/hr at 08/17/21 0908, 12 Units/kg/hr at 08/17/21 0908  •  heparin bolus from bag 2,400 Units, 30 Units/kg, Intravenous, Q6H PRN, Casper Laura MD, 2,400 Units at 08/17/21 0906  •  heparin bolus from bag 4,800 Units, 60 Units/kg, Intravenous, Q6H PRN, Casper Laura MD  •  ipratropium-albuterol (DUO-NEB) nebulizer solution 3 mL, 3 mL, Nebulization, Q6H - RT, CHITO Woo DPM, 3 mL at 08/17/21 1204  •  Magnesium Sulfate 2 gram Bolus, followed by 8 gram infusion (total Mg dose 10 grams)- Mg less than or equal to 1mg/dL, 2 g, Intravenous, PRN **OR** Magnesium Sulfate 2 gram / 50mL Infusion (GIVE X 3 BAGS TO EQUAL 6GM TOTAL DOSE) - Mg 1.1 - 1.5 mg/dl, 2 g, Intravenous, PRN **OR** Magnesium Sulfate 4 gram infusion- Mg 1.6-1.9 mg/dL, 4 g, Intravenous, PRN, CHITO Woo DPM, Last Rate: 25 mL/hr at 08/13/21 0818, 4 g at 08/13/21 0818  •  melatonin tablet 5 mg, 5 mg, Oral, Nightly PRN, CHITO Woo DPM  •  nitroglycerin (NITROSTAT) SL tablet 0.4 mg, 0.4 mg, Sublingual, Q5 Min PRN, CHITO Woo DPM  •  ondansetron (ZOFRAN) tablet 4 mg, 4 mg, Oral, Q6H PRN **OR** ondansetron (ZOFRAN) injection 4 mg, 4 mg, Intravenous, Q6H PRN, CHITO Woo DPM  •  potassium chloride (K-DUR,KLOR-CON) CR tablet 40 mEq, 40 mEq, Oral, PRN, CHITO Woo DPM  •  potassium chloride 10 mEq in 100 mL IVPB,  10 mEq, Intravenous, Q1H PRN, CHITO Woo DPM  •  potassium phosphate 45 mmol in sodium chloride 0.9 % 500 mL infusion, 45 mmol, Intravenous, PRN **OR** potassium phosphate 30 mmol in sodium chloride 0.9 % 250 mL infusion, 30 mmol, Intravenous, PRN **OR** potassium phosphate 15 mmol in 0.9% sodium chloride 100 mL IVPB, 15 mmol, Intravenous, PRN **OR** sodium phosphates 45 mmol in sodium chloride 0.9 % 500 mL IVPB, 45 mmol, Intravenous, PRN **OR** sodium phosphates 30 mmol in sodium chloride 0.9 % 250 mL IVPB, 30 mmol, Intravenous, PRN **OR** sodium phosphates 15 mmol in sodium chloride 0.9 % 250 mL IVPB, 15 mmol, Intravenous, PRN, CHITO Woo DPM  •  QUEtiapine (SEROquel) tablet 25 mg, 25 mg, Oral, Q12H, Alec Husain, , 25 mg at 08/17/21 1019  •  rosuvastatin (CRESTOR) tablet 10 mg, 10 mg, Oral, Nightly, CHITO Woo DPM, 10 mg at 08/16/21 2029  •  sertraline (ZOLOFT) tablet 100 mg, 100 mg, Oral, Daily, CHITO Woo DPM, 100 mg at 08/17/21 0914  •  sodium chloride 0.9 % flush 10 mL, 10 mL, Intravenous, PRN, CHITO Woo DPM  •  sodium chloride 0.9 % flush 10 mL, 10 mL, Intravenous, Q12H, CHITO Woo DPM, 10 mL at 08/17/21 0914  •  sodium chloride 0.9 % flush 10 mL, 10 mL, Intravenous, PRN, CHITO Woo DPM, 10 mL at 08/15/21 0409  •  sodium chloride 0.9 % infusion, 60 mL/hr, Intravenous, Continuous, Princess Hutchins MD, Last Rate: 60 mL/hr at 08/16/21 2357, 60 mL/hr at 08/16/21 2357  •  sodium hypochlorite (DAKIN'S 1/4 STRENGTH) 0.125 % topical solution 0.125% solution, , Topical, BID, CHITO Woo DPM, Given at 08/17/21 1020  •  tamsulosin (FLOMAX) 24 hr capsule 0.4 mg, 0.4 mg, Oral, Daily, Zaire Mansfield MD, 0.4 mg at 08/17/21 0914    Data Review:  All labs (24hrs):   Recent Results (from the past 24 hour(s))   aPTT    Collection Time: 08/16/21  2:12 PM    Specimen: Blood   Result Value Ref Range    PTT 86.3 (H) 61.0 - 76.5 seconds    aPTT    Collection Time: 08/16/21 10:00 PM    Specimen: Blood   Result Value Ref Range    PTT 55.1 (L) 61.0 - 76.5 seconds   CBC Auto Differential    Collection Time: 08/16/21 10:00 PM    Specimen: Blood   Result Value Ref Range    WBC 8.40 3.40 - 10.80 10*3/mm3    RBC 3.34 (L) 4.14 - 5.80 10*6/mm3    Hemoglobin 8.7 (L) 13.0 - 17.7 g/dL    Hematocrit 27.4 (L) 37.5 - 51.0 %    MCV 82.1 79.0 - 97.0 fL    MCH 26.1 (L) 26.6 - 33.0 pg    MCHC 31.7 31.5 - 35.7 g/dL    RDW 17.5 (H) 12.3 - 15.4 %    RDW-SD 51.2 37.0 - 54.0 fl    MPV 8.3 6.0 - 12.0 fL    Platelets 308 140 - 450 10*3/mm3    Neutrophil % 90.1 (H) 42.7 - 76.0 %    Lymphocyte % 3.8 (L) 19.6 - 45.3 %    Monocyte % 4.8 (L) 5.0 - 12.0 %    Eosinophil % 0.8 0.3 - 6.2 %    Basophil % 0.5 0.0 - 1.5 %    Neutrophils, Absolute 7.60 (H) 1.70 - 7.00 10*3/mm3    Lymphocytes, Absolute 0.30 (L) 0.70 - 3.10 10*3/mm3    Monocytes, Absolute 0.40 0.10 - 0.90 10*3/mm3    Eosinophils, Absolute 0.10 0.00 - 0.40 10*3/mm3    Basophils, Absolute 0.00 0.00 - 0.20 10*3/mm3    nRBC 0.0 0.0 - 0.2 /100 WBC   Gold Top - SST    Collection Time: 08/16/21 10:00 PM   Result Value Ref Range    Extra Tube Hold for add-ons.    Protime-INR    Collection Time: 08/17/21  6:42 AM    Specimen: Blood   Result Value Ref Range    Protime 19.8 19.4 - 28.5 Seconds    INR 1.87 (L) 2.00 - 3.00   Magnesium    Collection Time: 08/17/21  6:42 AM    Specimen: Blood   Result Value Ref Range    Magnesium 1.8 1.7 - 2.3 mg/dL   CBC (No Diff)    Collection Time: 08/17/21  6:42 AM    Specimen: Blood   Result Value Ref Range    WBC 7.80 3.40 - 10.80 10*3/mm3    RBC 3.33 (L) 4.14 - 5.80 10*6/mm3    Hemoglobin 8.7 (L) 13.0 - 17.7 g/dL    Hematocrit 27.6 (L) 37.5 - 51.0 %    MCV 83.0 79.0 - 97.0 fL    MCH 26.1 (L) 26.6 - 33.0 pg    MCHC 31.5 31.5 - 35.7 g/dL    RDW 17.9 (H) 12.3 - 15.4 %    RDW-SD 52.5 37.0 - 54.0 fl    MPV 8.5 6.0 - 12.0 fL    Platelets 304 140 - 450 10*3/mm3   Basic Metabolic Panel    Collection Time:  08/17/21  6:42 AM    Specimen: Blood   Result Value Ref Range    Glucose 79 65 - 99 mg/dL    BUN 18 8 - 23 mg/dL    Creatinine 0.94 0.76 - 1.27 mg/dL    Sodium 146 (H) 136 - 145 mmol/L    Potassium 3.1 (L) 3.5 - 5.2 mmol/L    Chloride 104 98 - 107 mmol/L    CO2 31.0 (H) 22.0 - 29.0 mmol/L    Calcium 8.4 8.2 - 9.6 mg/dL    eGFR Non African Amer 75 >60 mL/min/1.73    BUN/Creatinine Ratio 19.1 7.0 - 25.0    Anion Gap 11.0 5.0 - 15.0 mmol/L   Phosphorus    Collection Time: 08/17/21  6:42 AM    Specimen: Blood   Result Value Ref Range    Phosphorus 2.3 (L) 2.5 - 4.5 mg/dL   aPTT    Collection Time: 08/17/21  6:42 AM    Specimen: Blood   Result Value Ref Range    PTT 54.1 (L) 61.0 - 76.5 seconds   TSH    Collection Time: 08/17/21  6:42 AM    Specimen: Blood   Result Value Ref Range    TSH 2.830 0.270 - 4.200 uIU/mL   Ammonia    Collection Time: 08/17/21  8:24 AM    Specimen: Blood   Result Value Ref Range    Ammonia 10 (L) 16 - 60 umol/L        Imaging:  XR Chest 1 View  Narrative: DATE OF EXAM:  8/16/2021 4:56 AM     PROCEDURE:  XR CHEST 1 VW-     INDICATIONS:  Shortness of breath, hypertension, heart disease, history of B-cell  lymphoma.      COMPARISON:  8/15/2021.     TECHNIQUE:   Single radiographic view of the chest was obtained.     FINDINGS:  The heart is enlarged with postsurgical changes apparent. There is  bilateral mixed interstitial/airspace disease which has not  significantly changed. The patient has a small right and a small to  moderate left pleural effusion with compressive atelectasis.  There is  no interval change from yesterday's exam.     Impression: No interval change yesterday's study with abnormalities as described  above.     Electronically Signed By-Hammad Ragland MD On:8/16/2021 8:18 AM  This report was finalized on [id number] by  Hammad Ragland MD.       ASSESSMENT:   Cellulitis of left lower extremity    Chronic coronary artery disease    Depression    Hearing problem    History of mitral valve  replacement    Essential hypertension    Long term current use of anticoagulant therapy    Mechanical heart valve present    History of B-cell lymphoma    Elevated INR (international normalized ratio) due to prior anticoagulant medication ingestion    Mixed hyperlipidemia    Peripheral vascular disease/ischemia  of lower extremity with ulceration s/p toe amputation       PLAN:  Consult palliative care  Patient is now a DNR/DNI  O2 support , decrease O2 as tolerated   The effusion is very small no need for further work-up  Not a candidate for surgery per vascular   Encouraged to use flutter valve  Bronchodilator  Inhaled corticosteroids  Electrolytes/ glycemic control  DVT and GI prophylaxis  Prognosis is poor     Discussed with MARIZA Landers   2021  14:05 EDT         Electronically signed by Felicitas Page APRN at 21 1407          Consult Notes (most recent note)      Becca Kirk APRN at 21 0908      Consult Orders    1. Inpatient Palliative Care Nurse Consult [[id number]] ordered by Alec Husain DO at 21 0935               Palliative Care Consultation    Patient Name: Niko Servin  : 1929  MRN: [id number]  Allergies: Patient has no known allergies.    Requesting clinician:  Lisha  Reason for consult: Consultation for clarification of goals of care and code status.      Patient Code Status:   Code Status and Medical Interventions:   Ordered at: 21 0996     Limited Support to NOT Include:    Intubation     Level Of Support Discussed With:    Health Care Surrogate     Code Status:    No CPR     Medical Interventions (Level of Support Prior to Arrest):    Limited           Chief Complaint:    Left great toe injury    History of Present Illness    Niko Servin is a 89 y.o. male who presented to [facility] ED on  with reports of a toe injury. Patient stated that he ran into a piece of furniture two weeks prior to presentation. There had been a wound  on toe since that time. Family was unaware of the extent of injury. Patient reported welling and erythema. Also complained of mild pain. Wife assisted with history collection as patient is a poor historian and very hard of hearing. She stated the patient has had multiple falls over the past week secondary to his foot injury. Patient's wife also reported deterioration of cognition with intermittent hallucinations.    In ED: Abs notable for troponin less than 0.015, glucose 113, , K4.1, creatinine 1.25, BUN 35, GFR 54, ALT 8, AST 15, total bili 0.3, CRP 28.7, lactic 1.0, INR greater than 8, WBC 12, Hgb 9.4, platelets 302, neutrophils 87.3.  Sed rate 42.  Blood cultures drawn.    CT of the head shows no acute intracranial abnormality, no hemorrhage.  Moderate chronic age-related intracranial findings.     EKG shows sinus rhythm rate 73 multiple PVCs, ventricular and supraventricular, borderline ST depression anterolateral leads.     Patient started on IV antibiotics. X-ray of foot ordered. Podiatry, ID, and wound consulted. X-ray showed osteomyelitis.     On 8/10, nursing noted changes in mental status along with fever. Patient was transferred to PCU. Vascular surgery was consulted. Recommendation from podiatry an ID was for amputation. Cardiology was consulted for cardiac clearance.     Patient had left great toe amputation on 8/12.     Oxygen needs increased throughout hospitalization. Pulmonary was consulted. Patient is on bipap.     8/17 Palliative consult for goals of care discussion.     VITAL SIGNS:   Temp:  [97.7 °F (36.5 °C)-98.1 °F (36.7 °C)] 97.7 °F (36.5 °C)  Heart Rate:  [73-83] 77  Resp:  [18-20] 18  BP: (135-159)/(50-66) 135/66       PMH: B-cell Lymphoma, CAD with mechanical valve, Depression, [place], HTN    Past Surgical History:   Procedure Laterality Date   • AMPUTATION DIGIT Left 8/12/2021    Procedure: AMPUTATION DIGIT Left great toe;  Surgeon: CHITO Woo DPM;  Location: [facility] MAIN OR;   Service: Podiatry;  Laterality: Left;   • VEIN BYPASS SURGERY         History reviewed. No pertinent family history.    Social History     Tobacco Use   • Smoking status: Former Smoker   • Smokeless tobacco: Never Used   Vaping Use   • Vaping Use: Never used   Substance Use Topics   • Alcohol use: Yes     Alcohol/week: 5.0 standard drinks     Types: 5 Cans of beer per week   • Drug use: No           LABS:    Results from last 7 days   Lab Units 08/17/21  0642   WBC 10*3/mm3 7.80   HEMOGLOBIN g/dL 8.7*   HEMATOCRIT % 27.6*   PLATELETS 10*3/mm3 304     Results from last 7 days   Lab Units 08/17/21  0642   SODIUM mmol/L 146*   POTASSIUM mmol/L 3.1*   CHLORIDE mmol/L 104   CO2 mmol/L 31.0*   BUN mg/dL 18   CREATININE mg/dL 0.94   GLUCOSE mg/dL 79   CALCIUM mg/dL 8.4     Results from last 7 days   Lab Units 08/17/21  0642 08/16/21  0627 08/15/21  0300   SODIUM mmol/L 146*   < > 146*   POTASSIUM mmol/L 3.1*   < > 3.8   CHLORIDE mmol/L 104   < > 106   CO2 mmol/L 31.0*   < > 29.0   BUN mg/dL 18   < > 25*   CREATININE mg/dL 0.94   < > 1.12   CALCIUM mg/dL 8.4   < > 8.1*   BILIRUBIN mg/dL  --   --  0.4   ALK PHOS U/L  --   --  57   ALT (SGPT) U/L  --   --  7   AST (SGOT) U/L  --   --  17   GLUCOSE mg/dL 79   < > 98    < > = values in this interval not displayed.         IMAGING STUDIES:  XR Chest 1 View    Result Date: 8/16/2021  No interval change yesterday's study with abnormalities as described above.  Electronically Signed By-Hammad Ragland MD On:8/16/2021 8:18 AM This report was finalized on [id number] by  Hammad Ragland MD.        I reviewed the patient's new clinical results including labs, imaging, and vitals.        Scheduled Meds:  Acetylcysteine, 1,200 mg, Oral, BID  ampicillin-sulbactam, 3 g, Intravenous, Q6H  budesonide, 0.5 mg, Nebulization, BID - RT  ipratropium-albuterol, 3 mL, Nebulization, Q6H - RT  QUEtiapine, 25 mg, Oral, Q12H  rosuvastatin, 10 mg, Oral, Nightly  sertraline, 100 mg, Oral, Daily  sodium  chloride, 10 mL, Intravenous, Q12H  sodium hypochlorite, , Topical, BID  tamsulosin, 0.4 mg, Oral, Daily      Continuous Infusions:  heparin, 12 Units/kg/hr, Last Rate: 12 Units/kg/hr (08/17/21 0908)  sodium chloride, 60 mL/hr, Last Rate: 60 mL/hr (08/16/21 3213)        I have reviewed patient's current medication list.     Review of Systems   Unable to perform ROS: Mental status change         Physical Exam  Vitals and nursing note reviewed.   Constitutional:       Appearance: He is ill-appearing.      Comments: Agitated and restless   HENT:      Head: Normocephalic and atraumatic.      Nose: Nose normal.      Mouth/Throat:      Mouth: Mucous membranes are dry.      Pharynx: Oropharynx is clear.   Eyes:      Extraocular Movements: Extraocular movements intact.      Conjunctiva/sclera: Conjunctivae normal.   Cardiovascular:      Rate and Rhythm: Normal rate.      Pulses: Normal pulses.   Pulmonary:      Effort: Pulmonary effort is normal.   Abdominal:      General: Abdomen is flat.   Musculoskeletal:         General: Normal range of motion.      Cervical back: Normal range of motion.   Skin:     General: Skin is dry.   Neurological:      Mental Status: He is disoriented.             PROBLEM LIST:    Cellulitis of left lower extremity    Chronic coronary artery disease    Depression    Hearing problem    History of mitral valve replacement    Essential hypertension    Long term current use of anticoagulant therapy    Mechanical heart valve present    History of B-cell lymphoma    Elevated INR (international normalized ratio) due to prior anticoagulant medication ingestion    Mixed hyperlipidemia    Peripheral vascular disease of lower extremity with ulceration (CMS/HCC)          ASSESSMENT/PLAN:    Osteomyelitis: of LLE. X-ray showed destructive changes involving the proximal and distal phalanx. ID following. Patient started on IV antibiotics. Wound care consulted. 2-echo was negative for vegetation.     PAD:  podiatry consulted. Recommending amputation of left great toe. Vascular surgery also following. CTA shows relatively normal and unobstructed vasculature down to the popliteal arteries.  He has significant infrapopliteal disease on the left leg . Patient had amputation of left great toe on 8/12. Plans for LLE arteriogram this am.     Elevated INR: Patient started on FFP. Now on heparin drip.    Anemia: secondary to CKD and history of b cell lymphoma. Patient has required pRBC.     Acute respiratory failure: possible CHF exacerbation. Suspect underlying interstitial lung disease based on x-ray. Pulmonary is following. Requiring bipap prn.     HLD/HTN/CAD/Depression/[place] IRA: Chronic conditions per primary.       ADVANCED CARE PLANNING:     Met with patient at bedside this am. He is restless and agitated. He is pulling at IVs, trying to grab his valerio catheter, and making very little sense in conversation. I spoke with wife and daughter at bedside. Daughter Ca says she just wants her dad to be a peace and be comfortable. Wife becomes tearful and says she doesn't think he would want to live like this. They ask that I call and update daughter Davida about current medical status and overall clinical condition. Davida asks that I reach out to [facility] hospice for services and possible GIP admission. Referral will be made.       Advanced Directives: Patient does not have advance directive  Health Care Directive on file: No  Health Care Surrogate:      Palliative Performance Scale Score:    Comments:           Decisional Capacity: no  Patient's understanding of illness: unknown  Patient goals of care:  DNR/DNI      Thank you for this consult and allowing us to participate in patient's plan of care. Palliative Care Team will continue to follow patient.       I spent 59 minutes reviewing medical and medication records, assessing and examining patient, discussing with family, answering questions, providing some guidance about a  plan and documentation of care, and coordinating care with other healthcare members. More than 50% of time spent face to face discussing disease education, current clinical status, and medication management.     I spent an additional 32 minutes on advanced care planning, goals of care, and code status discussion.     MARIZA Zheng  8/17/2021    Electronically signed by Becca Kirk APRN at 08/17/21 4108

## 2021-08-17 NOTE — PROGRESS NOTES
"PULMONARY CRITICAL CARE Progress  NOTE      PATIENT IDENTIFICATION:  Name: Niko Servin  MRN: [id number]  :  1929     Age: 89 y.o.  Sex: male    DATE OF Note:  2021   Referring Physician: Alec Husain DO                  Subjective:   Alert with confusion, incotninenet,  No SOB, no chest or abd pain, no bowel or bladder issues reported, patient yelling out at times and reaching out in air, pulling at oxygen mask       Objective:  tMax 24 hrs: Temp (24hrs), Av °F (36.7 °C), Min:97.7 °F (36.5 °C), Max:98.1 °F (36.7 °C)      Vitals Ranges:   Temp:  [97.7 °F (36.5 °C)-98.1 °F (36.7 °C)] 98 °F (36.7 °C)  Heart Rate:  [73-83] 78  Resp:  [18-20] 18  BP: (135-156)/(50-66) 135/55    Intake and Output Last 3 Shifts:   I/O last 3 completed shifts:  In: 1596 [P.O.:600; I.V.:596; IV Piggyback:400]  Out: 1650 [Urine:1650]    Exam:  /55   Pulse 78   Temp 98 °F (36.7 °C) (Oral)   Resp 18   Ht 180.3 cm (71\")   Wt 84.6 kg (186 lb 8.2 oz) Comment: no extra linen on the bed  SpO2 99%   BMI 26.01 kg/m²     General Appearance:   Alert  HEENT:  Normocephalic, without obvious abnormality, Conjunctiva/corneas clear,.  Normal external ear canals, Nares normal, no drainage     Neck:  Supple, symmetrical, trachea midline. No JVD.  Lungs /Chest wall:   Bilateral basal rhonchi, respirations unlabored symmetrical wall movement.     Heart:  Regular rate and rhythm, systolic murmur PMI left sternal border  Abdomen: Soft, non-tender, no masses, no organomegaly.    Extremities: Trace edema no clubbing or Cyanosis        Medications:    Current Facility-Administered Medications:     acetaminophen (TYLENOL) tablet 650 mg, 650 mg, Oral, Q4H PRN, 650 mg at 21 **OR** acetaminophen (TYLENOL) 160 MG/5ML solution 650 mg, 650 mg, Oral, Q4H PRN **OR** acetaminophen (TYLENOL) suppository 650 mg, 650 mg, Rectal, Q4H PRN, CHITO Woo, JELANI    Acetylcysteine capsule 1,200 mg, 1,200 mg, Oral, BID, Felicitas Page " ABELINO, APRN, 1,200 mg at 08/17/21 0914    aluminum-magnesium hydroxide-simethicone (MAALOX MAX) 400-400-40 MG/5ML suspension 15 mL, 15 mL, Oral, Q6H PRN, CHITO Woo, DPM    ampicillin-sulbactam (UNASYN) 3 g in sodium chloride 0.9 % 100 mL IVPB-MBP, 3 g, Intravenous, Q6H, CHITO Woo, DPM, 3 g at 08/17/21 0914    budesonide (PULMICORT) nebulizer solution 0.5 mg, 0.5 mg, Nebulization, BID - RT, Felicitas Page, APRN, 0.5 mg at 08/17/21 0739    haloperidol lactate (HALDOL) injection 1 mg, 1 mg, Intravenous, Q6H PRN, CHITO Woo, DPM, 1 mg at 08/17/21 1128    heparin 73166 units/250 mL (100 units/mL) in 0.45 % NaCl infusion, 12 Units/kg/hr, Intravenous, Titrated, Casper Laura MD, Last Rate: 9.68 mL/hr at 08/17/21 0908, 12 Units/kg/hr at 08/17/21 0908    heparin bolus from bag 2,400 Units, 30 Units/kg, Intravenous, Q6H PRN, Casper Laura MD, 2,400 Units at 08/17/21 0906    heparin bolus from bag 4,800 Units, 60 Units/kg, Intravenous, Q6H PRN, Casper Laura MD    ipratropium-albuterol (DUO-NEB) nebulizer solution 3 mL, 3 mL, Nebulization, Q6H - RT, CHITO Woo, DPM, 3 mL at 08/17/21 1204    Magnesium Sulfate 2 gram Bolus, followed by 8 gram infusion (total Mg dose 10 grams)- Mg less than or equal to 1mg/dL, 2 g, Intravenous, PRN **OR** Magnesium Sulfate 2 gram / 50mL Infusion (GIVE X 3 BAGS TO EQUAL 6GM TOTAL DOSE) - Mg 1.1 - 1.5 mg/dl, 2 g, Intravenous, PRN **OR** Magnesium Sulfate 4 gram infusion- Mg 1.6-1.9 mg/dL, 4 g, Intravenous, PRN, CHITO Woo DPM, Last Rate: 25 mL/hr at 08/13/21 0818, 4 g at 08/13/21 0818    melatonin tablet 5 mg, 5 mg, Oral, Nightly PRN, CHITO Woo DPM    nitroglycerin (NITROSTAT) SL tablet 0.4 mg, 0.4 mg, Sublingual, Q5 Min PRN, CHITO Woo DPM    ondansetron (ZOFRAN) tablet 4 mg, 4 mg, Oral, Q6H PRN **OR** ondansetron (ZOFRAN) injection 4 mg, 4 mg, Intravenous, Q6H PRN, CHITO Woo DPM    potassium chloride  (K-DUR,KLOR-CON) CR tablet 40 mEq, 40 mEq, Oral, PRN, Amna, T Willy, DPM    potassium chloride 10 mEq in 100 mL IVPB, 10 mEq, Intravenous, Q1H PRN, Amna, T Willy, DPM    potassium phosphate 45 mmol in sodium chloride 0.9 % 500 mL infusion, 45 mmol, Intravenous, PRN **OR** potassium phosphate 30 mmol in sodium chloride 0.9 % 250 mL infusion, 30 mmol, Intravenous, PRN **OR** potassium phosphate 15 mmol in 0.9% sodium chloride 100 mL IVPB, 15 mmol, Intravenous, PRN **OR** sodium phosphates 45 mmol in sodium chloride 0.9 % 500 mL IVPB, 45 mmol, Intravenous, PRN **OR** sodium phosphates 30 mmol in sodium chloride 0.9 % 250 mL IVPB, 30 mmol, Intravenous, PRN **OR** sodium phosphates 15 mmol in sodium chloride 0.9 % 250 mL IVPB, 15 mmol, Intravenous, PRN, Amna, CHITO Willy, DPM    QUEtiapine (SEROquel) tablet 25 mg, 25 mg, Oral, Q12H, Alec Husain, , 25 mg at 08/17/21 1019    rosuvastatin (CRESTOR) tablet 10 mg, 10 mg, Oral, Nightly, Amna, CHITO Willy, DPM, 10 mg at 08/16/21 2029    sertraline (ZOLOFT) tablet 100 mg, 100 mg, Oral, Daily, Amna, T Willy, DPM, 100 mg at 08/17/21 0914    sodium chloride 0.9 % flush 10 mL, 10 mL, Intravenous, PRN, Amna, T Willy, DPM    sodium chloride 0.9 % flush 10 mL, 10 mL, Intravenous, Q12H, Amna, T Willy, DPM, 10 mL at 08/17/21 0914    sodium chloride 0.9 % flush 10 mL, 10 mL, Intravenous, PRN, Amna, T Willy, DPM, 10 mL at 08/15/21 0409    sodium chloride 0.9 % infusion, 60 mL/hr, Intravenous, Continuous, Princess Hutchins MD, Last Rate: 60 mL/hr at 08/16/21 2357, 60 mL/hr at 08/16/21 2357    sodium hypochlorite (DAKIN'S 1/4 STRENGTH) 0.125 % topical solution 0.125% solution, , Topical, BID, CHITO oWo DPM, Given at 08/17/21 1020    tamsulosin (FLOMAX) 24 hr capsule 0.4 mg, 0.4 mg, Oral, Daily, Zaire Mansfield MD, 0.4 mg at 08/17/21 0914    Data Review:  All labs (24hrs):   Recent Results (from the past 24 hour(s))   aPTT     Collection Time: 08/16/21  2:12 PM    Specimen: Blood   Result Value Ref Range    PTT 86.3 (H) 61.0 - 76.5 seconds   aPTT    Collection Time: 08/16/21 10:00 PM    Specimen: Blood   Result Value Ref Range    PTT 55.1 (L) 61.0 - 76.5 seconds   CBC Auto Differential    Collection Time: 08/16/21 10:00 PM    Specimen: Blood   Result Value Ref Range    WBC 8.40 3.40 - 10.80 10*3/mm3    RBC 3.34 (L) 4.14 - 5.80 10*6/mm3    Hemoglobin 8.7 (L) 13.0 - 17.7 g/dL    Hematocrit 27.4 (L) 37.5 - 51.0 %    MCV 82.1 79.0 - 97.0 fL    MCH 26.1 (L) 26.6 - 33.0 pg    MCHC 31.7 31.5 - 35.7 g/dL    RDW 17.5 (H) 12.3 - 15.4 %    RDW-SD 51.2 37.0 - 54.0 fl    MPV 8.3 6.0 - 12.0 fL    Platelets 308 140 - 450 10*3/mm3    Neutrophil % 90.1 (H) 42.7 - 76.0 %    Lymphocyte % 3.8 (L) 19.6 - 45.3 %    Monocyte % 4.8 (L) 5.0 - 12.0 %    Eosinophil % 0.8 0.3 - 6.2 %    Basophil % 0.5 0.0 - 1.5 %    Neutrophils, Absolute 7.60 (H) 1.70 - 7.00 10*3/mm3    Lymphocytes, Absolute 0.30 (L) 0.70 - 3.10 10*3/mm3    Monocytes, Absolute 0.40 0.10 - 0.90 10*3/mm3    Eosinophils, Absolute 0.10 0.00 - 0.40 10*3/mm3    Basophils, Absolute 0.00 0.00 - 0.20 10*3/mm3    nRBC 0.0 0.0 - 0.2 /100 WBC   Gold Top - SST    Collection Time: 08/16/21 10:00 PM   Result Value Ref Range    Extra Tube Hold for add-ons.    Protime-INR    Collection Time: 08/17/21  6:42 AM    Specimen: Blood   Result Value Ref Range    Protime 19.8 19.4 - 28.5 Seconds    INR 1.87 (L) 2.00 - 3.00   Magnesium    Collection Time: 08/17/21  6:42 AM    Specimen: Blood   Result Value Ref Range    Magnesium 1.8 1.7 - 2.3 mg/dL   CBC (No Diff)    Collection Time: 08/17/21  6:42 AM    Specimen: Blood   Result Value Ref Range    WBC 7.80 3.40 - 10.80 10*3/mm3    RBC 3.33 (L) 4.14 - 5.80 10*6/mm3    Hemoglobin 8.7 (L) 13.0 - 17.7 g/dL    Hematocrit 27.6 (L) 37.5 - 51.0 %    MCV 83.0 79.0 - 97.0 fL    MCH 26.1 (L) 26.6 - 33.0 pg    MCHC 31.5 31.5 - 35.7 g/dL    RDW 17.9 (H) 12.3 - 15.4 %    RDW-SD 52.5 37.0  - 54.0 fl    MPV 8.5 6.0 - 12.0 fL    Platelets 304 140 - 450 10*3/mm3   Basic Metabolic Panel    Collection Time: 08/17/21  6:42 AM    Specimen: Blood   Result Value Ref Range    Glucose 79 65 - 99 mg/dL    BUN 18 8 - 23 mg/dL    Creatinine 0.94 0.76 - 1.27 mg/dL    Sodium 146 (H) 136 - 145 mmol/L    Potassium 3.1 (L) 3.5 - 5.2 mmol/L    Chloride 104 98 - 107 mmol/L    CO2 31.0 (H) 22.0 - 29.0 mmol/L    Calcium 8.4 8.2 - 9.6 mg/dL    eGFR Non African Amer 75 >60 mL/min/1.73    BUN/Creatinine Ratio 19.1 7.0 - 25.0    Anion Gap 11.0 5.0 - 15.0 mmol/L   Phosphorus    Collection Time: 08/17/21  6:42 AM    Specimen: Blood   Result Value Ref Range    Phosphorus 2.3 (L) 2.5 - 4.5 mg/dL   aPTT    Collection Time: 08/17/21  6:42 AM    Specimen: Blood   Result Value Ref Range    PTT 54.1 (L) 61.0 - 76.5 seconds   TSH    Collection Time: 08/17/21  6:42 AM    Specimen: Blood   Result Value Ref Range    TSH 2.830 0.270 - 4.200 uIU/mL   Ammonia    Collection Time: 08/17/21  8:24 AM    Specimen: Blood   Result Value Ref Range    Ammonia 10 (L) 16 - 60 umol/L        Imaging:  XR Chest 1 View  Narrative: DATE OF EXAM:  8/16/2021 4:56 AM     PROCEDURE:  XR CHEST 1 VW-     INDICATIONS:  Shortness of breath, hypertension, heart disease, history of B-cell  lymphoma.      COMPARISON:  8/15/2021.     TECHNIQUE:   Single radiographic view of the chest was obtained.     FINDINGS:  The heart is enlarged with postsurgical changes apparent. There is  bilateral mixed interstitial/airspace disease which has not  significantly changed. The patient has a small right and a small to  moderate left pleural effusion with compressive atelectasis.  There is  no interval change from yesterday's exam.     Impression: No interval change yesterday's study with abnormalities as described  above.     Electronically Signed By-Hammad Ragland MD On:8/16/2021 8:18 AM  This report was finalized on [id number] by  Hammad Ragland MD.       ASSESSMENT:   Cellulitis of  left lower extremity    Chronic coronary artery disease    Depression    Hearing problem    History of mitral valve replacement    Essential hypertension    Long term current use of anticoagulant therapy    Mechanical heart valve present    History of B-cell lymphoma    Elevated INR (international normalized ratio) due to prior anticoagulant medication ingestion    Mixed hyperlipidemia    Peripheral vascular disease/ischemia  of lower extremity with ulceration s/p toe amputation       PLAN:  Consult palliative care  Patient is now a DNR/DNI  O2 support , decrease O2 as tolerated   The effusion is very small no need for further work-up  Not a candidate for surgery per vascular   Encouraged to use flutter valve  Bronchodilator  Inhaled corticosteroids  Electrolytes/ glycemic control  DVT and GI prophylaxis  Prognosis is poor     Discussed with Dr Irwin Page, APRN   8/17/2021  14:05 EDT     I personally have examined  and interviewed the patient. I have reviewed the history, data, problems, assessment and plan with our NP.  Critical care time in direct medical management (   ) minutes  Electronically signed by Randi Gayle MD, D,ABS, 08/17/21, 11:21 PM EDT.

## 2021-08-17 NOTE — CASE MANAGEMENT/SOCIAL WORK
Continued Stay Note   Richard     Patient Name: Niko Servin  MRN: [id number]  Today's Date: 8/17/2021    Admit Date: 8/9/2021    Discharge Plan     Row Name 08/17/21 1332       Plan    Plan Comments  DC barriers: heparin gtt, LLE arteriogram cancelled for today due to worsening confusion, palliative care consut        Expected Discharge Date and Time     Expected Discharge Date Expected Discharge Time    Aug 20, 2021         Phone communication or documentation only - no physical contact with patient or family.      Andra Urias RN

## 2021-08-17 NOTE — PROGRESS NOTES
"NEPHROLOGY PROGRESS NOTE------[facility]    [facility]  [phone]  Kashif Hutchins MD      Patient Care Team:  Casper Saldaña MD as PCP - General  Casper Saldaña MD as PCP - Family Medicine  ElvieCory bob MD as Consulting Physician (Nephrology)      Provider:  Kashif Hutchins MD  Patient Name: Niko Servin  :  1929    SUBJECTIVE:    F/U CRF/CKD    Comfortable. Awaiting arteriogram today. No SOB, CP, dysuria    Medication:  Acetylcysteine, 1,200 mg, Oral, BID  ampicillin-sulbactam, 3 g, Intravenous, Q6H  budesonide, 0.5 mg, Nebulization, BID - RT  ipratropium-albuterol, 3 mL, Nebulization, Q6H - RT  rosuvastatin, 10 mg, Oral, Nightly  sertraline, 100 mg, Oral, Daily  sodium chloride, 10 mL, Intravenous, Q12H  sodium hypochlorite, , Topical, BID  tamsulosin, 0.4 mg, Oral, Daily      heparin, 12 Units/kg/hr, Last Rate: 11 Units/kg/hr (21 0353)  sodium chloride, 60 mL/hr, Last Rate: 60 mL/hr (21 2987)        OBJECTIVE    Vital Sign Min/Max for last 24 hours  Temp  Min: 97.7 °F (36.5 °C)  Max: 98.1 °F (36.7 °C)   BP  Min: 135/66  Max: 159/58   Pulse  Min: 73  Max: 83   Resp  Min: 18  Max: 20   SpO2  Min: 90 %  Max: 100 %   No data recorded   Weight  Min: 84.6 kg (186 lb 8.2 oz)  Max: 84.6 kg (186 lb 8.2 oz)     Flowsheet Rows      First Filed Value   Admission Height  182.9 cm (72\") Documented at 2021   Admission Weight  90.7 kg (200 lb) Documented at 2021          No intake/output data recorded.  I/O last 3 completed shifts:  In: 1596 [P.O.:600; I.V.:596; IV Piggyback:400]  Out: 1650 [Urine:1650]    Physical Exam:  General Appearance: alert, appears stated age and cooperative. NAD. [place]  Head: normocephalic, without obvious abnormality and atraumatic  Eyes: conjunctivae and sclerae normal and no icterus  Neck: supple and no JVD  Lungs: +SCATTERED RHONCHI  Heart: regular rhythm & " normal rate and normal S1, S2 +FRANCOIS  Chest: Wall no abnormalities observed  Abdomen: normal bowel sounds and soft non-tender +BELTRE  Extremities:no edema, no cyanosis and no redness. +LEFT FOOT WRAPPED +DJD  Skin: no bleeding, +ERYTHEMA IN LEFT LE  Neurologic: Alert No focal deficits    Labs:    WBC WBC   Date Value Ref Range Status   08/16/2021 8.40 3.40 - 10.80 10*3/mm3 Final   08/16/2021 8.80 3.40 - 10.80 10*3/mm3 Final   08/15/2021 8.10 3.40 - 10.80 10*3/mm3 Final      HGB Hemoglobin   Date Value Ref Range Status   08/16/2021 8.7 (L) 13.0 - 17.7 g/dL Final   08/16/2021 8.6 (L) 13.0 - 17.7 g/dL Final   08/15/2021 8.0 (L) 13.0 - 17.7 g/dL Final      HCT Hematocrit   Date Value Ref Range Status   08/16/2021 27.4 (L) 37.5 - 51.0 % Final   08/16/2021 26.3 (L) 37.5 - 51.0 % Final   08/15/2021 25.0 (L) 37.5 - 51.0 % Final      Platlets No results found for: LABPLAT   MCV MCV   Date Value Ref Range Status   08/16/2021 82.1 79.0 - 97.0 fL Final   08/16/2021 82.2 79.0 - 97.0 fL Final   08/15/2021 82.2 79.0 - 97.0 fL Final          Sodium Sodium   Date Value Ref Range Status   08/16/2021 143 136 - 145 mmol/L Final   08/15/2021 146 (H) 136 - 145 mmol/L Final      Potassium Potassium   Date Value Ref Range Status   08/16/2021 3.4 (L) 3.5 - 5.2 mmol/L Final   08/15/2021 3.8 3.5 - 5.2 mmol/L Final      Chloride Chloride   Date Value Ref Range Status   08/16/2021 103 98 - 107 mmol/L Final   08/15/2021 106 98 - 107 mmol/L Final      CO2 CO2   Date Value Ref Range Status   08/16/2021 29.0 22.0 - 29.0 mmol/L Final   08/15/2021 29.0 22.0 - 29.0 mmol/L Final      BUN BUN   Date Value Ref Range Status   08/16/2021 20 8 - 23 mg/dL Final   08/15/2021 25 (H) 8 - 23 mg/dL Final      Creatinine Creatinine   Date Value Ref Range Status   08/16/2021 1.05 0.76 - 1.27 mg/dL Final   08/15/2021 1.12 0.76 - 1.27 mg/dL Final      Calcium Calcium   Date Value Ref Range Status   08/16/2021 8.3 8.2 - 9.6 mg/dL Final   08/15/2021 8.1 (L) 8.2 - 9.6  mg/dL Final      PO4 No components found for: PO4   Albumin Albumin   Date Value Ref Range Status   08/15/2021 2.70 (L) 3.50 - 5.20 g/dL Final      Magnesium Magnesium   Date Value Ref Range Status   08/16/2021 1.9 1.7 - 2.3 mg/dL Final   08/15/2021 2.1 1.7 - 2.3 mg/dL Final      Uric Acid No components found for: URIC ACID     Imaging Results (Last 72 Hours)     Procedure Component Value Units Date/Time    XR Chest 1 View [[id number]] Collected: 08/16/21 0817     Updated: 08/16/21 0820    Narrative:      DATE OF EXAM:  8/16/2021 4:56 AM     PROCEDURE:  XR CHEST 1 VW-     INDICATIONS:  Shortness of breath, hypertension, heart disease, history of B-cell  lymphoma.      COMPARISON:  8/15/2021.     TECHNIQUE:   Single radiographic view of the chest was obtained.     FINDINGS:  The heart is enlarged with postsurgical changes apparent. There is  bilateral mixed interstitial/airspace disease which has not  significantly changed. The patient has a small right and a small to  moderate left pleural effusion with compressive atelectasis.  There is  no interval change from yesterday's exam.       Impression:      No interval change yesterday's study with abnormalities as described  above.     Electronically Signed By-Hammad Ragland MD On:8/16/2021 8:18 AM  This report was finalized on [id number] by  Hammad Ragland MD.    XR Chest 1 View [[id number]] Collected: 08/15/21 1021     Updated: 08/15/21 1039    Narrative:      XR CHEST 1 VW-     Date of Exam: 8/15/2021 6:05 AM     Indication: Shortness of breath; L03.116-Cellulitis of left lower limb;  R79.1-Abnormal coagulation profile; M18-[place], not elsewhere  classified; I73.9-Peripheral vascular disease, unspecified;  L97.909-Non-pressure chronic ulcer of unspecified part of unspecified  lower leg with unspecified severity.     Comparison Exams: CT chest from 08/11/2021     Technique: Single AP chest radiograph     FINDINGS:  Median sternotomy wires appear intact. There are coarse  bilateral  interstitial markings. There are small bilateral pleural effusions. The  heart is enlarged.       Impression:      1.Coarse bilateral interstitial markings, which could reflect  interstitial pulmonary edema versus atypical pneumonia.  2.Cardiomegaly with small bilateral pleural effusions.     Electronically Signed By-Aneesh Cook MD On:8/15/2021 10:23 AM  This report was finalized on [id number] by  Aneesh Cook MD.          Results for orders placed during the hospital encounter of 08/09/21    XR Chest 1 View    Narrative  DATE OF EXAM:  8/16/2021 4:56 AM    PROCEDURE:  XR CHEST 1 VW-    INDICATIONS:  Shortness of breath, hypertension, heart disease, history of B-cell  lymphoma.    COMPARISON:  8/15/2021.    TECHNIQUE:  Single radiographic view of the chest was obtained.    FINDINGS:  The heart is enlarged with postsurgical changes apparent. There is  bilateral mixed interstitial/airspace disease which has not  significantly changed. The patient has a small right and a small to  moderate left pleural effusion with compressive atelectasis.  There is  no interval change from yesterday's exam.    Impression  No interval change yesterday's study with abnormalities as described  above.    Electronically Signed By-Hammad Ragland MD On:8/16/2021 8:18 AM  This report was finalized on [id number] by  Hammad Ragland MD.      XR Chest 1 View    Narrative  XR CHEST 1 VW-    Date of Exam: 8/15/2021 6:05 AM    Indication: Shortness of breath; L03.116-Cellulitis of left lower limb;  R79.1-Abnormal coagulation profile; K39-Ejpevjpm, not elsewhere  classified; I73.9-Peripheral vascular disease, unspecified;  L97.909-Non-pressure chronic ulcer of unspecified part of unspecified  lower leg with unspecified severity.    Comparison Exams: CT chest from 08/11/2021    Technique: Single AP chest radiograph    FINDINGS:  Median sternotomy wires appear intact. There are coarse bilateral  interstitial markings. There are  small bilateral pleural effusions. The  heart is enlarged.    Impression  1.Coarse bilateral interstitial markings, which could reflect  interstitial pulmonary edema versus atypical pneumonia.  2.Cardiomegaly with small bilateral pleural effusions.    Electronically Signed By-Aneesh Cook MD On:8/15/2021 10:23 AM  This report was finalized on [id number] by  Aneesh Cook MD.      XR Chest 2 View    Narrative  DATE OF EXAM:  8/10/2021 12:20 PM    PROCEDURE:  XR CHEST 2 VW-    INDICATIONS:  SOA; L03.116-Cellulitis of left lower limb; R79.1-Abnormal coagulation  profile    COMPARISON:  A 10/20/2021 at 12:40 AM, 6/8/2016 at 5:39 PM, 6/7/2016 at 9:47 PM    TECHNIQUE:  Two radiologic views of the chest , PA and lateral were obtained.    FINDINGS:  Extensive chronic changes are again seen throughout the lungs. These  findings are again stable given differences in technique. No new  consolidations or pleural effusions are observed. The cardiac silhouette  and mediastinum are unchanged. Stable cardiomegaly is noted.  Postoperative changes are noted. No acute osseous abnormalities are  seen.    Impression  Chronic changes are again noted which are stable. There is no definitive  evidence for acute cardiopulmonary process.    Electronically Signed By-Aneesh Judd MD On:8/10/2021 12:43 PM  This report was finalized on [id number] by  Aneesh Judd MD.      Results for orders placed during the hospital encounter of 08/09/21    Duplex Vein Mapping Lower Extremity - Bilateral CAR    Interpretation Summary  · The left greater saphenous vein is patent and of adequate size in the thigh.  · The left greater saphenous vein is patent and of adequate size in the calf.        ASSESSMENT / PLAN      Cellulitis of left lower extremity    Chronic coronary artery disease    Depression    Hearing problem    History of mitral valve replacement    Essential hypertension    Long term current use of anticoagulant therapy     Mechanical heart valve present    History of B-cell lymphoma    Elevated INR (international normalized ratio) due to prior anticoagulant medication ingestion    Mixed hyperlipidemia    Peripheral vascular disease of lower extremity with ulceration (CMS/HCC)    1. CRF/CKD STG 3A-------Nonoliguric. BUN/Cr stable. Premedicated for arteriogram today with gentle IVFs and po Mucomyst. No NSAIDs. Dose meds for CrCl 30-60 cc/min    2. HTN WITH CKD------BP okay. Avoid hypotension. No ACE-I/ARB/DRI for now    3. BPH------On Flomax    4. HYPERLIPIDEMIA------On Statin    5. OA/DJD-----No NSAIDs    6. PVD/PAD WITH LEFT GREAT TOE GANGRENE-----S/P Amputatyion. On Abx and getting wound care. Arteriogram tomorrow    7. CAD WITH H/O MECHANICAL VALVE REPLACEMENT    8. ANEMIA WITH H/O B-CELL LYMPHOMA-----Follow for further PRBC need    9. DEPRESSION/DEMENTIA-------SSRI    10. HYPOALBUMINEMIA----S/P IV Albumin to temporize    11. HYPOCALCEMIA-----Replaced    12. HYPERNATREMIA-----Encourage increased po water intake    13. HYPOKALEMIA/HYPOPHOSPHATEMIA-------Replaced    Kashif Hutchins MD  [facility]  [phone]  08/17/21  07:12 EDT

## 2021-08-17 NOTE — CONSULTS
Palliative Care Consultation    Patient Name: Niko Servin  : 1929  MRN: [id number]  Allergies: Patient has no known allergies.    Requesting clinician:  Lisha  Reason for consult: Consultation for clarification of goals of care and code status.      Patient Code Status:   Code Status and Medical Interventions:   Ordered at: 21 0935     Limited Support to NOT Include:    Intubation     Level Of Support Discussed With:    Health Care Surrogate     Code Status:    No CPR     Medical Interventions (Level of Support Prior to Arrest):    Limited           Chief Complaint:    Left great toe injury    History of Present Illness    Niko Servin is a 89 y.o. male who presented to [facility] ED on  with reports of a toe injury. Patient stated that he ran into a piece of furniture two weeks prior to presentation. There had been a wound on toe since that time. Family was unaware of the extent of injury. Patient reported welling and erythema. Also complained of mild pain. Wife assisted with history collection as patient is a poor historian and very hard of hearing. She stated the patient has had multiple falls over the past week secondary to his foot injury. Patient's wife also reported deterioration of cognition with intermittent hallucinations.    In ED: Abs notable for troponin less than 0.015, glucose 113, , K4.1, creatinine 1.25, BUN 35, GFR 54, ALT 8, AST 15, total bili 0.3, CRP 28.7, lactic 1.0, INR greater than 8, WBC 12, Hgb 9.4, platelets 302, neutrophils 87.3.  Sed rate 42.  Blood cultures drawn.    CT of the head shows no acute intracranial abnormality, no hemorrhage.  Moderate chronic age-related intracranial findings.     EKG shows sinus rhythm rate 73 multiple PVCs, ventricular and supraventricular, borderline ST depression anterolateral leads.     Patient started on IV antibiotics. X-ray of foot ordered. Podiatry, ID, and wound consulted. X-ray showed osteomyelitis.     On 8/10, nursing  noted changes in mental status along with fever. Patient was transferred to PCU. Vascular surgery was consulted. Recommendation from podiatry an ID was for amputation. Cardiology was consulted for cardiac clearance.     Patient had left great toe amputation on 8/12.     Oxygen needs increased throughout hospitalization. Pulmonary was consulted. Patient is on bipap.     8/17 Palliative consult for goals of care discussion.     VITAL SIGNS:   Temp:  [97.7 °F (36.5 °C)-98.1 °F (36.7 °C)] 97.7 °F (36.5 °C)  Heart Rate:  [73-83] 77  Resp:  [18-20] 18  BP: (135-159)/(50-66) 135/66       PMH: B-cell Lymphoma, CAD with mechanical valve, Depression, [place], HTN    Past Surgical History:   Procedure Laterality Date   • AMPUTATION DIGIT Left 8/12/2021    Procedure: AMPUTATION DIGIT Left great toe;  Surgeon: CHITO Woo DPM;  Location: [facility] OR;  Service: Podiatry;  Laterality: Left;   • VEIN BYPASS SURGERY         History reviewed. No pertinent family history.    Social History     Tobacco Use   • Smoking status: Former Smoker   • Smokeless tobacco: Never Used   Vaping Use   • Vaping Use: Never used   Substance Use Topics   • Alcohol use: Yes     Alcohol/week: 5.0 standard drinks     Types: 5 Cans of beer per week   • Drug use: No           LABS:    Results from last 7 days   Lab Units 08/17/21  0642   WBC 10*3/mm3 7.80   HEMOGLOBIN g/dL 8.7*   HEMATOCRIT % 27.6*   PLATELETS 10*3/mm3 304     Results from last 7 days   Lab Units 08/17/21  0642   SODIUM mmol/L 146*   POTASSIUM mmol/L 3.1*   CHLORIDE mmol/L 104   CO2 mmol/L 31.0*   BUN mg/dL 18   CREATININE mg/dL 0.94   GLUCOSE mg/dL 79   CALCIUM mg/dL 8.4     Results from last 7 days   Lab Units 08/17/21  0642 08/16/21  0627 08/15/21  0300   SODIUM mmol/L 146*   < > 146*   POTASSIUM mmol/L 3.1*   < > 3.8   CHLORIDE mmol/L 104   < > 106   CO2 mmol/L 31.0*   < > 29.0   BUN mg/dL 18   < > 25*   CREATININE mg/dL 0.94   < > 1.12   CALCIUM mg/dL 8.4   < > 8.1*   BILIRUBIN  mg/dL  --   --  0.4   ALK PHOS U/L  --   --  57   ALT (SGPT) U/L  --   --  7   AST (SGOT) U/L  --   --  17   GLUCOSE mg/dL 79   < > 98    < > = values in this interval not displayed.         IMAGING STUDIES:  XR Chest 1 View    Result Date: 8/16/2021  No interval change yesterday's study with abnormalities as described above.  Electronically Signed By-Hammad Ragland MD On:8/16/2021 8:18 AM This report was finalized on [id number] by  Hammad Ragland MD.        I reviewed the patient's new clinical results including labs, imaging, and vitals.        Scheduled Meds:  Acetylcysteine, 1,200 mg, Oral, BID  ampicillin-sulbactam, 3 g, Intravenous, Q6H  budesonide, 0.5 mg, Nebulization, BID - RT  ipratropium-albuterol, 3 mL, Nebulization, Q6H - RT  QUEtiapine, 25 mg, Oral, Q12H  rosuvastatin, 10 mg, Oral, Nightly  sertraline, 100 mg, Oral, Daily  sodium chloride, 10 mL, Intravenous, Q12H  sodium hypochlorite, , Topical, BID  tamsulosin, 0.4 mg, Oral, Daily      Continuous Infusions:  heparin, 12 Units/kg/hr, Last Rate: 12 Units/kg/hr (08/17/21 0908)  sodium chloride, 60 mL/hr, Last Rate: 60 mL/hr (08/16/21 4290)        I have reviewed patient's current medication list.     Review of Systems   Unable to perform ROS: Mental status change         Physical Exam  Vitals and nursing note reviewed.   Constitutional:       Appearance: He is ill-appearing.      Comments: Agitated and restless   HENT:      Head: Normocephalic and atraumatic.      Nose: Nose normal.      Mouth/Throat:      Mouth: Mucous membranes are dry.      Pharynx: Oropharynx is clear.   Eyes:      Extraocular Movements: Extraocular movements intact.      Conjunctiva/sclera: Conjunctivae normal.   Cardiovascular:      Rate and Rhythm: Normal rate.      Pulses: Normal pulses.   Pulmonary:      Effort: Pulmonary effort is normal.   Abdominal:      General: Abdomen is flat.   Musculoskeletal:         General: Normal range of motion.      Cervical back: Normal range of  motion.   Skin:     General: Skin is dry.   Neurological:      Mental Status: He is disoriented.             PROBLEM LIST:    Cellulitis of left lower extremity    Chronic coronary artery disease    Depression    Hearing problem    History of mitral valve replacement    Essential hypertension    Long term current use of anticoagulant therapy    Mechanical heart valve present    History of B-cell lymphoma    Elevated INR (international normalized ratio) due to prior anticoagulant medication ingestion    Mixed hyperlipidemia    Peripheral vascular disease of lower extremity with ulceration (CMS/HCC)          ASSESSMENT/PLAN:    Osteomyelitis: of LLE. X-ray showed destructive changes involving the proximal and distal phalanx. ID following. Patient started on IV antibiotics. Wound care consulted. 2-echo was negative for vegetation.     PAD: podiatry consulted. Recommending amputation of left great toe. Vascular surgery also following. CTA shows relatively normal and unobstructed vasculature down to the popliteal arteries.  He has significant infrapopliteal disease on the left leg . Patient had amputation of left great toe on 8/12. Plans for LLE arteriogram this am.     Elevated INR: Patient started on FFP. Now on heparin drip.    Anemia: secondary to CKD and history of b cell lymphoma. Patient has required pRBC.     Acute respiratory failure: possible CHF exacerbation. Suspect underlying interstitial lung disease based on x-ray. Pulmonary is following. Requiring bipap prn.     HLD/HTN/CAD/Depression/[place]: Chronic conditions per primary.       ADVANCED CARE PLANNING:     Met with patient at bedside this am. He is restless and agitated. He is pulling at IVs, trying to grab his valerio catheter, and making very little sense in conversation. I spoke with wife and daughter at bedside. Daughter Ca says she just wants her dad to be a peace and be comfortable. Wife becomes tearful and says she doesn't think he would want to  live like this. They ask that I call and update daughter Davida about current medical status and overall clinical condition. Davida asks that I reach out to [facility] hospice for services and possible GIP admission. Referral will be made.       Advanced Directives: Patient does not have advance directive  Health Care Directive on file: No  Health Care Surrogate:      Palliative Performance Scale Score:    Comments:           Decisional Capacity: no  Patient's understanding of illness: unknown  Patient goals of care:  DNR/DNI      Thank you for this consult and allowing us to participate in patient's plan of care. Palliative Care Team will continue to follow patient.       I spent 59 minutes reviewing medical and medication records, assessing and examining patient, discussing with family, answering questions, providing some guidance about a plan and documentation of care, and coordinating care with other healthcare members. More than 50% of time spent face to face discussing disease education, current clinical status, and medication management.     I spent an additional 32 minutes on advanced care planning, goals of care, and code status discussion.     Becca Kirk, APRN  8/17/2021

## 2021-08-17 NOTE — PROGRESS NOTES
LOS:  LOS: 0 days   Patient Name: Niko Servin  Age/Sex: 89 y.o. male  : 1929  MRN: [id number]    Day of Service: 21   Length of Stay: 0  Encounter Provider: Joni Soria MD  Patient Care Team:  Casper Saldaña MD as PCP - General  Casper Saldaña MD as PCP - Family Medicine  Cory Bermeo MD as Consulting Physician (Nephrology)    Subjective:     Chief Complaint: f/u mech valve, on anticoagulation    Subjective: Patient not responding            Current Medications:   Scheduled Meds:glycopyrrolate, 0.4 mg, Intravenous, 4x Daily  QUEtiapine, 25 mg, Oral, Q12H  sertraline, 100 mg, Oral, Daily  warfarin, 7.5 mg, Oral, Daily      Continuous Infusions:     Allergies:  No Known Allergies    Review of Systems   Unable to perform ROS: patient unresponsive       Objective:     Temp:  [97.7 °F (36.5 °C)-98.3 °F (36.8 °C)] 98.3 °F (36.8 °C)  Heart Rate:  [75-83] 80  Resp:  [18-22] 22  BP: (131-152)/(55-66) 131/61   No intake or output data in the 24 hours ending 21  There is no height or weight on file to calculate BMI.  There were no vitals filed for this visit.      Physical Exam:  General Appearance:    Alert, cooperative, in no acute distress               Neck:   supple,  no JVD   Lungs:     Supplemental O2, dim bases, some scattered rhonchi    Heart:    Regular rhythm and normal rate, normal S1 and S2,1/6 murmur noted   Abdomen:     Normal bowel sounds, soft   Extremities:   Moves all extremities well, no edema, left foot wrapped   Pulses:   Pulses palpable and equal bilaterally   Skin:   reddened LE   Neurologic:   Awake, alert, oriented x3   Unchanged from prior encounter based on face-to-face assessment      Lab Review:   Results from last 7 days   Lab Units 21  0642 21  0627 08/15/21  0300 08/15/21  0300 21  0343 21  0343   SODIUM mmol/L 146* 143   < > 146*   < > 144   POTASSIUM mmol/L 3.1* 3.4*   < > 3.8   < > 4.0   CHLORIDE mmol/L  104 103   < > 106   < > 106   CO2 mmol/L 31.0* 29.0   < > 29.0   < > 29.0   BUN mg/dL 18 20   < > 25*   < > 30*   CREATININE mg/dL 0.94 1.05   < > 1.12   < > 1.26   GLUCOSE mg/dL 79 98   < > 98   < > 103*   CALCIUM mg/dL 8.4 8.3   < > 8.1*   < > 7.9*   AST (SGOT) U/L  --   --   --  17  --  16   ALT (SGPT) U/L  --   --   --  7  --  9    < > = values in this interval not displayed.         Results from last 7 days   Lab Units 08/17/21  0642 08/16/21  2200   WBC 10*3/mm3 7.80 8.40   HEMOGLOBIN g/dL 8.7* 8.7*   HEMATOCRIT % 27.6* 27.4*   PLATELETS 10*3/mm3 304 308     Results from last 7 days   Lab Units 08/17/21  0642 08/16/21  2200 08/16/21  1412 08/16/21  0627   INR  1.87*  --   --  1.66*   APTT seconds 54.1* 55.1*   < > 53.8*    < > = values in this interval not displayed.     Results from last 7 days   Lab Units 08/17/21  0642 08/16/21  0627   MAGNESIUM mg/dL 1.8 1.9           Invalid input(s): LDLCALC      Results from last 7 days   Lab Units 08/17/21  0642   TSH uIU/mL 2.830       Recent Radiology:  Imaging Results (Most Recent)     None          ECHOCARDIOGRAM:    Results for orders placed during the hospital encounter of 08/09/21    Adult Transthoracic Echo Complete W/ Cont if Necessary Per Protocol    Interpretation Summary  · Left ventricular wall thickness is consistent with mild concentric hypertrophy.  · Estimated left ventricular EF was in agreement with the calculated left ventricular EF. Left ventricular ejection fraction appears to be 56 - 60%. Left ventricular systolic function is normal.  · Estimated right ventricular systolic pressure from tricuspid regurgitation is mildly elevated (35-45 mmHg).  · Mild pulmonary hypertension is present.  · Left ventricular diastolic function is consistent with (grade Ia w/high LAP) impaired relaxation.  · Mild to moderate aortic valve stenosis is present.        I reviewed the patient's new clinical results.    EKG:      Assessment:       Hospice  care      1. Perioperative ischemic evaluation  - 2D ECHO preserved LV and RV size and function EF 55 to 60%  -Defer ischemic evaluation presently 89 years old    2. Supratherapeutic INR  - INR remains > 8- received vitamin K  - INR 1.6, heparin bridge until INR 2.5    3. LE cellulitis  - abx per primary/ vascular     4. CAD remote history of bypass revascularization  -Asymptomatic with no anginal chest pain  -Likely no benefit to invasive or noninvasive ischemic evaluation revascularization prior to left lower extremity revascularization with threatened limb,  -Optimize medicines perioperatively avoid hypotension  -Continue perioperative aspirin statin therapy    5. 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  Further recommendations per vascular surgery on any degree of revascularization that is warranted  Start heparin bridging for INR less than 2.5 with mechanical mitral valve  Avoid FFP for any cause other than intracranial bleeding     Stable hemodynamically  No acute CV concerns today  Continue present therapies from CV standpoint  CAD clinically silent no chest pain  Continue heparin drip until Coumadin restarted with INR greater than 2.5  Postoperative anemia, transfuse for hemoglobin less than 7      Plans for angiogram on Tuesday  Defer to renal for hydration and renal protection  No CV contraindication to lower extremity arteriogram  Patient's family decided to go with comfort measures and is on hospice.  Will not follow him.        Joni Soria MD  08/17/21  19:15 EDT

## 2021-08-17 NOTE — PLAN OF CARE
Goal Outcome Evaluation:  Plan of Care Reviewed With: patient      Patient is confused and disoriented; continually trying to get out of bed; unable to reorient patient; remains on IV heparin drip; has had several episodes of incontinence of loose stool; extremely hard of hearing; will continue to monitor patient.

## 2021-08-17 NOTE — PROGRESS NOTES
Infectious Diseases Progress Note      LOS: 6 days   Patient Care Team:  Casper Saldaña MD as PCP - General  Casper Saldaña MD as PCP - Family Medicine  Cory Bermeo MD as Consulting Physician (Nephrology)    Chief Complaint: Left great toe infection, left foot redness and swelling at admission    Subjective       The patient has been afebrile for the last 24 hours.  The patient is on 3 L of oxygen by nasal cannula, hemodynamically stable, and is tolerating antimicrobial therapy.  Wife and daughter at bedside and states that patient has become very confused overnight and is hallucinating      Review of Systems:   Review of Systems   Unable to perform ROS: Mental status change        Objective     Vital Signs  Temp:  [97.7 °F (36.5 °C)-98.3 °F (36.8 °C)] 98.3 °F (36.8 °C)  Heart Rate:  [75-83] 80  Resp:  [18-22] 22  BP: (131-156)/(50-66) 131/61    Physical Exam:  Physical Exam  Vitals and nursing note reviewed.   Constitutional:       General: He is not in acute distress.     Appearance: Normal appearance. He is well-developed. He is not diaphoretic.      Comments: Appears thin   HENT:      Head: Normocephalic and atraumatic.   Eyes:      Extraocular Movements: Extraocular movements intact.      Conjunctiva/sclera: Conjunctivae normal.      Pupils: Pupils are equal, round, and reactive to light.   Cardiovascular:      Rate and Rhythm: Normal rate and regular rhythm.      Heart sounds: Normal heart sounds, S1 normal and S2 normal.   Pulmonary:      Effort: Pulmonary effort is normal. No respiratory distress.      Breath sounds: Normal breath sounds. No stridor. No wheezing or rales.   Abdominal:      General: Bowel sounds are normal. There is no distension.      Palpations: Abdomen is soft. There is no mass.      Tenderness: There is no abdominal tenderness. There is no guarding.   Musculoskeletal:         General: No deformity. Normal range of motion.      Cervical back: Neck supple.       Comments: Patient's left foot appears to be having some more discoloration   Skin:     General: Skin is warm and dry.      Coloration: Skin is not pale.      Findings: No rash.   Neurological:      Mental Status: He is alert.      Cranial Nerves: No cranial nerve deficit.      Comments: Patient is now very confused and is hallucinating and reaching for things that are not there          Results Review:    I have reviewed all clinical data, test, lab, and imaging results.     Radiology  No Radiology Exams Resulted Within Past 24 Hours    Cardiology    Laboratory    Results from last 7 days   Lab Units 08/17/21  0642 08/16/21  2200 08/16/21  0627 08/15/21  0300 08/14/21  0343 08/13/21  0826 08/13/21  0332   WBC 10*3/mm3 7.80 8.40 8.80 8.10 9.00 10.00 9.10   HEMOGLOBIN g/dL 8.7* 8.7* 8.6* 8.0* 7.2* 8.0* 8.2*   HEMATOCRIT % 27.6* 27.4* 26.3* 25.0* 22.1* 24.4* 25.5*   PLATELETS 10*3/mm3 304 308 290 243 245 265 278     Results from last 7 days   Lab Units 08/17/21  0824 08/17/21  0642 08/16/21  0627 08/15/21  0300 08/14/21  0343 08/13/21  0332 08/12/21  0302 08/11/21  0522   SODIUM mmol/L  --  146* 143 146* 144 143 143 142   POTASSIUM mmol/L  --  3.1* 3.4* 3.8 4.0 3.8 3.9 3.8   CHLORIDE mmol/L  --  104 103 106 106 103 106 105   CO2 mmol/L  --  31.0* 29.0 29.0 29.0 31.0* 28.0 28.0   BUN mg/dL  --  18 20 25* 30* 26* 28* 30*   CREATININE mg/dL  --  0.94 1.05 1.12 1.26 1.18 1.19 1.28*   GLUCOSE mg/dL  --  79 98 98 103* 82 89 95   ALBUMIN g/dL  --   --   --  2.70* 2.20*  --   --  2.60*   BILIRUBIN mg/dL  --   --   --  0.4 0.2  --   --  0.2   ALK PHOS U/L  --   --   --  57 64  --   --  72   AST (SGOT) U/L  --   --   --  17 16  --   --  22   ALT (SGPT) U/L  --   --   --  7 9  --   --  10   AMMONIA umol/L 10*  --   --   --   --   --   --   --    CALCIUM mg/dL  --  8.4 8.3 8.1* 7.9* 7.9* 7.7* 8.2                 Microbiology   Microbiology Results (last 10 days)     Procedure Component Value - Date/Time    Anaerobic Culture - Body  Fluid, Toe, Left [[id number]] Collected: 08/12/21 1701    Lab Status: Final result Specimen: Body Fluid from Toe, Left Updated: 08/17/21 0826     Anaerobic Culture No anaerobes isolated at 5 days    Tissue / Bone Culture - Tissue, Toe, Left [[id number]]  (Abnormal)  (Susceptibility) Collected: 08/12/21 1701    Lab Status: Final result Specimen: Tissue from Toe, Left Updated: 08/15/21 0838     Tissue Culture Moderate growth (3+) Staphylococcus aureus      Scant growth (1+) Proteus mirabilis     Gram Stain Moderate (3+) WBCs per low power field      Few (2+) Gram positive cocci, intracellular      Few (2+) Gram positive cocci in clusters    Susceptibility      Staphylococcus aureus Proteus mirabilis      SULMA SULMA      Ampicillin  Susceptible      Ampicillin + Sulbactam  Susceptible      Cefepime  Susceptible      Ceftazidime  Susceptible      Ceftriaxone  Susceptible      Clindamycin Susceptible       Erythromycin Susceptible       Gentamicin  Susceptible      Inducible Clindamycin Resistance Negative       Levofloxacin  Susceptible      Oxacillin Susceptible       Penicillin G Resistant       Piperacillin + Tazobactam  Susceptible      Rifampin Susceptible       Tetracycline Susceptible Resistant      Trimethoprim + Sulfamethoxazole Susceptible Susceptible      Vancomycin Susceptible                  Linear View               Susceptibility Comments     Staphylococcus aureus    This isolate does not demonstrate inducible clindamycin resistance in vitro.      Proteus mirabilis    Cefazolin sensitivity will not be reported for Enterobacteriaceae in non-urine isolates. If cefazolin is preferred, please call the microbiology lab to request an E-test.  With the exception of urinary-sourced infections, aminoglycosides should not be used as monotherapy.             Respiratory Panel, PCR (WITHOUT COVID) - Swab, Nasopharynx [[id number]]  (Normal) Collected: 08/10/21 1346    Lab Status: Final result Specimen: Swab from  Nasopharynx Updated: 08/10/21 1437     ADENOVIRUS, PCR Not Detected     Coronavirus 229E Not Detected     Coronavirus HKU1 Not Detected     Coronavirus NL63 Not Detected     Coronavirus OC43 Not Detected     Human Metapneumovirus Not Detected     Human Rhinovirus/Enterovirus Not Detected     Influenza B PCR Not Detected     Parainfluenza Virus 1 Not Detected     Parainfluenza Virus 2 Not Detected     Parainfluenza Virus 3 Not Detected     Parainfluenza Virus 4 Not Detected     Bordetella pertussis pcr Not Detected     Chlamydophila pneumoniae PCR Not Detected     Mycoplasma pneumo by PCR Not Detected     Influenza A PCR Not Detected     RSV, PCR Not Detected     Bordetella parapertussis PCR Not Detected    Narrative:      The coronavirus on the RVP is NOT COVID-19 and is NOT indicative of infection with COVID-19.    In the setting of a positive respiratory panel with a viral infection PLUS a negative procalcitonin without other underlying concern for bacterial infection, consider observing off antibiotics or discontinuation of antibiotics and continue supportive care. If the respiratory panel is positive for atypical bacterial infection (Bordetella pertussis, Chlamydophila pneumoniae, or Mycoplasma pneumoniae), consider antibiotic de-escalation to target atypical bacterial infection.    COVID PRE-OP / PRE-PROCEDURE SCREENING ORDER (NO ISOLATION) - Swab, Nasopharynx [[id number]]  (Normal) Collected: 08/10/21 1114    Lab Status: Final result Specimen: Swab from Nasopharynx Updated: 08/10/21 1241    Narrative:      The following orders were created for panel order COVID PRE-OP / PRE-PROCEDURE SCREENING ORDER (NO ISOLATION) - Swab, Nasopharynx.  Procedure                               Abnormality         Status                     ---------                               -----------         ------                     COVID-19,CEPHEID/XUAN/BD...[[id number]]  Normal              Final result                 Please view  results for these tests on the individual orders.    COVID-19,CEPHEID/XUAN/BDMAX,COR/LOREN/PAD/FEI IN-HOUSE(OR EMERGENT/ADD-ON),NP SWAB IN TRANSPORT MEDIA 3-4 HR TAT, RT-PCR - Swab, Nasopharynx [[id number]]  (Normal) Collected: 08/10/21 1114    Lab Status: Final result Specimen: Swab from Nasopharynx Updated: 08/10/21 1241     COVID19 Not Detected    Narrative:      Fact sheet for providers: https://www.fda.gov/media/335439/download     Fact sheet for patients: https://www.fda.gov/media/241669/download  Fact sheet for providers: https://www.fda.gov/media/064262/download    Fact sheet for patients: https://www.fda.gov/media/069004/download    Test performed by PCR.    COVID PRE-OP / PRE-PROCEDURE SCREENING ORDER (NO ISOLATION) - Swab, Nasopharynx [[id number]]  (Normal) Collected: 08/10/21 0403    Lab Status: Final result Specimen: Swab from Nasopharynx Updated: 08/10/21 1445    Narrative:      The following orders were created for panel order COVID PRE-OP / PRE-PROCEDURE SCREENING ORDER (NO ISOLATION) - Swab, Nasopharynx.  Procedure                               Abnormality         Status                     ---------                               -----------         ------                     COVID-19,APTIMA PANTHER(...[[id number]]  Normal              Final result                 Please view results for these tests on the individual orders.    COVID-19,APTIMA PANTHER(ISAAK),[facility], NP/OP SWAB IN UTM/VTM/SALINE TRANSPORT MEDIA,24 HR TAT - Swab, Nasopharynx [[id number]]  (Normal) Collected: 08/10/21 0403    Lab Status: Final result Specimen: Swab from Nasopharynx Updated: 08/10/21 1445     COVID19 Not Detected    Narrative:      Fact sheet for providers: https://www.fda.gov/media/431992/download     Fact sheet for patients: https://www.fda.gov/media/246014/download    Test performed by RT PCR.    Blood Culture - Blood, Arm, Right [[id number]]  (Abnormal)  (Susceptibility) Collected: 08/10/21 0031    Lab Status: Final result  Specimen: Blood from Arm, Right Updated: 08/12/21 1229     Blood Culture Proteus mirabilis     Gram Stain Anaerobic Bottle Gram negative bacilli    Susceptibility      Proteus mirabilis      SULMA      Ampicillin Susceptible      Ampicillin + Sulbactam Susceptible      Cefepime Susceptible      Ceftazidime Susceptible      Ceftriaxone Susceptible      Gentamicin Susceptible      Levofloxacin Susceptible      Piperacillin + Tazobactam Susceptible      Trimethoprim + Sulfamethoxazole Susceptible               Linear View               Susceptibility Comments     Proteus mirabilis    Cefazolin sensitivity will not be reported for Enterobacteriaceae in non-urine isolates. If cefazolin is preferred, please call the microbiology lab to request an E-test.  With the exception of urinary-sourced infections, aminoglycosides should not be used as monotherapy.             Blood Culture ID, PCR - Blood, Arm, Right [[id number]]  (Abnormal) Collected: 08/10/21 0031    Lab Status: Final result Specimen: Blood from Arm, Right Updated: 08/11/21 0602     BCID, PCR Proteus spp. Identification by BCID PCR.    Blood Culture - Blood, Arm, Left [[id number]] Collected: 08/10/21 0018    Lab Status: Final result Specimen: Blood from Arm, Left Updated: 08/15/21 0030     Blood Culture No growth at 5 days          Medication Review:       Schedule Meds  Acetylcysteine, 1,200 mg, Oral, BID  ampicillin-sulbactam, 3 g, Intravenous, Q6H  budesonide, 0.5 mg, Nebulization, BID - RT  ipratropium-albuterol, 3 mL, Nebulization, Q6H - RT  QUEtiapine, 25 mg, Oral, Q12H  rosuvastatin, 10 mg, Oral, Nightly  sertraline, 100 mg, Oral, Daily  sodium chloride, 10 mL, Intravenous, Q12H  sodium hypochlorite, , Topical, BID  tamsulosin, 0.4 mg, Oral, Daily        Infusion Meds  heparin, 12 Units/kg/hr, Last Rate: 12 Units/kg/hr (08/17/21 0908)  sodium chloride, 60 mL/hr, Last Rate: 60 mL/hr (08/16/21 3197)        PRN Meds  •  acetaminophen **OR** acetaminophen **OR**  acetaminophen  •  aluminum-magnesium hydroxide-simethicone  •  haloperidol lactate  •  heparin  •  heparin  •  magnesium sulfate **OR** magnesium sulfate **OR** magnesium sulfate  •  melatonin  •  nitroglycerin  •  ondansetron **OR** ondansetron  •  potassium chloride  •  potassium chloride  •  potassium phosphate infusion greater than 15 mMoles **OR** potassium phosphate infusion greater than 15 mMoles **OR** potassium phosphate **OR** sodium phosphate IVPB **OR** sodium phosphate IVPB **OR** sodium phosphate IVPB  •  sodium chloride  •  sodium chloride        Assessment/Plan       Antimicrobial Therapy   1.        day  2.        day  3.  Unasyn      day  4.      Day  5.      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 labs  Case discussed with wife and daughter at bedside  Case discussed with hospitalist-family is considering hospice      Lissette Osuna, APRN  08/17/21  15:55 EDT     Note is dictated utilizing voice recognition software/Dragon

## 2021-08-17 NOTE — SIGNIFICANT NOTE
08/17/21 1007   OTHER   Discipline physical therapist   Rehab Time/Intention   Session Not Performed unable to treat, medical status change  (RN notes pt is severely confused and not appropriate for PT this date.)   Recommendation   PT - Next Appointment 08/18/21

## 2021-08-17 NOTE — SIGNIFICANT NOTE
08/17/21 1407   OTHER   Discipline occupational therapist   Rehab Time/Intention   Session Not Performed   (RN declined due to confusion)   Recommendation   OT - Next Appointment 08/18/21

## 2021-08-17 NOTE — DISCHARGE SUMMARY
[facility] Medicine Services  DISCHARGE SUMMARY    Patient Name: Niko Servin  : 1929  MRN: [id number]    Date of Admission: 2021  Date of Discharge:  2021  Primary Care Physician: Casper Saldaña MD      Presenting Problem:   Supratherapeutic INR [R79.1]  Cellulitis of left lower extremity [L03.116]    Active and Resolved Hospital Problems:  Active Hospital Problems    Diagnosis POA   • **Cellulitis of left lower extremity [L03.116] Yes   • History of B-cell lymphoma [Z85.72] Not Applicable   • Elevated INR (international normalized ratio) due to prior anticoagulant medication ingestion [R79.1] Yes   • Mixed hyperlipidemia [E78.2] Yes   • Peripheral vascular disease of lower extremity with ulceration (CMS/HCC) [I73.9, L97.909] Unknown   • Depression [F32.9] Yes   • Essential hypertension [I10] Yes   • Mechanical heart valve present [Z95.2] Not Applicable   • Hearing problem [H91.90] Yes   • Chronic coronary artery disease [I25.10] Yes   • Long term current use of anticoagulant therapy [Z79.01] Not Applicable   • History of mitral valve replacement [Z95.2] Not Applicable      Resolved Hospital Problems   No resolved problems to display.         Hospital Course     Hospital Course:  Niko Servin is a 89 y.o. male w/PMH of B-cell lymphoma, CAD, MVR W/mechanical valve long-term anticoagulation, depression, [place], HTN presents to [facility] ED due to left lower extremity cellulitis worsening for the past 2 weeks since he injured foot with worsening dmenetia.  Patient started on broad spectrum abx, ID consulted.  Left great toe had necrosis and malodor concerning for osteomyelitis.  Due to elevated INR, vitamin K given, cards replaced coumadin with heparin drip.  Vascular workup showed significant infrapopliteal disease in LLE.   Podiatry performed left great toe ampuation and foot showed signs of improvement.  However, patient's mental status  and condition continued to deteriorate despite intervention from ID, vascular, Podiatry, cardiology,ane Pulm.  Vascular was unable to perform intervention on 8/17/2021.  Suspect worsening status due to poor penetration of abx and worsening of disease in setting of underlying dementia.  Patient was also bacteremic with proteus 2/2 osteomyelitis as tissue grew proteus and staph.  Family elected to make patient comfort care with hospice consult on 8/17/2021.  Patient discharged and made GIP hospice on 8/17/2021    #Left great toe osteomyelitis   #Left great toe wet gangrene  #Bacteremia  #Mitral valve replacement history  #B cell lymphoma  #Peripheral Vascular disease, severe  #Dementia  #Delirium  #hydronephrosis  #Coumadin toxicity  #Hypokalemia  #Hypophosphatemia  #acute blood loss anemia  #HTN  #CAD  #Depression    Condition on discharge: Poor    DISCHARGE Follow Up Recommendations for labs and diagnostics:     -patient to be discharged and readmitted as GIP hospice        Day of Discharge     Vital Signs:  Temp:  [97.7 °F (36.5 °C)-98.3 °F (36.8 °C)] 98.3 °F (36.8 °C)  Heart Rate:  [75-83] 80  Resp:  [18-22] 22  BP: (131-156)/(50-66) 131/61  Flow (L/min):  [3-4] 3    Physical Exam:  Physical Exam   Constitutional:       General: He is not in acute distress.  AAOx0-1 with poor insight     Appearance: Normal appearance. He is well-developed. He is not ill-appearing, toxic-appearing or diaphoretic.   HENT:      Head: Normocephalic and atraumatic.     Ears without abnormality.  Extremely hard of hearing.     Nose: Nose normal. No congestion or rhinorrhea.      Mouth/Throat:      Mouth: Mucous membranes are moist.      Pharynx: No oropharyngeal exudate.   Eyes:      General: No scleral icterus.        Right eye: No discharge.         Left eye: No discharge.      Extraocular Movements: Extraocular movements intact.      Conjunctiva/sclera: Conjunctivae normal.      Pupils: Pupils are equal, round, and reactive to light.    Neck:      Thyroid: No thyromegaly.      Vascular: No carotid bruit or JVD.      Trachea: No tracheal deviation.   Cardiovascular:      Rate and Rhythm: Normal rate and regular rhythm.      Pulses: Normal pulses.      Heart sounds: Normal heart sounds. No murmur heard.   No friction rub. No gallop.    Pulmonary:      Effort: Pulmonary effort is normal. No respiratory distress.      Breath sounds: Normal breath sounds. No stridor. No wheezing, rhonchi or rales.   Chest:      Chest wall: No tenderness.   Abdominal:      General: Bowel sounds are normal. There is no distension.      Palpations: Abdomen is soft. There is no mass.      Tenderness: There is no abdominal tenderness. There is no guarding or rebound.      Hernia: No hernia is present.   Musculoskeletal:         General: No swelling, tenderness, deformity or signs of injury. Normal range of motion.      Cervical back: Normal range of motion and neck supple. No rigidity. No muscular tenderness.      Right lower leg: No edema.      Left lower leg: No edema. Pulses palpable but poor     Comments: S/p left great toe amputation.  Covered with compression dressing.  Lymphadenopathy:      Cervical: No cervical adenopathy.   Skin:     General: Skin is warm and dry.      Coloration: Skin is not jaundiced or pale.      Findings: No bruising, erythema or rash.   Neurological:      General: No focal deficit present.      Mental Status: AAOx1-2 with poor insight, dementia and baseline per wife   Cranial Nerves: No cranial nerve deficit.      Sensory: No sensory deficit.      Motor: No weakness or abnormal muscle tone.      Coordination: Coordination normal.   Psychiatric:      Comments: Patient AAOx0-1 with poor insight      Pertinent  and/or Most Recent Results     LAB RESULTS:      Lab 08/17/21  0642 08/16/21  2200 08/16/21  1412 08/16/21  0627 08/15/21  0300 08/14/21  1627 08/14/21  0343 08/13/21  1915 08/13/21  0826 08/13/21  0332 08/13/21  0332 08/11/21  0522  08/10/21  1835   WBC 7.80 8.40  --  8.80 8.10  --  9.00  --  10.00   < > 9.10   < > 10.90*   HEMOGLOBIN 8.7* 8.7*  --  8.6* 8.0*  --  7.2*  --  8.0*   < > 8.2*   < > 8.7*   HEMATOCRIT 27.6* 27.4*  --  26.3* 25.0*  --  22.1*  --  24.4*   < > 25.5*   < > 27.0*   PLATELETS 304 308  --  290 243  --  245  --  265   < > 278   < > 292   NEUTROS ABS  --  7.60*  --   --  7.30*  --  8.00*  --  9.00*  --  8.00*   < > 9.80*   LYMPHS ABS  --  0.30*  --   --  0.30*  --  0.40*  --  0.30*  --  0.40*   < > 0.30*   MONOS ABS  --  0.40  --   --  0.40  --  0.50  --  0.50  --  0.50   < > 0.60   EOS ABS  --  0.10  --   --  0.20  --  0.10  --  0.20  --  0.20   < > 0.10   MCV 83.0 82.1  --  82.2 82.2  --  83.3  --  84.2   < > 83.2   < > 82.7   LACTATE  --   --   --   --   --   --   --   --   --   --   --   --  0.8   PROTIME 19.8  --   --  17.7* 17.7*  --  15.5*  --  17.7*   < > 20.1   < >  --    APTT 54.1* 55.1* 86.3* 53.8* 68.3   < > 49.5*   < > 39.9*  --   --   --   --     < > = values in this interval not displayed.         Lab 08/17/21  0642 08/16/21  0627 08/15/21  0300 08/14/21  0343 08/13/21  0332 08/12/21  0302 08/11/21  0522   SODIUM 146* 143 146* 144 143   < > 142   POTASSIUM 3.1* 3.4* 3.8 4.0 3.8   < > 3.8   CHLORIDE 104 103 106 106 103   < > 105   CO2 31.0* 29.0 29.0 29.0 31.0*   < > 28.0   ANION GAP 11.0 11.0 11.0 9.0 9.0   < > 9.0   BUN 18 20 25* 30* 26*   < > 30*   CREATININE 0.94 1.05 1.12 1.26 1.18   < > 1.28*   GLUCOSE 79 98 98 103* 82   < > 95   CALCIUM 8.4 8.3 8.1* 7.9* 7.9*   < > 8.2   MAGNESIUM 1.8 1.9 2.1 2.4* 1.6*   < > 1.5*   PHOSPHORUS 2.3* 2.3* 3.5  --   --   --   --    HEMOGLOBIN A1C  --   --   --   --   --   --  6.0*   TSH 2.830  --   --   --   --   --   --     < > = values in this interval not displayed.         Lab 08/15/21  0300 08/14/21  0343 08/11/21  0522   TOTAL PROTEIN 5.9* 5.6* 6.2   ALBUMIN 2.70* 2.20* 2.60*   GLOBULIN 3.2 3.4 3.6   ALT (SGPT) 7 9 10   AST (SGOT) 17 16 22   BILIRUBIN 0.4 0.2 0.2   ALK  PHOS 57 64 72         Lab 08/17/21  0642 08/16/21  0627 08/15/21  0300 08/14/21  0343 08/13/21  0826   PROTIME 19.8 17.7* 17.7* 15.5* 17.7*   INR 1.87* 1.66* 1.66* 1.43* 1.66*             Lab 08/16/21  0627 08/15/21  0300 08/14/21  0746   IRON 25*   < >  --    IRON SATURATION 19*  --   --    TIBC 133*  --   --    TRANSFERRIN 89*  --   --    FERRITIN 554.40*  --   --    ABO TYPING  --   --  A   RH TYPING  --   --  Positive   ANTIBODY SCREEN  --   --  Negative    < > = values in this interval not displayed.         Lab 08/14/21  1332 08/10/21  1806   PH, ARTERIAL 7.310* 7.384   PCO2, ARTERIAL 60.8* 42.5   PO2 ART 80.4* 81.1*   O2 SATURATION ART 94.2 95.7   FIO2 33 32   HCO3 ART 30.6* 25.3   BASE EXCESS ART 3.5* 0.2     Brief Urine Lab Results  (Last result in the past 365 days)      Color   Clarity   Blood   Leuk Est   Nitrite   Protein   CREAT   Urine HCG        08/10/21 0402 Dark Yellow Cloudy  Comment:  Result checked  Large (3+) Negative Negative 30 mg/dL (1+)             Microbiology Results (last 10 days)     Procedure Component Value - Date/Time    Anaerobic Culture - Body Fluid, Toe, Left [[id number]] Collected: 08/12/21 1701    Lab Status: Final result Specimen: Body Fluid from Toe, Left Updated: 08/17/21 0826     Anaerobic Culture No anaerobes isolated at 5 days    Tissue / Bone Culture - Tissue, Toe, Left [[id number]]  (Abnormal)  (Susceptibility) Collected: 08/12/21 1701    Lab Status: Final result Specimen: Tissue from Toe, Left Updated: 08/15/21 0838     Tissue Culture Moderate growth (3+) Staphylococcus aureus      Scant growth (1+) Proteus mirabilis     Gram Stain Moderate (3+) WBCs per low power field      Few (2+) Gram positive cocci, intracellular      Few (2+) Gram positive cocci in clusters    Susceptibility      Staphylococcus aureus Proteus mirabilis      SULMA SULMA      Ampicillin  Susceptible      Ampicillin + Sulbactam  Susceptible      Cefepime  Susceptible      Ceftazidime  Susceptible       Ceftriaxone  Susceptible      Clindamycin Susceptible       Erythromycin Susceptible       Gentamicin  Susceptible      Inducible Clindamycin Resistance Negative       Levofloxacin  Susceptible      Oxacillin Susceptible       Penicillin G Resistant       Piperacillin + Tazobactam  Susceptible      Rifampin Susceptible       Tetracycline Susceptible Resistant      Trimethoprim + Sulfamethoxazole Susceptible Susceptible      Vancomycin Susceptible                  Linear View               Susceptibility Comments     Staphylococcus aureus    This isolate does not demonstrate inducible clindamycin resistance in vitro.      Proteus mirabilis    Cefazolin sensitivity will not be reported for Enterobacteriaceae in non-urine isolates. If cefazolin is preferred, please call the microbiology lab to request an E-test.  With the exception of urinary-sourced infections, aminoglycosides should not be used as monotherapy.             Respiratory Panel, PCR (WITHOUT COVID) - Swab, Nasopharynx [[id number]]  (Normal) Collected: 08/10/21 1346    Lab Status: Final result Specimen: Swab from Nasopharynx Updated: 08/10/21 1437     ADENOVIRUS, PCR Not Detected     Coronavirus 229E Not Detected     Coronavirus HKU1 Not Detected     Coronavirus NL63 Not Detected     Coronavirus OC43 Not Detected     Human Metapneumovirus Not Detected     Human Rhinovirus/Enterovirus Not Detected     Influenza B PCR Not Detected     Parainfluenza Virus 1 Not Detected     Parainfluenza Virus 2 Not Detected     Parainfluenza Virus 3 Not Detected     Parainfluenza Virus 4 Not Detected     Bordetella pertussis pcr Not Detected     Chlamydophila pneumoniae PCR Not Detected     Mycoplasma pneumo by PCR Not Detected     Influenza A PCR Not Detected     RSV, PCR Not Detected     Bordetella parapertussis PCR Not Detected    Narrative:      The coronavirus on the RVP is NOT COVID-19 and is NOT indicative of infection with COVID-19.    In the setting of a positive  respiratory panel with a viral infection PLUS a negative procalcitonin without other underlying concern for bacterial infection, consider observing off antibiotics or discontinuation of antibiotics and continue supportive care. If the respiratory panel is positive for atypical bacterial infection (Bordetella pertussis, Chlamydophila pneumoniae, or Mycoplasma pneumoniae), consider antibiotic de-escalation to target atypical bacterial infection.    COVID PRE-OP / PRE-PROCEDURE SCREENING ORDER (NO ISOLATION) - Swab, Nasopharynx [[id number]]  (Normal) Collected: 08/10/21 1114    Lab Status: Final result Specimen: Swab from Nasopharynx Updated: 08/10/21 1241    Narrative:      The following orders were created for panel order COVID PRE-OP / PRE-PROCEDURE SCREENING ORDER (NO ISOLATION) - Swab, Nasopharynx.  Procedure                               Abnormality         Status                     ---------                               -----------         ------                     COVID-19,CEPHEID/XUAN/BD...[[id number]]  Normal              Final result                 Please view results for these tests on the individual orders.    COVID-19,CEPHEID/XUAN/BDMAX,COR/LOREN/PAD/FEI IN-HOUSE(OR EMERGENT/ADD-ON),NP SWAB IN TRANSPORT MEDIA 3-4 HR TAT, RT-PCR - Swab, Nasopharynx [[id number]]  (Normal) Collected: 08/10/21 1114    Lab Status: Final result Specimen: Swab from Nasopharynx Updated: 08/10/21 1241     COVID19 Not Detected    Narrative:      Fact sheet for providers: https://www.fda.gov/media/342395/download     Fact sheet for patients: https://www.fda.gov/media/416698/download  Fact sheet for providers: https://www.fda.gov/media/825972/download    Fact sheet for patients: https://www.fda.gov/media/578785/download    Test performed by PCR.    COVID PRE-OP / PRE-PROCEDURE SCREENING ORDER (NO ISOLATION) - Swab, Nasopharynx [[id number]]  (Normal) Collected: 08/10/21 0403    Lab Status: Final result Specimen: Swab from Nasopharynx  Updated: 08/10/21 1445    Narrative:      The following orders were created for panel order COVID PRE-OP / PRE-PROCEDURE SCREENING ORDER (NO ISOLATION) - Swab, Nasopharynx.  Procedure                               Abnormality         Status                     ---------                               -----------         ------                     COVID-19,APTIMA PANTHER(...[[id number]]  Normal              Final result                 Please view results for these tests on the individual orders.    COVID-19,APTIMA PANTHER(ISAAK),[facility], NP/OP SWAB IN UTM/VTM/SALINE TRANSPORT MEDIA,24 HR TAT - Swab, Nasopharynx [[id number]]  (Normal) Collected: 08/10/21 0403    Lab Status: Final result Specimen: Swab from Nasopharynx Updated: 08/10/21 1445     COVID19 Not Detected    Narrative:      Fact sheet for providers: https://www.fda.gov/media/226555/download     Fact sheet for patients: https://www.fda.gov/media/873110/download    Test performed by RT PCR.    Blood Culture - Blood, Arm, Right [[id number]]  (Abnormal)  (Susceptibility) Collected: 08/10/21 0031    Lab Status: Final result Specimen: Blood from Arm, Right Updated: 08/12/21 1229     Blood Culture Proteus mirabilis     Gram Stain Anaerobic Bottle Gram negative bacilli    Susceptibility      Proteus mirabilis      SULMA      Ampicillin Susceptible      Ampicillin + Sulbactam Susceptible      Cefepime Susceptible      Ceftazidime Susceptible      Ceftriaxone Susceptible      Gentamicin Susceptible      Levofloxacin Susceptible      Piperacillin + Tazobactam Susceptible      Trimethoprim + Sulfamethoxazole Susceptible               Linear View               Susceptibility Comments     Proteus mirabilis    Cefazolin sensitivity will not be reported for Enterobacteriaceae in non-urine isolates. If cefazolin is preferred, please call the microbiology lab to request an E-test.  With the exception of urinary-sourced infections, aminoglycosides should not be used as monotherapy.              Blood Culture ID, PCR - Blood, Arm, Right [[id number]]  (Abnormal) Collected: 08/10/21 0031    Lab Status: Final result Specimen: Blood from Arm, Right Updated: 08/11/21 0602     BCID, PCR Proteus spp. Identification by BCID PCR.    Blood Culture - Blood, Arm, Left [[id number]] Collected: 08/10/21 0018    Lab Status: Final result Specimen: Blood from Arm, Left Updated: 08/15/21 0030     Blood Culture No growth at 5 days          XR Chest 2 View    Result Date: 8/10/2021  Impression: Chronic changes are again noted which are stable. There is no definitive evidence for acute cardiopulmonary process.  Electronically Signed By-Aneesh Judd MD On:8/10/2021 12:43 PM This report was finalized on [id number] by  Aneesh Judd MD.    XR Foot 3+ View Left    Result Date: 8/10/2021  Impression: 1.Evidence for soft tissue swelling throughout the great toe with areas of gas production. The findings indicate changes of soft tissue cellulitis. 2.Evidence for osteomyelitis involving the proximal and distal phalanges of the great toe.  Electronically Signed By-Aneesh Judd MD On:8/10/2021 7:46 AM This report was finalized on [id number] by  Aneesh Judd MD.    CT Head Without Contrast    Result Date: 8/10/2021  Impression: 1. No acute intracranial abnormality. No hemorrhage. 2. No calvarial fracture. 3. Moderate chronic age-related intracranial findings as above.  This examination was interpreted by Aneesh Ordonez M.D. Electronically signed by:  Aneesh Ordonez M.D.  8/9/2021 11:53 PM    CT Chest Without Contrast Diagnostic    Result Date: 8/11/2021  Impression:  1. Tiny layering pleural effusions with mild bilateral basilar subsegmental atelectasis. 2. The study is difficult to interpret due to respiratory motion artifact. There are some areas of interstitial thickening and hazy groundglass density. I would favor pulmonary edema over an atypical infection. 3. Cardiomegaly with extensive coronary artery  atherosclerotic vascular disease. 4. Dense sludge within the gallbladder.  Electronically Signed By-Hammad Ragland MD On:8/11/2021 7:35 PM This report was finalized on [id number] by  Hammad Ragland MD.    CT Angio Abdominal Aorta Bilateral Iliofem Runoff    Result Date: 8/12/2021  Impression:  1. Diffuse atherosclerotic disease to approximately the popliteal level bilaterally without any significant stenosis above the popliteal artery. 2. Greater than 70% stenosis of the left popliteal artery. 3. Significant trifurcation level disease bilaterally with single vessel runoff via the peroneal artery. 4. Right renal and bladder stones with bilateral hydronephrosis. There is marked bladder distention with multiple bladder diverticula  and prostatic enlargement suggesting bladder outlet obstruction. 5. Multiple incidental nonvascular findings as noted above.  Electronically Signed By-Tushar Montes MD On:8/12/2021 1:32 PM This report was finalized on [id number] by  Tushar Montes MD.    XR Chest 1 View    Result Date: 8/16/2021  Impression: No interval change yesterday's study with abnormalities as described above.  Electronically Signed By-Hammad Ragland MD On:8/16/2021 8:18 AM This report was finalized on [id number] by  Hammad Ragland MD.    XR Chest 1 View    Result Date: 8/15/2021  Impression: 1.Coarse bilateral interstitial markings, which could reflect interstitial pulmonary edema versus atypical pneumonia. 2.Cardiomegaly with small bilateral pleural effusions.  Electronically Signed By-Aneesh Cook MD On:8/15/2021 10:23 AM This report was finalized on [id number] by  Aneesh Cook MD.    XR Chest 1 View    Result Date: 8/10/2021  Impression: Chronic changes are noted which are stable. There is no evidence for definitive acute cardiopulmonary process.  Electronically Signed By-Aneesh Judd MD On:8/10/2021 7:43 AM This report was finalized on [id number] by  Aneesh Judd MD.      Results for orders placed  during the hospital encounter of 08/09/21    Duplex Vein Mapping Lower Extremity - Bilateral CAR    Interpretation Summary  · The left greater saphenous vein is patent and of adequate size in the thigh.  · The left greater saphenous vein is patent and of adequate size in the calf.      Results for orders placed during the hospital encounter of 08/09/21    Duplex Vein Mapping Lower Extremity - Bilateral CAR    Interpretation Summary  · The left greater saphenous vein is patent and of adequate size in the thigh.  · The left greater saphenous vein is patent and of adequate size in the calf.      Results for orders placed during the hospital encounter of 08/09/21    Adult Transthoracic Echo Complete W/ Cont if Necessary Per Protocol    Interpretation Summary  · Left ventricular wall thickness is consistent with mild concentric hypertrophy.  · Estimated left ventricular EF was in agreement with the calculated left ventricular EF. Left ventricular ejection fraction appears to be 56 - 60%. Left ventricular systolic function is normal.  · Estimated right ventricular systolic pressure from tricuspid regurgitation is mildly elevated (35-45 mmHg).  · Mild pulmonary hypertension is present.  · Left ventricular diastolic function is consistent with (grade Ia w/high LAP) impaired relaxation.  · Mild to moderate aortic valve stenosis is present.      Labs Pending at Discharge:      Procedures Performed  Procedure(s):  AMPUTATION DIGIT Left great toe         Consults:   Consults     Date and Time Order Name Status Description    8/11/2021  8:37 AM Inpatient Nephrology Consult Completed     8/11/2021  8:37 AM Inpatient Pulmonology Consult Completed     8/10/2021  5:44 PM Inpatient Cardiology Consult Completed     8/10/2021  5:17 PM Inpatient Vascular Surgery Consult Completed     8/10/2021  8:36 AM Inpatient Infectious Diseases Consult Completed     8/10/2021  2:49 AM Inpatient Podiatry Consult Completed     8/10/2021  2:30 AM  Inpatient Hospitalist Consult Completed             Discharge Details        Discharge Medications      ASK your doctor about these medications      Instructions Start Date   sertraline 100 MG tablet  Commonly known as: ZOLOFT   TAKE ONE TABLET BY MOUTH DAILY      warfarin 7.5 MG tablet  Commonly known as: COUMADIN  Ask about: Which instructions should I use?   TAKE ONE TABLET BY MOUTH DAILY             No Known Allergies      Discharge Disposition:  Hospice/Medical Facility ([facility] [place], operated by [facility])    Diet:  Hospital:  Diet Order   Procedures   • Diet Diabetic/Consistent Carbs; Diabetic - Consistent Carb         Discharge Activity:         CODE STATUS:  Code Status and Medical Interventions:   Ordered at: 08/17/21 0935     Limited Support to NOT Include:    Intubation     Level Of Support Discussed With:    Health Care Surrogate     Code Status:    No CPR     Medical Interventions (Level of Support Prior to Arrest):    Limited         No future appointments.        Time spent on Discharge including face to face service:  34 minutes    This patient has been examined wearing appropriate Personal Protective Equipment and discussed with Family. 08/17/21      Signature: Electronically signed by Alec Husain DO, 08/17/21, 4:54 PM EDT.

## 2021-08-17 NOTE — PROGRESS NOTES
[facility] Medicine Services Daily Progress Note    Patient Name: Niko Servin  : 1929  MRN: [id number]  Primary Care Physician:  Casper Saldaña MD  Date of admission: 2021      Subjective      Chief Complaint: *Left foot pain      Niko Servin is a 89 y.o. male w/PMH of B-cell lymphoma, CAD, MVR W/mechanical valve long-term anticoagulation, depression, [place], HTN presents to [facility] ED due to left lower extremity cellulitis.  Patient is extremely hard of hearing and is a poor historian, unable to complete review of systems at this time.  Family reports patient injured his left great toe approximately 2 weeks prior but did not tell anyone.  Patient's wife reports he has fallen multiple times over the last week due to gait abnormality.  Patient's wife also reports deterioration of cognition with intermittent hallucinations.  Wife reports patient is vaccinated for Covid.              Upon arrival to the ED vitals temp 98, HR 77, RR 17, /65, O2 sat 100% on room air.  Abs notable for troponin less than 0.015, glucose 113, , K4.1, creatinine 1.25, BUN 35, GFR 54, ALT 8, AST 15, total bili 0.3, CRP 28.7, lactic 1.0, INR greater than 8, WBC 12, Hgb 9.4, platelets 302, neutrophils 87.3.  Sed rate 42.  Blood cultures drawn.  CT of the head shows no acute intracranial abnormality, no hemorrhage.  Moderate chronic age-related intracranial findings.  EKG shows sinus rhythm rate 73 multiple PVCs, ventricular and supraventricular, borderline ST depression anterolateral leads.  Patient treated in the ED with Tdap, IV Zosyn, albuterol nebulizer, IV vancomycin.  UA, chest x-ray, x-ray left foot ordered.          Patient Reports *    Patient reports persistent pain left foot  Going for surgery today  Less tachypneic than yesterday  Very hard of hearing  Dr. Saldaña sent me a message yesterday that he has also underlying dementia that has not been  fully evaluated.    Patient had persistent low hemoglobin 8.7  Has been febrile to 200.6 overnight yesterday  Tachypnea has improved  Saturation 93% on 3 L  Low magnesium 1.5  Supratherapeutic INR more than 8.  I have seen  FFP are being prepared.  We will defer vitamin K to cardiology service given mechanical the mitral valve.      8/12  Patient accompanied by family today  He does not seem to be in pain  He appears comfortable  He is going for surgery today for left great toe amputation  Discussed with Dr. Woo  Unfortunately his INR still high.  Discussed with cardiology service who recommended vitamin K only for now.  Patient was apparently seen by pulmonary and nephrology as well    8/13  Patient had amputation left great toe yesterday 8/12/2021  INR down to 1.6 and hemoglobin 8  Awaiting cultures from tissues.  Gram stain showing gram-positive cocci in clusters  Pathology from left great toe pending  .Echo shows normal ejection fraction and elevated right ventricular pressures with tricuspid regurgitation and mild pulmonary hypertension and diastolic dysfunction with moderate aortic stenosis        8/14  Patient has been afebrile  Blood pressure low normal side.  Saturation 95% on 3 L.  Renal function slightly fluctuating but not significantly changed  INR is 1.4 today  Hemoglobin down to 7.2  He is on heparin drip  He is receiving albumin by nephrologist  Tissue culture growing Staph aureus and scant growth of gram-negative bacilli pending identification  ABG shows respiratory acidosis with hypercarbia  Problems of sleep.  We will monitor.  Will need BiPAP at night.    8/15  Patient transferred to PCU due to need for BiPAP  Patient more alert today  Appears more comfortable  Hemoglobin is 8  Patient received 1 unit of RBCs yesterday  His INR 1.6    08/16/2021  No overnight events.  Patient appears confused, difficulty answering questions, wife states he has been like this since ill, suspect 2/2 long term  dementia.  Plan for arteriogram with possible intervention tomorrow.    08/17/2021  Case discussed with vascular, due to worsening mental status and ischemic leg patient is no longer a candidate for vascular intervention.  Patient is AAOx0-1 with poor insight.  Palliative consulted as outlook poor    Review of Systems   Unable to perform ROS: Altered mental status           Objective      Vitals:   Temp:  [97.7 °F (36.5 °C)-98.1 °F (36.7 °C)] 98 °F (36.7 °C)  Heart Rate:  [73-83] 78  Resp:  [18-20] 18  BP: (135-156)/(50-66) 135/55  Flow (L/min):  [3-4] 3    Physical Exam  Vitals and nursing note reviewed.   Constitutional:       General: He is not in acute distress.  AAOx0-1 with poor insight     Appearance: Normal appearance. He is well-developed. He is not ill-appearing, toxic-appearing or diaphoretic.   HENT:      Head: Normocephalic and atraumatic.     Ears without abnormality.  Extremely hard of hearing.     Nose: Nose normal. No congestion or rhinorrhea.      Mouth/Throat:      Mouth: Mucous membranes are moist.      Pharynx: No oropharyngeal exudate.   Eyes:      General: No scleral icterus.        Right eye: No discharge.         Left eye: No discharge.      Extraocular Movements: Extraocular movements intact.      Conjunctiva/sclera: Conjunctivae normal.      Pupils: Pupils are equal, round, and reactive to light.   Neck:      Thyroid: No thyromegaly.      Vascular: No carotid bruit or JVD.      Trachea: No tracheal deviation.   Cardiovascular:      Rate and Rhythm: Normal rate and regular rhythm.      Pulses: Normal pulses.      Heart sounds: Normal heart sounds. No murmur heard.   No friction rub. No gallop.    Pulmonary:      Effort: Pulmonary effort is normal. No respiratory distress.      Breath sounds: Normal breath sounds. No stridor. No wheezing, rhonchi or rales.   Chest:      Chest wall: No tenderness.   Abdominal:      General: Bowel sounds are normal. There is no distension.      Palpations:  Abdomen is soft. There is no mass.      Tenderness: There is no abdominal tenderness. There is no guarding or rebound.      Hernia: No hernia is present.   Musculoskeletal:         General: No swelling, tenderness, deformity or signs of injury. Normal range of motion.      Cervical back: Normal range of motion and neck supple. No rigidity. No muscular tenderness.      Right lower leg: No edema.      Left lower leg: No edema. Pulses palpable but poor     Comments: S/p left great toe amputation.  Covered with compression dressing.  Lymphadenopathy:      Cervical: No cervical adenopathy.   Skin:     General: Skin is warm and dry.      Coloration: Skin is not jaundiced or pale.      Findings: No bruising, erythema or rash.   Neurological:      General: No focal deficit present.      Mental Status: AAOx1-2 with poor insight, dementia and baseline per wife   Cranial Nerves: No cranial nerve deficit.      Sensory: No sensory deficit.      Motor: No weakness or abnormal muscle tone.      Coordination: Coordination normal.   Psychiatric:      Comments: Patient AAOx0-1 with poor insight     *       Result Review    Result Review:  I have personally reviewed the results from the time of this admission to 8/17/2021 12:18 EDT and agree with these findings:  [x]  Laboratory  [x]  Microbiology  [x]  Radiology  [x]  EKG/Telemetry   [x]  Cardiology/Vascular   [x]  Pathology  []  Old records  []  Other:  Most notable findings include: *  As above       Wounds (last 24 hours)      LDA Wound     Row Name 08/17/21 0400 08/17/21 0001 08/16/21 2000       Wound 08/10/21 0508 Left distal leg Other (comment)    Wound - Properties Group Placement Date: 08/10/21  - Placement Time: 0508  - Side: Left  - Orientation: distal  - Location: leg  - Primary Wound Type: Other  -JM, Cellulitis per md     Closure  --  --  --  -DM    Base  dry;red  -DM  dry;red  -DM  dry;red  -DM    Periwound  dry;warm;intact  -DM  dry;warm;intact  -DM   dry;warm;intact  -DM    Periwound Temperature  warm  -DM  warm  -DM  warm  -DM    Drainage Amount  none  -DM  none  -DM  none  -DM    Retired Wound - Properties Group Date first assessed: 08/10/21  -JM Time first assessed: 0508  -JM Side: Left  -JM Location: leg  -JM Primary Wound Type: Other  -JM, Cellulitis per md        Wound 08/12/21 Left anterior great toe Incision    Wound - Properties Group Placement Date: 08/12/21  -TH Side: Left  -TH Orientation: anterior  -TH Location: great toe  -TH Primary Wound Type: Incision  -TH Additional Comments: 4x4 soaked in betadine, 4x4, kerlix, and ace  -TH    Dressing Appearance  --  --  dried drainage  -DM    Closure  None  -DM  None  -DM  None  -DM    Drainage Amount  small  -DM  small  -DM  small  -DM    Care, Wound  --  --  cleansed with;sterile normal saline;dressing removed  -DM    Dressing Care  --  --  dressing applied  -DM    Retired Wound - Properties Group Date first assessed: 08/12/21  -TH Side: Left  -TH Location: great toe  -TH Primary Wound Type: Incision  -TH Additional Comments: 4x4 soaked in betadine, 4x4, kerlix, and ace  -TH       Wound 08/13/21 1113 upper thoracic spine    Wound - Properties Group Placement Date: 08/13/21  -TP Placement Time: 1113  -TP Orientation: upper  -TP Location: thoracic spine  -TP    Closure  Adhesive bandage  -DM  Adhesive bandage  -DM  Adhesive bandage  -DM    Retired Wound - Properties Group Date first assessed: 08/13/21  -TP Time first assessed: 1113  -TP Location: thoracic spine  -TP       Wound 08/13/21 1114 thoracic spine    Wound - Properties Group Placement Date: 08/13/21  -TP Placement Time: 1114  -TP Location: thoracic spine  -TP    Retired Wound - Properties Group Date first assessed: 08/13/21  -TP Time first assessed: 1114  -TP Location: thoracic spine  -TP    Row Name 08/16/21 1600             Wound 08/10/21 0508 Left distal leg Other (comment)    Wound - Properties Group Placement Date: 08/10/21  -JM Placement Time:  0508  -JM Side: Left  -JM Orientation: distal  -JM Location: leg  -JM Primary Wound Type: Other  -JM, Cellulitis per md     Closure  Surface sutures  -SK      Base  dry;red  -SK      Periwound  dry;warm;intact  -SK      Periwound Temperature  warm  -SK      Drainage Amount  none  -SK      Retired Wound - Properties Group Date first assessed: 08/10/21  -JM Time first assessed: 0508  -JM Side: Left  -JM Location: leg  -JM Primary Wound Type: Other  -JM, Cellulitis per md        Wound 08/12/21 Left anterior great toe Incision    Wound - Properties Group Placement Date: 08/12/21  -TH Side: Left  -TH Orientation: anterior  -TH Location: great toe  -TH Primary Wound Type: Incision  -TH Additional Comments: 4x4 soaked in betadine, 4x4, kerlix, and ace  -TH    Closure  None  -SK      Drainage Amount  large;moderate  -SK      Retired Wound - Properties Group Date first assessed: 08/12/21  -TH Side: Left  -TH Location: great toe  -TH Primary Wound Type: Incision  -TH Additional Comments: 4x4 soaked in betadine, 4x4, kerlix, and ace  -TH       Wound 08/13/21 1113 upper thoracic spine    Wound - Properties Group Placement Date: 08/13/21  -TP Placement Time: 1113  -TP Orientation: upper  -TP Location: thoracic spine  -TP    Closure  Adhesive bandage  -SK      Retired Wound - Properties Group Date first assessed: 08/13/21  -TP Time first assessed: 1113  -TP Location: thoracic spine  -TP       Wound 08/13/21 1114 thoracic spine    Wound - Properties Group Placement Date: 08/13/21  -TP Placement Time: 1114  -TP Location: thoracic spine  -TP    Retired Wound - Properties Group Date first assessed: 08/13/21  -TP Time first assessed: 1114  -TP Location: thoracic spine  -TP      User Key  (r) = Recorded By, (t) = Taken By, (c) = Cosigned By    Initials Name Provider Type     Fatuma Coronel, RN Registered Nurse    Erica Chou RN Registered Nurse    Velia Crenshaw RN Registered Nurse    Brittani Gilbert LPN  Licensed Nurse    PREM Herring, MARLINE Wetzel Licensed Nurse            Assessment/Plan      Brief Patient Summary:  Niko Servin is a 89 y.o. male who *presenting with gangrenous changes left great toe    Acetylcysteine, 1,200 mg, Oral, BID  ampicillin-sulbactam, 3 g, Intravenous, Q6H  budesonide, 0.5 mg, Nebulization, BID - RT  ipratropium-albuterol, 3 mL, Nebulization, Q6H - RT  QUEtiapine, 25 mg, Oral, Q12H  rosuvastatin, 10 mg, Oral, Nightly  sertraline, 100 mg, Oral, Daily  sodium chloride, 10 mL, Intravenous, Q12H  sodium hypochlorite, , Topical, BID  tamsulosin, 0.4 mg, Oral, Daily       heparin, 12 Units/kg/hr, Last Rate: 12 Units/kg/hr (08/17/21 0908)  sodium chloride, 60 mL/hr, Last Rate: 60 mL/hr (08/16/21 9330)         Active Hospital Problems:  Active Hospital Problems    Diagnosis    • **Cellulitis of left lower extremity    • History of B-cell lymphoma    • Elevated INR (international normalized ratio) due to prior anticoagulant medication ingestion    • Mixed hyperlipidemia    • Peripheral vascular disease of lower extremity with ulceration (CMS/HCC)      Added automatically from request for surgery [id number]     • Depression    • Essential hypertension    • Mechanical heart valve present    • Hearing problem    • Chronic coronary artery disease    • Long term current use of anticoagulant therapy    • History of mitral valve replacement      Plan:      #Gangrenous changes of the left lower extremity involving the left great toe wound and foot:    -X-ray shows positive gas production but thought to be related to the wound by Dr. Woo.    -Patient received clindamycin and vancomycin    -Currently on Unasyn .  ID following.  Likely will need 6 weeks therapy.    -8/11/2021 s/p left great toe amputation.    -Follow-up culture.  Culture growing MSSA and Proteus.    -Vascular surgery consulted as well.    -CT angiogram shows:Greater than 70% stenosis of the left popliteal artery.    - Significant  trifurcation level disease bilaterally with single vessel  runoff via the peroneal artery    -Per vascular:.Due to AMS and worsening ischemia he is not currently a candidate for vascular intervention       #Right renal and bladder stones with bilateral hydronephrosis. There  is marked bladder distention with multiple bladder diverticula  and  prostatic enlargement suggesting bladder outlet obstruction.    - Smith catheter.    - Flomax    -Follow-up with urology    #Bacteremia with Proteus species    -Present on admission     -On Unasyn    -Possibly related to the foot infection    -ID following    #Coumadin toxicity    -Elevated INR:    -S/p vitamin K    -INR decreased.    -Heparin drip startedFor bridging    -Defer to cardiology    -Holding Coumadin for mechanical mitral valve until patient is cleared for discharge.    #Hypokalemia  #Hypophosphatemia    - replace per protocol    #Acute blood loss anemia    - 2/2 post surgical blood loss and anemia of chronic disease    -Will need transfusion if below 7.5 or symptomatic    -Parenteral iron    -s/p 1u prbc on 8/14/2021    #Mixed hyperlipidemia:    -On statin    #Essential hypertension:     -On Lasix for now    #Acute hypoxic/hypercarbic respiratory failure requiring BiPAP    -Has not been on BiPAP or CPAP at home    - use PRN    Chronic coronary artery disease:  -Cardiology is consulted  On aspirin and statin  Negative initial troponin    #Suspect CHF exacerbation    -Echo shows that diastolic dysfunction mild to moderate aortic stenosis  · Left ventricular wall thickness is consistent with mild concentric hypertrophy.  · Estimated left ventricular EF was in agreement with the calculated left ventricular EF. Left ventricular ejection fraction appears to be 56 - 60%. Left ventricular systolic function is normal.  · Estimated right ventricular systolic pressure from tricuspid regurgitation is mildly elevated (35-45 mmHg).  · Mild pulmonary hypertension is  present.  · Left ventricular diastolic function is consistent with (grade Ia w/high LAP) impaired relaxation.  · Mild to moderate aortic valve stenosis is present.    -IV Lasix as needed    -Suspect underlying interstitial lung disease based on x-ray    -Consider pulmonary evaluation if worsening respiratory distress or hypoxemia      #History of MVR/mechanical valve present/long-term use of anticoagulant therapy:    -As above    #Depression  #Dementia  #Delerium    -Home medication sertraline 100 mg p.o. daily    - start seroquel 25mg BID    -Possible underlying dementia per Dr. Saldaña as well    - due to worsening mental status in the setting of left foot infection with vascular disease is suspected cause of delirium    - suspect also component of sundowning    - AAOx0-1, poor insight    - CT head    - Palliative to establish care    - case discussed with wife, she elected to make patient DNI/DNR        #Hard of hearing:    -Continue to monitor     disposition          Plan  Referral made to [facility] in [place]. Pending acceptance and will need precert. No pasrr for shante Clifford may need pasrr if goes subacute         Complex case high risk    DVT prophylaxis:  Medical DVT prophylaxis orders are present.    CODE STATUS:    Limited Support to NOT Include: Intubation  Level Of Support Discussed With: Health Care Surrogate  Code Status: No CPR  Medical Interventions (Level of Support Prior to Arrest): Limited      Disposition:  Due to worsening mental status and condition patient is at high risk of further decline  This patient has been examined wearing appropriate Personal Protective Equipment and discussed with hospital infection control department. 08/17/21      Electronically signed by Alec Husain DO, 08/17/21, 12:18 EDT.  [facility] Hospitalist Team

## 2021-08-17 NOTE — PROGRESS NOTES
Name: Niko Servin ADMIT: 2021   : 1929  PCP: Casper Saldaña MD    MRN: [id number] LOS: 6 days   AGE/SEX: 89 y.o. male  ROOM:     [facility]    [place], Subsequent Hospital Care    Chief Complaint   Patient presents with   • Toe Injury     CC: left great toe wound  Subjective     89 y.o. male  in bed this AM.  Patient is extremely hard of hearing. Confused this am. Discussed with RN, awaiting sitter.     Review of Systems   Unable to perform ROS: Other   Hearing deficit, confused     Objective     Scheduled Medications:   Acetylcysteine, 1,200 mg, Oral, BID  ampicillin-sulbactam, 3 g, Intravenous, Q6H  budesonide, 0.5 mg, Nebulization, BID - RT  ipratropium-albuterol, 3 mL, Nebulization, Q6H - RT  magnesium sulfate, 1 g, Intravenous, Once  rosuvastatin, 10 mg, Oral, Nightly  sertraline, 100 mg, Oral, Daily  sodium chloride, 10 mL, Intravenous, Q12H  sodium hypochlorite, , Topical, BID  tamsulosin, 0.4 mg, Oral, Daily        Active Infusions:  heparin, 12 Units/kg/hr, Last Rate: 11 Units/kg/hr (21 7686)  sodium chloride, 60 mL/hr, Last Rate: 60 mL/hr (21 7122)        As Needed Medications:  •  acetaminophen **OR** acetaminophen **OR** acetaminophen  •  aluminum-magnesium hydroxide-simethicone  •  haloperidol lactate  •  heparin  •  heparin  •  magnesium sulfate **OR** magnesium sulfate **OR** magnesium sulfate  •  melatonin  •  nitroglycerin  •  ondansetron **OR** ondansetron  •  potassium chloride  •  potassium chloride  •  potassium phosphate infusion greater than 15 mMoles **OR** potassium phosphate infusion greater than 15 mMoles **OR** potassium phosphate **OR** sodium phosphate IVPB **OR** sodium phosphate IVPB **OR** sodium phosphate IVPB  •  sodium chloride  •  sodium chloride    Vital Signs  Vital Signs   Patient Vitals for the past 24 hrs:   BP Temp Temp src Pulse Resp SpO2 Weight   21 0743 -- -- -- 77 18 96 % --   21 0739 -- -- -- 78 18 (!) 84 %  --   08/17/21 0545 135/66 97.7 °F (36.5 °C) Oral 75 20 97 % --   08/17/21 0500 -- -- -- -- -- -- 84.6 kg (186 lb 8.2 oz)   08/17/21 0230 152/65 98 °F (36.7 °C) Oral 75 20 95 % --   08/17/21 0014 -- -- -- 83 20 100 % --   08/17/21 0011 -- -- -- 79 20 90 % --   08/16/21 2235 151/64 97.8 °F (36.6 °C) Oral 76 18 97 % --   08/16/21 2005 -- -- -- 78 18 100 % --   08/16/21 2001 -- -- -- 77 18 98 % --   08/16/21 1900 156/50 98.1 °F (36.7 °C) Oral 75 20 100 % --   08/16/21 1500 141/54 98.1 °F (36.7 °C) Oral 73 18 96 % --   08/16/21 1116 -- -- -- -- 18 -- --   08/16/21 1113 -- -- -- 75 18 97 % --   08/16/21 1100 159/58 98.1 °F (36.7 °C) Oral 74 20 100 % --     I/O:     Intake/Output Summary (Last 24 hours) at 8/17/2021 0817  Last data filed at 8/17/2021 0545  Gross per 24 hour   Intake 1196 ml   Output 1000 ml   Net 196 ml     BMI:  Body mass index is 26.01 kg/m².    Physical Exam:  Physical Exam  Constitutional:       General: He is not in acute distress.  Pulmonary:      Comments: 3 L O2 nasal cannula  Abdominal:      General: Abdomen is flat.   Skin:     General: Skin is warm.   Neurological:      Mental Status: He is alert.                      Left foot remains wrapped  Results Review:     CBC    Results from last 7 days   Lab Units 08/17/21  0642 08/16/21  2200 08/16/21  0627 08/15/21  0300 08/14/21  0343 08/13/21  0826 08/13/21  0332   WBC 10*3/mm3 7.80 8.40 8.80 8.10 9.00 10.00 9.10   HEMOGLOBIN g/dL 8.7* 8.7* 8.6* 8.0* 7.2* 8.0* 8.2*   PLATELETS 10*3/mm3 304 308 290 243 245 265 278     BMP   Results from last 7 days   Lab Units 08/17/21  0642 08/16/21  0627 08/15/21  0300 08/14/21  0343 08/13/21  0332 08/12/21  0302 08/11/21  0522   SODIUM mmol/L 146* 143 146* 144 143 143 142   POTASSIUM mmol/L 3.1* 3.4* 3.8 4.0 3.8 3.9 3.8   CHLORIDE mmol/L 104 103 106 106 103 106 105   CO2 mmol/L 31.0* 29.0 29.0 29.0 31.0* 28.0 28.0   BUN mg/dL 18 20 25* 30* 26* 28* 30*   CREATININE mg/dL 0.94 1.05 1.12 1.26 1.18 1.19 1.28*    GLUCOSE mg/dL 79 98 98 103* 82 89 95   MAGNESIUM mg/dL 1.8 1.9 2.1 2.4* 1.6* 1.6* 1.5*   PHOSPHORUS mg/dL 2.3* 2.3* 3.5  --   --   --   --      Coag   Results from last 7 days   Lab Units 08/17/21  0642 08/16/21  2200 08/16/21  1412 08/16/21  0627 08/15/21  0300 08/14/21  2218 08/14/21  1627 08/14/21  0343 08/14/21  0343 08/13/21  1915 08/13/21  0826 08/13/21  0332 08/12/21  0302   INR  1.87*  --   --  1.66* 1.66*  --   --   --  1.43*  --  1.66* 1.90* >=8.00*   APTT seconds 54.1* 55.1* 86.3* 53.8* 68.3 73.2 56.2*   < > 49.5*   < > 39.9*  --   --     < > = values in this interval not displayed.     HbA1C   Lab Results   Component Value Date    HGBA1C 6.0 (H) 08/11/2021     Infection       Radiology(recent) XR Chest 1 View    Result Date: 8/16/2021  No interval change yesterday's study with abnormalities as described above.  Electronically Signed By-Hammad Ragland MD On:8/16/2021 8:18 AM This report was finalized on [id number] by  Hammad Ragland MD.      Assessment/Plan     Assessment & Plan      Cellulitis of left lower extremity    Peripheral vascular disease of lower extremity with ulceration (CMS/HCC)    Chronic coronary artery disease    Depression    Hearing problem    History of mitral valve replacement    Essential hypertension    Long term current use of anticoagulant therapy    Mechanical heart valve present    History of B-cell lymphoma    Elevated INR (international normalized ratio) due to prior anticoagulant medication ingestion    Mixed hyperlipidemia      89 y.o. male with history of left great toe wound now status post left great toe amputation.  Patient with significant infrapopliteal disease on the left lower extremity.  He was on the schedule for a left lower extremity arteriogram with possible intervention on today however, he has had increased confusion overnight with orders for a sitter. VSS. hgb 8.7 (8.6). Cr 0.94 (1.05)    Nephrology following, Dr. Hutchins    Cardiology following,   Jan  Patient has a history of mechanical mitral valve, warfarin is on hold. Patient is currently on heparin, INR 1.86 (1.66)  No contraindications to proceeding forward with surgery from cardiovascular standpoint per 8/14 note.    Infectious disease following, Dr Hernandez  IV vancomycin and Unasyn    Podiatry following, Dr. Woo  S/p left great toe amputation    Pulmonology following, Dr. Gayle  Patient on 3-4 L O2    Left foot wound care  Patient with increasing discoloration, continue recommended care on great toe amputation site however when wrapping the foot and ankle, please take care to wrap loosely as this is causing areas pressure/discoloration.    Patient with increased confusion overnight and increased wheezing, do not feel he is medically ready for procedure.  Will cancel intervention until he is medically improved.  Will discuss with family.       Nancy Paulson PA-C  08/17/21  08:17 EDT    Please call my office with any question: [phone]    Active Hospital Problems    Diagnosis  POA   • **Cellulitis of left lower extremity [L03.116]  Yes   • Peripheral vascular disease of lower extremity with ulceration (CMS/HCC) [I73.9, L97.909]  Unknown     Priority: High   • History of B-cell lymphoma [Z85.72]  Not Applicable   • Elevated INR (international normalized ratio) due to prior anticoagulant medication ingestion [R79.1]  Yes   • Mixed hyperlipidemia [E78.2]  Yes   • Depression [F32.9]  Yes   • Essential hypertension [I10]  Yes   • Mechanical heart valve present [Z95.2]  Not Applicable   • Hearing problem [H91.90]  Yes   • Chronic coronary artery disease [I25.10]  Yes   • Long term current use of anticoagulant therapy [Z79.01]  Not Applicable   • History of mitral valve replacement [Z95.2]  Not Applicable      Resolved Hospital Problems   No resolved problems to display.

## 2021-08-17 NOTE — CASE MANAGEMENT/SOCIAL WORK
Continued Stay Note   [facility]     Patient Name: Niko Servin  MRN: [id number]  Today's Date: 8/17/2021    Admit Date: 8/9/2021    Discharge Plan     Row Name 08/17/21 1439       Plan    Plan  Referral pending for [facility].    Plan Comments  Per Palliative NP, referral requested for [facility] w/ point of contact listed as dtr (Davida). SW sent referral via text and Epic.     Phone communication or documentation only - no physical contact with patient or family.    Erica Soni JD [facility]    Office: [phone]  Cell: [phone]  Fax: [phone]  E-mail: [email or web address]

## 2021-08-18 NOTE — PROGRESS NOTES
PULMONARY CRITICAL CARE Progress  NOTE      PATIENT IDENTIFICATION:  Name: Niko Servin  MRN: [id number]  :  1929     Age: 89 y.o.  Sex: male    DATE OF Note:  2021   Referring Physician: Alec Husain DO                  Subjective:   Alert with confusion, No SOB, no chest or abd pain  no bowel or bladder issues reported  Patient on comfort measures    Objective:  tMax 24 hrs: Temp (24hrs), Av.4 °F (36.9 °C), Min:97.5 °F (36.4 °C), Max:99.3 °F (37.4 °C)      Vitals Ranges:   Temp:  [97.5 °F (36.4 °C)-99.3 °F (37.4 °C)] 99.3 °F (37.4 °C)  Heart Rate:  [64-79] 79  Resp:  [21-26] 22  BP: (124-143)/(42-61) 132/49    Intake and Output Last 3 Shifts:   I/O last 3 completed shifts:  In: -   Out: 500 [Urine:500]    Exam:  /49   Pulse 79   Temp 99.3 °F (37.4 °C) (Oral)   Resp 22   Wt 85.4 kg (188 lb 4.4 oz)   SpO2 97%   BMI 26.26 kg/m²     General Appearance:   Alert  HEENT:  Normocephalic, without obvious abnormality, Conjunctiva/corneas clear,.  Normal external ear canals, Nares normal, no drainage     Neck:  Supple, symmetrical, trachea midline. No JVD.  Lungs /Chest wall:   Bilateral basal rhonchi, respirations unlabored symmetrical wall movement.     Heart:  Regular rate and rhythm, systolic murmur PMI left sternal border  Abdomen: Soft, non-tender, no masses, no organomegaly.    Extremities: Trace edema no clubbing or Cyanosis        Medications:    Current Facility-Administered Medications:     acetaminophen (TYLENOL) tablet 650 mg, 650 mg, Oral, Q4H PRN **OR** acetaminophen (TYLENOL) 160 MG/5ML solution 650 mg, 650 mg, Oral, Q4H PRN **OR** acetaminophen (TYLENOL) suppository 650 mg, 650 mg, Rectal, Q4H PRN, Alec Husain DO    aluminum-magnesium hydroxide-simethicone (MAALOX MAX) 400-400-40 MG/5ML suspension 15 mL, 15 mL, Oral, Q6H PRN, Alec Husain,     budesonide (PULMICORT) nebulizer solution 0.5 mg, 0.5 mg, Nebulization, BID - RT, Juan-Neisha Franklin, APRN     glycopyrrolate PF (ROBINUL) injection 0.4 mg, 0.4 mg, Intravenous, 4x Daily, Alec Husain DO, 0.4 mg at 08/18/21 1004    haloperidol lactate (HALDOL) injection 1 mg, 1 mg, Intravenous, Q6H PRN, Alec Husain DO    ipratropium-albuterol (DUO-NEB) nebulizer solution 3 mL, 3 mL, Nebulization, Q4H PRN, Neisha Lindo APRN    LORazepam (ATIVAN) injection 1 mg, 1 mg, Intravenous, Q4H PRN, Alec Husain DO, 1 mg at 08/17/21 1737    Morphine sulfate (PF) injection 2 mg, 2 mg, Intravenous, Q4H PRN, Alec Husain DO, 2 mg at 08/17/21 1737    Pharmacy to dose warfarin, , Does not apply, Continuous PRN, Alec Husain DO    QUEtiapine (SEROquel) tablet 25 mg, 25 mg, Oral, Q12H, Alec Husain DO    sertraline (ZOLOFT) tablet 100 mg, 100 mg, Oral, Daily, Alec Husain DO    warfarin (COUMADIN) tablet 1 mg, 1 mg, Oral, Once, Alec Husain DO    Data Review:  All labs (24hrs):   Recent Results (from the past 24 hour(s))   CBC (No Diff)    Collection Time: 08/18/21  7:46 AM    Specimen: Blood   Result Value Ref Range    WBC 9.10 3.40 - 10.80 10*3/mm3    RBC 2.93 (L) 4.14 - 5.80 10*6/mm3    Hemoglobin 7.9 (L) 13.0 - 17.7 g/dL    Hematocrit 24.5 (L) 37.5 - 51.0 %    MCV 83.4 79.0 - 97.0 fL    MCH 27.0 26.6 - 33.0 pg    MCHC 32.4 31.5 - 35.7 g/dL    RDW 18.1 (H) 12.3 - 15.4 %    RDW-SD 53.8 37.0 - 54.0 fl    MPV 8.1 6.0 - 12.0 fL    Platelets 277 140 - 450 10*3/mm3   Basic Metabolic Panel    Collection Time: 08/18/21  7:46 AM    Specimen: Blood   Result Value Ref Range    Glucose 76 65 - 99 mg/dL    BUN 19 8 - 23 mg/dL    Creatinine 0.97 0.76 - 1.27 mg/dL    Sodium 148 (H) 136 - 145 mmol/L    Potassium 3.6 3.5 - 5.2 mmol/L    Chloride 108 (H) 98 - 107 mmol/L    CO2 31.0 (H) 22.0 - 29.0 mmol/L    Calcium 8.4 8.2 - 9.6 mg/dL    eGFR Non African Amer 72 >60 mL/min/1.73    BUN/Creatinine Ratio 19.6 7.0 - 25.0    Anion Gap 9.0 5.0 - 15.0 mmol/L   Protime-INR    Collection Time: 08/18/21  7:46 AM    Specimen:  Blood   Result Value Ref Range    Protime 25.0 19.4 - 28.5 Seconds    INR 2.40 2.00 - 3.00        Imaging:  XR Chest 1 View  Narrative: DATE OF EXAM:  8/16/2021 4:56 AM     PROCEDURE:  XR CHEST 1 VW-     INDICATIONS:  Shortness of breath, hypertension, heart disease, history of B-cell  lymphoma.      COMPARISON:  8/15/2021.     TECHNIQUE:   Single radiographic view of the chest was obtained.     FINDINGS:  The heart is enlarged with postsurgical changes apparent. There is  bilateral mixed interstitial/airspace disease which has not  significantly changed. The patient has a small right and a small to  moderate left pleural effusion with compressive atelectasis.  There is  no interval change from yesterday's exam.     Impression: No interval change yesterday's study with abnormalities as described  above.     Electronically Signed By-Hammad Ragland MD On:8/16/2021 8:18 AM  This report was finalized on [id number] by  Hammad Ragland MD.       ASSESSMENT:   Cellulitis of left lower extremity    Chronic coronary artery disease    Depression    Hearing problem    History of mitral valve replacement    Essential hypertension    Long term current use of anticoagulant therapy    Mechanical heart valve present    History of B-cell lymphoma    Elevated INR (international normalized ratio) due to prior anticoagulant medication ingestion    Mixed hyperlipidemia    Peripheral vascular disease/ischemia  of lower extremity with ulceration s/p toe amputation       PLAN:  Comfort measures   DNR/DNI  O2 support , decrease O2 as tolerated   Not a candidate for surgery per vascular   Prognosis is poor     Discussed with Dr Irwin Page, MARIZA   8/18/2021  15:13 EDT     I personally have examined  and interviewed the patient. I have reviewed the history, data, problems, assessment and plan with our NP.  Critical care time in direct medical management (   ) minutes  Electronically signed by Randi Gayle MD, D,ABSM, 08/18/21,  11:49 PM EDT.

## 2021-08-18 NOTE — SIGNIFICANT NOTE
Wife has rescinded Comfort care orders, restarted Unasyn IV and vascular surgery reconsulted as wife as next of kin wants explanation as to benefits and risks to make decision

## 2021-08-18 NOTE — CASE MANAGEMENT/SOCIAL WORK
Continued Stay Note  [facility]     Patient Name: Niko Servin  MRN: [id number]  Today's Date: 8/18/2021    Admit Date: 8/17/2021    Discharge Plan     Row Name 08/18/21 1447       Plan    Plan  [facility]    Plan Comments  comfort care        Expected Discharge Date and Time     Expected Discharge Date Expected Discharge Time    Aug 20, 2021         Phone communication or documentation only - no physical contact with patient or family.      Andra Urias RN

## 2021-08-18 NOTE — PROGRESS NOTES
08/18/21   Foot and Ankle Surgery - Inpatient Follow-up  Provider: Dr. Willy Woo DPM  Location: [facility]    Chief Complaint: left toe amp with progressive ischemia    Subjective:  Niko Servin is a 89 y.o. male.     Patient is confused today. Daughter and wife are at beside.     No Known Allergies    Current Facility-Administered Medications on File Prior to Encounter   Medication Dose Route Frequency Provider Last Rate Last Admin   • [COMPLETED] haloperidol lactate (HALDOL) injection 1 mg  1 mg Intravenous Once Randi Gayle MD   1 mg at 08/17/21 1212   • [COMPLETED] magnesium sulfate in D5W 1g/100mL (PREMIX)  1 g Intravenous Once Princess Hutchins MD   1 g at 08/17/21 0914   • [DISCONTINUED] acetaminophen (TYLENOL) 160 MG/5ML solution 650 mg  650 mg Oral Q4H PRN CHITO Woo DPM       • [DISCONTINUED] acetaminophen (TYLENOL) suppository 650 mg  650 mg Rectal Q4H PRN CHITO Woo DPM       • [DISCONTINUED] acetaminophen (TYLENOL) tablet 650 mg  650 mg Oral Q4H PRN CHITO Woo DPM   650 mg at 08/12/21 2020   • [DISCONTINUED] Acetylcysteine capsule 1,200 mg  1,200 mg Oral BID Felicitas Page APRN   1,200 mg at 08/17/21 0914   • [DISCONTINUED] aluminum-magnesium hydroxide-simethicone (MAALOX MAX) 400-400-40 MG/5ML suspension 15 mL  15 mL Oral Q6H PRN CHITO Woo DPM       • [DISCONTINUED] ampicillin-sulbactam (UNASYN) 3 g in sodium chloride 0.9 % 100 mL IVPB-MBP  3 g Intravenous Q6H CHITO Woo DPM   3 g at 08/17/21 1523   • [DISCONTINUED] budesonide (PULMICORT) nebulizer solution 0.5 mg  0.5 mg Nebulization BID - RT Felicitas Page APRN   0.5 mg at 08/17/21 0739   • [DISCONTINUED] haloperidol lactate (HALDOL) injection 1 mg  1 mg Intravenous Q6H PRN CHITO Woo DPM   1 mg at 08/17/21 1128   • [DISCONTINUED] heparin 85211 units/250 mL (100 units/mL) in 0.45 % NaCl infusion  12 Units/kg/hr Intravenous Titrated Casper Laura MD    Stopped at 08/17/21 1644   • [DISCONTINUED] heparin bolus from bag 2,400 Units  30 Units/kg Intravenous Q6H PRN Casper Laura MD   2,400 Units at 08/17/21 0906   • [DISCONTINUED] heparin bolus from bag 4,800 Units  60 Units/kg Intravenous Q6H PRN Casper Laura MD       • [DISCONTINUED] ipratropium-albuterol (DUO-NEB) nebulizer solution 3 mL  3 mL Nebulization Q6H - RT CHITO Woo DPM   3 mL at 08/17/21 1204   • [DISCONTINUED] Magnesium Sulfate 2 gram / 50mL Infusion (GIVE X 3 BAGS TO EQUAL 6GM TOTAL DOSE) - Mg 1.1 - 1.5 mg/dl  2 g Intravenous PRN CHITO Woo DPM       • [DISCONTINUED] Magnesium Sulfate 2 gram Bolus, followed by 8 gram infusion (total Mg dose 10 grams)- Mg less than or equal to 1mg/dL  2 g Intravenous PRN CHITO Woo DPM       • [DISCONTINUED] Magnesium Sulfate 4 gram infusion- Mg 1.6-1.9 mg/dL  4 g Intravenous PRN CHITO Woo DPM 25 mL/hr at 08/13/21 0818 4 g at 08/13/21 0818   • [DISCONTINUED] melatonin tablet 5 mg  5 mg Oral Nightly PRN CHITO Woo DPM       • [DISCONTINUED] nitroglycerin (NITROSTAT) SL tablet 0.4 mg  0.4 mg Sublingual Q5 Min PRN CHITO Woo DPM       • [DISCONTINUED] ondansetron (ZOFRAN) injection 4 mg  4 mg Intravenous Q6H PRN CHITO Woo DPM       • [DISCONTINUED] ondansetron (ZOFRAN) tablet 4 mg  4 mg Oral Q6H PRN CHITO Woo DPM       • [DISCONTINUED] potassium chloride (K-DUR,KLOR-CON) CR tablet 40 mEq  40 mEq Oral PRN CHITO Woo DPM       • [DISCONTINUED] potassium chloride 10 mEq in 100 mL IVPB  10 mEq Intravenous Q1H PRN CHITO Woo DPM       • [DISCONTINUED] potassium phosphate 15 mmol in 0.9% sodium chloride 100 mL IVPB  15 mmol Intravenous PRN Amna, T Willy, DPM       • [DISCONTINUED] potassium phosphate 30 mmol in sodium chloride 0.9 % 250 mL infusion  30 mmol Intravenous PRN CHITO Woo DPM       • [DISCONTINUED] potassium phosphate 45 mmol in sodium chloride 0.9 % 500 mL  infusion  45 mmol Intravenous PRN CHITO Woo DPM       • [DISCONTINUED] QUEtiapine (SEROquel) tablet 25 mg  25 mg Oral Q12H Alec Husain DO   25 mg at 08/17/21 1019   • [DISCONTINUED] rosuvastatin (CRESTOR) tablet 10 mg  10 mg Oral Nightly CHITO Woo DPM   10 mg at 08/16/21 2029   • [DISCONTINUED] sertraline (ZOLOFT) tablet 100 mg  100 mg Oral Daily CHITO Woo DPM   100 mg at 08/17/21 0914   • [DISCONTINUED] sodium chloride 0.9 % flush 10 mL  10 mL Intravenous PRN CHITO Woo DPM       • [DISCONTINUED] sodium chloride 0.9 % flush 10 mL  10 mL Intravenous Q12H CHITO Woo DPM   10 mL at 08/17/21 0914   • [DISCONTINUED] sodium chloride 0.9 % flush 10 mL  10 mL Intravenous PRN CHITO Woo DPM   10 mL at 08/15/21 0409   • [DISCONTINUED] sodium chloride 0.9 % infusion  60 mL/hr Intravenous Continuous Princess Hutchins MD   Stopped at 08/17/21 1644   • [DISCONTINUED] sodium hypochlorite (DAKIN'S 1/4 STRENGTH) 0.125 % topical solution 0.125% solution   Topical BID CHITO Woo DPM   Given at 08/17/21 1020   • [DISCONTINUED] sodium phosphates 15 mmol in sodium chloride 0.9 % 250 mL IVPB  15 mmol Intravenous PRN CHITO Woo DPM       • [DISCONTINUED] sodium phosphates 30 mmol in sodium chloride 0.9 % 250 mL IVPB  30 mmol Intravenous PRN CHITO Woo DPM       • [DISCONTINUED] sodium phosphates 45 mmol in sodium chloride 0.9 % 500 mL IVPB  45 mmol Intravenous PRN CHITO Woo DPM       • [DISCONTINUED] tamsulosin (FLOMAX) 24 hr capsule 0.4 mg  0.4 mg Oral Daily Zaire Mansfield MD   0.4 mg at 08/17/21 0914     Current Outpatient Medications on File Prior to Encounter   Medication Sig Dispense Refill   • sertraline (ZOLOFT) 100 MG tablet TAKE ONE TABLET BY MOUTH DAILY 30 tablet 2   • warfarin (COUMADIN) 7.5 MG tablet TAKE ONE TABLET BY MOUTH DAILY 30 tablet 2       Objective   /52 (BP Location: Left arm, Patient Position: Lying)    Pulse 69   Temp 97.5 °F (36.4 °C) (Axillary)   Resp 26   Wt 85.4 kg (188 lb 4.4 oz)   SpO2 96%   BMI 26.26 kg/m²     General: Confused  Vascular: DP and PT pulses remain nonpalpable.   Neuro: Sensation appears to be diminished.  Motor function is intact  MSK: No significant tenderness with palpation.  No gross deformity or instability  Derm: Progressive ischemia and areas of necrosis to the foot. No active signs of infection.       Results from last 7 days   Lab Units 08/17/21  0642   WBC 10*3/mm3 7.80   HEMOGLOBIN g/dL 8.7*   HEMATOCRIT % 27.6*   PLATELETS 10*3/mm3 304       Assessment/Plan     Patient Active Problem List   Diagnosis   • Ataxia   • B-cell lymphoma (CMS/HCC)   • Chronic coronary artery disease   • Depression   • Diffuse large B cell lymphoma (CMS/HCC)   • Hearing problem   • History of mitral valve replacement   • Essential hypertension   • Long term current use of anticoagulant therapy   • Mechanical heart valve present   • Postural hypotension   • Protein calorie malnutrition (CMS/HCC)   • Soft tissue mass   • ST elevation (STEMI) myocardial infarction (CMS/HCC)   • Cellulitis of left lower extremity   • History of B-cell lymphoma   • Elevated INR (international normalized ratio) due to prior anticoagulant medication ingestion   • Mixed hyperlipidemia   • Peripheral vascular disease of lower extremity with ulceration (CMS/HCC)   • Hospice care       Vascular procedure unable to be performed yesterday due to AMS. After extensive discussion with primary and vascular services, family has decided to proceed with comfort care measures. I did explain that the amputation site is not likely to heal and remains at risk for progressive necrosis and recurrent infection. Family understands. Dressings to proceed on a daily basis. Will sign off at this time.     Note is dictated utilizing voice recognition software. Unfortunately this leads to occasional typographical errors. I apologize in advance if the  situation occurs. If questions occur please do not hesitate to call our office.

## 2021-08-18 NOTE — PROGRESS NOTES
Name: Niko Servin ADMIT: 2021   : 1929  PCP: Casper Saldaña MD    MRN: [id number] LOS: 1 days   AGE/SEX: 89 y.o. male  ROOM:     [facility]    [place], Subsequent Hospital Care    No chief complaint on file.    CC: left great toe wound  Subjective     89 y.o. male  in bed this AM.  Patient is resting comfortably this AM.  Family is at bedside.  Patient was first made hospice but now is considering otherwise. Asked to discuss vascular options with family.     Review of Systems   Unable to perform ROS: Other   Hearing deficit, confused     Objective     Scheduled Medications:   ampicillin-sulbactam, 3 g, Intravenous, Q6H  budesonide, 0.5 mg, Nebulization, BID - RT  glycopyrrolate, 0.4 mg, Intravenous, 4x Daily  QUEtiapine, 25 mg, Oral, Q12H  sertraline, 100 mg, Oral, Daily  warfarin, 1 mg, Oral, Once        Active Infusions:  Pharmacy to dose warfarin,         As Needed Medications:  •  acetaminophen **OR** acetaminophen **OR** acetaminophen  •  aluminum-magnesium hydroxide-simethicone  •  haloperidol lactate  •  ipratropium-albuterol  •  LORazepam  •  Morphine  •  Pharmacy to dose warfarin    Vital Signs  Vital Signs   Patient Vitals for the past 24 hrs:   BP Temp Temp src Pulse Resp SpO2 Weight   21 0531 143/52 97.5 °F (36.4 °C) Axillary 69 26 96 % --   21 0454 -- -- -- -- -- -- 85.4 kg (188 lb 4.4 oz)   21 0250 140/61 98.4 °F (36.9 °C) Axillary 65 22 93 % --   21 2321 -- -- -- 64 -- 96 % --   21 2212 124/42 98.3 °F (36.8 °C) Axillary 69 24 92 % --     I/O:     Intake/Output Summary (Last 24 hours) at 2021 0846  Last data filed at 2021 0531  Gross per 24 hour   Intake --   Output 500 ml   Net -500 ml     BMI:  Body mass index is 26.26 kg/m².    Physical Exam:  Physical Exam  Constitutional:       General: He is not in acute distress.  Pulmonary:      Comments: 3 L O2 nasal cannula  Abdominal:      General: Abdomen is flat.   Skin:      General: Skin is warm.   Neurological:      Mental Status: He is alert.                      Left foot remains wrapped  Results Review:     CBC    Results from last 7 days   Lab Units 08/18/21  0746 08/17/21  0642 08/16/21 2200 08/16/21  0627 08/15/21  0300 08/14/21  0343 08/13/21  0826   WBC 10*3/mm3 9.10 7.80 8.40 8.80 8.10 9.00 10.00   HEMOGLOBIN g/dL 7.9* 8.7* 8.7* 8.6* 8.0* 7.2* 8.0*   PLATELETS 10*3/mm3 277 304 308 290 243 245 265     BMP   Results from last 7 days   Lab Units 08/18/21  0746 08/17/21  0642 08/16/21  0627 08/15/21  0300 08/14/21  0343 08/13/21  0332 08/12/21  0302   SODIUM mmol/L 148* 146* 143 146* 144 143 143   POTASSIUM mmol/L 3.6 3.1* 3.4* 3.8 4.0 3.8 3.9   CHLORIDE mmol/L 108* 104 103 106 106 103 106   CO2 mmol/L 31.0* 31.0* 29.0 29.0 29.0 31.0* 28.0   BUN mg/dL 19 18 20 25* 30* 26* 28*   CREATININE mg/dL 0.97 0.94 1.05 1.12 1.26 1.18 1.19   GLUCOSE mg/dL 76 79 98 98 103* 82 89   MAGNESIUM mg/dL  --  1.8 1.9 2.1 2.4* 1.6* 1.6*   PHOSPHORUS mg/dL  --  2.3* 2.3* 3.5  --   --   --      Coag   Results from last 7 days   Lab Units 08/18/21  0746 08/17/21  0642 08/16/21 2200 08/16/21  1412 08/16/21  0627 08/15/21  0300 08/14/21  2218 08/14/21  1627 08/14/21  0343 08/14/21  0343 08/13/21  1915 08/13/21  0826 08/13/21  0332   INR  2.40 1.87*  --   --  1.66* 1.66*  --   --   --  1.43*  --  1.66* 1.90*   APTT seconds  --  54.1* 55.1* 86.3* 53.8* 68.3 73.2 56.2*   < > 49.5*   < > 39.9*  --     < > = values in this interval not displayed.     HbA1C   Lab Results   Component Value Date    HGBA1C 6.0 (H) 08/11/2021     Infection       Radiology(recent) No radiology results for the last day    Assessment/Plan     Assessment & Plan      Hospice care      89 y.o. male with history of left great toe wound now status post left great toe amputation.  Patient with significant infrapopliteal disease on the left lower extremity.  Patient was scheduled for left lower extremity arteriogram however due to  "progressive ischemic changes to the foot and increased wheezing, this was canceled.  On 8/17/21, Dr. Elizabeth discussed with patient's daughter, Davida,  only option would be major amputation. Family does not want intubation and has made patient DNI/DNR. Palliative met with patient yesterday as well to discuss goals of treatment.      Discussed with family with RN present. Patient was made hospice but family is rethinking decision. From a vascular standpoint, the only option would be major amputation. The patient's medical status would need to be optimized, per pulmonary note on 8/17/21 \"Prognosis is poor\". Further, Mrs Servin stated this am \"I do not want to take his leg\".     Family is discussing amongst themselves further course.     Left foot wound care  Patient with necrotic changes, continue recommended care on great toe amputation site and palliative wound care for new areas of necrosis.  Wrap the foot and ankle, please take care to wrap loosely as this is causing gamy changes.    We will sign off at this time and see again upon request.    Addendum:  Dr Husain, at 9:10am on 8/18/21, reached out to say family has decided to go comfort measures only.     Nancy Paulson PA-C  08/18/21  08:46 EDT    Please call my office with any question: [phone]    Active Hospital Problems    Diagnosis  POA   • Hospice care [Z51.5]  Not Applicable      Resolved Hospital Problems   No resolved problems to display.         "

## 2021-08-18 NOTE — CASE MANAGEMENT/SOCIAL WORK
Case Management Discharge Note      Final Note: [facility]    Provided Post Acute Provider List?: Yes  Post Acute Provider List: Home Health, Nursing Home, Inpatient Rehab  Provided Post Acute Provider Quality & Resource List?: Yes  Post Acute Provider Quality and Resource List: Home Health, Inpatient Rehab, Nursing Home  Delivered To: Support Person  Support Person: spouse Nini  Method of Delivery: In person    Selected Continued Care - Discharged on 8/17/2021 Admission date: 8/9/2021 - Discharge disposition: Hospice/Medical Facility ([facility])        Home Medical Care Coordination complete.    Service Provider Selected Services Address Phone Fax Patient Preferred    [facility] [place]  Home Health Services [address] [place] [id number] [phone] --                    Final Discharge Disposition Code: 51 - hospice medical facility

## 2021-08-18 NOTE — PROGRESS NOTES
Pharmacy dosing service  Anticoagulant  Warfarin     Subjective:    Niko Servin is a 89 y.o.male being continued on warfarin for valve replacement.    INR Goal: 2.5 - 3.5  Home medication?: Yes, warfarin 7.5 mg PO daily.  Bridge Therapy Present?:  No  Interacting Medications Evaluation (New/Present/Discontinued): Unasyn      Assessment/Plan:    The patient has not received warfarin since 08/10 due to several INR results > 8 and possible procedures. Vitamin k was given on 08/12. INR has been variable and increased without the administration of warfarin. Today the patient's INR is subtherapeutic. Will give warfarin 1 mg due to the large increase in INR from yesterday.     Continue to monitor and adjust based on INR.         Date 08/16 08/17 08/18         INR 1.66 1.87 2.4         Dose HOLD HOLD 1 mg             Objective:  [Ht:  ; Wt: 85.4 kg (188 lb 4.4 oz); BMI: Body mass index is 26.26 kg/m².]    Lab Results   Component Value Date    ALBUMIN 2.70 (L) 08/15/2021     Lab Results   Component Value Date    INR 2.40 08/18/2021    INR 1.87 (L) 08/17/2021    INR 1.66 (L) 08/16/2021    PROTIME 25.0 08/18/2021    PROTIME 19.8 08/17/2021    PROTIME 17.7 (L) 08/16/2021     Lab Results   Component Value Date    HGB 7.9 (L) 08/18/2021    HGB 8.7 (L) 08/17/2021    HGB 8.7 (L) 08/16/2021     Lab Results   Component Value Date    HCT 24.5 (L) 08/18/2021    HCT 27.6 (L) 08/17/2021    HCT 27.4 (L) 08/16/2021       Rafael Kent RPH  08/18/21 08:26 EDT

## 2021-08-18 NOTE — PLAN OF CARE
Long discussion held with wife and daughters at bedside concerning patients condition and poor prognosis.  Family has elected to remain comfort care as GIP hospice    Electronically signed by Alec Husain DO, 08/18/21, 9:10 AM EDT.

## 2021-08-18 NOTE — PROGRESS NOTES
[facility] Medicine Services Daily Progress Note    Patient Name: Niko Servin  : 1929  MRN: [id number]  Primary Care Physician:  Casper Saldaña MD  Date of admission: 2021      Subjective      Chief Complaint: Altered mental status      Date    2021  Family decided to rescind hospice last night.  However after having discussion with wife and daughters at bedside, explaining options and specialists contributions, family decided to make patient comfort care again and reinstate hospice    ROS   Unable to obtain due to altered mental status     Objective      Vitals:   Temp:  [97.5 °F (36.4 °C)-98.4 °F (36.9 °C)] 97.5 °F (36.4 °C)  Heart Rate:  [64-80] 69  Resp:  [18-26] 21  BP: (124-143)/(42-61) 143/52  Flow (L/min):  [3-4] 3.5    Physical Exam  Constitutional:       Appearance: He is ill-appearing and toxic-appearing.      Comments: AAOx0 with no insight  Responds to sternal rub   HENT:      Head: Normocephalic and atraumatic.      Nose: Nose normal.      Mouth/Throat:      Mouth: Mucous membranes are dry.   Eyes:      General: No scleral icterus.  Cardiovascular:      Rate and Rhythm: Normal rate and regular rhythm.      Heart sounds: Murmur heard.     Pulmonary:      Breath sounds: Wheezing, rhonchi and rales present.      Comments: Patient on 3.5L NC  Abdominal:      General: There is no distension.      Tenderness: There is no abdominal tenderness. There is no guarding.   Musculoskeletal:      Cervical back: No rigidity.      Right lower leg: No edema.      Left lower leg: No edema.      Comments: S/p left great toe amputation   Skin:     Coloration: Skin is not jaundiced.      Findings: No bruising or lesion.   Neurological:      General: No focal deficit present.      Mental Status: He is disoriented.      Motor: No weakness.             Result Review    Result Review:  I have personally reviewed the results from the time of this admission to 2021  10:48 EDT and agree with these findings:  [x]  Laboratory  [x]  Microbiology  [x]  Radiology  []  EKG/Telemetry   []  Cardiology/Vascular   []  Pathology  []  Old records  []  Other:         Wounds (last 24 hours)      LDA Wound     Row Name 08/17/21 2000             Wound 08/12/21 Left anterior great toe Incision    Wound - Properties Group Placement Date: 08/12/21  -TH Side: Left  -TH Orientation: anterior  -TH Location: great toe  -TH Primary Wound Type: Incision  -TH Additional Comments: 4x4 soaked in betadine, 4x4, kerlix, and ace  -TH    Dressing Appearance  dry;intact  -DM      Closure  MARLA  -DM      Base  --  -DM      Drainage Amount  --  -DM      Retired Wound - Properties Group Date first assessed: 08/12/21  -TH Side: Left  -TH Location: great toe  -TH Primary Wound Type: Incision  -TH Additional Comments: 4x4 soaked in betadine, 4x4, kerlix, and ace  -TH       Wound 08/13/21 1113 upper thoracic spine    Wound - Properties Group Placement Date: 08/13/21  -TP Placement Time: 1113  -TP Orientation: upper  -TP Location: thoracic spine  -TP    Closure  Adhesive bandage  -DM      Retired Wound - Properties Group Date first assessed: 08/13/21  -TP Time first assessed: 1113  -TP Location: thoracic spine  -TP       Wound 08/13/21 1114 thoracic spine    Wound - Properties Group Placement Date: 08/13/21  -TP Placement Time: 1114  -TP Location: thoracic spine  -TP    Closure  Adhesive bandage  -DM      Retired Wound - Properties Group Date first assessed: 08/13/21  -TP Time first assessed: 1114  -TP Location: thoracic spine  -TP       Wound 08/17/21 0800 Left anterior foot    Wound - Properties Group Placement Date: 08/17/21  -CL, not on LDA  Placement Time: 0800  -CL Side: Left  -CL Orientation: anterior  -CL Location: foot  -CL Stage, Pressure Injury : other (see comments)  -CL, blackened area to top of foot     Closure  None  -DM      Retired Wound - Properties Group Date first assessed: 08/17/21  -CL, not on LDA   Time first assessed: 0800  -CL Side: Left  -CL Location: foot  -CL      User Key  (r) = Recorded By, (t) = Taken By, (c) = Cosigned By    Initials Name Provider Type    Fatuma Antonio, RN Registered Nurse    Pastora Jin, RN Registered Nurse    Miguel Kamara RN Registered Nurse    Brittani Gilbert LPN Licensed Nurse            Assessment/Plan      Brief Patient Summary:  Niko Servin is a 89 y.o. male who w/PMH of B-cell lymphoma, CAD, MVR W/mechanical valve long-term anticoagulation, depression, [place], HTN presents to [facility] ED due to left lower extremity cellulitis worsening for the past 2 weeks since he injured foot with worsening dmenetia.  Patient started on broad spectrum abx, ID consulted.  Left great toe had necrosis and malodor concerning for osteomyelitis.  Due to elevated INR, vitamin K given, cards replaced coumadin with heparin drip.  Vascular workup showed significant infrapopliteal disease in LLE.   Podiatry performed left great toe ampuation and foot showed signs of improvement.  However, patient's mental status and condition continued to deteriorate despite intervention from ID, vascular, Podiatry, cardiology,ane Pulm.  Vascular was unable to perform intervention on 8/17/2021.  Suspect worsening status due to poor penetration of abx and worsening of disease in setting of underlying dementia.  Patient was also bacteremic with proteus 2/2 osteomyelitis as tissue grew proteus and staph.  Family elected to make patient comfort care with hospice consult on 8/17/2021. Due to poor lung sounds and encephalopathy, suspect patient is aspirating secretions leading to respiratory failure. Patient discharged and made GIP hospice on 8/17/2021      ampicillin-sulbactam, 3 g, Intravenous, Q6H  budesonide, 0.5 mg, Nebulization, BID - RT  glycopyrrolate, 0.4 mg, Intravenous, 4x Daily  QUEtiapine, 25 mg, Oral, Q12H  sertraline, 100 mg, Oral, Daily  warfarin, 1 mg, Oral, Once        Pharmacy to dose warfarin,          Active Hospital Problems:  Active Hospital Problems    Diagnosis    • Hospice care      Plan:   #Acute Hypoxic Respiratory Failure 2/2 aspirations  #Aspiration pneumonia  #Acute toxic encephalopathy  #Left great toe osteomyelitis   #Left great toe wet gangrene  #Bacteremia  #Mitral valve replacement history  #B cell lymphoma  #Peripheral Vascular disease, severe  #hydronephrosis  #Coumadin toxicity  #Hypokalemia  #Hypophosphatemia  #acute blood loss anemia  #HTN  #CAD  #Depression    - conversation held with family and specialists, patient to remain [facility] hospice  -Patient made [facility] hospice  - Haldol 1mg IV q6h PRN agitations  -Ativan 1mg IV q4h PRN anxiety  -Morphine 2mg IV q4h PRN severe pain or dyspnea  - Robinul QID    DVT prophylaxis:  Medical and mechanical DVT prophylaxis orders are present.    CODE STATUS:    Level Of Support Discussed With: Health Care Surrogate; Next of Kin (If No Surrogate)  Code Status: No CPR  Medical Interventions (Level of Support Prior to Arrest): Comfort Measures      Disposition:  Patient is [facility] hospice    This patient has been examined wearing appropriate Personal Protective Equipment and discussed with Family and nursing Staff. 08/18/21      Electronically signed by Alec Husain DO, 08/18/21, 10:48 EDT.  [facility] Hospitalist Team         .[email or web address]

## 2021-08-18 NOTE — NURSING NOTE
Wife has rescinded Hospice/Confort Care orders.  Family wishes to speak with vascular surgeon before making that decision.  Hospitalist informed of family's wishes and new orders for Abx to be restarted and consult vascular again.

## 2021-08-19 NOTE — PROGRESS NOTES
PULMONARY CRITICAL CARE Progress  NOTE      PATIENT IDENTIFICATION:  Name: Niko Servin  MRN: [id number]  :  1929     Age: 89 y.o.  Sex: male    DATE OF Note:  2021   Referring Physician: Alec Husain DO                  Subjective:   Still confused   No SOB, no chest or abd pain, no bowel or bladder issues reported, patient yelling out at times and reaching out in air, pulling at oxygen mask       Objective:  tMax 24 hrs: Temp (24hrs), Av.6 °F (37 °C), Min:97.5 °F (36.4 °C), Max:99.3 °F (37.4 °C)      Vitals Ranges:   Temp:  [97.5 °F (36.4 °C)-99.3 °F (37.4 °C)] 98.4 °F (36.9 °C)  Heart Rate:  [64-79] 70  Resp:  [20-26] 22  BP: (122-143)/(49-69) 132/54    Intake and Output Last 3 Shifts:   I/O last 3 completed shifts:  In: 240 [P.O.:240]  Out: 900 [Urine:900]    Exam:  /54   Pulse 70   Temp 98.4 °F (36.9 °C) (Oral)   Resp 22   Wt 85.4 kg (188 lb 4.4 oz)   SpO2 90%   BMI 26.26 kg/m²     General Appearance:   Alert  HEENT:  Normocephalic, without obvious abnormality, Conjunctiva/corneas clear,.  Normal external ear canals, Nares normal, no drainage     Neck:  Supple, symmetrical, trachea midline. No JVD.  Lungs /Chest wall:   Bilateral basal rhonchi, respirations unlabored symmetrical wall movement.     Heart:  Regular rate and rhythm, systolic murmur PMI left sternal border  Abdomen: Soft, non-tender, no masses, no organomegaly.    Extremities: Trace edema no clubbing or Cyanosis        Medications:    Current Facility-Administered Medications:   •  acetaminophen (TYLENOL) tablet 650 mg, 650 mg, Oral, Q4H PRN **OR** acetaminophen (TYLENOL) 160 MG/5ML solution 650 mg, 650 mg, Oral, Q4H PRN **OR** acetaminophen (TYLENOL) suppository 650 mg, 650 mg, Rectal, Q4H PRN, Lisha, Waitman, DO  •  aluminum-magnesium hydroxide-simethicone (MAALOX MAX) 400-400-40 MG/5ML suspension 15 mL, 15 mL, Oral, Q6H PRN, Alec Husain, DO  •  budesonide (PULMICORT) nebulizer solution 0.5 mg, 0.5 mg,  Nebulization, BID - RT, Neisha Lindo, MARIZA  •  glycopyrrolate PF (ROBINUL) injection 0.4 mg, 0.4 mg, Intravenous, 4x Daily, Alec Husain DO, 0.4 mg at 08/18/21 2137  •  haloperidol lactate (HALDOL) injection 1 mg, 1 mg, Intravenous, Q6H PRN, Alec Husain DO  •  ipratropium-albuterol (DUO-NEB) nebulizer solution 3 mL, 3 mL, Nebulization, Q4H PRN, Neisha Lindo APRN  •  LORazepam (ATIVAN) injection 1 mg, 1 mg, Intravenous, Q4H PRN, Alec Husain DO, 1 mg at 08/18/21 1629  •  Morphine sulfate (PF) injection 2 mg, 2 mg, Intravenous, Q4H PRN, Alec Husain DO, 2 mg at 08/17/21 1737  •  Pharmacy to dose warfarin, , Does not apply, Continuous PRN, Alec Husain DO  •  QUEtiapine (SEROquel) tablet 25 mg, 25 mg, Oral, Q12H, Alec Husain DO, 25 mg at 08/18/21 2137  •  sertraline (ZOLOFT) tablet 100 mg, 100 mg, Oral, Daily, Alec Husain DO  •  warfarin (COUMADIN) tablet 1 mg, 1 mg, Oral, Once, Alec Husain DO    Data Review:  All labs (24hrs):   Recent Results (from the past 24 hour(s))   CBC (No Diff)    Collection Time: 08/18/21  7:46 AM    Specimen: Blood   Result Value Ref Range    WBC 9.10 3.40 - 10.80 10*3/mm3    RBC 2.93 (L) 4.14 - 5.80 10*6/mm3    Hemoglobin 7.9 (L) 13.0 - 17.7 g/dL    Hematocrit 24.5 (L) 37.5 - 51.0 %    MCV 83.4 79.0 - 97.0 fL    MCH 27.0 26.6 - 33.0 pg    MCHC 32.4 31.5 - 35.7 g/dL    RDW 18.1 (H) 12.3 - 15.4 %    RDW-SD 53.8 37.0 - 54.0 fl    MPV 8.1 6.0 - 12.0 fL    Platelets 277 140 - 450 10*3/mm3   Basic Metabolic Panel    Collection Time: 08/18/21  7:46 AM    Specimen: Blood   Result Value Ref Range    Glucose 76 65 - 99 mg/dL    BUN 19 8 - 23 mg/dL    Creatinine 0.97 0.76 - 1.27 mg/dL    Sodium 148 (H) 136 - 145 mmol/L    Potassium 3.6 3.5 - 5.2 mmol/L    Chloride 108 (H) 98 - 107 mmol/L    CO2 31.0 (H) 22.0 - 29.0 mmol/L    Calcium 8.4 8.2 - 9.6 mg/dL    eGFR Non African Amer 72 >60 mL/min/1.73    BUN/Creatinine Ratio 19.6 7.0 - 25.0    Anion Gap  9.0 5.0 - 15.0 mmol/L   Protime-INR    Collection Time: 08/18/21  7:46 AM    Specimen: Blood   Result Value Ref Range    Protime 25.0 19.4 - 28.5 Seconds    INR 2.40 2.00 - 3.00        Imaging:  XR Chest 1 View  Narrative: DATE OF EXAM:  8/16/2021 4:56 AM     PROCEDURE:  XR CHEST 1 VW-     INDICATIONS:  Shortness of breath, hypertension, heart disease, history of B-cell  lymphoma.      COMPARISON:  8/15/2021.     TECHNIQUE:   Single radiographic view of the chest was obtained.     FINDINGS:  The heart is enlarged with postsurgical changes apparent. There is  bilateral mixed interstitial/airspace disease which has not  significantly changed. The patient has a small right and a small to  moderate left pleural effusion with compressive atelectasis.  There is  no interval change from yesterday's exam.     Impression: No interval change yesterday's study with abnormalities as described  above.     Electronically Signed By-Hammad Ragland MD On:8/16/2021 8:18 AM  This report was finalized on [id number] by  Hammad Rgaland MD.       ASSESSMENT:   Cellulitis of left lower extremity    Chronic coronary artery disease    Depression    Hearing problem    History of mitral valve replacement    Essential hypertension    Long term current use of anticoagulant therapy    Mechanical heart valve present    History of B-cell lymphoma    Elevated INR (international normalized ratio) due to prior anticoagulant medication ingestion    Mixed hyperlipidemia    Peripheral vascular disease/ischemia  of lower extremity with ulceration s/p toe amputation       PLAN:  Poor long term prognosis  O2 support , decrease O2 as tolerated   The effusion is very small no need for further work-up   Encouraged to use flutter valve  Bronchodilator  Inhaled corticosteroids  Electrolytes/ glycemic control  DVT and GI prophylaxis  Prognosis is poor      Critical care time in direct medical management (   ) minutes  Electronically signed by Randi Gayle MD,  [facility]  8/18/2021  23:18 EDT

## 2021-08-19 NOTE — PLAN OF CARE
Goal Outcome Evaluation:              Outcome Summary: Patient now GIP hospice family at bedside. Request for scheduled meds to be made PRN medications changed per family. [facility] at bedside.

## 2021-08-19 NOTE — PROGRESS NOTES
[facility] Medicine Services Daily Progress Note    Patient Name: Niko Servin  : 1929  MRN: [id number]  Primary Care Physician:  Casper Saldaña MD  Date of admission: 2021      Subjective      Chief Complaint: Left Leg Pain      Date    2021  Family decided to rescind hospice last night.  However after having discussion with wife and daughters at bedside, explaining options and specialists contributions, family decided to make patient comfort care again and reinstate hospice    2021  No overnight events. Patient remains GIP hospice, does not appear to be in distress. Comfort care meds are PRN per family wishes.  Currently on 6L NC for comfort.    ROS   Unable to obtain due to altered mental status     Objective      Vitals:   Temp:  [98.4 °F (36.9 °C)-99.3 °F (37.4 °C)] 98.5 °F (36.9 °C)  Heart Rate:  [70-79] 71  Resp:  [20-22] 22  BP: (122-132)/(49-69) 132/54  Flow (L/min):  [3.5-6] 6    Physical Exam  Constitutional:       Appearance: He is ill-appearing and toxic-appearing.      Comments: AAOx0 with no insight  Responds to sternal rub and simple commands   HENT:      Head: Normocephalic and atraumatic.      Ears:      Comments: Hard of hearing     Nose: Nose normal.      Mouth/Throat:      Mouth: Mucous membranes are dry.   Eyes:      General: No scleral icterus.  Cardiovascular:      Rate and Rhythm: Normal rate and regular rhythm.      Heart sounds: Murmur heard.     Pulmonary:      Breath sounds: Wheezing, rhonchi and rales present.      Comments: Patient on 6L NC  Abdominal:      General: There is no distension.      Tenderness: There is no abdominal tenderness. There is no guarding.   Musculoskeletal:      Cervical back: No rigidity.      Right lower leg: No edema.      Left lower leg: No edema.      Comments: S/p left great toe amputation   Skin:     Coloration: Skin is not jaundiced.      Findings: No bruising or lesion.   Neurological:       General: No focal deficit present.      Mental Status: He is disoriented.      Motor: No weakness.             Result Review    Result Review:  I have personally reviewed the results from the time of this admission to 8/19/2021 10:50 EDT and agree with these findings:  [x]  Laboratory  [x]  Microbiology  [x]  Radiology  []  EKG/Telemetry   []  Cardiology/Vascular   []  Pathology  []  Old records  []  Other:         Wounds (last 24 hours)      LDA Wound     Row Name 08/19/21 0900 08/19/21 0401 08/19/21 0001       Wound 08/12/21 Left anterior great toe Incision    Wound - Properties Group Placement Date: 08/12/21  -TH Side: Left  -TH Orientation: anterior  -TH Location: great toe  -TH Primary Wound Type: Incision  -TH Additional Comments: 4x4 soaked in betadine, 4x4, kerlix, and ace  -TH    Closure  None  -CL  None  -CW  None  -CW    Base  slough;necrotic;black eschar;exposed structure;moist  -CL  slough;necrotic;black eschar;exposed structure;moist  -CW  slough;necrotic;black eschar;exposed structure;moist  -CW    Periwound  intact;dry;redness  -CL  intact;dry;redness  -CW  intact;dry;redness  -CW    Drainage Characteristics/Odor  creamy;purulent;tan  -CL  creamy;purulent;tan  -CW  creamy;purulent;tan  -CW    Drainage Amount  scant  -CL  scant  -CW  scant  -CW    Retired Wound - Properties Group Date first assessed: 08/12/21  -TH Side: Left  -TH Location: great toe  -TH Primary Wound Type: Incision  -TH Additional Comments: 4x4 soaked in betadine, 4x4, kerlix, and ace  -TH       Wound 08/13/21 1113 upper thoracic spine    Wound - Properties Group Placement Date: 08/13/21  -TP Placement Time: 1113  -TP Orientation: upper  -TP Location: thoracic spine  -TP    Closure  Open to air  -CL  Open to air  -CW  Open to air  -CW    Retired Wound - Properties Group Date first assessed: 08/13/21  -TP Time first assessed: 1113  -TP Location: thoracic spine  -TP       Wound 08/13/21 1114 thoracic spine    Wound - Properties Group  Placement Date: 08/13/21  -TP Placement Time: 1114  -TP Location: thoracic spine  -TP    Closure  Open to air  -CL  Open to air  -CW  Open to air  -CW    Retired Wound - Properties Group Date first assessed: 08/13/21  -TP Time first assessed: 1114  -TP Location: thoracic spine  -TP       Wound 08/17/21 0800 Left anterior foot    Wound - Properties Group Placement Date: 08/17/21  -CLA, not on LDA  Placement Time: 0800  -CLA Side: Left  -CLA Orientation: anterior  -CLA Location: foot  -CLA Stage, Pressure Injury : other (see comments)  -CLA, blackened area to top of foot     Closure  None  -CL  None  -CW  None  -CW    Retired Wound - Properties Group Date first assessed: 08/17/21  -CLA, not on LDA  Time first assessed: 0800  -CLA Side: Left  -CLA Location: foot  -CLA       Wound 08/10/21 0508 Left distal leg Other (comment)    Wound - Properties Group Placement Date: 08/10/21  - Placement Time: 0508  -JM Side: Left  -JM Orientation: distal  -JM Location: leg  -JM Primary Wound Type: Other  -JM, Cellulitis per md     Closure  Other (Comment) kerlex, ace  -CL  Other (Comment) kerlex, ace  -CW  Other (Comment) kerlex, ace  -CW    Base  black eschar;necrotic  -CL  black eschar;necrotic  -CW  black eschar;necrotic  -CW    Periwound  intact;dry;redness  -CL  intact;dry;redness  -CW  intact;dry;redness  -CW    Periwound Temperature  warm  -CL  warm  -CW  warm  -CW    Drainage Amount  none  -CL  none  -CW  none  -CW    Retired Wound - Properties Group Date first assessed: 08/10/21  - Time first assessed: 0508  -JM Side: Left  -JM Location: leg  -JM Primary Wound Type: Other  -JM, Cellulitis per md     Row Name 08/18/21 2001 08/18/21 1600 08/18/21 1200       Wound 08/12/21 Left anterior great toe Incision    Wound - Properties Group Placement Date: 08/12/21  -TH Side: Left  -TH Orientation: anterior  -TH Location: great toe  -TH Primary Wound Type: Incision  -TH Additional Comments: 4x4 soaked in betadine, 4x4, kerlix, and  ace  -TH    Dressing Appearance  dry;intact  -CW  dry;intact  -AS  dry;intact  -AS    Closure  None  -CW  None  -AS  None  -AS    Base  slough;necrotic;black eschar;exposed structure;moist  -CW  purple  -AS  purple  -AS    Periwound  intact;dry;redness  -CW  --  --    Drainage Characteristics/Odor  creamy;purulent;tan  -CW  --  --    Drainage Amount  scant  -CW  scant  -AS  scant  -AS    Care, Wound  cleansed with;sterile normal saline;dressing removed betadine  -CW  --  --    Dressing Care  dressing applied;gauze kerlex,ace  -CW  gauze, wet-to-moist  -AS  gauze, wet-to-moist  -AS    Retired Wound - Properties Group Date first assessed: 08/12/21  -TH Side: Left  -TH Location: great toe  -TH Primary Wound Type: Incision  -TH Additional Comments: 4x4 soaked in betadine, 4x4, kerlix, and ace  -TH       Wound 08/13/21 1113 upper thoracic spine    Wound - Properties Group Placement Date: 08/13/21  -TP Placement Time: 1113  -TP Orientation: upper  -TP Location: thoracic spine  -TP    Dressing Appearance  open to air  -CW  dry;intact  -AS  dry;intact  -AS    Closure  Open to air  -CW  Adhesive bandage  -AS  Adhesive bandage  -AS    Retired Wound - Properties Group Date first assessed: 08/13/21  -TP Time first assessed: 1113  -TP Location: thoracic spine  -TP       Wound 08/13/21 1114 thoracic spine    Wound - Properties Group Placement Date: 08/13/21  -TP Placement Time: 1114  -TP Location: thoracic spine  -TP    Dressing Appearance  open to air  -CW  dry;intact  -AS  dry;intact  -AS    Closure  Open to air  -CW  Adhesive bandage  -AS  Adhesive bandage  -AS    Retired Wound - Properties Group Date first assessed: 08/13/21  -TP Time first assessed: 1114  -TP Location: thoracic spine  -TP       Wound 08/17/21 0800 Left anterior foot    Wound - Properties Group Placement Date: 08/17/21  -CLA, not on LDA  Placement Time: 0800  -CLA Side: Left  -CLA Orientation: anterior  -CLA Location: foot  -CLA Stage, Pressure Injury : other  (see comments)  -CLA, blackened area to top of foot     Dressing Appearance  dry;intact  -CW  intact;dry  -AS  dry;intact  -AS    Closure  None  -CW  None  -AS  None  -AS    Care, Wound  cleansed with;sterile half normal saline betadine  -CW  --  --    Dressing Care  gauze kerlex, ace  -CW  gauze  -AS  gauze  -AS    Retired Wound - Properties Group Date first assessed: 08/17/21  -CLA, not on LDA  Time first assessed: 0800  -CLA Side: Left  -CLA Location: foot  -CLA       Wound 08/10/21 0508 Left distal leg Other (comment)    Wound - Properties Group Placement Date: 08/10/21  -JM Placement Time: 0508 -JM Side: Left  - Orientation: distal  -JM Location: leg  -JM Primary Wound Type: Other  -JM, Cellulitis per md     Dressing Appearance  dry;intact;no drainage  -CW  --  --    Closure  Other (Comment) kerlex, ace  -CW  --  --    Base  black eschar;necrotic  -CW  --  --    Periwound  intact;dry;redness  -CW  --  --    Periwound Temperature  warm  -CW  --  --    Drainage Amount  none  -CW  --  --    Care, Wound  cleansed with;sterile normal saline;dressing removed betadine  -CW  --  --    Dressing Care  dressing changed;gauze, dry  -CW  --  --    Retired Wound - Properties Group Date first assessed: 08/10/21  -JM Time first assessed: 0508 - Side: Left  -JM Location: leg  -JM Primary Wound Type: Other  -JM, Cellulitis per md       User Key  (r) = Recorded By, (t) = Taken By, (c) = Cosigned By    Initials Name Provider Type     Fatuma Coronel, RN Registered Nurse    Pastora Ortiz, RN Registered Nurse    Tamika Gordon RN Registered Nurse    Anaya Willson RN Registered Nurse    Brittani Gilbert LPN Licensed Nurse    Rashard Persaud LPN Licensed Nurse    Opal Starr RN Registered Nurse            Assessment/Plan      Brief Patient Summary:  Niko Servin is a 89 y.o. male who w/PMH of B-cell lymphoma, CAD, MVR W/mechanical valve long-term anticoagulation, depression, [place], HTN presents to  [facility] ED due to left lower extremity cellulitis worsening for the past 2 weeks since he injured foot with worsening dmenetia.  Patient started on broad spectrum abx, ID consulted.  Left great toe had necrosis and malodor concerning for osteomyelitis.  Due to elevated INR, vitamin K given, cards replaced coumadin with heparin drip.  Vascular workup showed significant infrapopliteal disease in LLE.   Podiatry performed left great toe ampuation and foot showed signs of improvement.  However, patient's mental status and condition continued to deteriorate despite intervention from ID, vascular, Podiatry, cardiology,ane Pulm.  Vascular was unable to perform intervention on 8/17/2021.  Suspect worsening status due to poor penetration of abx and worsening of disease in setting of underlying dementia.  Patient was also bacteremic with proteus 2/2 osteomyelitis as tissue grew proteus and staph.  Family elected to make patient comfort care with hospice consult on 8/17/2021. Due to poor lung sounds and encephalopathy, suspect patient is aspirating secretions leading to respiratory failure. Patient discharged and made GIP hospice on 8/17/2021      budesonide, 0.5 mg, Nebulization, BID - RT  glycopyrrolate, 0.4 mg, Intravenous, 4x Daily  warfarin, 1 mg, Oral, Once       Pharmacy to dose warfarin,          Active Hospital Problems:  Active Hospital Problems    Diagnosis    • Hospice care      Plan:   #Acute Hypoxic Respiratory Failure 2/2 aspirations  #Aspiration pneumonia  #Acute toxic encephalopathy  #Left great toe osteomyelitis   #Left great toe wet gangrene  #Bacteremia  #Mitral valve replacement history  #B cell lymphoma  #Peripheral Vascular disease, severe  #hydronephrosis  #Coumadin toxicity  #Hypokalemia  #Hypophosphatemia  #acute blood loss anemia  #HTN  #CAD  #Depression      - conversation held with family at bedside, patient to remain GIP hospice    - Haldol 1mg IV q6h PRN agitations    -Ativan 1mg IV  q4h PRN anxiety    -Morphine 2mg IV q4h PRN severe pain or dyspnea    - Robinul QID    - family requests to continue home Warfarin    - O2 for comfort    DVT prophylaxis:  Medical and mechanical DVT prophylaxis orders are present.    CODE STATUS:    Level Of Support Discussed With: Health Care Surrogate; Next of Kin (If No Surrogate)  Code Status: No CPR  Medical Interventions (Level of Support Prior to Arrest): Comfort Measures      Disposition:  Patient is GIP hospice    This patient has been examined wearing appropriate Personal Protective Equipment and discussed with Family and nursing Staff. 08/19/21      Electronically signed by Alec Husain DO, 08/19/21, 10:50 EDT.  [facility] Hospitalist Team         .[email or web address]

## 2021-08-20 NOTE — NURSING NOTE
Call placed to attending regarding patient fever and BP, Family is requesting a chest xray to check for pneumonia and to provide antibiotics if it is needed.

## 2021-08-20 NOTE — PLAN OF CARE
Goal Outcome Evaluation:   Patient has been sleeping most of the shift, When he arrived to the floor he had swelling of his bottom lip and of his tongue. Iv benadryl was given and it seemed to help with the swelling. Family wanted a chest xray completed to rule out pneumonia because of his fatigue and temp. Is blood pressure low at the beginning of shift however it did come back up. Will continue to monitor.

## 2021-08-20 NOTE — CASE MANAGEMENT/SOCIAL WORK
Continued Stay Note  [facility]     Patient Name: Niko Servin  MRN: [id number]  Today's Date: 8/20/2021    Admit Date: 8/17/2021    Discharge Plan     Row Name 08/20/21 1703       Plan    Plan  DC Plan: [facility] hospice following for transition home with hospice vs. to [facility] in [place].    Plan Comments  Discussed with [facility] nurse who reported that they cannot cross the border so [facility] would need to follow to transition patient to next level of care. Reported that patient no longer meets GIP criteria today and will now be routine related. CM sent new referral to basket and liaison for [facility].        Phone communication or documentation only - no physical contact with patient or family.    Isa Bolivar RN     Office Phone: [phone]  Office Cell: [phone]

## 2021-08-20 NOTE — PROGRESS NOTES
PULMONARY CRITICAL CARE Progress  NOTE      PATIENT IDENTIFICATION:  Name: Niko Servin  MRN: [id number]  :  1929     Age: 89 y.o.  Sex: male    DATE OF Note:  2021   Referring Physician: Alec Husain DO                  Subjective:   In bed eating not indistress per family         Objective:  tMax 24 hrs: Temp (24hrs), Av.4 °F (37.4 °C), Min:97.6 °F (36.4 °C), Max:100.2 °F (37.9 °C)      Vitals Ranges:   Temp:  [97.6 °F (36.4 °C)-100.2 °F (37.9 °C)] 97.6 °F (36.4 °C)  Heart Rate:  [] 68  Resp:  [16-22] 22  BP: ()/(41-67) 134/67    Intake and Output Last 3 Shifts:   I/O last 3 completed shifts:  In: -   Out: 400 [Urine:400]    Exam:  /67   Pulse 68   Temp 97.6 °F (36.4 °C) (Axillary)   Resp 22   Wt 85.4 kg (188 lb 4.4 oz)   SpO2 92%   BMI 26.26 kg/m²     General Appearance:   Alert confused  HEENT:  Normocephalic, without obvious abnormality, Conjunctiva/corneas clear,.  Normal external ear canals, Nares normal, no drainage     Neck:  Supple, symmetrical, trachea midline. No JVD.  Lungs /Chest wall:   Bilateral basal rhonchi, respirations unlabored symmetrical wall movement.     Heart:  Regular rate and rhythm, systolic murmur PMI left sternal border  Abdomen: Soft, non-tender, no masses, no organomegaly.    Extremities: Trace edema no clubbing or Cyanosis        Medications:    Current Facility-Administered Medications:   •  acetaminophen (TYLENOL) tablet 650 mg, 650 mg, Oral, Q4H PRN **OR** acetaminophen (TYLENOL) 160 MG/5ML solution 650 mg, 650 mg, Oral, Q4H PRN, 650 mg at 21 8284 **OR** acetaminophen (TYLENOL) suppository 650 mg, 650 mg, Rectal, Q4H PRN, Alec Husain DO  •  aluminum-magnesium hydroxide-simethicone (MAALOX MAX) 400-400-40 MG/5ML suspension 15 mL, 15 mL, Oral, Q6H PRN, Alec Husain, DO  •  budesonide (PULMICORT) nebulizer solution 0.5 mg, 0.5 mg, Nebulization, BID - RT, Neisha Lindo, MARIZA, 0.5 mg at 21  •   diphenhydrAMINE (BENADRYL) injection 12.5 mg, 12.5 mg, Intravenous, Q6H PRN, [place], Kasia L, APRN, 12.5 mg at 08/19/21 2257  •  glycopyrrolate PF (ROBINUL) injection 0.4 mg, 0.4 mg, Intravenous, Q4H PRN, Alec Husain DO  •  haloperidol lactate (HALDOL) injection 1 mg, 1 mg, Intravenous, Q6H PRN, Alec Husain DO  •  ipratropium-albuterol (DUO-NEB) nebulizer solution 3 mL, 3 mL, Nebulization, Q4H PRN, Neisha Lindo APRN  •  LORazepam (ATIVAN) injection 1 mg, 1 mg, Intravenous, Q4H PRN, Alec Husain DO, 1 mg at 08/18/21 1629  •  Morphine sulfate (PF) injection 2 mg, 2 mg, Intravenous, Q4H PRN, Alec Husain DO, 2 mg at 08/17/21 1737  •  Pharmacy to dose warfarin, , Does not apply, Continuous PRN, Alec Husain DO  •  QUEtiapine (SEROquel) tablet 25 mg, 25 mg, Oral, Daily PRN, Alec Husain DO  •  sertraline (ZOLOFT) tablet 100 mg, 100 mg, Oral, Daily PRN, Alec Husain DO    Data Review:  All labs (24hrs):     Imaging:  XR Chest 1 View  Narrative: DATE OF EXAM:  8/19/2021 10:10 PM     PROCEDURE:  XR CHEST 1 VW-     INDICATIONS:  fever and shortness of air cough today     COMPARISON:  Single frontal view chest performed on August 16, 2021     TECHNIQUE:   Single radiographic AP view of the chest was obtained.     FINDINGS:  Single frontal view chest AP some reportable reveals that the heart is  enlarged and is unchanged. Superior mediastinum is normal. The patient  is status post sternotomy. There is moderate gaseous distention of the  stomach in the superior aspect the abdomen. Nasogastric tube might be  helpful to further evaluate this finding. There are bilateral diffuse  mild infiltrates throughout the lungs unchanged since previous study  performed on August 16, 2021 no evidence of pneumothorax. Small  atelectatic infiltrates are seen in the lung bases bilaterally.      Impression:    1. Cardiomegaly unchanged. Bilateral diffuse mild infiltrates unchanged.  Small atelectatic  infiltrates in the lung bases bilaterally unchanged  there is moderate gaseous distention of the stomach in the superior  aspect the abdomen     Electronically Signed By-Jeff Valles MD On:8/20/2021 12:10 AM  This report was finalized on [id number] by  Jeff Valles MD.       ASSESSMENT:   Cellulitis of left lower extremity    Chronic coronary artery disease    Depression    Hearing problem    History of mitral valve replacement    Essential hypertension    Long term current use of anticoagulant therapy    Mechanical heart valve present    History of B-cell lymphoma    Elevated INR (international normalized ratio) due to prior anticoagulant medication ingestion    Mixed hyperlipidemia    Peripheral vascular disease/ischemia  of lower extremity with ulceration s/p toe amputation       PLAN:  Poor long term prognosis  O2 support , decrease O2 as tolerated   Comfort care     Critical care time in direct medical management (   ) minutes  Electronically signed by Randi Gayle MD, D,ABS   8/19/2021  07:39 EDT

## 2021-08-20 NOTE — PLAN OF CARE
Problem: Skin Injury Risk Increased  Goal: Skin Health and Integrity  Outcome: Ongoing, Progressing  Intervention: Optimize Skin Protection  Recent Flowsheet Documentation  Taken 8/20/2021 0729 by Deedee Hunter LPN  Pressure Reduction Techniques: weight shift assistance provided  Head of Bed (HOB): HOB elevated  Skin Protection: tubing/devices free from skin contact     Problem: Fall Injury Risk  Goal: Absence of Fall and Fall-Related Injury  Outcome: Ongoing, Progressing  Intervention: Identify and Manage Contributors to Fall Injury Risk  Recent Flowsheet Documentation  Taken 8/20/2021 0729 by Deedee Hunter LPN  Medication Review/Management: medications reviewed  Intervention: Promote Injury-Free Environment  Recent Flowsheet Documentation  Taken 8/20/2021 1335 by Deedee Hunter LPN  Safety Promotion/Fall Prevention: safety round/check completed  Taken 8/20/2021 1142 by Deedee Hunter LPN  Safety Promotion/Fall Prevention:   safety round/check completed   room organization consistent   nonskid shoes/slippers when out of bed   clutter free environment maintained   fall prevention program maintained   assistive device/personal items within reach   activity supervised  Taken 8/20/2021 0902 by Deedee Hunter LPN  Safety Promotion/Fall Prevention: safety round/check completed  Taken 8/20/2021 0729 by Deedee Hunter LPN  Safety Promotion/Fall Prevention:   safety round/check completed   room organization consistent   nonskid shoes/slippers when out of bed   fall prevention program maintained   clutter free environment maintained   assistive device/personal items within reach   activity supervised   Goal Outcome Evaluation:

## 2021-08-20 NOTE — PROGRESS NOTES
Pharmacy dosing service  Anticoagulant  Warfarin     Subjective:    Niko Servin is a 89 y.o.male being continued on warfarin for valve replacement.    INR Goal: 2.5 - 3.5  Home medication?: Yes, warfarin 7.5 mg PO daily.  Bridge Therapy Present?:  No      Assessment/Plan:    Warfarin held yesterday due to increase in INR without administration of warfarin. Will continue to hold today due to INR now being therapeutic and rising without administration of warfarin.     Continue to monitor and adjust based on INR.         Date 08/16 08/17 08/18 8/19 8/20       INR 1.66 1.87 2.4 2.79 3.17       Dose HOLD HOLD 1 mg  Not given HOLD HOLD           Objective:  [Ht:  ; Wt: 85.4 kg (188 lb 4.4 oz); BMI: Body mass index is 26.26 kg/m².]    Lab Results   Component Value Date    ALBUMIN 2.70 (L) 08/15/2021     Lab Results   Component Value Date    INR 3.17 (H) 08/20/2021    INR 2.79 08/19/2021    INR 2.40 08/18/2021    PROTIME 32.3 (H) 08/20/2021    PROTIME 28.7 (H) 08/19/2021    PROTIME 25.0 08/18/2021     Lab Results   Component Value Date    HGB 7.9 (L) 08/18/2021    HGB 8.7 (L) 08/17/2021    HGB 8.7 (L) 08/16/2021     Lab Results   Component Value Date    HCT 24.5 (L) 08/18/2021    HCT 27.6 (L) 08/17/2021    HCT 27.4 (L) 08/16/2021       Michael Dennis [facility]  08/20/21 12:32 EDT

## 2021-08-20 NOTE — CASE MANAGEMENT/SOCIAL WORK
Continued Stay Note  [facility]     Patient Name: Niko Servin  MRN: [id number]  Today's Date: 8/20/2021    Admit Date: 8/17/2021    Discharge Plan     Row Name 08/20/21 0940       Plan    Plan  GIP with [facility].        Phone communication or documentation only - no physical contact with patient or family.    Isa Bolivar RN     Office Phone: [phone]  Office Cell: [phone]

## 2021-08-21 NOTE — DISCHARGE SUMMARY
[facility] Medicine Services  DISCHARGE SUMMARY    Patient Name: Niko Servin  : 1929  MRN: [id number]    Date of Admission: 2021  Date of Discharge:  2021  Primary Care Physician: Casper Saldaña MD      Presenting Problem:   Hospice care [Z51.5]    Active and Resolved Hospital Problems:  Active Hospital Problems    Diagnosis POA   • Hospice care [Z51.5] Not Applicable      Resolved Hospital Problems   No resolved problems to display.         Hospital Course     Hospital Course:  Niko Servin is a 89 y.o. male who w/PMH of B-cell lymphoma, CAD, MVR W/mechanical valve long-term anticoagulation, depression, [place], HTN presents to [facility] ED due to left lower extremity cellulitis worsening for the past 2 weeks since he injured foot with worsening dmenetia.  Patient started on broad spectrum abx, ID consulted.  Left great toe had necrosis and malodor concerning for osteomyelitis.  Due to elevated INR, vitamin K given, cards replaced coumadin with heparin drip.  Vascular workup showed significant infrapopliteal disease in LLE.   Podiatry performed left great toe ampuation and foot showed signs of improvement.  However, patient's mental status and condition continued to deteriorate despite intervention from ID, vascular, Podiatry, cardiology,ane Pulm.  Vascular was unable to perform intervention on 2021.  Suspect worsening status due to poor penetration of abx and worsening of disease in setting of underlying dementia.  Patient was also bacteremic with proteus 2/2 osteomyelitis as tissue grew proteus and staph.  Family elected to make patient comfort care with hospice consult on 2021. Due to poor lung sounds and encephalopathy, suspect patient is aspirating secretions leading to respiratory failure. Patient discharged and made GIP hospice on 2021.  However, family elected to take patient home with hospice on 2021.  Patient  discharged home with [facility] hospice.        DISCHARGE Follow Up Recommendations for labs and diagnostics:     -Patient to be discharged home with hospice      Reasons For Change In Medications and Indications for New Medications:      Day of Discharge     Vital Signs:  Temp:  [97.5 °F (36.4 °C)-97.9 °F (36.6 °C)] 97.5 °F (36.4 °C)  Heart Rate:  [68-97] 97  Resp:  [14-15] 15  BP: (119-133)/(55-64) 119/59  Flow (L/min):  [6] 6    Physical Exam:  Physical Exam   Constitutional:       Appearance: He is ill-appearing and toxic-appearing.      Comments: AAOx1-2 with poor insight  Responds to sternal rub and simple commands   HENT:      Head: Normocephalic and atraumatic.      Ears:      Comments: Hard of hearing     Nose: Nose normal.      Mouth/Throat:      Mouth: Mucous membranes are dry.      Comments: Lip improved  Eyes:      General: No scleral icterus.  Cardiovascular:      Rate and Rhythm: Normal rate and regular rhythm.      Heart sounds: Murmur heard.     Pulmonary:      Breath sounds: Wheezing, rhonchi and rales present.      Comments: Patient on 6L NC  Abdominal:      General: There is no distension.      Tenderness: There is no abdominal tenderness. There is no guarding.   Musculoskeletal:      Cervical back: No rigidity.      Right lower leg: No edema.      Left lower leg: No edema.      Comments: S/p left great toe amputation   Skin:     Coloration: Skin is not jaundiced.      Findings: No bruising or lesion.   Neurological:      General: No focal deficit present.      Mental Status: He is disoriented.      Motor: No weakness.     Pertinent  and/or Most Recent Results     LAB RESULTS:      Lab 08/21/21  0218 08/20/21  0303 08/19/21  0635 08/18/21  0746 08/17/21  0642 08/16/21  2200 08/16/21  1412 08/16/21  0627 08/16/21  0627 08/15/21  0300 08/15/21  0300   WBC  --   --   --  9.10 7.80 8.40  --   --  8.80  --  8.10   HEMOGLOBIN  --   --   --  7.9* 8.7* 8.7*  --   --  8.6*  --  8.0*   HEMATOCRIT  --   --    --  24.5* 27.6* 27.4*  --   --  26.3*  --  25.0*   PLATELETS  --   --   --  277 304 308  --   --  290  --  243   NEUTROS ABS  --   --   --   --   --  7.60*  --   --   --   --  7.30*   LYMPHS ABS  --   --   --   --   --  0.30*  --   --   --   --  0.30*   MONOS ABS  --   --   --   --   --  0.40  --   --   --   --  0.40   EOS ABS  --   --   --   --   --  0.10  --   --   --   --  0.20   MCV  --   --   --  83.4 83.0 82.1  --   --  82.2  --  82.2   PROTIME 29.8* 32.3* 28.7* 25.0 19.8  --   --    < > 17.7*   < > 17.7*   APTT  --   --   --   --  54.1* 55.1* 86.3*  --  53.8*  --  68.3    < > = values in this interval not displayed.         Lab 08/18/21  0746 08/17/21  0642 08/16/21  0627 08/15/21  0300   SODIUM 148* 146* 143 146*   POTASSIUM 3.6 3.1* 3.4* 3.8   CHLORIDE 108* 104 103 106   CO2 31.0* 31.0* 29.0 29.0   ANION GAP 9.0 11.0 11.0 11.0   BUN 19 18 20 25*   CREATININE 0.97 0.94 1.05 1.12   GLUCOSE 76 79 98 98   CALCIUM 8.4 8.4 8.3 8.1*   MAGNESIUM  --  1.8 1.9 2.1   PHOSPHORUS  --  2.3* 2.3* 3.5   TSH  --  2.830  --   --          Lab 08/15/21  0300   TOTAL PROTEIN 5.9*   ALBUMIN 2.70*   GLOBULIN 3.2   ALT (SGPT) 7   AST (SGOT) 17   BILIRUBIN 0.4   ALK PHOS 57         Lab 08/21/21  0218 08/20/21  0303 08/19/21  0635 08/18/21  0746 08/17/21  0642   PROTIME 29.8* 32.3* 28.7* 25.0 19.8   INR 2.91 3.17* 2.79 2.40 1.87*             Lab 08/16/21  0627   IRON 25*   IRON SATURATION 19*   TIBC 133*   TRANSFERRIN 89*   FERRITIN 554.40*         Lab 08/14/21  1332   PH, ARTERIAL 7.310*   PCO2, ARTERIAL 60.8*   PO2 ART 80.4*   O2 SATURATION ART 94.2   FIO2 33   HCO3 ART 30.6*   BASE EXCESS ART 3.5*     Brief Urine Lab Results  (Last result in the past 365 days)      Color   Clarity   Blood   Leuk Est   Nitrite   Protein   CREAT   Urine HCG        08/10/21 0402 Dark Yellow Cloudy  Comment:  Result checked  Large (3+) Negative Negative 30 mg/dL (1+)             Microbiology Results (last 10 days)     Procedure Component Value -  Date/Time    Anaerobic Culture - Body Fluid, Toe, Left [[id number]] Collected: 08/12/21 1701    Lab Status: Final result Specimen: Body Fluid from Toe, Left Updated: 08/17/21 0826     Anaerobic Culture No anaerobes isolated at 5 days    Tissue / Bone Culture - Tissue, Toe, Left [[id number]]  (Abnormal)  (Susceptibility) Collected: 08/12/21 1701    Lab Status: Final result Specimen: Tissue from Toe, Left Updated: 08/15/21 0838     Tissue Culture Moderate growth (3+) Staphylococcus aureus      Scant growth (1+) Proteus mirabilis     Gram Stain Moderate (3+) WBCs per low power field      Few (2+) Gram positive cocci, intracellular      Few (2+) Gram positive cocci in clusters    Susceptibility      Staphylococcus aureus Proteus mirabilis      SULMA SULMA      Ampicillin  Susceptible      Ampicillin + Sulbactam  Susceptible      Cefepime  Susceptible      Ceftazidime  Susceptible      Ceftriaxone  Susceptible      Clindamycin Susceptible       Erythromycin Susceptible       Gentamicin  Susceptible      Inducible Clindamycin Resistance Negative       Levofloxacin  Susceptible      Oxacillin Susceptible       Penicillin G Resistant       Piperacillin + Tazobactam  Susceptible      Rifampin Susceptible       Tetracycline Susceptible Resistant      Trimethoprim + Sulfamethoxazole Susceptible Susceptible      Vancomycin Susceptible                  Linear View               Susceptibility Comments     Staphylococcus aureus    This isolate does not demonstrate inducible clindamycin resistance in vitro.      Proteus mirabilis    Cefazolin sensitivity will not be reported for Enterobacteriaceae in non-urine isolates. If cefazolin is preferred, please call the microbiology lab to request an E-test.  With the exception of urinary-sourced infections, aminoglycosides should not be used as monotherapy.                   XR Chest 2 View    Result Date: 8/10/2021  Impression: Chronic changes are again noted which are stable. There is no  definitive evidence for acute cardiopulmonary process.  Electronically Signed By-Aneesh Judd MD On:8/10/2021 12:43 PM This report was finalized on [id number] by  Aneesh Judd MD.    XR Foot 3+ View Left    Result Date: 8/10/2021  Impression: 1.Evidence for soft tissue swelling throughout the great toe with areas of gas production. The findings indicate changes of soft tissue cellulitis. 2.Evidence for osteomyelitis involving the proximal and distal phalanges of the great toe.  Electronically Signed By-Aneesh Judd MD On:8/10/2021 7:46 AM This report was finalized on [id number] by  Aneesh Judd MD.    CT Head Without Contrast    Result Date: 8/10/2021  Impression: 1. No acute intracranial abnormality. No hemorrhage. 2. No calvarial fracture. 3. Moderate chronic age-related intracranial findings as above.  This examination was interpreted by Aneesh Ordonez M.D. Electronically signed by:  Aneesh Ordonez M.D.  8/9/2021 11:53 PM    CT Chest Without Contrast Diagnostic    Result Date: 8/11/2021  Impression:  1. Tiny layering pleural effusions with mild bilateral basilar subsegmental atelectasis. 2. The study is difficult to interpret due to respiratory motion artifact. There are some areas of interstitial thickening and hazy groundglass density. I would favor pulmonary edema over an atypical infection. 3. Cardiomegaly with extensive coronary artery atherosclerotic vascular disease. 4. Dense sludge within the gallbladder.  Electronically Signed By-Hammad Ragland MD On:8/11/2021 7:35 PM This report was finalized on [id number] by  Hammad Ragland MD.    CT Angio Abdominal Aorta Bilateral Iliofem Runoff    Result Date: 8/12/2021  Impression:  1. Diffuse atherosclerotic disease to approximately the popliteal level bilaterally without any significant stenosis above the popliteal artery. 2. Greater than 70% stenosis of the left popliteal artery. 3. Significant trifurcation level disease bilaterally with  single vessel runoff via the peroneal artery. 4. Right renal and bladder stones with bilateral hydronephrosis. There is marked bladder distention with multiple bladder diverticula  and prostatic enlargement suggesting bladder outlet obstruction. 5. Multiple incidental nonvascular findings as noted above.  Electronically Signed By-Tushar Montes MD On:8/12/2021 1:32 PM This report was finalized on [id number] by  Tushar Montes MD.    XR Chest 1 View    Result Date: 8/20/2021  Impression:  1. Cardiomegaly unchanged. Bilateral diffuse mild infiltrates unchanged. Small atelectatic infiltrates in the lung bases bilaterally unchanged there is moderate gaseous distention of the stomach in the superior aspect the abdomen  Electronically Signed By-Jeff Valles MD On:8/20/2021 12:10 AM This report was finalized on [id number] by  Jeff Valles MD.    XR Chest 1 View    Result Date: 8/16/2021  Impression: No interval change yesterday's study with abnormalities as described above.  Electronically Signed By-Hammad Ragland MD On:8/16/2021 8:18 AM This report was finalized on [id number] by  aHmmad Ragland MD.    XR Chest 1 View    Result Date: 8/15/2021  Impression: 1.Coarse bilateral interstitial markings, which could reflect interstitial pulmonary edema versus atypical pneumonia. 2.Cardiomegaly with small bilateral pleural effusions.  Electronically Signed By-Aneesh Cook MD On:8/15/2021 10:23 AM This report was finalized on [id number] by  Aneesh Cook MD.    XR Chest 1 View    Result Date: 8/10/2021  Impression: Chronic changes are noted which are stable. There is no evidence for definitive acute cardiopulmonary process.  Electronically Signed By-Aneesh Judd MD On:8/10/2021 7:43 AM This report was finalized on [id number] by  Aneesh Judd MD.      Results for orders placed during the hospital encounter of 08/09/21    Duplex Vein Mapping Lower Extremity - Bilateral CAR    Interpretation  Summary  · The left greater saphenous vein is patent and of adequate size in the thigh.  · The left greater saphenous vein is patent and of adequate size in the calf.      Results for orders placed during the hospital encounter of 08/09/21    Duplex Vein Mapping Lower Extremity - Bilateral CAR    Interpretation Summary  · The left greater saphenous vein is patent and of adequate size in the thigh.  · The left greater saphenous vein is patent and of adequate size in the calf.      Results for orders placed during the hospital encounter of 08/09/21    Adult Transthoracic Echo Complete W/ Cont if Necessary Per Protocol    Interpretation Summary  · Left ventricular wall thickness is consistent with mild concentric hypertrophy.  · Estimated left ventricular EF was in agreement with the calculated left ventricular EF. Left ventricular ejection fraction appears to be 56 - 60%. Left ventricular systolic function is normal.  · Estimated right ventricular systolic pressure from tricuspid regurgitation is mildly elevated (35-45 mmHg).  · Mild pulmonary hypertension is present.  · Left ventricular diastolic function is consistent with (grade Ia w/high LAP) impaired relaxation.  · Mild to moderate aortic valve stenosis is present.      Labs Pending at Discharge:      Procedures Performed           Consults:   Consults     Date and Time Order Name Status Description    8/17/2021  8:57 PM Inpatient Vascular Surgery Consult Completed     8/11/2021  8:37 AM Inpatient Nephrology Consult Completed     8/11/2021  8:37 AM Inpatient Pulmonology Consult Completed     8/10/2021  5:44 PM Inpatient Cardiology Consult Completed     8/10/2021  5:17 PM Inpatient Vascular Surgery Consult Completed     8/10/2021  8:36 AM Inpatient Infectious Diseases Consult Completed     8/10/2021  2:49 AM Inpatient Podiatry Consult Completed     8/10/2021  2:30 AM Inpatient Hospitalist Consult Completed             Discharge Details        Discharge Medications       Stop These Medications    sertraline 100 MG tablet  Commonly known as: ZOLOFT     warfarin 7.5 MG tablet  Commonly known as: COUMADIN            No Known Allergies      Discharge Disposition: Home with hospice  Condition on discharge: Poor  Hospice/Medical Facility (DC - External)    Diet:  Hospital:  Diet Order   Procedures   • Diet Regular         Discharge Activity:   Activity Instructions     Activity as Tolerated              CODE STATUS:  Code Status and Medical Interventions:   Ordered at: 08/18/21 0911     Level Of Support Discussed With:    Health Care Surrogate    Next of Kin (If No Surrogate)     Code Status:    No CPR     Medical Interventions (Level of Support Prior to Arrest):    Comfort Measures         No future appointments.    Additional Instructions for the Follow-ups that You Need to Schedule     Call MD With Problems / Concerns   As directed      Instructions: Call [phone] or email [email or web address] for problems or concerns.    Order Comments: Instructions: Call [phone] or email [email or web address] for problems or concerns.                Time spent on Discharge including face to face service:  25 minutes    This patient has been examined wearing appropriate Personal Protective Equipment and discussed with Patient. 08/21/21      Signature: Electronically signed by Alec Husain DO, 08/21/21, 11:29 AM EDT.

## 2021-08-21 NOTE — PLAN OF CARE
Pt resting. PRN medications administered at family member's request. Pt sitting up and more alert per family. Daily dressing change completed during this shift. Per family, pt to d/c home today on [facility]. Will continue to monitor; comfort care maintained .

## 2021-08-21 NOTE — PROGRESS NOTES
Daily Progress Note        Hospice care      ASSESSMENT:   Cellulitis of left lower extremity    Chronic coronary artery disease    Depression    Hearing problem    History of mitral valve replacement    Essential hypertension    Long term current use of anticoagulant therapy    Mechanical heart valve present    History of B-cell lymphoma    Elevated INR (international normalized ratio) due to prior anticoagulant medication ingestion    Mixed hyperlipidemia    Peripheral vascular disease/ischemia  of lower extremity with ulceration s/p toe amputation        PLAN:    O2 support , decrease O2 as tolerated   Comfort care      LOS: 3 days     Subjective         Objective     Vital signs for last 24 hours:  Vitals:    08/20/21 0745 08/20/21 1119 08/20/21 1624 08/20/21 2123   BP: 128/55 130/73 125/55 133/64   BP Location: Right arm Right arm Right arm Right arm   Patient Position: Lying Lying Lying Lying   Pulse: 65 67 68 70   Resp: 19 12 14 15   Temp: 97.6 °F (36.4 °C) 99.2 °F (37.3 °C) 97.9 °F (36.6 °C) 97.9 °F (36.6 °C)   TempSrc: Axillary Axillary Axillary Oral   SpO2: 98% 95% 90% 95%   Weight:           Intake/Output last 3 shifts:  I/O last 3 completed shifts:  In: 240 [P.O.:240]  Out: 400 [Urine:400]  Intake/Output this shift:  No intake/output data recorded.      Radiology  Imaging Results (Last 24 Hours)     ** No results found for the last 24 hours. **          Labs:  Results from last 7 days   Lab Units 08/18/21  0746   WBC 10*3/mm3 9.10   HEMOGLOBIN g/dL 7.9*   HEMATOCRIT % 24.5*   PLATELETS 10*3/mm3 277     Results from last 7 days   Lab Units 08/18/21  0746 08/16/21  0627 08/15/21  0300   SODIUM mmol/L 148*   < > 146*   POTASSIUM mmol/L 3.6   < > 3.8   CHLORIDE mmol/L 108*   < > 106   CO2 mmol/L 31.0*   < > 29.0   BUN mg/dL 19   < > 25*   CREATININE mg/dL 0.97   < > 1.12   CALCIUM mg/dL 8.4   < > 8.1*   BILIRUBIN mg/dL  --   --  0.4   ALK PHOS U/L  --   --  57   ALT (SGPT) U/L  --   --  7   AST (SGOT) U/L   --   --  17   GLUCOSE mg/dL 76   < > 98    < > = values in this interval not displayed.     Results from last 7 days   Lab Units 08/14/21  1332   PH, ARTERIAL pH units 7.310*   PO2 ART mm Hg 80.4*   PCO2, ARTERIAL mm Hg 60.8*   HCO3 ART mmol/L 30.6*     Results from last 7 days   Lab Units 08/15/21  0300 08/14/21  0343   ALBUMIN g/dL 2.70* 2.20*             Results from last 7 days   Lab Units 08/17/21  0642   MAGNESIUM mg/dL 1.8     Results from last 7 days   Lab Units 08/20/21  0303 08/19/21  0635 08/18/21  0746 08/17/21  0642 08/17/21  0642 08/16/21  2200 08/16/21  1412 08/16/21  0627   INR  3.17* 2.79 2.40   < > 1.87*  --   --    < >   APTT seconds  --   --   --   --  54.1* 55.1* 86.3*  --     < > = values in this interval not displayed.     Results from last 7 days   Lab Units 08/17/21  0642   TSH uIU/mL 2.830           Meds:   SCHEDULE     Infusions  Pharmacy to dose warfarin,       PRNs  •  acetaminophen **OR** acetaminophen **OR** acetaminophen  •  aluminum-magnesium hydroxide-simethicone  •  glycopyrrolate  •  guaiFENesin-codeine  •  haloperidol lactate  •  ipratropium-albuterol  •  LORazepam  •  Morphine  •  Pharmacy to dose warfarin  •  QUEtiapine  •  sertraline    Physical Exam:  Physical Exam  Pulmonary:      Breath sounds: Rhonchi and rales present.         ROS  Review of Systems   Respiratory: Positive for shortness of breath.    Cardiovascular: Positive for leg swelling.             Total time spent with patient greater than: 45 Minutes

## 2021-08-21 NOTE — PROGRESS NOTES
Daily Progress Note        Hospice care      ASSESSMENT:   Cellulitis of left lower extremity    Chronic coronary artery disease    Depression    Hearing problem    History of mitral valve replacement    Essential hypertension    Long term current use of anticoagulant therapy    Mechanical heart valve present    History of B-cell lymphoma    Elevated INR (international normalized ratio) due to prior anticoagulant medication ingestion    Mixed hyperlipidemia    Peripheral vascular disease/ischemia  of lower extremity with ulceration s/p toe amputation        PLAN:    O2 support , decrease O2 as tolerated   Comfort care          LOS: 4 days     Subjective         Objective     Vital signs for last 24 hours:  Vitals:    08/20/21 1119 08/20/21 1624 08/20/21 2123 08/21/21 0440   BP: 130/73 125/55 133/64 119/59   BP Location: Right arm Right arm Right arm Right arm   Patient Position: Lying Lying Lying Lying   Pulse: 67 68 70 97   Resp: 12 14 15 15   Temp: 99.2 °F (37.3 °C) 97.9 °F (36.6 °C) 97.9 °F (36.6 °C) 97.5 °F (36.4 °C)   TempSrc: Axillary Axillary Oral Oral   SpO2: 95% 90% 95% 93%   Weight:           Intake/Output last 3 shifts:  I/O last 3 completed shifts:  In: 240 [P.O.:240]  Out: 400 [Urine:400]  Intake/Output this shift:  I/O this shift:  In: -   Out: 550 [Urine:550]      Radiology  Imaging Results (Last 24 Hours)     ** No results found for the last 24 hours. **          Labs:  Results from last 7 days   Lab Units 08/18/21  0746   WBC 10*3/mm3 9.10   HEMOGLOBIN g/dL 7.9*   HEMATOCRIT % 24.5*   PLATELETS 10*3/mm3 277     Results from last 7 days   Lab Units 08/18/21  0746 08/16/21  0627 08/15/21  0300   SODIUM mmol/L 148*   < > 146*   POTASSIUM mmol/L 3.6   < > 3.8   CHLORIDE mmol/L 108*   < > 106   CO2 mmol/L 31.0*   < > 29.0   BUN mg/dL 19   < > 25*   CREATININE mg/dL 0.97   < > 1.12   CALCIUM mg/dL 8.4   < > 8.1*   BILIRUBIN mg/dL  --   --  0.4   ALK PHOS U/L  --   --  57   ALT (SGPT) U/L  --   --  7   AST  (SGOT) U/L  --   --  17   GLUCOSE mg/dL 76   < > 98    < > = values in this interval not displayed.     Results from last 7 days   Lab Units 08/14/21  1332   PH, ARTERIAL pH units 7.310*   PO2 ART mm Hg 80.4*   PCO2, ARTERIAL mm Hg 60.8*   HCO3 ART mmol/L 30.6*     Results from last 7 days   Lab Units 08/15/21  0300   ALBUMIN g/dL 2.70*             Results from last 7 days   Lab Units 08/17/21  0642   MAGNESIUM mg/dL 1.8     Results from last 7 days   Lab Units 08/21/21  0218 08/20/21  0303 08/19/21  0635 08/18/21  0746 08/17/21  0642 08/16/21  2200 08/16/21  1412 08/16/21  0627   INR  2.91 3.17* 2.79   < > 1.87*  --   --    < >   APTT seconds  --   --   --   --  54.1* 55.1* 86.3*  --     < > = values in this interval not displayed.     Results from last 7 days   Lab Units 08/17/21  0642   TSH uIU/mL 2.830           Meds:   SCHEDULE     Infusions  Pharmacy to dose warfarin,       PRNs  •  acetaminophen **OR** acetaminophen **OR** acetaminophen  •  aluminum-magnesium hydroxide-simethicone  •  glycopyrrolate  •  guaiFENesin-codeine  •  haloperidol lactate  •  ipratropium-albuterol  •  LORazepam  •  Morphine  •  Pharmacy to dose warfarin  •  QUEtiapine  •  sertraline    Physical Exam:  Physical Exam  Pulmonary:      Breath sounds: Rhonchi and rales present.   Musculoskeletal:      Right lower leg: Edema present.      Left lower leg: Edema present.   Skin:     General: Skin is warm and dry.   Neurological:      Mental Status: He is alert.         ROS  Review of Systems   Respiratory: Positive for shortness of breath.    Cardiovascular: Positive for leg swelling.       I have reviewed the patient's clinical results.    Electronically signed by MARIZA Rich

## 2021-08-21 NOTE — PROGRESS NOTES
Pharmacy dosing service  Anticoagulant  Warfarin     Subjective:    Niko Servin is a 89 y.o.male being continued on warfarin for valve replacement.    INR Goal: 2.5 - 3.5  Home medication?: Yes, warfarin 7.5 mg PO daily.  Bridge Therapy Present?:  No      Assessment/Plan:    Patient receiving hospice care, but family requesting to continue warfarin. INR therapeutic, but INR has been variable despite not receiving any warfarin during admission.  Will continue to hold dose today. If INR continues to trend down tomorrow, may consider low dose.    Continue to monitor and adjust based on INR.         Date 08/16 08/17 08/18 8/19 8/20 8/21      INR 1.66 1.87 2.4 2.79 3.17 2.91      Dose HOLD HOLD 1 mg  Not given HOLD HOLD HOLD          Objective:  [Ht:  ; Wt: 85.4 kg (188 lb 4.4 oz); BMI: Body mass index is 26.26 kg/m².]    Lab Results   Component Value Date    ALBUMIN 2.70 (L) 08/15/2021     Lab Results   Component Value Date    INR 2.91 08/21/2021    INR 3.17 (H) 08/20/2021    INR 2.79 08/19/2021    PROTIME 29.8 (H) 08/21/2021    PROTIME 32.3 (H) 08/20/2021    PROTIME 28.7 (H) 08/19/2021     Lab Results   Component Value Date    HGB 7.9 (L) 08/18/2021    HGB 8.7 (L) 08/17/2021    HGB 8.7 (L) 08/16/2021     Lab Results   Component Value Date    HCT 24.5 (L) 08/18/2021    HCT 27.6 (L) 08/17/2021    HCT 27.4 (L) 08/16/2021       Marian Cuevas PharmD, BCPS  08/21/21 15:15 EDT

## 2021-08-23 NOTE — CASE MANAGEMENT/SOCIAL WORK
Case Management Discharge Note      Final Note: DC home with [facility] hosp[facility]         Selected Continued Care - Discharged on 8/21/2021 Admission date: 8/17/2021 - Discharge disposition: Hospice/Medical Facility (DC - External)        Home Medical Care Coordination complete.    Service Provider Selected Services Address Phone Fax Patient Preferred    [facility]  Home [facility] [address] [facility] [place] [address] [phone] --           Final Discharge Disposition Code: 50 - home with hospice

## 2022-11-10 NOTE — PROGRESS NOTES
35 year old M patient seen in preop holding. Patient was NPO since midnight. All questions answered. Right foot was marked with indelible ink and fibroma was circled. Patient cleared for surgery.    Logan Ulloa DPM     Pharmacy dosing service  Anticoagulant  Warfarin     Subjective:    Niko Servin is a 89 y.o.male being continued on warfarin for valve replacement.    INR Goal: 2.5 - 3.5  Home medication?: Yes, warfarin 7.5 mg PO daily.  Bridge Therapy Present?:  No      Assessment/Plan:    The patient did not receive the warfarin 1 mg dose yesterday, however INR has increased and is now therapeutic. Will hold warfarin today since the patient's INR is increasing without the administration of warfarin.    Continue to monitor and adjust based on INR.         Date 08/16 08/17 08/18 8/19        INR 1.66 1.87 2.4 2.79        Dose HOLD HOLD 1 mg  Not given HOLD            Objective:  [Ht:  ; Wt: 85.4 kg (188 lb 4.4 oz); BMI: Body mass index is 26.26 kg/m².]    Lab Results   Component Value Date    ALBUMIN 2.70 (L) 08/15/2021     Lab Results   Component Value Date    INR 2.79 08/19/2021    INR 2.40 08/18/2021    INR 1.87 (L) 08/17/2021    PROTIME 28.7 (H) 08/19/2021    PROTIME 25.0 08/18/2021    PROTIME 19.8 08/17/2021     Lab Results   Component Value Date    HGB 7.9 (L) 08/18/2021    HGB 8.7 (L) 08/17/2021    HGB 8.7 (L) 08/16/2021     Lab Results   Component Value Date    HCT 24.5 (L) 08/18/2021    HCT 27.6 (L) 08/17/2021    HCT 27.4 (L) 08/16/2021       Rafael Kent RPH  08/19/21 12:31 EDT

## 2022-12-03 NOTE — PROGRESS NOTES
[facility] Medicine Services Daily Progress Note    Patient Name: Niko Servin  : 1929  MRN: [id number]  Primary Care Physician:  Casper Saldaña MD  Date of admission: 2021      Subjective      Chief Complaint: Left Leg Pain      Date    2021  Family decided to rescind hospice last night.  However after having discussion with wife and daughters at bedside, explaining options and specialists contributions, family decided to make patient comfort care again and reinstate hospice    2021  No overnight events. Patient remains [facility]IP hospice, does not appear to be in distress. Comfort care meds are PRN per family wishes.  Currently on 6L NC for comfort.    2021  Patient had some lip swelling last night, aerosols stopped, and this improved with benadryl.  Family requests all comfort care meds be prn.    ROS   Unable to obtain due to altered mental status     Objective      Vitals:   Temp:  [97.6 °F (36.4 °C)-100.2 °F (37.9 °C)] 99.2 °F (37.3 °C)  Heart Rate:  [] 67  Resp:  [12-22] 12  BP: ()/(41-73) 130/73  Flow (L/min):  [5-6] 6    Physical Exam  Constitutional:       Appearance: He is ill-appearing and toxic-appearing.      Comments: AAOx0 with no insight  Responds to sternal rub and simple commands   HENT:      Head: Normocephalic and atraumatic.      Ears:      Comments: Hard of hearing     Nose: Nose normal.      Mouth/Throat:      Mouth: Mucous membranes are dry.      Comments: Lip slightly swollen  Eyes:      General: No scleral icterus.  Cardiovascular:      Rate and Rhythm: Normal rate and regular rhythm.      Heart sounds: Murmur heard.     Pulmonary:      Breath sounds: Wheezing, rhonchi and rales present.      Comments: Patient on 6L NC  Abdominal:      General: There is no distension.      Tenderness: There is no abdominal tenderness. There is no guarding.   Musculoskeletal:      Cervical back: No rigidity.      Right lower leg: No  edema.      Left lower leg: No edema.      Comments: S/p left great toe amputation   Skin:     Coloration: Skin is not jaundiced.      Findings: No bruising or lesion.   Neurological:      General: No focal deficit present.      Mental Status: He is disoriented.      Motor: No weakness.             Result Review    Result Review:  I have personally reviewed the results from the time of this admission to 8/20/2021 13:08 EDT and agree with these findings:  [x]  Laboratory  [x]  Microbiology  [x]  Radiology  []  EKG/Telemetry   []  Cardiology/Vascular   []  Pathology  []  Old records  []  Other:         Wounds (last 24 hours)      LDA Wound     Row Name 08/20/21 0729 08/19/21 1959          Wound 08/12/21 Left anterior great toe Incision    Wound - Properties Group Placement Date: 08/12/21  -TH Side: Left  -TH Orientation: anterior  -TH Location: great toe  -TH Primary Wound Type: Incision  -TH Additional Comments: 4x4 soaked in betadine, 4x4, kerlix, and ace  -TH    Dressing Appearance  dry;intact  -SC  --     Closure  --  None  -SR     Base  --  slough;necrotic;black eschar;exposed structure;moist  -SR     Periwound  --  intact;dry;redness  -SR     Drainage Characteristics/Odor  --  creamy;purulent;tan  -SR     Drainage Amount  --  scant  -SR     Retired Wound - Properties Group Date first assessed: 08/12/21  -TH Side: Left  -TH Location: great toe  -TH Primary Wound Type: Incision  -TH Additional Comments: 4x4 soaked in betadine, 4x4, kerlix, and ace  -TH       Wound 08/13/21 1113 upper thoracic spine    Wound - Properties Group Placement Date: 08/13/21  -TP Placement Time: 1113  -TP Orientation: upper  -TP Location: thoracic spine  -TP    Dressing Appearance  open to air  -SC  --     Closure  --  Open to air  -SR     Retired Wound - Properties Group Date first assessed: 08/13/21  -TP Time first assessed: 1113  -TP Location: thoracic spine  -TP       Wound 08/13/21 1114 thoracic spine    Wound - Properties Group  Placement Date: 08/13/21  -TP Placement Time: 1114  -TP Location: thoracic spine  -TP    Dressing Appearance  open to air  -SC  --     Closure  --  Open to air  -SR     Retired Wound - Properties Group Date first assessed: 08/13/21  -TP Time first assessed: 1114  -TP Location: thoracic spine  -TP       Wound 08/17/21 0800 Left anterior foot    Wound - Properties Group Placement Date: 08/17/21  -CL, not on LDA  Placement Time: 0800  -CL Side: Left  -CL Orientation: anterior  -CL Location: foot  -CL Stage, Pressure Injury : other (see comments)  -CL, blackened area to top of foot     Dressing Appearance  dry;intact  -SC  --     Closure  --  None  -SR     Retired Wound - Properties Group Date first assessed: 08/17/21  -CL, not on LDA  Time first assessed: 0800  -CL Side: Left  -CL Location: foot  -CL       Wound 08/10/21 0508 Left distal leg Other (comment)    Wound - Properties Group Placement Date: 08/10/21  -JM Placement Time: 0508  -JM Side: Left  -JM Orientation: distal  -JM Location: leg  -JM Primary Wound Type: Other  -JM, Cellulitis per md     Dressing Appearance  dry;intact  -SC  dry;intact  -SR     Closure  --  Other (Comment) kerlex, ace  -SR     Base  --  black eschar;necrotic;exposed structure;other (see comments)  -SR     Periwound  --  intact;dry;redness  -SR     Periwound Temperature  --  warm  -SR     Drainage Amount  --  none  -SR     Retired Wound - Properties Group Date first assessed: 08/10/21  -JM Time first assessed: 0508  -JM Side: Left  -JM Location: leg  -JM Primary Wound Type: Other  -JM, Cellulitis per md       User Key  (r) = Recorded By, (t) = Taken By, (c) = Cosigned By    Initials Name Provider Type     Fatuma Coronel, RN Registered Nurse    Deedee Das LPN Licensed Nurse    Pastora Jin RN Registered Nurse    Brittani Gilbert LPN Licensed Nurse    Rashard Persaud LPN Licensed Nurse    Erica Fajardo RN Registered Nurse            Assessment/Plan      Brief  Patient Summary:  Niko Servin is a 89 y.o. male who w/PMH of B-cell lymphoma, CAD, MVR W/mechanical valve long-term anticoagulation, depression, [place], HTN presents to [facility] ED due to left lower extremity cellulitis worsening for the past 2 weeks since he injured foot with worsening dmenetia.  Patient started on broad spectrum abx, ID consulted.  Left great toe had necrosis and malodor concerning for osteomyelitis.  Due to elevated INR, vitamin K given, cards replaced coumadin with heparin drip.  Vascular workup showed significant infrapopliteal disease in LLE.   Podiatry performed left great toe ampuation and foot showed signs of improvement.  However, patient's mental status and condition continued to deteriorate despite intervention from ID, vascular, Podiatry, cardiology,ane Pulm.  Vascular was unable to perform intervention on 8/17/2021.  Suspect worsening status due to poor penetration of abx and worsening of disease in setting of underlying dementia.  Patient was also bacteremic with proteus 2/2 osteomyelitis as tissue grew proteus and staph.  Family elected to make patient comfort care with hospice consult on 8/17/2021. Due to poor lung sounds and encephalopathy, suspect patient is aspirating secretions leading to respiratory failure. Patient discharged and made [facility]IP hospice on 8/17/2021          Pharmacy to dose warfarin,          Active Hospital Problems:  Active Hospital Problems    Diagnosis    • Hospice care      Plan:   #Acute Hypoxic Respiratory Failure 2/2 aspirations  #Aspiration pneumonia  #Acute toxic encephalopathy  #Left great toe osteomyelitis   #Left great toe wet gangrene  #Bacteremia  #Mitral valve replacement history  #B cell lymphoma  #Peripheral Vascular disease, severe  #hydronephrosis  #Coumadin toxicity  #Hypokalemia  #Hypophosphatemia  #acute blood loss anemia  #HTN  #CAD  #Depression      - conversation held with family at bedside, patient to remain [facility] hospice    -  Brain mass Haldol 1mg IV q6h PRN agitations    -Ativan 1mg IV q4h PRN anxiety    -Morphine 2mg IV q4h PRN severe pain or dyspnea    - Benadryl PRN    - Guaifenesin 100-10 5ml q8h PRN cough    - Robinul QID    - family requests to continue home Warfarin    - O2 for comfort    DVT prophylaxis:  Medical and mechanical DVT prophylaxis orders are present.    CODE STATUS:    Level Of Support Discussed With: Health Care Surrogate; Next of Kin (If No Surrogate)  Code Status: No CPR  Medical Interventions (Level of Support Prior to Arrest): Comfort Measures      Disposition:  Patient is GIP hospice    This patient has been examined wearing appropriate Personal Protective Equipment and discussed with Family and nursing Staff. 08/20/21      Electronically signed by Alec Husain DO, 08/20/21, 13:08 EDT.  [facility] Hospitalist Team         .[email or web address]   Brain mass Brain mass Brain mass Brain mass

## (undated) DEVICE — SOLUTION,WATER,IRRIGATION,1000ML,STERILE: Brand: MEDLINE

## (undated) DEVICE — APPL CHLORAPREP HI/LITE TINTED 10.5ML ORNG

## (undated) DEVICE — GLV SURG BIOGEL M LTX PF 7 1/2

## (undated) DEVICE — GOWN,REINFORCE,POLY,SIRUS,BREATH SLV,XLG: Brand: MEDLINE

## (undated) DEVICE — PK EXTREM 50

## (undated) DEVICE — KT SURG TURNOVER 050

## (undated) DEVICE — BANDAGE,GAUZE,BULKEE II,4.5"X4.1YD,STRL: Brand: MEDLINE

## (undated) DEVICE — DRAPE,U/ SHT,SPLIT,PLAS,STERIL: Brand: MEDLINE

## (undated) DEVICE — GAUZE,SPONGE,FLUFF,6"X6.75",STRL,5/TRAY: Brand: MEDLINE

## (undated) DEVICE — BNDG ELAS ELITE V/CLOSE 4IN 5YD LF STRL

## (undated) DEVICE — ESWAB LQ AMIES  REG. NYLON FLOCKED  APPLICATOR: Brand: ESWAB™

## (undated) DEVICE — BNDG ESMARK 4IN 12FT LF STRL BLU

## (undated) DEVICE — INTENDED FOR TISSUE SEPARATION, AND OTHER PROCEDURES THAT REQUIRE A SHARP SURGICAL BLADE TO PUNCTURE OR CUT.: Brand: BARD-PARKER ® CARBON RIB-BACK BLADES

## (undated) DEVICE — UNDERGLV SURG BIOGEL INDICAT PI SZ8 BLU